# Patient Record
Sex: MALE | Race: WHITE | NOT HISPANIC OR LATINO | Employment: OTHER | URBAN - METROPOLITAN AREA
[De-identification: names, ages, dates, MRNs, and addresses within clinical notes are randomized per-mention and may not be internally consistent; named-entity substitution may affect disease eponyms.]

---

## 2024-01-31 ENCOUNTER — APPOINTMENT (OUTPATIENT)
Dept: LAB | Facility: HOSPITAL | Age: 89
End: 2024-01-31
Payer: COMMERCIAL

## 2024-01-31 DIAGNOSIS — Z13.21 SCREENING FOR MALNUTRITION: ICD-10-CM

## 2024-01-31 DIAGNOSIS — E08.65 DIABETES MELLITUS DUE TO UNDERLYING CONDITION WITH HYPERGLYCEMIA, UNSPECIFIED WHETHER LONG TERM INSULIN USE (HCC): ICD-10-CM

## 2024-01-31 DIAGNOSIS — K57.11 DIVERTICULOSIS OF SMALL INTESTINE WITH HEMORRHAGE: ICD-10-CM

## 2024-01-31 DIAGNOSIS — Z13.1 SCREENING FOR DIABETES MELLITUS: ICD-10-CM

## 2024-01-31 DIAGNOSIS — E04.1 NONTOXIC UNINODULAR GOITER: ICD-10-CM

## 2024-01-31 DIAGNOSIS — I10 ESSENTIAL HYPERTENSION, MALIGNANT: ICD-10-CM

## 2024-01-31 DIAGNOSIS — Z13.0 SCREENING FOR IRON DEFICIENCY ANEMIA: ICD-10-CM

## 2024-01-31 DIAGNOSIS — J43.9 EMPHYSEMATOUS BLEB (HCC): ICD-10-CM

## 2024-01-31 DIAGNOSIS — Z13.29 SCREENING FOR THYROID DISORDER: ICD-10-CM

## 2024-01-31 LAB
25(OH)D3 SERPL-MCNC: 38.7 NG/ML (ref 30–100)
ALBUMIN SERPL BCP-MCNC: 4.1 G/DL (ref 3.5–5)
ALP SERPL-CCNC: 71 U/L (ref 34–104)
ALT SERPL W P-5'-P-CCNC: 27 U/L (ref 7–52)
ANION GAP SERPL CALCULATED.3IONS-SCNC: 12 MMOL/L
AST SERPL W P-5'-P-CCNC: 26 U/L (ref 13–39)
BACTERIA UR QL AUTO: ABNORMAL /HPF
BASOPHILS # BLD AUTO: 0.03 THOUSANDS/ÂΜL (ref 0–0.1)
BASOPHILS NFR BLD AUTO: 0 % (ref 0–1)
BILIRUB SERPL-MCNC: 0.97 MG/DL (ref 0.2–1)
BILIRUB UR QL STRIP: NEGATIVE
BUN SERPL-MCNC: 21 MG/DL (ref 5–25)
CALCIUM SERPL-MCNC: 10 MG/DL (ref 8.4–10.2)
CHLORIDE SERPL-SCNC: 101 MMOL/L (ref 96–108)
CHOLEST SERPL-MCNC: 161 MG/DL
CLARITY UR: CLEAR
CO2 SERPL-SCNC: 30 MMOL/L (ref 21–32)
COLOR UR: YELLOW
CREAT SERPL-MCNC: 1.08 MG/DL (ref 0.6–1.3)
EOSINOPHIL # BLD AUTO: 0.27 THOUSAND/ÂΜL (ref 0–0.61)
EOSINOPHIL NFR BLD AUTO: 3 % (ref 0–6)
ERYTHROCYTE [DISTWIDTH] IN BLOOD BY AUTOMATED COUNT: 14.9 % (ref 11.6–15.1)
GFR SERPL CREATININE-BSD FRML MDRD: 60 ML/MIN/1.73SQ M
GLUCOSE P FAST SERPL-MCNC: 103 MG/DL (ref 65–99)
GLUCOSE UR STRIP-MCNC: ABNORMAL MG/DL
HCT VFR BLD AUTO: 47.9 % (ref 36.5–49.3)
HDLC SERPL-MCNC: 45 MG/DL
HGB BLD-MCNC: 15 G/DL (ref 12–17)
HGB UR QL STRIP.AUTO: NEGATIVE
HYALINE CASTS #/AREA URNS LPF: ABNORMAL /LPF
IMM GRANULOCYTES # BLD AUTO: 0.07 THOUSAND/UL (ref 0–0.2)
IMM GRANULOCYTES NFR BLD AUTO: 1 % (ref 0–2)
KETONES UR STRIP-MCNC: NEGATIVE MG/DL
LDLC SERPL CALC-MCNC: 86 MG/DL (ref 0–100)
LEUKOCYTE ESTERASE UR QL STRIP: ABNORMAL
LYMPHOCYTES # BLD AUTO: 0.95 THOUSANDS/ÂΜL (ref 0.6–4.47)
LYMPHOCYTES NFR BLD AUTO: 9 % (ref 14–44)
MCH RBC QN AUTO: 28.7 PG (ref 26.8–34.3)
MCHC RBC AUTO-ENTMCNC: 31.3 G/DL (ref 31.4–37.4)
MCV RBC AUTO: 92 FL (ref 82–98)
MONOCYTES # BLD AUTO: 1.12 THOUSAND/ÂΜL (ref 0.17–1.22)
MONOCYTES NFR BLD AUTO: 11 % (ref 4–12)
MUCOUS THREADS UR QL AUTO: ABNORMAL
NEUTROPHILS # BLD AUTO: 7.63 THOUSANDS/ÂΜL (ref 1.85–7.62)
NEUTS SEG NFR BLD AUTO: 76 % (ref 43–75)
NITRITE UR QL STRIP: NEGATIVE
NON-SQ EPI CELLS URNS QL MICRO: ABNORMAL /HPF
NRBC BLD AUTO-RTO: 0 /100 WBCS
PH UR STRIP.AUTO: 6 [PH]
PLATELET # BLD AUTO: 261 THOUSANDS/UL (ref 149–390)
PMV BLD AUTO: 10.7 FL (ref 8.9–12.7)
POTASSIUM SERPL-SCNC: 4.2 MMOL/L (ref 3.5–5.3)
PROT SERPL-MCNC: 7.6 G/DL (ref 6.4–8.4)
PROT UR STRIP-MCNC: ABNORMAL MG/DL
RBC # BLD AUTO: 5.22 MILLION/UL (ref 3.88–5.62)
RBC #/AREA URNS AUTO: ABNORMAL /HPF
SODIUM SERPL-SCNC: 143 MMOL/L (ref 135–147)
SP GR UR STRIP.AUTO: 1.02 (ref 1–1.03)
TRIGL SERPL-MCNC: 149 MG/DL
TSH SERPL DL<=0.05 MIU/L-ACNC: 1.8 UIU/ML (ref 0.45–4.5)
UROBILINOGEN UR STRIP-ACNC: <2 MG/DL
WBC # BLD AUTO: 10.07 THOUSAND/UL (ref 4.31–10.16)
WBC #/AREA URNS AUTO: ABNORMAL /HPF

## 2024-01-31 PROCEDURE — 36415 COLL VENOUS BLD VENIPUNCTURE: CPT

## 2024-01-31 PROCEDURE — 84443 ASSAY THYROID STIM HORMONE: CPT

## 2024-01-31 PROCEDURE — 80053 COMPREHEN METABOLIC PANEL: CPT

## 2024-01-31 PROCEDURE — 81001 URINALYSIS AUTO W/SCOPE: CPT

## 2024-01-31 PROCEDURE — 80061 LIPID PANEL: CPT

## 2024-01-31 PROCEDURE — 82306 VITAMIN D 25 HYDROXY: CPT

## 2024-01-31 PROCEDURE — 85025 COMPLETE CBC W/AUTO DIFF WBC: CPT

## 2024-03-06 ENCOUNTER — APPOINTMENT (OUTPATIENT)
Dept: LAB | Facility: HOSPITAL | Age: 89
End: 2024-03-06
Payer: COMMERCIAL

## 2024-03-06 DIAGNOSIS — Z79.4 DIABETES MELLITUS DUE TO UNDERLYING CONDITION, UNCONTROLLED, WITH HYPERGLYCEMIA, WITH LONG-TERM CURRENT USE OF INSULIN (HCC): ICD-10-CM

## 2024-03-06 DIAGNOSIS — I10 ESSENTIAL HYPERTENSION, MALIGNANT: ICD-10-CM

## 2024-03-06 DIAGNOSIS — E08.65 DIABETES MELLITUS DUE TO UNDERLYING CONDITION, UNCONTROLLED, WITH HYPERGLYCEMIA, WITH LONG-TERM CURRENT USE OF INSULIN (HCC): ICD-10-CM

## 2024-03-06 LAB
EST. AVERAGE GLUCOSE BLD GHB EST-MCNC: 223 MG/DL
HBA1C MFR BLD: 9.4 %

## 2024-03-06 PROCEDURE — 36415 COLL VENOUS BLD VENIPUNCTURE: CPT

## 2024-03-06 PROCEDURE — 83036 HEMOGLOBIN GLYCOSYLATED A1C: CPT

## 2024-03-07 ENCOUNTER — LAB REQUISITION (OUTPATIENT)
Dept: LAB | Facility: HOSPITAL | Age: 89
End: 2024-03-07
Payer: COMMERCIAL

## 2024-03-07 ENCOUNTER — TELEPHONE (OUTPATIENT)
Dept: LAB | Facility: HOSPITAL | Age: 89
End: 2024-03-07

## 2024-03-07 DIAGNOSIS — E08.65 DIABETES MELLITUS DUE TO UNDERLYING CONDITION WITH HYPERGLYCEMIA (HCC): ICD-10-CM

## 2024-03-07 DIAGNOSIS — I10 ESSENTIAL (PRIMARY) HYPERTENSION: ICD-10-CM

## 2024-03-07 LAB
CREAT UR-MCNC: 54.9 MG/DL
MICROALBUMIN UR-MCNC: 7.2 MG/L
MICROALBUMIN/CREAT 24H UR: 13 MG/G CREATININE (ref 0–30)

## 2024-03-07 PROCEDURE — 82043 UR ALBUMIN QUANTITATIVE: CPT | Performed by: PHYSICIAN ASSISTANT

## 2024-03-07 PROCEDURE — 82570 ASSAY OF URINE CREATININE: CPT | Performed by: PHYSICIAN ASSISTANT

## 2024-04-18 ENCOUNTER — TELEPHONE (OUTPATIENT)
Dept: CARDIOLOGY CLINIC | Facility: CLINIC | Age: 89
End: 2024-04-18

## 2024-04-18 NOTE — TELEPHONE ENCOUNTER
Spoke with Cecil ([address]) who took the message that patient's 5- appointment with Dr Reeves is cancelled.  Dr Reeves is not in the office this morning.  She will have patient contact the office back to reschedule as avelino likes to schedule his own appointments.  Provided number for her to have patient call back

## 2024-05-10 ENCOUNTER — OFFICE VISIT (OUTPATIENT)
Dept: FAMILY MEDICINE CLINIC | Facility: HOSPITAL | Age: 89
End: 2024-05-10
Payer: COMMERCIAL

## 2024-05-10 VITALS
HEART RATE: 64 BPM | HEIGHT: 67 IN | BODY MASS INDEX: 33.43 KG/M2 | TEMPERATURE: 97.9 F | DIASTOLIC BLOOD PRESSURE: 62 MMHG | RESPIRATION RATE: 16 BRPM | WEIGHT: 213 LBS | OXYGEN SATURATION: 90 % | SYSTOLIC BLOOD PRESSURE: 120 MMHG

## 2024-05-10 DIAGNOSIS — I10 PRIMARY HYPERTENSION: ICD-10-CM

## 2024-05-10 DIAGNOSIS — R35.1 BENIGN PROSTATIC HYPERPLASIA WITH NOCTURIA: ICD-10-CM

## 2024-05-10 DIAGNOSIS — E78.2 MIXED HYPERLIPIDEMIA: ICD-10-CM

## 2024-05-10 DIAGNOSIS — G47.33 OSA (OBSTRUCTIVE SLEEP APNEA): ICD-10-CM

## 2024-05-10 DIAGNOSIS — Z79.4 TYPE 2 DIABETES MELLITUS WITH HYPERGLYCEMIA, WITH LONG-TERM CURRENT USE OF INSULIN (HCC): Primary | ICD-10-CM

## 2024-05-10 DIAGNOSIS — J44.9 CHRONIC OBSTRUCTIVE PULMONARY DISEASE, UNSPECIFIED COPD TYPE (HCC): ICD-10-CM

## 2024-05-10 DIAGNOSIS — N40.1 BENIGN PROSTATIC HYPERPLASIA WITH NOCTURIA: ICD-10-CM

## 2024-05-10 DIAGNOSIS — E11.65 TYPE 2 DIABETES MELLITUS WITH HYPERGLYCEMIA, WITH LONG-TERM CURRENT USE OF INSULIN (HCC): Primary | ICD-10-CM

## 2024-05-10 DIAGNOSIS — I25.10 CORONARY ARTERY DISEASE INVOLVING NATIVE CORONARY ARTERY OF NATIVE HEART WITHOUT ANGINA PECTORIS: ICD-10-CM

## 2024-05-10 DIAGNOSIS — E53.8 B12 DEFICIENCY: ICD-10-CM

## 2024-05-10 DIAGNOSIS — E55.9 VITAMIN D DEFICIENCY: ICD-10-CM

## 2024-05-10 PROCEDURE — 99204 OFFICE O/P NEW MOD 45 MIN: CPT | Performed by: INTERNAL MEDICINE

## 2024-05-10 RX ORDER — FINASTERIDE 5 MG/1
TABLET, FILM COATED ORAL EVERY 24 HOURS
COMMUNITY

## 2024-05-10 RX ORDER — ATORVASTATIN CALCIUM 80 MG/1
TABLET, FILM COATED ORAL EVERY 24 HOURS
COMMUNITY

## 2024-05-10 RX ORDER — FLUTICASONE PROPIONATE 50 MCG
SPRAY, SUSPENSION (ML) NASAL EVERY 24 HOURS
COMMUNITY

## 2024-05-10 RX ORDER — AMLODIPINE BESYLATE 10 MG/1
TABLET ORAL EVERY 24 HOURS
COMMUNITY

## 2024-05-10 RX ORDER — INSULIN GLARGINE 100 [IU]/ML
INJECTION, SOLUTION SUBCUTANEOUS
COMMUNITY
Start: 2024-02-27 | End: 2024-05-10

## 2024-05-10 RX ORDER — PANTOPRAZOLE SODIUM 40 MG/1
TABLET, DELAYED RELEASE ORAL EVERY 24 HOURS
COMMUNITY

## 2024-05-10 RX ORDER — IPRATROPIUM BROMIDE AND ALBUTEROL SULFATE 2.5; .5 MG/3ML; MG/3ML
SOLUTION RESPIRATORY (INHALATION)
COMMUNITY

## 2024-05-10 RX ORDER — ATENOLOL 50 MG/1
TABLET ORAL EVERY 24 HOURS
COMMUNITY

## 2024-05-10 RX ORDER — VITAMIN B COMPLEX
TABLET ORAL
COMMUNITY

## 2024-05-10 RX ORDER — FUROSEMIDE 20 MG/1
TABLET ORAL EVERY 24 HOURS
COMMUNITY

## 2024-05-10 RX ORDER — FLUTICASONE FUROATE, UMECLIDINIUM BROMIDE AND VILANTEROL TRIFENATATE 100; 62.5; 25 UG/1; UG/1; UG/1
1 POWDER RESPIRATORY (INHALATION) EVERY 24 HOURS
COMMUNITY

## 2024-05-10 RX ORDER — INSULIN GLARGINE 100 [IU]/ML
INJECTION, SOLUTION SUBCUTANEOUS
Qty: 15 ML | Refills: 3 | Status: SHIPPED | OUTPATIENT
Start: 2024-05-10

## 2024-05-10 RX ORDER — INSULIN ADMIN. SUPPLIES
INSULIN PEN (EA) SUBCUTANEOUS
COMMUNITY
Start: 2024-04-08

## 2024-05-10 RX ORDER — LACTOSE-REDUCED FOOD
LIQUID (ML) ORAL
COMMUNITY

## 2024-05-10 RX ORDER — HYDROCHLOROTHIAZIDE 25 MG/1
TABLET ORAL EVERY 24 HOURS
COMMUNITY

## 2024-05-10 RX ORDER — CYCLOSPORINE 0.5 MG/ML
EMULSION OPHTHALMIC EVERY 12 HOURS
COMMUNITY

## 2024-05-10 RX ORDER — LOSARTAN POTASSIUM 100 MG/1
TABLET ORAL EVERY 24 HOURS
COMMUNITY

## 2024-05-10 RX ORDER — CLOTRIMAZOLE AND BETAMETHASONE DIPROPIONATE 10; .64 MG/G; MG/G
1 CREAM TOPICAL EVERY 12 HOURS
COMMUNITY

## 2024-05-10 RX ORDER — ISOSORBIDE MONONITRATE 60 MG/1
TABLET, EXTENDED RELEASE ORAL
COMMUNITY

## 2024-05-10 RX ORDER — ISOPROPYL ALCOHOL 0.7 ML/1
SWAB TOPICAL
COMMUNITY

## 2024-05-10 RX ORDER — ASPIRIN 81 MG/1
TABLET, CHEWABLE ORAL EVERY 24 HOURS
COMMUNITY

## 2024-05-10 RX ORDER — ACETAMINOPHEN 325 MG/1
TABLET ORAL
COMMUNITY

## 2024-05-10 RX ORDER — ALBUTEROL SULFATE 2.5 MG/3ML
SOLUTION RESPIRATORY (INHALATION)
COMMUNITY

## 2024-05-10 RX ORDER — ALFUZOSIN HYDROCHLORIDE 10 MG/1
TABLET, EXTENDED RELEASE ORAL EVERY 24 HOURS
COMMUNITY

## 2024-05-10 NOTE — ASSESSMENT & PLAN NOTE
Patient has chronic hypertension.  His blood pressure is controlled today    I will check his electrolytes and renal function since he is on furosemide and hydrochlorothiazide in addition to losartan, atenolol and amlodipine.    Will continue this combination for now and will review labs.

## 2024-05-10 NOTE — ASSESSMENT & PLAN NOTE
He has chronic COPD.  He quit smoking in 1988  He feels that his breathing is at baseline.  He uses oxygen as needed when he feels out of breath.  He has an oxygen concentrator at his facility.    His lungs are completely clear to auscultation today.  Continue Trelegy and as needed albuterol

## 2024-05-10 NOTE — ASSESSMENT & PLAN NOTE
He has BPH with nocturia.  Denies urinary retention, dysuria, hematuria.    He requested that I check his PSA.  I told him that after age 70 screening for prostate cancer is not indicated by any professional societies    He will continue alfuzosin and finasteride for BPH

## 2024-05-10 NOTE — ASSESSMENT & PLAN NOTE
Lab Results   Component Value Date    HGBA1C 9.4 (H) 03/06/2024       Patient presents as a new patient to establish care at this practice.    He has had diabetes at least since 2008.  He denies retinopathy nephropathy or neuropathy from diabetes.    He is not able to follow a diabetic diet when he resides at an assisted living facility.  His blood sugars are frequently more than 300.  The lowest blood sugar he remembers is 146.  He checks his blood sugars 4 times a day.    A1c was uncontrolled in March    I increased his Lantus to 30 units in the morning from 20 units and will continue giving him 40 units in the evening.  I will recheck his A1c  Continue Jardiance    He saw an ophthalmologist last year somewhere in the [facility]  Monofilament testing of the feet today was normal.  Will check his renal function and his urine microalbumin    I will follow-up with him at [facility] living [facility]

## 2024-05-10 NOTE — PROGRESS NOTES
Assessment/Plan:    Type 2 diabetes mellitus with hyperglycemia, with long-term current use of insulin ([facility])    Lab Results   Component Value Date    HGBA1C 9.4 (H) 03/06/2024       Patient presents as a new patient to establish care at this practice.    He has had diabetes at least since 2008.  He denies retinopathy nephropathy or neuropathy from diabetes.    He is not able to follow a diabetic diet when he resides at an assisted living facility.  His blood sugars are frequently more than 300.  The lowest blood sugar he remembers is 146.  He checks his blood sugars 4 times a day.    A1c was uncontrolled in March    I increased his Lantus to 30 units in the morning from 20 units and will continue giving him 40 units in the evening.  I will recheck his A1c  Continue Jardiance    He saw an ophthalmologist last year somewhere in the [facility]  Monofilament testing of the feet today was normal.  Will check his renal function and his urine microalbumin    I will follow-up with him at [facility]    Coronary artery disease involving native coronary artery of native heart without angina pectoris  Patient has coronary disease and he is status post 3 stent placements in his arteries.  He denies chest pain at rest or with exertion.  He only gastro breath with heavy physical exertion.    His lungs and heart examinations are normal today    Continue aspirin, atorvastatin, Imdur, atenolol and losartan    COPD (chronic obstructive pulmonary disease) ([facility])  He has chronic COPD.  He quit smoking in 1988  He feels that his breathing is at baseline.  He uses oxygen as needed when he feels out of breath.  He has an oxygen concentrator at his facility.    His lungs are completely clear to auscultation today.  Continue Trelegy and as needed albuterol    Primary hypertension  Patient has chronic hypertension.  His blood pressure is controlled today    I will check his electrolytes and renal function  since he is on furosemide and hydrochlorothiazide in addition to losartan, atenolol and amlodipine.    Will continue this combination for now and will review labs.    Benign prostatic hyperplasia with nocturia  He has BPH with nocturia.  Denies urinary retention, dysuria, hematuria.    He requested that I check his PSA.  I told him that after age 70 screening for prostate cancer is not indicated by any professional societies    He will continue alfuzosin and finasteride for BPH    Mixed hyperlipidemia  He has hyperlipidemia.  Denies myalgias.  I will check his cholesterol.  Continue atorvastatin    Vitamin D deficiency  He has vitamin D deficiency.  I will check his vitamin D level    B12 deficiency  He has a vitamin B12 deficiency.  I will check his B12 level       Diagnoses and all orders for this visit:    Type 2 diabetes mellitus with hyperglycemia, with long-term current use of insulin ([facility])  -     Comprehensive metabolic panel; Future  -     CBC and Platelet; Future  -     Albumin / creatinine urine ratio; Future  -     Hemoglobin A1C; Future  -     Lipid Panel with Direct LDL reflex; Future  -     TSH, 3rd generation with Free T4 reflex; Future  -     Insulin Glargine Solostar (Lantus SoloStar) 100 UNIT/ML SOPN; 30 units sc in am and 40 sc at hs    Chronic obstructive pulmonary disease, unspecified COPD type ([facility])    Coronary artery disease involving native coronary artery of native heart without angina pectoris    Primary hypertension  -     Comprehensive metabolic panel; Future  -     CBC and Platelet; Future  -     TSH, 3rd generation with Free T4 reflex; Future    B12 deficiency  -     Vitamin B12; Future    Mixed hyperlipidemia  -     Lipid Panel with Direct LDL reflex; Future    Vitamin D deficiency  -     Vitamin D 25 hydroxy; Future    LINCOLN (obstructive sleep apnea)    Benign prostatic hyperplasia with nocturia    Other orders  -     Alcohol Swabs (Alcohol Prep Pad) 70 % PADS; As directed  -      Multiple Vitamin (MULTIVITAMIN ADULT PO); every 24 hours  -     ipratropium-albuterol (DUO-NEB) 0.5-2.5 mg/3 mL nebulizer solution; 4 times daily prn wheezing  -     isosorbide mononitrate (IMDUR) 60 mg 24 hr tablet; 1 tablet in the morning Orally Once a day  -     losartan (COZAAR) 100 MG tablet; every 24 hours  -     Cyanocobalamin 2500 MCG SUBL; 1 tab --5 times a week  -     Empagliflozin (JARDIANCE) 10 MG TABS tablet; every 24 hours  -     clotrimazole-betamethasone (LOTRISONE) 1-0.05 % cream; 1 Application Every 12 hours  -     cycloSPORINE (RESTASIS) 0.05 % ophthalmic emulsion; Administer to both eyes Every 12 hours  -     albuterol (2.5 mg/3 mL) 0.083 % nebulizer solution; 1 vial q 4 hrs prn  -     alfuzosin (UROXATRAL) 10 mg 24 hr tablet; every 24 hours  -     amLODIPine (NORVASC) 10 mg tablet; every 24 hours  -     aspirin 81 mg chewable tablet; every 24 hours  -     atorvastatin (LIPITOR) 80 mg tablet; every 24 hours  -     atenolol (TENORMIN) 50 mg tablet; every 24 hours  -     finasteride (PROSCAR) 5 mg tablet; every 24 hours  -     fluticasone (FLONASE) 50 mcg/act nasal spray; every 24 hours  -     Trelegy Ellipta 100-62.5-25 MCG/ACT inhaler; 1 puff every 24 hours  -     furosemide (LASIX) 20 mg tablet; every 24 hours  -     hydroCHLOROthiazide 25 mg tablet; every 24 hours  -     Discontinue: Lantus SoloStar 100 units/mL SOPN; 22 units in am and 40 units in evening Subcutaneous 2 x daily for 90 days  -     NovoFine Autocover Pen Needle 30G X 8 MM MISC; 2 pen needles daily  -     pantoprazole (PROTONIX) 40 mg tablet; every 24 hours  -     Nutritional Supplements (Ensure); Take by mouth 1 can daily  -     acetaminophen (TYLENOL) 325 mg tablet; 2 tabs q 6 hrs prn pain or fever greater than 100  -     Cholecalciferol (Vitamin D3) 25 MCG (1000 UT) CAPS; Take by mouth 1 daily          Subjective:      Patient ID: Kyler Hernandez is a 88 y.o. male.      HPI    Patient presents for follow-up of chronic conditions  "as detailed in the assessment and plan.      The following portions of the patient's history were reviewed and updated as appropriate: current medications, past family history, past medical history, past social history, past surgical history and problem list.    Review of Systems   Constitutional:  Negative for fever.   HENT:  Positive for hearing loss.    Eyes:  Negative for visual disturbance.   Respiratory:  Negative for cough. Shortness of breath: with heavy exetion only.   Cardiovascular:  Negative for chest pain and palpitations.   Gastrointestinal:  Negative for abdominal pain, blood in stool, constipation and diarrhea.   Endocrine: Negative for polydipsia and polyphagia.   Genitourinary:  Negative for dysuria and hematuria.   Musculoskeletal:  Negative for myalgias.   Skin:  Negative for rash.   Neurological:  Negative for weakness, numbness and headaches.   Psychiatric/Behavioral:  Negative for dysphoric mood.    All other systems reviewed and are negative.        Objective:    /62   Pulse 64   Temp 97.9 °F (36.6 °C) (Tympanic)   Resp 16   Ht 5' 7\" (1.702 m)   Wt 96.6 kg (213 lb)   SpO2 90%   BMI 33.36 kg/m²      Physical Exam  HENT:      Head: Normocephalic.   Eyes:      Conjunctiva/sclera: Conjunctivae normal.   Cardiovascular:      Rate and Rhythm: Normal rate and regular rhythm.      Pulses: no weak pulses.           Dorsalis pedis pulses are 2+ on the right side and 2+ on the left side.        Posterior tibial pulses are 2+ on the right side and 1+ on the left side.      Heart sounds: No murmur heard.  Pulmonary:      Effort: No respiratory distress.      Breath sounds: No wheezing or rales.   Abdominal:      General: Bowel sounds are normal.      Palpations: Abdomen is soft.      Tenderness: There is no abdominal tenderness.   Musculoskeletal:         General: No tenderness.      Cervical back: Neck supple.   Feet:      Right foot:      Skin integrity: No ulcer.      Left foot:      Skin " integrity: No ulcer.   Skin:     General: Skin is warm and dry.   Neurological:      Mental Status: He is alert.      Cranial Nerves: No cranial nerve deficit.      Motor: No weakness.      Gait: Gait normal.   Psychiatric:         Mood and Affect: Mood normal.             Csaba Berces, MDDiabetic Foot Exam    Patient's shoes and socks removed.    Right Foot/Ankle   Right Foot Inspection  Skin Exam: No ulcer.     Sensory   Monofilament testing: intact    Vascular  The right DP pulse is 2+. The right PT pulse is 2+.     Left Foot/Ankle  Left Foot Inspection  Skin Exam: No ulcer.     Sensory   Monofilament testing: intact    Vascular  The left DP pulse is 2+. The left PT pulse is 1+.     Assign Risk Category  No deformity present  No loss of protective sensation  No weak pulses  Risk: 0      Depression Screening and Follow-up Plan: Patient was screened for depression during today's encounter. They screened negative with a PHQ-2 score of 0.

## 2024-05-10 NOTE — ASSESSMENT & PLAN NOTE
Patient has coronary disease and he is status post 3 stent placements in his arteries.  He denies chest pain at rest or with exertion.  He only gastro breath with heavy physical exertion.    His lungs and heart examinations are normal today    Continue aspirin, atorvastatin, Imdur, atenolol and losartan

## 2024-05-15 ENCOUNTER — APPOINTMENT (OUTPATIENT)
Dept: LAB | Facility: HOSPITAL | Age: 89
End: 2024-05-15
Attending: INTERNAL MEDICINE
Payer: COMMERCIAL

## 2024-05-15 DIAGNOSIS — E55.9 VITAMIN D DEFICIENCY: ICD-10-CM

## 2024-05-15 DIAGNOSIS — E53.8 B12 DEFICIENCY: ICD-10-CM

## 2024-05-15 DIAGNOSIS — Z79.4 TYPE 2 DIABETES MELLITUS WITH HYPERGLYCEMIA, WITH LONG-TERM CURRENT USE OF INSULIN (HCC): ICD-10-CM

## 2024-05-15 DIAGNOSIS — I10 PRIMARY HYPERTENSION: ICD-10-CM

## 2024-05-15 DIAGNOSIS — E78.2 MIXED HYPERLIPIDEMIA: ICD-10-CM

## 2024-05-15 DIAGNOSIS — E11.65 TYPE 2 DIABETES MELLITUS WITH HYPERGLYCEMIA, WITH LONG-TERM CURRENT USE OF INSULIN (HCC): ICD-10-CM

## 2024-05-15 LAB
25(OH)D3 SERPL-MCNC: 41.4 NG/ML (ref 30–100)
ALBUMIN SERPL BCP-MCNC: 4.4 G/DL (ref 3.5–5)
ALP SERPL-CCNC: 71 U/L (ref 34–104)
ALT SERPL W P-5'-P-CCNC: 22 U/L (ref 7–52)
ANION GAP SERPL CALCULATED.3IONS-SCNC: 10 MMOL/L (ref 4–13)
AST SERPL W P-5'-P-CCNC: 16 U/L (ref 13–39)
BILIRUB SERPL-MCNC: 0.63 MG/DL (ref 0.2–1)
BUN SERPL-MCNC: 25 MG/DL (ref 5–25)
CALCIUM SERPL-MCNC: 9.9 MG/DL (ref 8.4–10.2)
CHLORIDE SERPL-SCNC: 101 MMOL/L (ref 96–108)
CHOLEST SERPL-MCNC: 161 MG/DL
CO2 SERPL-SCNC: 32 MMOL/L (ref 21–32)
CREAT SERPL-MCNC: 1.3 MG/DL (ref 0.6–1.3)
ERYTHROCYTE [DISTWIDTH] IN BLOOD BY AUTOMATED COUNT: 14.8 % (ref 11.6–15.1)
EST. AVERAGE GLUCOSE BLD GHB EST-MCNC: 212 MG/DL
GFR SERPL CREATININE-BSD FRML MDRD: 48 ML/MIN/1.73SQ M
GLUCOSE P FAST SERPL-MCNC: 172 MG/DL (ref 65–99)
HBA1C MFR BLD: 9 %
HCT VFR BLD AUTO: 46.3 % (ref 36.5–49.3)
HDLC SERPL-MCNC: 46 MG/DL
HGB BLD-MCNC: 15 G/DL (ref 12–17)
LDLC SERPL CALC-MCNC: 87 MG/DL (ref 0–100)
MCH RBC QN AUTO: 29.9 PG (ref 26.8–34.3)
MCHC RBC AUTO-ENTMCNC: 32.4 G/DL (ref 31.4–37.4)
MCV RBC AUTO: 92 FL (ref 82–98)
PLATELET # BLD AUTO: 221 THOUSANDS/UL (ref 149–390)
PMV BLD AUTO: 10.2 FL (ref 8.9–12.7)
POTASSIUM SERPL-SCNC: 4.1 MMOL/L (ref 3.5–5.3)
PROT SERPL-MCNC: 7.9 G/DL (ref 6.4–8.4)
RBC # BLD AUTO: 5.01 MILLION/UL (ref 3.88–5.62)
SODIUM SERPL-SCNC: 143 MMOL/L (ref 135–147)
TRIGL SERPL-MCNC: 142 MG/DL
TSH SERPL DL<=0.05 MIU/L-ACNC: 1.6 UIU/ML (ref 0.45–4.5)
VIT B12 SERPL-MCNC: 1967 PG/ML (ref 180–914)
WBC # BLD AUTO: 7.54 THOUSAND/UL (ref 4.31–10.16)

## 2024-05-15 PROCEDURE — 82607 VITAMIN B-12: CPT

## 2024-05-15 PROCEDURE — 36415 COLL VENOUS BLD VENIPUNCTURE: CPT

## 2024-05-15 PROCEDURE — 82306 VITAMIN D 25 HYDROXY: CPT

## 2024-05-15 PROCEDURE — 83036 HEMOGLOBIN GLYCOSYLATED A1C: CPT

## 2024-05-15 PROCEDURE — 84443 ASSAY THYROID STIM HORMONE: CPT

## 2024-05-15 PROCEDURE — 80053 COMPREHEN METABOLIC PANEL: CPT

## 2024-05-15 PROCEDURE — 85027 COMPLETE CBC AUTOMATED: CPT

## 2024-05-15 PROCEDURE — 80061 LIPID PANEL: CPT

## 2024-05-23 ENCOUNTER — IN HOME VISIT (OUTPATIENT)
Dept: FAMILY MEDICINE CLINIC | Facility: HOSPITAL | Age: 89
End: 2024-05-23
Payer: COMMERCIAL

## 2024-05-23 VITALS
HEART RATE: 68 BPM | TEMPERATURE: 96.9 F | WEIGHT: 216 LBS | RESPIRATION RATE: 16 BRPM | BODY MASS INDEX: 33.83 KG/M2 | DIASTOLIC BLOOD PRESSURE: 62 MMHG | SYSTOLIC BLOOD PRESSURE: 124 MMHG

## 2024-05-23 DIAGNOSIS — R35.1 BENIGN PROSTATIC HYPERPLASIA WITH NOCTURIA: ICD-10-CM

## 2024-05-23 DIAGNOSIS — N40.1 BENIGN PROSTATIC HYPERPLASIA WITH NOCTURIA: ICD-10-CM

## 2024-05-23 DIAGNOSIS — Z79.4 TYPE 2 DIABETES MELLITUS WITH HYPERGLYCEMIA, WITH LONG-TERM CURRENT USE OF INSULIN (HCC): Primary | ICD-10-CM

## 2024-05-23 DIAGNOSIS — I10 PRIMARY HYPERTENSION: ICD-10-CM

## 2024-05-23 DIAGNOSIS — E11.65 TYPE 2 DIABETES MELLITUS WITH HYPERGLYCEMIA, WITH LONG-TERM CURRENT USE OF INSULIN (HCC): Primary | ICD-10-CM

## 2024-05-23 PROCEDURE — 99349 HOME/RES VST EST MOD MDM 40: CPT | Performed by: INTERNAL MEDICINE

## 2024-05-23 RX ORDER — INSULIN GLARGINE 100 [IU]/ML
INJECTION, SOLUTION SUBCUTANEOUS
Qty: 15 ML | Refills: 3 | Status: SHIPPED | OUTPATIENT
Start: 2024-05-23

## 2024-05-23 RX ORDER — INSULIN LISPRO 100 [IU]/ML
5 INJECTION, SOLUTION INTRAVENOUS; SUBCUTANEOUS
Qty: 15 ML | Refills: 5 | Status: SHIPPED | OUTPATIENT
Start: 2024-05-23

## 2024-05-23 RX ORDER — FUROSEMIDE 40 MG/1
40 TABLET ORAL EVERY MORNING
Qty: 30 TABLET | Refills: 5 | Status: SHIPPED | OUTPATIENT
Start: 2024-05-23

## 2024-05-23 NOTE — ASSESSMENT & PLAN NOTE
Lab Results   Component Value Date    HGBA1C 9.0 (H) 05/15/2024       Patient has type 2 diabetes with hyperglycemia.  His fasting blood sugars in the morning since May 17 have been running between 138-188, before lunch blood sugars can be anywhere between 184-279, before dinner blood sugars run between 176 and 262, bedtime blood sugars run between 215-346.    I placed patient on diabetic diet and the assisted in facility    I will increase the dose of his morning Lantus to 35 units in the morning and will add Humalog before dinner to improve bedtime hyperglycemia    Recheck A1c after August 15

## 2024-05-23 NOTE — ASSESSMENT & PLAN NOTE
Patient has BPH with nocturia.  He feels that he is able to empty his bladder.  Denies urinary retention.  Continue alfuzosin and finasteride

## 2024-05-23 NOTE — ASSESSMENT & PLAN NOTE
Patient's blood pressure is acceptable.    His electrolytes were normal on May 15, renal function was worse than in January.  Patient does not take NSAIDs.    His lungs are clear to auscultation, he has trace lower leg edema.    I increased the dose of furosemide to 40 mg in the morning and discontinue the HCTZ.  I will recheck his electrolytes renal function next week    Continue losartan 100 mg daily, continue amlodipine 10 mg and atenolol 50 mg daily

## 2024-05-23 NOTE — PROGRESS NOTES
Assessment/Plan:    Type 2 diabetes mellitus with hyperglycemia, with long-term current use of insulin ([facility])    Lab Results   Component Value Date    HGBA1C 9.0 (H) 05/15/2024       Patient has type 2 diabetes with hyperglycemia.  His fasting blood sugars in the morning since May 17 have been running between 138-188, before lunch blood sugars can be anywhere between 184-279, before dinner blood sugars run between 176 and 262, bedtime blood sugars run between 215-346.    I placed patient on diabetic diet and the assisted in facility    I will increase the dose of his morning Lantus to 35 units in the morning and will add Humalog before dinner to improve bedtime hyperglycemia    Recheck A1c after August 15    Primary hypertension  Patient's blood pressure is acceptable.    His electrolytes were normal on May 15, renal function was worse than in January.  Patient does not take NSAIDs.    His lungs are clear to auscultation, he has trace lower leg edema.    I increased the dose of furosemide to 40 mg in the morning and discontinue the HCTZ.  I will recheck his electrolytes renal function next week    Continue losartan 100 mg daily, continue amlodipine 10 mg and atenolol 50 mg daily      Benign prostatic hyperplasia with nocturia  Patient has BPH with nocturia.  He feels that he is able to empty his bladder.  Denies urinary retention.  Continue alfuzosin and finasteride     Diagnoses and all orders for this visit:    Type 2 diabetes mellitus with hyperglycemia, with long-term current use of insulin ([facility])  -     Basic metabolic panel; Future  -     Hemoglobin A1C; Future  -     Insulin Glargine Solostar (Lantus SoloStar) 100 UNIT/ML SOPN; 35 units sc in am and 40 sc at hs  -     insulin lispro (HumaLOG KwikPen) 100 units/mL injection pen; Inject 5 Units under the skin daily before dinner    Primary hypertension  -     furosemide (LASIX) 40 mg tablet; Take 1 tablet (40 mg total) by mouth every morning  -     Basic  metabolic panel; Future    Benign prostatic hyperplasia with nocturia          Subjective:      Patient ID: Kyler Hernandez is a 88 y.o. male.      I saw patient for follow-up at [facility] in [facility] for diabetes mellitus with uncontrolled blood sugars.    We reviewed labs from March 15.  He has not been on diabetic diet at the facility but I started it today.          Patient presents for follow-up of chronic conditions as detailed in the assessment and plan.      The following portions of the patient's history were reviewed and updated as appropriate: current medications, past family history, past medical history, past social history, past surgical history and problem list.    Review of Systems   Respiratory:  Negative for shortness of breath.    Cardiovascular:  Negative for chest pain.   Gastrointestinal:  Negative for abdominal pain and blood in stool.   Genitourinary:  Positive for difficulty urinating. Negative for dysuria and hematuria.         Objective:    /62   Pulse 68   Temp (!) 96.9 °F (36.1 °C)   Resp 16   Wt 98 kg (216 lb)   BMI 33.83 kg/m²      Physical Exam  Constitutional:       General: He is not in acute distress.     Appearance: He is not toxic-appearing.   Cardiovascular:      Rate and Rhythm: Normal rate and regular rhythm.   Pulmonary:      Effort: No respiratory distress.      Breath sounds: No wheezing or rales.   Abdominal:      General: Bowel sounds are normal.      Palpations: Abdomen is soft. There is no mass.      Tenderness: There is no abdominal tenderness.   Skin:     General: Skin is warm and dry.   Neurological:      Mental Status: He is alert and oriented to person, place, and time.      Motor: No weakness.   Psychiatric:         Mood and Affect: Mood normal.             Gordon Álvarez MD

## 2024-06-10 ENCOUNTER — OFFICE VISIT (OUTPATIENT)
Dept: ENDOCRINOLOGY | Facility: HOSPITAL | Age: 89
End: 2024-06-10
Payer: COMMERCIAL

## 2024-06-10 VITALS
HEART RATE: 50 BPM | BODY MASS INDEX: 34.31 KG/M2 | HEIGHT: 67 IN | OXYGEN SATURATION: 90 % | DIASTOLIC BLOOD PRESSURE: 60 MMHG | SYSTOLIC BLOOD PRESSURE: 118 MMHG | WEIGHT: 218.6 LBS

## 2024-06-10 DIAGNOSIS — I25.10 CORONARY ARTERY DISEASE INVOLVING NATIVE CORONARY ARTERY OF NATIVE HEART WITHOUT ANGINA PECTORIS: ICD-10-CM

## 2024-06-10 DIAGNOSIS — E78.2 MIXED HYPERLIPIDEMIA: Primary | ICD-10-CM

## 2024-06-10 DIAGNOSIS — Z79.4 TYPE 2 DIABETES MELLITUS WITH HYPERGLYCEMIA, WITH LONG-TERM CURRENT USE OF INSULIN (HCC): ICD-10-CM

## 2024-06-10 DIAGNOSIS — E11.65 TYPE 2 DIABETES MELLITUS WITH HYPERGLYCEMIA, WITH LONG-TERM CURRENT USE OF INSULIN (HCC): ICD-10-CM

## 2024-06-10 DIAGNOSIS — I10 PRIMARY HYPERTENSION: ICD-10-CM

## 2024-06-10 PROCEDURE — 99204 OFFICE O/P NEW MOD 45 MIN: CPT | Performed by: STUDENT IN AN ORGANIZED HEALTH CARE EDUCATION/TRAINING PROGRAM

## 2024-06-10 RX ORDER — INSULIN LISPRO 100 [IU]/ML
1-8 INJECTION, SOLUTION INTRAVENOUS; SUBCUTANEOUS
Qty: 15 ML | Refills: 5 | Status: SHIPPED | OUTPATIENT
Start: 2024-06-10

## 2024-06-10 RX ORDER — DULAGLUTIDE 0.75 MG/.5ML
0.75 INJECTION, SOLUTION SUBCUTANEOUS
Qty: 2 ML | Refills: 2 | Status: SHIPPED | OUTPATIENT
Start: 2024-06-10

## 2024-06-10 NOTE — PROGRESS NOTES
"    New Patient Consult Note      Chief Complaint   Patient presents with    Diabetes Type 2      Referring Provider  Referral Self  No address on file     History of Present Illness:   Kyler Hernandez is a 88 y.o. male with a history of type 2 diabetes since 1987 With complications of CABG s.p stenting x3 who presents today for diabetes management. Other Pmhx of htn, hlp, class 1 obesity, COPD    Patient was first diagnosed with T2DM- 1987  Control since diagnosis:- only 2 A1C on file 03/24 9.4% now 9%   Medications tried thus far: metformin for years but reports wont be on it anymore since it will kill his kidney  Current regime:- lantus 35u AM and 40u PM, Lispro 5u with dinner, jardiance 10mg   BG control:- checks BG 3-4x daily.   Random BG checks but avg in 200-300 range  Hypoglycemic episodes:  None  Hyperglycemia symptoms: no polyuria or polydipsia\",\"no chest pain, dyspnea or TIAs\",\"no numbness, tingling or pain in extremities\"  Diet: reports they give him poor diet at the facility and they dont have very many diabetic friendly options  Activity: stays active    Opthamology: oct 2023; no retinopathy, no macular edema  Hx of hyperlipidemia: yes  Hx of hypertension: yes  Last Lipid:- 05/24 LDL <100  Last urine microalbumin: 03/24 normal   Last hbA1C: 05/24 9%    Social Hx:- lives [facility]  Family HX:- nephew had T1DM, sister had T2DM    Patient Active Problem List   Diagnosis    Coronary artery disease involving native coronary artery of native heart without angina pectoris    Primary hypertension    Type 2 diabetes mellitus with hyperglycemia, with long-term current use of insulin (HCC)    Benign prostatic hyperplasia with nocturia    COPD (chronic obstructive pulmonary disease) (HCC)    LINCOLN (obstructive sleep apnea)    B12 deficiency    Vitamin D deficiency    Mixed hyperlipidemia      Past Medical History:   Diagnosis Date    Atherosclerotic heart disease of native coronary artery without angina " pectoris     Chronic obstructive pulmonary disease, unspecified COPD type (HCC)     Diabetes mellitus (HCC)     Dry eye syndrome     Hyperlipidemia     Hypertension     Nonrheumatic mitral (valve) insufficiency     Obstructive sleep apnea (adult) (pediatric)     Vitamin B12 deficiency     Vitamin D deficiency       Past Surgical History:   Procedure Laterality Date    HERNIA REPAIR        Family History   Problem Relation Age of Onset    Dementia Mother     Diabetes Mother     Peripheral vascular disease Father     Mental illness Neg Hx     Substance Abuse Neg Hx      Social History     Tobacco Use    Smoking status: Former     Types: Cigarettes     Start date:      Quit date:      Years since quittin.4    Smokeless tobacco: Never   Substance Use Topics    Alcohol use: Yes     Comment: occasional whiskey sour     No Known Allergies      Current Outpatient Medications:     acetaminophen (TYLENOL) 325 mg tablet, 2 tabs q 6 hrs prn pain or fever greater than 100, Disp: , Rfl:     albuterol (2.5 mg/3 mL) 0.083 % nebulizer solution, 1 vial q 4 hrs prn, Disp: , Rfl:     Alcohol Swabs (Alcohol Prep Pad) 70 % PADS, As directed, Disp: , Rfl:     alfuzosin (UROXATRAL) 10 mg 24 hr tablet, every 24 hours, Disp: , Rfl:     amLODIPine (NORVASC) 10 mg tablet, every 24 hours, Disp: , Rfl:     aspirin 81 mg chewable tablet, every 24 hours, Disp: , Rfl:     atenolol (TENORMIN) 50 mg tablet, every 24 hours, Disp: , Rfl:     atorvastatin (LIPITOR) 80 mg tablet, every 24 hours, Disp: , Rfl:     Cholecalciferol (Vitamin D3) 25 MCG (1000 UT) CAPS, Take by mouth 1 daily, Disp: , Rfl:     clotrimazole-betamethasone (LOTRISONE) 1-0.05 % cream, 1 Application Every 12 hours, Disp: , Rfl:     Cyanocobalamin 2500 MCG SUBL, 1 tab --5 times a week, Disp: , Rfl:     cycloSPORINE (RESTASIS) 0.05 % ophthalmic emulsion, Administer to both eyes Every 12 hours, Disp: , Rfl:     Empagliflozin (JARDIANCE) 10 MG TABS tablet, every 24 hours,  "Disp: , Rfl:     finasteride (PROSCAR) 5 mg tablet, every 24 hours, Disp: , Rfl:     fluticasone (FLONASE) 50 mcg/act nasal spray, every 24 hours, Disp: , Rfl:     furosemide (LASIX) 40 mg tablet, Take 1 tablet (40 mg total) by mouth every morning, Disp: 30 tablet, Rfl: 5    Insulin Glargine Solostar (Lantus SoloStar) 100 UNIT/ML SOPN, 35 units sc in am and 40 sc at hs, Disp: 15 mL, Rfl: 3    insulin lispro (HumaLOG KwikPen) 100 units/mL injection pen, Inject 5 Units under the skin daily before dinner, Disp: 15 mL, Rfl: 5    ipratropium-albuterol (DUO-NEB) 0.5-2.5 mg/3 mL nebulizer solution, 4 times daily prn wheezing, Disp: , Rfl:     isosorbide mononitrate (IMDUR) 60 mg 24 hr tablet, 1 tablet in the morning Orally Once a day, Disp: , Rfl:     losartan (COZAAR) 100 MG tablet, every 24 hours, Disp: , Rfl:     Multiple Vitamin (MULTIVITAMIN ADULT PO), every 24 hours, Disp: , Rfl:     NovoFine Autocover Pen Needle 30G X 8 MM MISC, 2 pen needles daily, Disp: , Rfl:     Nutritional Supplements (Ensure), Take by mouth 1 can daily, Disp: , Rfl:     pantoprazole (PROTONIX) 40 mg tablet, every 24 hours, Disp: , Rfl:     Trelegy Ellipta 100-62.5-25 MCG/ACT inhaler, 1 puff every 24 hours, Disp: , Rfl:   Review of Systems   Constitutional:  Negative for diaphoresis, fatigue and unexpected weight change.   Respiratory:  Negative for shortness of breath.    Cardiovascular:  Negative for chest pain and palpitations.   Gastrointestinal:  Negative for constipation and diarrhea.   Endocrine: Negative for polydipsia and polyuria.       Physical Exam:  Body mass index is 34.24 kg/m².  /60   Pulse (!) 50   Ht 5' 7\" (1.702 m)   Wt 99.2 kg (218 lb 9.6 oz)   SpO2 90%   BMI 34.24 kg/m²    Wt Readings from Last 3 Encounters:   06/10/24 99.2 kg (218 lb 9.6 oz)   05/23/24 98 kg (216 lb)   05/10/24 96.6 kg (213 lb)       Physical Exam  Constitutional:       Appearance: Normal appearance. He is obese.   Cardiovascular:      Rate and " Rhythm: Normal rate and regular rhythm.      Pulses: Normal pulses. no weak pulses.           Dorsalis pedis pulses are 2+ on the right side and 2+ on the left side.   Pulmonary:      Effort: Pulmonary effort is normal.   Abdominal:      General: Abdomen is flat.      Palpations: Abdomen is soft.   Feet:      Right foot:      Skin integrity: Callus and dry skin present. No ulcer, skin breakdown, erythema or warmth.      Left foot:      Skin integrity: Callus and dry skin present. No ulcer, skin breakdown, erythema or warmth.   Skin:     General: Skin is warm.      Capillary Refill: Capillary refill takes less than 2 seconds.   Neurological:      General: No focal deficit present.      Mental Status: He is alert.       Patient's shoes and socks removed.    Right Foot/Ankle   Right Foot Inspection  Skin Exam: skin normal, skin intact, dry skin, callus and callus. No warmth, no erythema, no maceration, no abnormal color, no pre-ulcer and no ulcer.     Toe Exam: ROM and strength within normal limits.     Sensory   Vibration: intact  Monofilament testing: intact    Vascular  Capillary refills: < 3 seconds  The right DP pulse is 2+.     Left Foot/Ankle  Left Foot Inspection  Skin Exam: skin normal, skin intact, dry skin and callus. No warmth, no erythema, no maceration, normal color, no pre-ulcer and no ulcer.     Toe Exam: ROM and strength within normal limits.     Sensory   Vibration: intact  Monofilament testing: intact    Vascular  Capillary refills: < 3 seconds  The left DP pulse is 2+.     Assign Risk Category  Deformity present  No loss of protective sensation  No weak pulses  Risk: 0      Labs:    Latest Reference Range & Units 03/06/24 08:10 05/15/24 07:55   Hemoglobin A1C Normal 4.0-5.6%; PreDiabetic 5.7-6.4%; Diabetic >=6.5%; Glycemic control for adults with diabetes <7.0% % 9.4 (H) 9.0 (H)   eAG, EST AVG Glucose mg/dl 223 212   (H): Data is abnormally high     Latest Reference Range & Units 03/07/24 20:55    EXT Creatinine Urine Reference range not established. mg/dL 54.9   Albumin Creat Ratio 0 - 30 mg/g creatinine 13   Albumin,U,Random <20.0 mg/L 7.2      Latest Reference Range & Units 01/31/24 08:15 05/15/24 07:55   Cholesterol See Comment mg/dL 161 161   Triglycerides See Comment mg/dL 149 142   HDL >=40 mg/dL 45 46   LDL Calculated 0 - 100 mg/dL 86 87     Impression:  1. Mixed hyperlipidemia    2. Type 2 diabetes mellitus with hyperglycemia, with long-term current use of insulin (HCC)    3. Primary hypertension    4. Coronary artery disease involving native coronary artery of native heart without angina pectoris           Plan:    Kyler was seen today for diabetes type 2.    Diagnoses and all orders for this visit:    Mixed hyperlipidemia  -     Comprehensive metabolic panel; Future  -     Albumin / creatinine urine ratio; Future  -     Lipid panel; Future  -     Hemoglobin A1C; Future  -     Ambulatory referral to Diabetic Education; Future    Type 2 diabetes mellitus with hyperglycemia, with long-term current use of insulin (HCC)  -     Comprehensive metabolic panel; Future  -     Albumin / creatinine urine ratio; Future  -     Lipid panel; Future  -     Hemoglobin A1C; Future  -     Ambulatory referral to Diabetic Education; Future    Primary hypertension  -     Comprehensive metabolic panel; Future  -     Albumin / creatinine urine ratio; Future  -     Lipid panel; Future  -     Hemoglobin A1C; Future  -     Ambulatory referral to Diabetic Education; Future    Coronary artery disease involving native coronary artery of native heart without angina pectoris  -     Comprehensive metabolic panel; Future  -     Albumin / creatinine urine ratio; Future  -     Lipid panel; Future  -     Hemoglobin A1C; Future  -     Ambulatory referral to Diabetic Education; Future      There are no diagnoses linked to this encounter.      Patient is a 88yM With PMHx of T2DM since 1983 With any complications CAD with Other PMHx of  htn, hlp, COPD, class 1 obesity Who presents today for diabetic care.     1) T2DM:- Patients A1C is poorly controlled but reports A1C was 11% last year and thus his 9% is better. BG data does show elevated fasting and premeal BG. He does not want his BG <140 which is acceptable considering his age and hence A1C 8.5 is ok without hypoglycemia. Did discuss however given his CAD hx, considering starting GLP-1 agonist to help with CVD protection and also glycemic benefits and also will recommend scale for lispro with meals rather than standing doses. Will also start him on CGM for more BG data. Refer to CDE for this    Plan   - c/w lantus 35/40  - Stop lispro with dinner  - Start Lispro scale 1:50 for goal 150 for each meal   - Start trulicity 0.75mg weekly   - c.w jardiance 10mg   - Start CGM  - CDE visit   - labs in 3 months     Screening:-   Retinopathy- UTD  Nephropathy- UTD  Lipide levels- UTD    2) Hypertension:- BP in clinic 118/60, currently on     3) Hyperlipidemia:- LDL <100, c/w statin     RTC in 3 months     Discussed with the patient and all questioned fully answered. He will call me if any problems arise.    Counseled patient on diagnostic results, prognosis, risk and benefit of treatment options, instruction for management, importance of treatment compliance, Risk  factor reduction and impressions      Nighat Lemos MD

## 2024-06-11 ENCOUNTER — TELEPHONE (OUTPATIENT)
Age: 89
End: 2024-06-11

## 2024-06-11 NOTE — TELEPHONE ENCOUNTER
Patient returning call to schedule appt with diabetes educator. Warm transferred to diabetes education.

## 2024-06-12 ENCOUNTER — APPOINTMENT (OUTPATIENT)
Dept: LAB | Facility: HOSPITAL | Age: 89
End: 2024-06-12
Attending: INTERNAL MEDICINE
Payer: COMMERCIAL

## 2024-06-12 ENCOUNTER — OFFICE VISIT (OUTPATIENT)
Dept: CARDIOLOGY CLINIC | Facility: CLINIC | Age: 89
End: 2024-06-12
Payer: COMMERCIAL

## 2024-06-12 VITALS
SYSTOLIC BLOOD PRESSURE: 110 MMHG | DIASTOLIC BLOOD PRESSURE: 60 MMHG | HEIGHT: 67 IN | HEART RATE: 61 BPM | BODY MASS INDEX: 34.37 KG/M2 | WEIGHT: 219 LBS

## 2024-06-12 DIAGNOSIS — I10 PRIMARY HYPERTENSION: ICD-10-CM

## 2024-06-12 DIAGNOSIS — E78.2 MIXED HYPERLIPIDEMIA: ICD-10-CM

## 2024-06-12 DIAGNOSIS — I25.10 CORONARY ARTERY DISEASE INVOLVING NATIVE CORONARY ARTERY OF NATIVE HEART WITHOUT ANGINA PECTORIS: Primary | ICD-10-CM

## 2024-06-12 LAB
ANION GAP SERPL CALCULATED.3IONS-SCNC: 11 MMOL/L (ref 4–13)
BUN SERPL-MCNC: 26 MG/DL (ref 5–25)
CALCIUM SERPL-MCNC: 9.7 MG/DL (ref 8.4–10.2)
CHLORIDE SERPL-SCNC: 105 MMOL/L (ref 96–108)
CO2 SERPL-SCNC: 28 MMOL/L (ref 21–32)
CREAT SERPL-MCNC: 1.19 MG/DL (ref 0.6–1.3)
GFR SERPL CREATININE-BSD FRML MDRD: 54 ML/MIN/1.73SQ M
GLUCOSE SERPL-MCNC: 126 MG/DL (ref 65–140)
POTASSIUM SERPL-SCNC: 3.8 MMOL/L (ref 3.5–5.3)
SODIUM SERPL-SCNC: 144 MMOL/L (ref 135–147)

## 2024-06-12 PROCEDURE — 93000 ELECTROCARDIOGRAM COMPLETE: CPT | Performed by: INTERNAL MEDICINE

## 2024-06-12 PROCEDURE — 36415 COLL VENOUS BLD VENIPUNCTURE: CPT

## 2024-06-12 PROCEDURE — 99204 OFFICE O/P NEW MOD 45 MIN: CPT | Performed by: INTERNAL MEDICINE

## 2024-06-12 PROCEDURE — 80048 BASIC METABOLIC PNL TOTAL CA: CPT

## 2024-06-12 NOTE — PROGRESS NOTES
Consultation - Cardiology   Kyler Hernandez 88 y.o. male MRN: [id number]    Encounter: [id number]    Assessment & Plan     Assessment:     CAD  Hypertension  Hyperlipidemia    Plan:    CAD: No angina. Continue with current medical therapy. EF normal on echocardiogram last year. LDL 87. No changes needed today.    Hypertension: BP is currently controlled on current medical therapy.     Hyperlipidemia: Continue with statin therapy. LDL 87.       History of Present Illness   Physician Requesting Consult: No att. providers found  Reason for Consult / Principal Problem: CAD  HPI: Kyler Hernandez is a 88 y.o. year old male who presents with a history of CAD and prior stenting X 3 over the past twenty years, including stenting to left circumflex. He has type 2 DM, COPD, and Hypertension. He just moved to [facility]. Historically he has been an active fred. He has no angina, dyspnea or palpitations.     Review of Systems   Constitutional: Negative.   HENT: Negative.     Eyes: Negative.    Cardiovascular: Negative.    Respiratory:  Positive for shortness of breath.    Endocrine: Negative.    Hematologic/Lymphatic: Negative.    Skin: Negative.    Musculoskeletal: Negative.    Gastrointestinal: Negative.    Genitourinary: Negative.    Neurological: Negative.    Psychiatric/Behavioral: Negative.     Allergic/Immunologic: Negative.        Historical Information   Past Medical History:   Diagnosis Date    Atherosclerotic heart disease of native coronary artery without angina pectoris     Chronic obstructive pulmonary disease, unspecified COPD type (HCC)     Diabetes mellitus (HCC)     Dry eye syndrome     Hyperlipidemia     Hypertension     Nonrheumatic mitral (valve) insufficiency     Obstructive sleep apnea (adult) (pediatric)     Vitamin B12 deficiency     Vitamin D deficiency      Past Surgical History:   Procedure Laterality Date    HERNIA REPAIR         Social History:  Social History     Substance and Sexual Activity    Alcohol Use Yes    Comment: occasional whiskey sour     Social History     Substance and Sexual Activity   Drug Use Never     Social History     Tobacco Use   Smoking Status Former    Types: Cigarettes    Start date:     Quit date: 1945    Years since quittin.4   Smokeless Tobacco Never       Family History:   Family History   Problem Relation Age of Onset    Dementia Mother     Diabetes Mother     Peripheral vascular disease Father     Mental illness Neg Hx     Substance Abuse Neg Hx        Meds/Allergies   No Known Allergies    Current Outpatient Medications:     acetaminophen (TYLENOL) 325 mg tablet, 2 tabs q 6 hrs prn pain or fever greater than 100, Disp: , Rfl:     albuterol (2.5 mg/3 mL) 0.083 % nebulizer solution, 1 vial q 4 hrs prn, Disp: , Rfl:     Alcohol Swabs (Alcohol Prep Pad) 70 % PADS, As directed, Disp: , Rfl:     alfuzosin (UROXATRAL) 10 mg 24 hr tablet, every 24 hours, Disp: , Rfl:     amLODIPine (NORVASC) 10 mg tablet, every 24 hours, Disp: , Rfl:     aspirin 81 mg chewable tablet, every 24 hours, Disp: , Rfl:     atenolol (TENORMIN) 50 mg tablet, Take 50 mg by mouth 2 (two) times a day, Disp: , Rfl:     atorvastatin (LIPITOR) 80 mg tablet, every 24 hours, Disp: , Rfl:     Cholecalciferol (Vitamin D3) 25 MCG (1000 UT) CAPS, Take by mouth 1 daily, Disp: , Rfl:     Cyanocobalamin 2500 MCG SUBL, 1 tab --5 times a week, Disp: , Rfl:     dulaglutide (Trulicity) 0.75 MG/0.5ML injection, Inject 0.5 mL (0.75 mg total) under the skin every 7 days, Disp: 2 mL, Rfl: 2    Empagliflozin (JARDIANCE) 10 MG TABS tablet, every 24 hours, Disp: , Rfl:     finasteride (PROSCAR) 5 mg tablet, every 24 hours, Disp: , Rfl:     fluticasone (FLONASE) 50 mcg/act nasal spray, every 24 hours, Disp: , Rfl:     furosemide (LASIX) 40 mg tablet, Take 1 tablet (40 mg total) by mouth every morning, Disp: 30 tablet, Rfl: 5    Insulin Glargine Solostar (Lantus SoloStar) 100 UNIT/ML SOPN, 35 units sc in am and 40 sc at hs,  Disp: 15 mL, Rfl: 3    insulin lispro (HumaLOG KwikPen) 100 units/mL injection pen, Inject 1-8 Units under the skin 3 (three) times a day with meals Per scale 1:50 for goal 150, Disp: 15 mL, Rfl: 5    ipratropium-albuterol (DUO-NEB) 0.5-2.5 mg/3 mL nebulizer solution, 4 times daily prn wheezing, Disp: , Rfl:     isosorbide mononitrate (IMDUR) 60 mg 24 hr tablet, 1 tablet in the morning Orally Once a day, Disp: , Rfl:     losartan (COZAAR) 100 MG tablet, every 24 hours, Disp: , Rfl:     Multiple Vitamin (MULTIVITAMIN ADULT PO), every 24 hours, Disp: , Rfl:     NovoFine Autocover Pen Needle 30G X 8 MM MISC, 2 pen needles daily, Disp: , Rfl:     Nutritional Supplements (Ensure), Take by mouth 1 can daily, Disp: , Rfl:     pantoprazole (PROTONIX) 40 mg tablet, every 24 hours, Disp: , Rfl:     Trelegy Ellipta 100-62.5-25 MCG/ACT inhaler, 1 puff every 24 hours, Disp: , Rfl:     clotrimazole-betamethasone (LOTRISONE) 1-0.05 % cream, 1 Application Every 12 hours, Disp: , Rfl:     cycloSPORINE (RESTASIS) 0.05 % ophthalmic emulsion, Administer to both eyes Every 12 hours, Disp: , Rfl:     Vitals:   Pulse: 61  Blood Pressue: 110/60  Weight: 99.3 kg (219 lb)      Physical Exam  Constitutional:       General: He is not in acute distress.     Appearance: He is well-developed. He is not diaphoretic.   HENT:      Head: Normocephalic.   Eyes:      General: No scleral icterus.        Right eye: No discharge.      Conjunctiva/sclera: Conjunctivae normal.   Neck:      Vascular: No JVD.   Cardiovascular:      Rate and Rhythm: Normal rate and regular rhythm.      Heart sounds: No murmur heard.     No friction rub. No gallop.   Pulmonary:      Effort: Pulmonary effort is normal. No respiratory distress.      Breath sounds: Normal breath sounds. No wheezing or rales.   Abdominal:      General: Bowel sounds are normal. There is no distension.      Palpations: Abdomen is soft.      Tenderness: There is no abdominal tenderness. There is no  "rebound.   Musculoskeletal:         General: No tenderness, deformity or edema.      Cervical back: Normal range of motion.   Skin:     General: Skin is warm and dry.   Neurological:      Mental Status: He is alert and oriented to person, place, and time.   Psychiatric:         Mood and Affect: Mood and affect normal.         [unfilled]    Invasive Devices       None                   Lab Results   Component Value Date     05/15/2024    CO2 32 05/15/2024    BUN 25 05/15/2024    CREATININE 1.30 05/15/2024    EGFR 48 05/15/2024    CALCIUM 9.9 05/15/2024    AST 16 05/15/2024    ALT 22 05/15/2024    ALKPHOS 71 05/15/2024     Lab Results   Component Value Date    WBC 7.54 05/15/2024    HGB 15.0 05/15/2024     05/15/2024     No components found for: \"TROP\"    Imaging:     EKG: Normal Sinus Rhythm     Counseling / Coordination of Care  Total floor / unit time spent today 45 minutes.  Greater than 50% of total time was spent with the patient and / or family counseling and / or coordination of care.  A description of the counseling / coordination of care.      "

## 2024-06-17 ENCOUNTER — TELEPHONE (OUTPATIENT)
Dept: ENDOCRINOLOGY | Facility: HOSPITAL | Age: 89
End: 2024-06-17

## 2024-06-17 NOTE — TELEPHONE ENCOUNTER
----- Message from Nighat Lemos MD sent at 6/10/2024  1:15 PM EDT -----  Can we order dexcom G7 for patient

## 2024-06-17 NOTE — TELEPHONE ENCOUNTER
Per provider request a Dexcom was ordered for the patient the [facility] on 6/17/2024

## 2024-06-18 DIAGNOSIS — J44.9 CHRONIC OBSTRUCTIVE PULMONARY DISEASE, UNSPECIFIED COPD TYPE (HCC): Primary | ICD-10-CM

## 2024-06-18 RX ORDER — FLUTICASONE FUROATE, UMECLIDINIUM BROMIDE AND VILANTEROL TRIFENATATE 100; 62.5; 25 UG/1; UG/1; UG/1
1 POWDER RESPIRATORY (INHALATION) EVERY 24 HOURS
Qty: 60 BLISTER | Refills: 5 | Status: SHIPPED | OUTPATIENT
Start: 2024-06-18

## 2024-06-18 NOTE — TELEPHONE ENCOUNTER
Reason for call:   [x] Refill   [] Prior Auth  [] Other:     Office:   [x] PCP/Provider -   [] Specialty/Provider -     Medication: Trelegy Ellipta 100-62.5-25 MCG/ACT inhaler 1 puff every 24 hours       Pharmacy: [id number] - [place], [place] - [address] [place] [address]      Does the patient have enough for 3 days?   [] Yes   [x] No - Send as HP to POD

## 2024-06-19 ENCOUNTER — TELEPHONE (OUTPATIENT)
Age: 89
End: 2024-06-19

## 2024-06-19 LAB
DME PARACHUTE DELIVERY DATE REQUESTED: NORMAL
DME PARACHUTE ITEM DESCRIPTION: NORMAL
DME PARACHUTE ITEM DESCRIPTION: NORMAL
DME PARACHUTE ORDER STATUS: NORMAL
DME PARACHUTE SUPPLIER NAME: NORMAL
DME PARACHUTE SUPPLIER PHONE: NORMAL

## 2024-06-19 NOTE — TELEPHONE ENCOUNTER
Kassie was calling to see how patients Dexcom will be getting delivered. I gave her [facility] phone number. No further questions at this time.

## 2024-07-08 DIAGNOSIS — R21 RASH AND NONSPECIFIC SKIN ERUPTION: Primary | ICD-10-CM

## 2024-07-08 RX ORDER — NYSTATIN 100000 [USP'U]/G
POWDER TOPICAL 2 TIMES DAILY
Qty: 15 G | Refills: 0 | Status: SHIPPED | OUTPATIENT
Start: 2024-07-08

## 2024-07-20 ENCOUNTER — TELEPHONE (OUTPATIENT)
Dept: OTHER | Facility: OTHER | Age: 89
End: 2024-07-20

## 2024-07-20 PROCEDURE — 87635 SARS-COV-2 COVID-19 AMP PRB: CPT | Performed by: INTERNAL MEDICINE

## 2024-07-21 ENCOUNTER — LAB REQUISITION (OUTPATIENT)
Dept: LAB | Facility: HOSPITAL | Age: 89
End: 2024-07-21
Payer: COMMERCIAL

## 2024-07-21 DIAGNOSIS — Z11.52 ENCOUNTER FOR SCREENING FOR COVID-19: ICD-10-CM

## 2024-07-21 LAB — SARS-COV-2 RNA RESP QL NAA+PROBE: POSITIVE

## 2024-07-21 NOTE — TELEPHONE ENCOUNTER
[facility] Patient Engagement Partner // Jocelynn ARRIETA from [facility]. Reporting that pt has tested positive for Covid . Please f/u and advise . Thank you in advance. Phone # [phone]

## 2024-07-21 NOTE — PROGRESS NOTES
Assessment/Plan:    No problem-specific Assessment & Plan notes found for this encounter.       There are no diagnoses linked to this encounter.      Subjective:      Patient ID: Kyler Hernandez is a 88 y.o. male.    Pt  with  low grad  fever in no distress vss not hypoxic per marely  at [facility] living  Paxlovid contraindicated with 3 medications  Advised follow facility covid protocols and close observation of symptoms        The following portions of the patient's history were reviewed and updated as appropriate: allergies, current medications, past family history, past medical history, past social history, past surgical history, and problem list.    Review of Systems      Objective:      There were no vitals taken for this visit.         Physical Exam

## 2024-08-19 DIAGNOSIS — E11.65 TYPE 2 DIABETES MELLITUS WITH HYPERGLYCEMIA, WITH LONG-TERM CURRENT USE OF INSULIN (HCC): ICD-10-CM

## 2024-08-19 DIAGNOSIS — Z79.4 TYPE 2 DIABETES MELLITUS WITH HYPERGLYCEMIA, WITH LONG-TERM CURRENT USE OF INSULIN (HCC): ICD-10-CM

## 2024-08-19 RX ORDER — INSULIN GLARGINE 100 [IU]/ML
INJECTION, SOLUTION SUBCUTANEOUS
Qty: 15 ML | Refills: 5 | Status: SHIPPED | OUTPATIENT
Start: 2024-08-19

## 2024-08-21 ENCOUNTER — APPOINTMENT (OUTPATIENT)
Dept: LAB | Facility: HOSPITAL | Age: 89
End: 2024-08-21
Attending: INTERNAL MEDICINE
Payer: COMMERCIAL

## 2024-08-21 DIAGNOSIS — Z79.4 TYPE 2 DIABETES MELLITUS WITH HYPERGLYCEMIA, WITH LONG-TERM CURRENT USE OF INSULIN (HCC): ICD-10-CM

## 2024-08-21 DIAGNOSIS — E11.65 TYPE 2 DIABETES MELLITUS WITH HYPERGLYCEMIA, WITH LONG-TERM CURRENT USE OF INSULIN (HCC): ICD-10-CM

## 2024-08-21 LAB
ANION GAP SERPL CALCULATED.3IONS-SCNC: 12 MMOL/L (ref 4–13)
BUN SERPL-MCNC: 21 MG/DL (ref 5–25)
CALCIUM SERPL-MCNC: 9.5 MG/DL (ref 8.4–10.2)
CHLORIDE SERPL-SCNC: 104 MMOL/L (ref 96–108)
CO2 SERPL-SCNC: 29 MMOL/L (ref 21–32)
CREAT SERPL-MCNC: 1.12 MG/DL (ref 0.6–1.3)
EST. AVERAGE GLUCOSE BLD GHB EST-MCNC: 177 MG/DL
GFR SERPL CREATININE-BSD FRML MDRD: 57 ML/MIN/1.73SQ M
GLUCOSE P FAST SERPL-MCNC: 108 MG/DL (ref 65–99)
HBA1C MFR BLD: 7.8 %
POTASSIUM SERPL-SCNC: 4.1 MMOL/L (ref 3.5–5.3)
SODIUM SERPL-SCNC: 145 MMOL/L (ref 135–147)

## 2024-08-21 PROCEDURE — 80048 BASIC METABOLIC PNL TOTAL CA: CPT

## 2024-08-21 PROCEDURE — 36415 COLL VENOUS BLD VENIPUNCTURE: CPT

## 2024-08-21 PROCEDURE — 83036 HEMOGLOBIN GLYCOSYLATED A1C: CPT

## 2024-08-27 DIAGNOSIS — I10 PRIMARY HYPERTENSION: ICD-10-CM

## 2024-08-27 DIAGNOSIS — I25.10 CORONARY ARTERY DISEASE INVOLVING NATIVE CORONARY ARTERY OF NATIVE HEART WITHOUT ANGINA PECTORIS: ICD-10-CM

## 2024-08-27 DIAGNOSIS — E78.2 MIXED HYPERLIPIDEMIA: ICD-10-CM

## 2024-08-27 DIAGNOSIS — Z79.4 TYPE 2 DIABETES MELLITUS WITH HYPERGLYCEMIA, WITH LONG-TERM CURRENT USE OF INSULIN (HCC): ICD-10-CM

## 2024-08-27 DIAGNOSIS — E11.65 TYPE 2 DIABETES MELLITUS WITH HYPERGLYCEMIA, WITH LONG-TERM CURRENT USE OF INSULIN (HCC): Primary | ICD-10-CM

## 2024-08-27 DIAGNOSIS — Z79.4 TYPE 2 DIABETES MELLITUS WITH HYPERGLYCEMIA, WITH LONG-TERM CURRENT USE OF INSULIN (HCC): Primary | ICD-10-CM

## 2024-08-27 DIAGNOSIS — E11.65 TYPE 2 DIABETES MELLITUS WITH HYPERGLYCEMIA, WITH LONG-TERM CURRENT USE OF INSULIN (HCC): ICD-10-CM

## 2024-08-27 RX ORDER — DULAGLUTIDE 0.75 MG/.5ML
0.75 INJECTION, SOLUTION SUBCUTANEOUS
Qty: 2 ML | Refills: 5 | Status: SHIPPED | OUTPATIENT
Start: 2024-08-27

## 2024-08-27 RX ORDER — DULAGLUTIDE 0.75 MG/.5ML
INJECTION, SOLUTION SUBCUTANEOUS
Qty: 2 ML | Refills: 5 | Status: SHIPPED | OUTPATIENT
Start: 2024-08-27 | End: 2024-08-27 | Stop reason: SDUPTHER

## 2024-09-18 ENCOUNTER — APPOINTMENT (OUTPATIENT)
Dept: LAB | Facility: HOSPITAL | Age: 89
End: 2024-09-18
Attending: INTERNAL MEDICINE
Payer: COMMERCIAL

## 2024-09-18 DIAGNOSIS — E11.65 TYPE 2 DIABETES MELLITUS WITH HYPERGLYCEMIA, WITH LONG-TERM CURRENT USE OF INSULIN (HCC): ICD-10-CM

## 2024-09-18 DIAGNOSIS — I25.10 CORONARY ARTERY DISEASE INVOLVING NATIVE CORONARY ARTERY OF NATIVE HEART WITHOUT ANGINA PECTORIS: ICD-10-CM

## 2024-09-18 DIAGNOSIS — Z79.4 TYPE 2 DIABETES MELLITUS WITH HYPERGLYCEMIA, WITH LONG-TERM CURRENT USE OF INSULIN (HCC): ICD-10-CM

## 2024-09-18 DIAGNOSIS — E78.2 MIXED HYPERLIPIDEMIA: ICD-10-CM

## 2024-09-18 DIAGNOSIS — I10 PRIMARY HYPERTENSION: ICD-10-CM

## 2024-09-18 LAB
ALBUMIN SERPL BCG-MCNC: 3.9 G/DL (ref 3.5–5)
ALP SERPL-CCNC: 67 U/L (ref 34–104)
ALT SERPL W P-5'-P-CCNC: 21 U/L (ref 7–52)
ANION GAP SERPL CALCULATED.3IONS-SCNC: 12 MMOL/L (ref 4–13)
AST SERPL W P-5'-P-CCNC: 19 U/L (ref 13–39)
BILIRUB SERPL-MCNC: 0.58 MG/DL (ref 0.2–1)
BUN SERPL-MCNC: 21 MG/DL (ref 5–25)
CALCIUM SERPL-MCNC: 9.3 MG/DL (ref 8.4–10.2)
CHLORIDE SERPL-SCNC: 105 MMOL/L (ref 96–108)
CHOLEST SERPL-MCNC: 139 MG/DL
CO2 SERPL-SCNC: 28 MMOL/L (ref 21–32)
CREAT SERPL-MCNC: 1.06 MG/DL (ref 0.6–1.3)
CREAT UR-MCNC: 83.1 MG/DL
EST. AVERAGE GLUCOSE BLD GHB EST-MCNC: 169 MG/DL
GFR SERPL CREATININE-BSD FRML MDRD: 61 ML/MIN/1.73SQ M
GLUCOSE P FAST SERPL-MCNC: 98 MG/DL (ref 65–99)
HBA1C MFR BLD: 7.5 %
HDLC SERPL-MCNC: 38 MG/DL
LDLC SERPL CALC-MCNC: 62 MG/DL (ref 0–100)
MICROALBUMIN UR-MCNC: <7 MG/L
NONHDLC SERPL-MCNC: 101 MG/DL
POTASSIUM SERPL-SCNC: 3.9 MMOL/L (ref 3.5–5.3)
PROT SERPL-MCNC: 7 G/DL (ref 6.4–8.4)
SODIUM SERPL-SCNC: 145 MMOL/L (ref 135–147)
TRIGL SERPL-MCNC: 197 MG/DL

## 2024-09-18 PROCEDURE — 82570 ASSAY OF URINE CREATININE: CPT

## 2024-09-18 PROCEDURE — 80061 LIPID PANEL: CPT

## 2024-09-18 PROCEDURE — 83036 HEMOGLOBIN GLYCOSYLATED A1C: CPT

## 2024-09-18 PROCEDURE — 80053 COMPREHEN METABOLIC PANEL: CPT

## 2024-09-18 PROCEDURE — 82043 UR ALBUMIN QUANTITATIVE: CPT

## 2024-09-18 PROCEDURE — 36415 COLL VENOUS BLD VENIPUNCTURE: CPT

## 2024-09-19 ENCOUNTER — OFFICE VISIT (OUTPATIENT)
Dept: ENDOCRINOLOGY | Facility: HOSPITAL | Age: 89
End: 2024-09-19
Payer: COMMERCIAL

## 2024-09-19 VITALS
BODY MASS INDEX: 35.16 KG/M2 | DIASTOLIC BLOOD PRESSURE: 62 MMHG | HEIGHT: 67 IN | SYSTOLIC BLOOD PRESSURE: 120 MMHG | OXYGEN SATURATION: 90 % | WEIGHT: 224 LBS | HEART RATE: 71 BPM

## 2024-09-19 DIAGNOSIS — E11.65 TYPE 2 DIABETES MELLITUS WITH HYPERGLYCEMIA, WITH LONG-TERM CURRENT USE OF INSULIN (HCC): ICD-10-CM

## 2024-09-19 DIAGNOSIS — I10 PRIMARY HYPERTENSION: ICD-10-CM

## 2024-09-19 DIAGNOSIS — Z79.4 TYPE 2 DIABETES MELLITUS WITH OTHER SPECIFIED COMPLICATION, WITH LONG-TERM CURRENT USE OF INSULIN (HCC): ICD-10-CM

## 2024-09-19 DIAGNOSIS — I27.20 MILD PULMONARY HYPERTENSION (HCC): ICD-10-CM

## 2024-09-19 DIAGNOSIS — E78.2 MIXED HYPERLIPIDEMIA: ICD-10-CM

## 2024-09-19 DIAGNOSIS — E11.69 TYPE 2 DIABETES MELLITUS WITH OTHER SPECIFIED COMPLICATION, WITH LONG-TERM CURRENT USE OF INSULIN (HCC): ICD-10-CM

## 2024-09-19 DIAGNOSIS — Z79.4 TYPE 2 DIABETES MELLITUS WITH HYPERGLYCEMIA, WITH LONG-TERM CURRENT USE OF INSULIN (HCC): ICD-10-CM

## 2024-09-19 DIAGNOSIS — I25.10 CORONARY ARTERY DISEASE INVOLVING NATIVE CORONARY ARTERY OF NATIVE HEART WITHOUT ANGINA PECTORIS: Primary | ICD-10-CM

## 2024-09-19 PROCEDURE — 99214 OFFICE O/P EST MOD 30 MIN: CPT | Performed by: STUDENT IN AN ORGANIZED HEALTH CARE EDUCATION/TRAINING PROGRAM

## 2024-09-19 PROCEDURE — 95251 CONT GLUC MNTR ANALYSIS I&R: CPT | Performed by: STUDENT IN AN ORGANIZED HEALTH CARE EDUCATION/TRAINING PROGRAM

## 2024-09-19 RX ORDER — ACYCLOVIR 400 MG/1
TABLET ORAL
COMMUNITY
Start: 2024-06-19

## 2024-09-19 RX ORDER — ACYCLOVIR 400 MG/1
TABLET ORAL
COMMUNITY
Start: 2024-09-13

## 2024-09-19 NOTE — PATIENT INSTRUCTIONS
- c/w lantus 35/40  - Stop Lispro scale since BG at goal  - c/w trulicity 0.75mg weekly   - c.w jardiance 10mg   - c/w CGM  - labs in 3 months

## 2024-09-19 NOTE — PROGRESS NOTES
"    Follow up Progress note      Chief Complaint   Patient presents with    Diabetes Type 2      History of Present Illness:   Kyler Hernandez is a 89 y.o. male with a history of type 2 diabetes since 1987 With complications of CABG s.p stenting x3 who presents today for diabetes management. Other Pmhx of htn, hlp, class 1 obesity, COPD    Patient was first diagnosed with T2DM- 1987  Control since diagnosis:- only 2 A1C on file 03/24 9.4% now 9%   Medications tried thus far: metformin for years but reports wont be on it anymore since it will kill his kidney  Current regime:- lantus 35u AM and 40u PM, Trulicity 0.75mg weekly, jardiance 10mg   BG control:- Kyler Hernandez   Device used Dexcom G7    Indication   Type 2 Diabetes    More than 72 hours of data was reviewed. Report to be scanned to chart.     Date Range: Sept 6- Sep 19 2024  11/14 days 79%    Analysis of data:   Average Glucose: 159  GMI N/A  Time in Target Range: 77%   Time Above Range: 23%/0%   Time Below Range: 0%     Interpretation of data:   BG in range     Hypoglycemic episodes:  None  Hyperglycemia symptoms: no polyuria or polydipsia\",\"no chest pain, dyspnea or TIAs\",\"no numbness, tingling or pain in extremities\"  Diet: reports eats whatever they give him   Activity: stays active    Opthamology: oct 2023; no retinopathy, no macular edema  Hx of hyperlipidemia: yes  Hx of hypertension: yes  Last Lipid:- 09/24 LDL <100  Last urine microalbumin: 09/24 normal   Last hbA1C: 9/24 7.5%, 05/24 9%    Social Hx:- lives at [place]  Family HX:- nephew had T1DM, sister had T2DM    Patient Active Problem List   Diagnosis    Coronary artery disease involving native coronary artery of native heart without angina pectoris    Primary hypertension    Type 2 diabetes mellitus with hyperglycemia, with long-term current use of insulin (HCC)    Benign prostatic hyperplasia with nocturia    COPD (chronic obstructive pulmonary disease) (HCC)    LINCOLN (obstructive sleep " apnea)    B12 deficiency    Vitamin D deficiency    Mixed hyperlipidemia      Past Medical History:   Diagnosis Date    Atherosclerotic heart disease of native coronary artery without angina pectoris     Chronic obstructive pulmonary disease, unspecified COPD type (HCC)     Diabetes mellitus (HCC)     Dry eye syndrome     Hyperlipidemia     Hypertension     Nonrheumatic mitral (valve) insufficiency     Obstructive sleep apnea (adult) (pediatric)     Vitamin B12 deficiency     Vitamin D deficiency       Past Surgical History:   Procedure Laterality Date    HERNIA REPAIR        Family History   Problem Relation Age of Onset    Dementia Mother     Diabetes Mother     Peripheral vascular disease Father     Mental illness Neg Hx     Substance Abuse Neg Hx      Social History     Tobacco Use    Smoking status: Former     Types: Cigarettes     Start date:      Quit date:      Years since quittin.7    Smokeless tobacco: Never   Substance Use Topics    Alcohol use: Yes     Comment: occasional whiskey sour     No Known Allergies      Current Outpatient Medications:     acetaminophen (TYLENOL) 325 mg tablet, 2 tabs q 6 hrs prn pain or fever greater than 100, Disp: , Rfl:     albuterol (2.5 mg/3 mL) 0.083 % nebulizer solution, 1 vial q 4 hrs prn, Disp: , Rfl:     Alcohol Swabs (Alcohol Prep Pad) 70 % PADS, As directed, Disp: , Rfl:     alfuzosin (UROXATRAL) 10 mg 24 hr tablet, every 24 hours, Disp: , Rfl:     amLODIPine (NORVASC) 10 mg tablet, every 24 hours, Disp: , Rfl:     aspirin 81 mg chewable tablet, every 24 hours, Disp: , Rfl:     atenolol (TENORMIN) 50 mg tablet, Take 50 mg by mouth 2 (two) times a day, Disp: , Rfl:     atorvastatin (LIPITOR) 80 mg tablet, every 24 hours, Disp: , Rfl:     Cholecalciferol (Vitamin D3) 25 MCG (1000 UT) CAPS, Take by mouth 1 daily, Disp: , Rfl:     clotrimazole-betamethasone (LOTRISONE) 1-0.05 % cream, 1 Application Every 12 hours, Disp: , Rfl:     Cyanocobalamin 2500 MCG  SUBL, 1 tab --5 times a week, Disp: , Rfl:     cycloSPORINE (RESTASIS) 0.05 % ophthalmic emulsion, Administer to both eyes Every 12 hours, Disp: , Rfl:     dulaglutide (Trulicity) 0.75 MG/0.5ML injection, Inject 0.5 mL (0.75 mg total) under the skin every 7 days, Disp: 2 mL, Rfl: 5    Empagliflozin (JARDIANCE) 10 MG TABS tablet, every 24 hours, Disp: , Rfl:     finasteride (PROSCAR) 5 mg tablet, every 24 hours, Disp: , Rfl:     fluticasone (FLONASE) 50 mcg/act nasal spray, every 24 hours, Disp: , Rfl:     furosemide (LASIX) 40 mg tablet, Take 1 tablet (40 mg total) by mouth every morning, Disp: 30 tablet, Rfl: 5    Insulin Glargine Solostar (Lantus SoloStar) 100 UNIT/ML SOPN, INJECT 35U SUB-Q EVERY MORNING DX: DIABETES DX: **DISCARD UNUSED PEN 28 DAYS AFTER 1ST USE** INJECT 40 UNITS SUB-Q AT BEDTIME DX; DIABETES, Disp: 15 mL, Rfl: 5    insulin lispro (HumaLOG KwikPen) 100 units/mL injection pen, Inject 1-8 Units under the skin 3 (three) times a day with meals Per scale 1:50 for goal 150, Disp: 15 mL, Rfl: 5    ipratropium-albuterol (DUO-NEB) 0.5-2.5 mg/3 mL nebulizer solution, 4 times daily prn wheezing, Disp: , Rfl:     isosorbide mononitrate (IMDUR) 60 mg 24 hr tablet, 1 tablet in the morning Orally Once a day, Disp: , Rfl:     losartan (COZAAR) 100 MG tablet, every 24 hours, Disp: , Rfl:     Multiple Vitamin (MULTIVITAMIN ADULT PO), every 24 hours, Disp: , Rfl:     NovoFine Autocover Pen Needle 30G X 8 MM MISC, 2 pen needles daily, Disp: , Rfl:     Nutritional Supplements (Ensure), Take by mouth 1 can daily, Disp: , Rfl:     nystatin (MYCOSTATIN) powder, Apply topically 2 (two) times a day, Disp: 15 g, Rfl: 0    pantoprazole (PROTONIX) 40 mg tablet, every 24 hours, Disp: , Rfl:     Trelegy Ellipta 100-62.5-25 MCG/ACT inhaler, Inhale 1 puff every 24 hours, Disp: 60 blister, Rfl: 5  Review of Systems   Constitutional:  Negative for diaphoresis, fatigue and unexpected weight change.   Respiratory:  Negative for  shortness of breath.    Cardiovascular:  Negative for chest pain and palpitations.   Gastrointestinal:  Negative for constipation and diarrhea.   Endocrine: Negative for polydipsia and polyuria.       Physical Exam:  There is no height or weight on file to calculate BMI.  There were no vitals taken for this visit.   Wt Readings from Last 3 Encounters:   06/12/24 99.3 kg (219 lb)   06/10/24 99.2 kg (218 lb 9.6 oz)   05/23/24 98 kg (216 lb)       Physical Exam  Constitutional:       Appearance: Normal appearance. He is obese.   Cardiovascular:      Rate and Rhythm: Normal rate and regular rhythm.      Pulses: Normal pulses.           Dorsalis pedis pulses are 2+ on the right side and 2+ on the left side.   Pulmonary:      Effort: Pulmonary effort is normal.   Abdominal:      General: Abdomen is flat.      Palpations: Abdomen is soft.   Feet:      Right foot:      Skin integrity: Callus and dry skin present. No ulcer, skin breakdown, erythema or warmth.      Left foot:      Skin integrity: Callus and dry skin present. No ulcer, skin breakdown, erythema or warmth.   Skin:     General: Skin is warm.      Capillary Refill: Capillary refill takes less than 2 seconds.   Neurological:      General: No focal deficit present.      Mental Status: He is alert.         Labs:    Latest Reference Range & Units 03/06/24 08:10 05/15/24 07:55 08/21/24 07:49 09/18/24 07:57   Hemoglobin A1C Normal 4.0-5.6%; PreDiabetic 5.7-6.4%; Diabetic >=6.5%; Glycemic control for adults with diabetes <7.0% % 9.4 (H) 9.0 (H) 7.8 (H) 7.5 (H)   eAG, EST AVG Glucose mg/dl 223 212 177 169   (H): Data is abnormally high     Latest Reference Range & Units 03/07/24 20:55 09/18/24 07:57   EXT Creatinine Urine Reference range not established. mg/dL 54.9 83.1   Albumin Creat Ratio 0 - 30 mg/g creatinine 13 See Comment   Albumin,U,Random <20.0 mg/L 7.2 <7.0      Latest Reference Range & Units 05/15/24 07:55 09/18/24 07:57   Cholesterol See Comment mg/dL 161 139    Triglycerides See Comment mg/dL 142 197 (H)   HDL >=40 mg/dL 46 38 (L)   Non-HDL Cholesterol mg/dl  101   LDL Calculated 0 - 100 mg/dL 87 62   (H): Data is abnormally high  (L): Data is abnormally low    Impression:  1. Coronary artery disease involving native coronary artery of native heart without angina pectoris    2. Primary hypertension    3. Type 2 diabetes mellitus with hyperglycemia, with long-term current use of insulin (HCC)    4. Mixed hyperlipidemia             Plan:    Kyler was seen today for diabetes type 2.    Diagnoses and all orders for this visit:    Coronary artery disease involving native coronary artery of native heart without angina pectoris    Primary hypertension    Type 2 diabetes mellitus with hyperglycemia, with long-term current use of insulin (HCC)    Mixed hyperlipidemia        There are no diagnoses linked to this encounter.      Patient is a 88yM With PMHx of T2DM since 1983 With any complications CAD with Other PMHx of htn, hlp, COPD, class 1 obesity Who presents today for diabetic care.     1) T2DM:- patients A1C continous to improve and now at 7.5% which is excellent for his age. CGM shows TIR 77% with only 23% high without any lows or major highs and hence c/w current regime.     Plan   - c/w lantus 35/40  - Stop Lispro scale since BG at goal  - c/w trulicity 0.75mg weekly   - c.w jardiance 10mg   - c/w CGM  - labs in 3 months     Screening:-   Retinopathy- UTD  Nephropathy- UTD  Lipide levels- UTD    2) Hypertension:- BP in clinic 120/62, currently on losartan    3) Hyperlipidemia:- LDL <100, c/w statin     RTC in 3 months     Discussed with the patient and all questioned fully answered. He will call me if any problems arise.    Counseled patient on diagnostic results, prognosis, risk and benefit of treatment options, instruction for management, importance of treatment compliance, Risk  factor reduction and impressions      Nighat Lemos MD

## 2024-09-24 ENCOUNTER — TELEPHONE (OUTPATIENT)
Age: 89
End: 2024-09-24

## 2024-09-24 DIAGNOSIS — R19.7 DIARRHEA, UNSPECIFIED TYPE: Primary | ICD-10-CM

## 2024-09-24 RX ORDER — LOPERAMIDE HCL 2 MG
2 CAPSULE ORAL DAILY PRN
Qty: 30 CAPSULE | Refills: 0 | Status: SHIPPED | OUTPATIENT
Start: 2024-09-24

## 2024-09-24 NOTE — TELEPHONE ENCOUNTER
Received call from irma at [facility]. They are asking for script for immodium. Patient occasionally has bouts of diarrhea that he believes Is from his trulicity. He's asking for immodium. Please advise.

## 2024-09-26 DIAGNOSIS — J44.9 CHRONIC OBSTRUCTIVE PULMONARY DISEASE, UNSPECIFIED COPD TYPE (HCC): Primary | ICD-10-CM

## 2024-09-28 DIAGNOSIS — I10 PRIMARY HYPERTENSION: ICD-10-CM

## 2024-09-28 RX ORDER — FUROSEMIDE 40 MG
40 TABLET ORAL EVERY MORNING
Qty: 30 TABLET | Refills: 4 | Status: SHIPPED | OUTPATIENT
Start: 2024-09-28

## 2024-10-09 ENCOUNTER — TELEPHONE (OUTPATIENT)
Age: 89
End: 2024-10-09

## 2024-10-09 NOTE — TELEPHONE ENCOUNTER
Independent living is calling as patient is requesting the Trulicity to be discontinued as it is causing him diarrhea and he refuses to continue with it. He is asking that his lispro be ordered again with meals. Please advise.

## 2024-10-10 ENCOUNTER — TELEPHONE (OUTPATIENT)
Age: 89
End: 2024-10-10

## 2024-10-10 DIAGNOSIS — Z79.4 TYPE 2 DIABETES MELLITUS WITH HYPERGLYCEMIA, WITH LONG-TERM CURRENT USE OF INSULIN (HCC): Primary | ICD-10-CM

## 2024-10-10 DIAGNOSIS — Z79.4 TYPE 2 DIABETES MELLITUS WITH HYPERGLYCEMIA, WITH LONG-TERM CURRENT USE OF INSULIN (HCC): ICD-10-CM

## 2024-10-10 DIAGNOSIS — E11.65 TYPE 2 DIABETES MELLITUS WITH HYPERGLYCEMIA, WITH LONG-TERM CURRENT USE OF INSULIN (HCC): ICD-10-CM

## 2024-10-10 DIAGNOSIS — E11.65 TYPE 2 DIABETES MELLITUS WITH HYPERGLYCEMIA, WITH LONG-TERM CURRENT USE OF INSULIN (HCC): Primary | ICD-10-CM

## 2024-10-10 RX ORDER — INSULIN LISPRO 100 [IU]/ML
7 INJECTION, SOLUTION INTRAVENOUS; SUBCUTANEOUS
Qty: 15 ML | Refills: 4 | Status: SHIPPED | OUTPATIENT
Start: 2024-10-10

## 2024-10-10 RX ORDER — INSULIN LISPRO 100 [IU]/ML
7 INJECTION, SOLUTION INTRAVENOUS; SUBCUTANEOUS
Qty: 15 ML | Refills: 1 | Status: SHIPPED | OUTPATIENT
Start: 2024-10-10 | End: 2024-10-10 | Stop reason: SDUPTHER

## 2024-10-10 NOTE — TELEPHONE ENCOUNTER
06/18/2018OS-0.25+[id number] -2&nbsp;SN &nbsp; &nbsp; Pharmacist [facility] called stating they received a rx for Lispro but then a cancelled rx came through. Made aware rx was reordered and printed to fax to facility. Pharmacist states she will call facility for rx. Thank you.

## 2024-10-10 NOTE — TELEPHONE ENCOUNTER
Can you get me a blank order sheet. I did order the lispro on file and also d/c his trulicity from chart

## 2024-10-10 NOTE — TELEPHONE ENCOUNTER
Physician order and new script was faxed to [facility] for Tanvi [phone]

## 2024-10-10 NOTE — TELEPHONE ENCOUNTER
I spoke with Tanvi from [facility] and she would like a new printed prescription for the Lispro sent over to them via a fax as well as a printed order stating that the patient can discontinue his Trulicity.  The order will be faxed over with the script when printed.  The prescription will be sent over to the provider to be signed off on and printed.

## 2024-10-24 DIAGNOSIS — E78.2 MIXED HYPERLIPIDEMIA: Primary | ICD-10-CM

## 2024-10-24 DIAGNOSIS — I10 PRIMARY HYPERTENSION: ICD-10-CM

## 2024-10-24 DIAGNOSIS — J44.9 CHRONIC OBSTRUCTIVE PULMONARY DISEASE, UNSPECIFIED COPD TYPE (HCC): Primary | ICD-10-CM

## 2024-10-24 NOTE — TELEPHONE ENCOUNTER
Patient had nurse call to ask if there is an alternative to Trulicity that he can try? He wanted to stop the Trulicity as it was causing him to have diarrhea.

## 2024-10-24 NOTE — TELEPHONE ENCOUNTER
Patient would like to know if there is a weekly medication option like Trulicity instead of daily insulin?

## 2024-10-25 DIAGNOSIS — Z79.4 TYPE 2 DIABETES MELLITUS WITH HYPERGLYCEMIA, WITH LONG-TERM CURRENT USE OF INSULIN (HCC): Primary | ICD-10-CM

## 2024-10-25 DIAGNOSIS — E11.65 TYPE 2 DIABETES MELLITUS WITH HYPERGLYCEMIA, WITH LONG-TERM CURRENT USE OF INSULIN (HCC): Primary | ICD-10-CM

## 2024-10-25 RX ORDER — LOSARTAN POTASSIUM 100 MG/1
100 TABLET ORAL EVERY MORNING
Qty: 30 TABLET | Refills: 10 | Status: SHIPPED | OUTPATIENT
Start: 2024-10-25

## 2024-10-25 RX ORDER — ATORVASTATIN CALCIUM 80 MG/1
80 TABLET, FILM COATED ORAL
Qty: 30 TABLET | Refills: 10 | Status: SHIPPED | OUTPATIENT
Start: 2024-10-25

## 2024-10-25 RX ORDER — TIRZEPATIDE 2.5 MG/.5ML
2.5 INJECTION, SOLUTION SUBCUTANEOUS WEEKLY
Qty: 2 ML | Refills: 2 | Status: SHIPPED | OUTPATIENT
Start: 2024-10-25

## 2024-10-25 NOTE — TELEPHONE ENCOUNTER
Spoke with Tanvi at the facility. She will speak with the patient and call the office back with a decision.

## 2024-10-31 ENCOUNTER — TELEPHONE (OUTPATIENT)
Dept: FAMILY MEDICINE CLINIC | Facility: HOSPITAL | Age: 89
End: 2024-10-31

## 2024-10-31 NOTE — TELEPHONE ENCOUNTER
Patient calling with complaint of continued diarrhea. States he has been up since 3am because of it. States new medication is not working and he wants to go back to insulin before meals as he did not have this problem before. Requesting orders  be faxed to nurses station at [facility].

## 2024-10-31 NOTE — TELEPHONE ENCOUNTER
Attempted to call and speak with the nurse at [facility] and they were unable to reach her concerning the patient.  I will attempt to call them again tomorrow concerning the patient

## 2024-10-31 NOTE — TELEPHONE ENCOUNTER
Patient can stop Trulicity, but I would like to see a Dexcom download in about 2 weeks to see if we need to restart the mealtime insulin.

## 2024-11-01 NOTE — TELEPHONE ENCOUNTER
I spoke with the patient's caretaker after she returned my call and I made her aware of the provider's recommendations and she asked if a written order could be sent over to her for her to review with the patient.

## 2024-11-29 DIAGNOSIS — E11.65 TYPE 2 DIABETES MELLITUS WITH HYPERGLYCEMIA, WITH LONG-TERM CURRENT USE OF INSULIN (HCC): ICD-10-CM

## 2024-11-29 DIAGNOSIS — E55.9 VITAMIN D DEFICIENCY: ICD-10-CM

## 2024-11-29 DIAGNOSIS — I10 PRIMARY HYPERTENSION: ICD-10-CM

## 2024-11-29 DIAGNOSIS — R35.1 BENIGN PROSTATIC HYPERPLASIA WITH NOCTURIA: ICD-10-CM

## 2024-11-29 DIAGNOSIS — N40.1 BENIGN PROSTATIC HYPERPLASIA WITH NOCTURIA: ICD-10-CM

## 2024-11-29 DIAGNOSIS — R19.7 DIARRHEA, UNSPECIFIED TYPE: ICD-10-CM

## 2024-11-29 DIAGNOSIS — Z79.4 TYPE 2 DIABETES MELLITUS WITH HYPERGLYCEMIA, WITH LONG-TERM CURRENT USE OF INSULIN (HCC): ICD-10-CM

## 2024-11-29 DIAGNOSIS — E78.2 MIXED HYPERLIPIDEMIA: Primary | ICD-10-CM

## 2024-11-29 DIAGNOSIS — E53.8 B12 DEFICIENCY: ICD-10-CM

## 2024-11-29 RX ORDER — EMPAGLIFLOZIN 10 MG/1
TABLET, FILM COATED ORAL
Qty: 30 TABLET | Refills: 5 | Status: SHIPPED | OUTPATIENT
Start: 2024-11-29

## 2024-11-29 RX ORDER — ISOSORBIDE MONONITRATE 60 MG/1
60 TABLET, EXTENDED RELEASE ORAL EVERY MORNING
Qty: 30 TABLET | Refills: 5 | Status: SHIPPED | OUTPATIENT
Start: 2024-11-29

## 2024-11-29 RX ORDER — FINASTERIDE 5 MG/1
TABLET, FILM COATED ORAL
Qty: 30 TABLET | Refills: 5 | Status: SHIPPED | OUTPATIENT
Start: 2024-11-29

## 2024-11-29 RX ORDER — CYANOCOBALAMIN (VITAMIN B-12) 2500 MCG
TABLET, SUBLINGUAL SUBLINGUAL
Qty: 20 TABLET | Refills: 4 | Status: SHIPPED | OUTPATIENT
Start: 2024-11-29

## 2024-11-29 RX ORDER — ALFUZOSIN HYDROCHLORIDE 10 MG/1
TABLET, EXTENDED RELEASE ORAL
Qty: 30 TABLET | Refills: 5 | Status: SHIPPED | OUTPATIENT
Start: 2024-11-29

## 2024-11-29 RX ORDER — AMLODIPINE BESYLATE 10 MG/1
10 TABLET ORAL EVERY MORNING
Qty: 30 TABLET | Refills: 5 | Status: SHIPPED | OUTPATIENT
Start: 2024-11-29

## 2024-11-29 RX ORDER — ASPIRIN 81 MG/1
81 TABLET, COATED ORAL EVERY MORNING
Qty: 30 TABLET | Refills: 5 | Status: SHIPPED | OUTPATIENT
Start: 2024-11-29

## 2024-11-29 RX ORDER — PANTOPRAZOLE SODIUM 40 MG/1
40 TABLET, DELAYED RELEASE ORAL EVERY MORNING
Qty: 30 TABLET | Refills: 5 | Status: SHIPPED | OUTPATIENT
Start: 2024-11-29

## 2024-11-29 RX ORDER — MULTIVITAMIN WITH FOLIC ACID 400 MCG
1 TABLET ORAL EVERY MORNING
Qty: 30 TABLET | Refills: 5 | Status: SHIPPED | OUTPATIENT
Start: 2024-11-29

## 2024-11-29 RX ORDER — ATENOLOL 50 MG/1
50 TABLET ORAL
Qty: 60 TABLET | Refills: 4 | Status: SHIPPED | OUTPATIENT
Start: 2024-11-29

## 2024-12-02 ENCOUNTER — IN HOME VISIT (OUTPATIENT)
Dept: FAMILY MEDICINE CLINIC | Facility: HOSPITAL | Age: 89
End: 2024-12-02
Payer: COMMERCIAL

## 2024-12-02 VITALS
HEART RATE: 72 BPM | BODY MASS INDEX: 34.46 KG/M2 | OXYGEN SATURATION: 94 % | SYSTOLIC BLOOD PRESSURE: 132 MMHG | RESPIRATION RATE: 16 BRPM | TEMPERATURE: 98.4 F | WEIGHT: 220 LBS | DIASTOLIC BLOOD PRESSURE: 68 MMHG

## 2024-12-02 DIAGNOSIS — E55.9 VITAMIN D DEFICIENCY: Primary | ICD-10-CM

## 2024-12-02 DIAGNOSIS — Z00.00 MEDICARE ANNUAL WELLNESS VISIT, SUBSEQUENT: Primary | ICD-10-CM

## 2024-12-02 PROCEDURE — G0439 PPPS, SUBSEQ VISIT: HCPCS | Performed by: INTERNAL MEDICINE

## 2024-12-03 NOTE — PATIENT INSTRUCTIONS
Medicare Preventive Visit Patient Instructions  Thank you for completing your Welcome to Medicare Visit or Medicare Annual Wellness Visit today. Your next wellness visit will be due in one year (12/3/2025).  The screening/preventive services that you may require over the next 5-10 years are detailed below. Some tests may not apply to you based off risk factors and/or age. Screening tests ordered at today's visit but not completed yet may show as past due. Also, please note that scanned in results may not display below.  Preventive Screenings:  Service Recommendations Previous Testing/Comments   Colorectal Cancer Screening  Colonoscopy    Fecal Occult Blood Test (FOBT)/Fecal Immunochemical Test (FIT)  Fecal DNA/Cologuard Test  Flexible Sigmoidoscopy Age: 45-75 years old   Colonoscopy: every 10 years (May be performed more frequently if at higher risk)  OR  FOBT/FIT: every 1 year  OR  Cologuard: every 3 years  OR  Sigmoidoscopy: every 5 years  Screening may be recommended earlier than age 45 if at higher risk for colorectal cancer. Also, an individualized decision between you and your healthcare provider will decide whether screening between the ages of 76-85 would be appropriate. Colonoscopy: Not on file  FOBT/FIT: Not on file  Cologuard: Not on file  Sigmoidoscopy: Not on file          Prostate Cancer Screening Individualized decision between patient and health care provider in men between ages of 55-69   Medicare will cover every 12 months beginning on the day after your 50th birthday PSA: No results in last 5 years           Hepatitis C Screening Once for adults born between 1945 and 1965  More frequently in patients at high risk for Hepatitis C Hep C Antibody: Not on file        Diabetes Screening 1-2 times per year if you're at risk for diabetes or have pre-diabetes Fasting glucose: 98 mg/dL (9/18/2024)  A1C: 7.5 % (9/18/2024)      Cholesterol Screening Once every 5 years if you don't have a lipid disorder. May  order more often based on risk factors. Lipid panel: 09/18/2024         Other Preventive Screenings Covered by Medicare:  Abdominal Aortic Aneurysm (AAA) Screening: covered once if your at risk. You're considered to be at risk if you have a family history of AAA or a male between the age of 65-75 who smoking at least 100 cigarettes in your lifetime.  Lung Cancer Screening: covers low dose CT scan once per year if you meet all of the following conditions: (1) Age 55-77; (2) No signs or symptoms of lung cancer; (3) Current smoker or have quit smoking within the last 15 years; (4) You have a tobacco smoking history of at least 20 pack years (packs per day x number of years you smoked); (5) You get a written order from a healthcare provider.  Glaucoma Screening: covered annually if you're considered high risk: (1) You have diabetes OR (2) Family history of glaucoma OR (3)  aged 50 and older OR (4)  American aged 65 and older  Osteoporosis Screening: covered every 2 years if you meet one of the following conditions: (1) Have a vertebral abnormality; (2) On glucocorticoid therapy for more than 3 months; (3) Have primary hyperparathyroidism; (4) On osteoporosis medications and need to assess response to drug therapy.  HIV Screening: covered annually if you're between the age of 15-65. Also covered annually if you are younger than 15 and older than 65 with risk factors for HIV infection. For pregnant patients, it is covered up to 3 times per pregnancy.    Immunizations:  Immunization Recommendations   Influenza Vaccine Annual influenza vaccination during flu season is recommended for all persons aged >= 6 months who do not have contraindications   Pneumococcal Vaccine   * Pneumococcal conjugate vaccine = PCV13 (Prevnar 13), PCV15 (Vaxneuvance), PCV20 (Prevnar 20)  * Pneumococcal polysaccharide vaccine = PPSV23 (Pneumovax) Adults 19-65 yo with certain risk factors or if 65+ yo  If never received any  pneumonia vaccine: recommend Prevnar 20 (PCV20)  Give PCV20 if previously received 1 dose of PCV13 or PPSV23   Hepatitis B Vaccine 3 dose series if at intermediate or high risk (ex: diabetes, end stage renal disease, liver disease)   Respiratory syncytial virus (RSV) Vaccine - COVERED BY MEDICARE PART D  * RSVPreF3 (Arexvy) CDC recommends that adults 60 years of age and older may receive a single dose of RSV vaccine using shared clinical decision-making (SCDM)   Tetanus (Td) Vaccine - COST NOT COVERED BY MEDICARE PART B Following completion of primary series, a booster dose should be given every 10 years to maintain immunity against tetanus. Td may also be given as tetanus wound prophylaxis.   Tdap Vaccine - COST NOT COVERED BY MEDICARE PART B Recommended at least once for all adults. For pregnant patients, recommended with each pregnancy.   Shingles Vaccine (Shingrix) - COST NOT COVERED BY MEDICARE PART B  2 shot series recommended in those 19 years and older who have or will have weakened immune systems or those 50 years and older     Health Maintenance Due:  There are no preventive care reminders to display for this patient.  Immunizations Due:      Topic Date Due   • Influenza Vaccine (1) 09/01/2024   • COVID-19 Vaccine (6 - 2024-25 season) 09/01/2024     Advance Directives   What are advance directives?  Advance directives are legal documents that state your wishes and plans for medical care. These plans are made ahead of time in case you lose your ability to make decisions for yourself. Advance directives can apply to any medical decision, such as the treatments you want, and if you want to donate organs.   What are the types of advance directives?  There are many types of advance directives, and each state has rules about how to use them. You may choose a combination of any of the following:  Living will:  This is a written record of the treatment you want. You can also choose which treatments you do not  want, which to limit, and which to stop at a certain time. This includes surgery, medicine, IV fluid, and tube feedings.   Durable power of  for healthcare (DPAHC):  This is a written record that states who you want to make healthcare choices for you when you are unable to make them for yourself. This person, called a proxy, is usually a family member or a friend. You may choose more than 1 proxy.  Do not resuscitate (DNR) order:  A DNR order is used in case your heart stops beating or you stop breathing. It is a request not to have certain forms of treatment, such as CPR. A DNR order may be included in other types of advance directives.  Medical directive:  This covers the care that you want if you are in a coma, near death, or unable to make decisions for yourself. You can list the treatments you want for each condition. Treatment may include pain medicine, surgery, blood transfusions, dialysis, IV or tube feedings, and a ventilator (breathing machine).  Values history:  This document has questions about your views, beliefs, and how you feel and think about life. This information can help others choose the care that you would choose.  Why are advance directives important?  An advance directive helps you control your care. Although spoken wishes may be used, it is better to have your wishes written down. Spoken wishes can be misunderstood, or not followed. Treatments may be given even if you do not want them. An advance directive may make it easier for your family to make difficult choices about your care.   Weight Management   Why it is important to manage your weight:  Being overweight increases your risk of health conditions such as heart disease, high blood pressure, type 2 diabetes, and certain types of cancer. It can also increase your risk for osteoarthritis, sleep apnea, and other respiratory problems. Aim for a slow, steady weight loss. Even a small amount of weight loss can lower your risk of  health problems.  How to lose weight safely:  A safe and healthy way to lose weight is to eat fewer calories and get regular exercise. You can lose up about 1 pound a week by decreasing the number of calories you eat by 500 calories each day.   Healthy meal plan for weight management:  A healthy meal plan includes a variety of foods, contains fewer calories, and helps you stay healthy. A healthy meal plan includes the following:  Eat whole-grain foods more often.  A healthy meal plan should contain fiber. Fiber is the part of grains, fruits, and vegetables that is not broken down by your body. Whole-grain foods are healthy and provide extra fiber in your diet. Some examples of whole-grain foods are whole-wheat breads and pastas, oatmeal, brown rice, and bulgur.  Eat a variety of vegetables every day.  Include dark, leafy greens such as spinach, kale, bowen greens, and mustard greens. Eat yellow and orange vegetables such as carrots, sweet potatoes, and winter squash.   Eat a variety of fruits every day.  Choose fresh or canned fruit (canned in its own juice or light syrup) instead of juice. Fruit juice has very little or no fiber.  Eat low-fat dairy foods.  Drink fat-free (skim) milk or 1% milk. Eat fat-free yogurt and low-fat cottage cheese. Try low-fat cheeses such as mozzarella and other reduced-fat cheeses.  Choose meat and other protein foods that are low in fat.  Choose beans or other legumes such as split peas or lentils. Choose fish, skinless poultry (chicken or turkey), or lean cuts of red meat (beef or pork). Before you cook meat or poultry, cut off any visible fat.   Use less fat and oil.  Try baking foods instead of frying them. Add less fat, such as margarine, sour cream, regular salad dressing and mayonnaise to foods. Eat fewer high-fat foods. Some examples of high-fat foods include french fries, doughnuts, ice cream, and cakes.  Eat fewer sweets.  Limit foods and drinks that are high in sugar. This  includes candy, cookies, regular soda, and sweetened drinks.  Exercise:  Exercise at least 30 minutes per day on most days of the week. Some examples of exercise include walking, biking, dancing, and swimming. You can also fit in more physical activity by taking the stairs instead of the elevator or parking farther away from stores. Ask your healthcare provider about the best exercise plan for you.      © Copyright [employer] 2018 Information is for End User's use only and may not be sold, redistributed or otherwise used for commercial purposes. All illustrations and images included in CareNotes® are the copyrighted property of [employer] Inc[employer] or [employer]

## 2024-12-03 NOTE — PROGRESS NOTES
Name: Kyler Hernandez      : 1935      MRN: [id number]  Encounter Provider: Gordon Álvarez MD  Encounter Date: 2024   Encounter department: [facility] PRIMARY CARE [address]    Assessment & Plan  Medicare annual wellness visit, subsequent              Preventive health issues were discussed with patient, and age appropriate screening tests were ordered as noted in patient's After Visit Summary. Personalized health advice and appropriate referrals for health education or preventive services given if needed, as noted in patient's After Visit Summary.    History of Present Illness     HPI   Patient Care Team:  Gordon Álvarez MD as PCP - General (Internal Medicine)  Parish Barker as PCP - PCP-[facility] ([facility])  Nighat Lemos MD as PCP - Endocrinology (Endocrinology)    Review of Systems   Respiratory:  Negative for shortness of breath.    Cardiovascular:  Negative for chest pain.   Gastrointestinal:  Negative for abdominal pain and blood in stool.   Genitourinary:  Negative for dysuria and hematuria.     Medical History Reviewed by provider this encounter:  Tobacco  Allergies  Meds  Problems  Med Hx  Surg Hx  Fam Hx       Annual Wellness Visit Questionnaire   Kyler is here for his Subsequent Wellness visit.     Health Risk Assessment:   Patient rates overall health as good. Patient feels that their physical health rating is same. Patient is satisfied with their life. Eyesight was rated as same. Hearing was rated as same. Patient feels that their emotional and mental health rating is same. Patients states they are never, rarely angry. Patient states they are sometimes unusually tired/fatigued. Pain experienced in the last 7 days has been none. Patient states that he has experienced no weight loss or gain in last 6 months.     Fall Risk Screening:   In the past year, patient has experienced: no history of falling in past year      Home Safety:  Patient does not have trouble with stairs  inside or outside of their home. Patient has working smoke alarms and has working carbon monoxide detector. Home safety hazards include: none.     Nutrition:   Current diet is Diabetic.     Medications:   Patient is not currently taking any over-the-counter supplements. Patient is not able to manage medications.     Activities of Daily Living (ADLs)/Instrumental Activities of Daily Living (IADLs):   Walk and transfer into and out of bed and chair?: Yes  Dress and groom yourself?: Yes    Bathe or shower yourself?: Yes    Feed yourself? Yes  Do your laundry/housekeeping?: Yes  Manage your money, pay your bills and track your expenses?: Yes  Make your own meals?: No    Do your own shopping?: Yes    Advance Care Planning:   Living will: Yes    Advanced directive: Yes    Advanced directive counseling given: Yes    End of Life Decisions reviewed with patient: Yes      PREVENTIVE SCREENINGS      Cardiovascular Screening:    General: Screening Not Indicated, History Lipid Disorder, Risks and Benefits Discussed and Screening Current      Diabetes Screening:     General: Screening Not Indicated, History Diabetes, Risks and Benefits Discussed and Screening Current      Colorectal Cancer Screening:     General: Screening Not Indicated      Prostate Cancer Screening:    General: Screening Not Indicated      Abdominal Aortic Aneurysm (AAA) Screening:    Risk factors include: tobacco use        Lung Cancer Screening:     General: Screening Not Indicated    Screening, Brief Intervention, and Referral to Treatment (SBIRT)    Screening  Typical number of drinks in a day: 0    Brief Intervention  Alcohol & drug use screenings were reviewed. No concerns regarding substance use disorder identified.     Other Counseling Topics:   Regular weightbearing exercise.        No results found.    Objective   /68   Pulse 72   Temp 98.4 °F (36.9 °C)   Resp 16   Wt 99.8 kg (220 lb)   SpO2 94%   BMI 34.46 kg/m²     Physical  Exam  Constitutional:       General: He is not in acute distress.     Appearance: He is well-developed. He is not toxic-appearing.   HENT:      Head: Normocephalic.   Eyes:      Conjunctiva/sclera: Conjunctivae normal.   Cardiovascular:      Rate and Rhythm: Normal rate and regular rhythm.   Pulmonary:      Effort: No respiratory distress.      Breath sounds: No wheezing or rales.   Abdominal:      General: Bowel sounds are normal.      Palpations: Abdomen is soft.      Tenderness: There is no abdominal tenderness.   Musculoskeletal:      Cervical back: Neck supple.   Skin:     General: Skin is warm and dry.   Neurological:      Mental Status: He is alert and oriented to person, place, and time.      Cranial Nerves: No cranial nerve deficit.      Motor: No weakness.   Psychiatric:         Mood and Affect: Mood normal.

## 2024-12-04 ENCOUNTER — TELEPHONE (OUTPATIENT)
Dept: ENDOCRINOLOGY | Facility: HOSPITAL | Age: 89
End: 2024-12-04

## 2024-12-04 DIAGNOSIS — E11.65 TYPE 2 DIABETES MELLITUS WITH HYPERGLYCEMIA, WITH LONG-TERM CURRENT USE OF INSULIN (HCC): Primary | ICD-10-CM

## 2024-12-04 DIAGNOSIS — Z79.4 TYPE 2 DIABETES MELLITUS WITH HYPERGLYCEMIA, WITH LONG-TERM CURRENT USE OF INSULIN (HCC): Primary | ICD-10-CM

## 2024-12-04 NOTE — TELEPHONE ENCOUNTER
Spoke with Josiah at [facility]. They will look into if the Dexcom can be downloaded and will call the office back.    Glucose log in media.

## 2024-12-04 NOTE — TELEPHONE ENCOUNTER
Josiah called back and stated that the patient will be coming by the office for his Dexcom to be downloaded

## 2024-12-04 NOTE — TELEPHONE ENCOUNTER
SUREKHA Styles MA  I know they are asking to restart Humalog.  Is there a way I could see a Dexcom download to see how his blood sugars are doing throughout the day before I write a prescription for this.

## 2024-12-05 ENCOUNTER — CLINICAL SUPPORT (OUTPATIENT)
Dept: ENDOCRINOLOGY | Facility: HOSPITAL | Age: 89
End: 2024-12-05

## 2024-12-05 DIAGNOSIS — E11.69 TYPE 2 DIABETES MELLITUS WITH OTHER SPECIFIED COMPLICATION, WITH LONG-TERM CURRENT USE OF INSULIN (HCC): Primary | ICD-10-CM

## 2024-12-05 DIAGNOSIS — Z79.4 TYPE 2 DIABETES MELLITUS WITH OTHER SPECIFIED COMPLICATION, WITH LONG-TERM CURRENT USE OF INSULIN (HCC): Primary | ICD-10-CM

## 2024-12-06 RX ORDER — REPAGLINIDE 0.5 MG/1
TABLET ORAL
Qty: 180 TABLET | Refills: 1 | Status: SHIPPED | OUTPATIENT
Start: 2024-12-06

## 2024-12-06 NOTE — TELEPHONE ENCOUNTER
Please inform patient to will start Prandin 0.5 mg with breakfast and with dinner, hold it if not eating or blood sugar less than 100  I have sent the prescription to pharmacy

## 2024-12-06 NOTE — TELEPHONE ENCOUNTER
Confirmed with facility patient is taking Lantus at 35 units in the morning and 40 units at night.

## 2024-12-06 NOTE — TELEPHONE ENCOUNTER
Reviewed continuous glucose monitor sensor by Dexcom, average blood sugars are to 20 mg per DL blood sugars are elevated in 182 to 240 mg per DL range  Please inform patient to cut down on carbohydrate with each meal    Please verify what dose of Lantus he is currently taking?  I do not see Lantus on his med list.

## 2024-12-09 NOTE — TELEPHONE ENCOUNTER
Spoke to the nurse at the facility. Nothing further is needed from us since the script was sent to the pharmacy.

## 2024-12-18 ENCOUNTER — APPOINTMENT (OUTPATIENT)
Dept: LAB | Facility: HOSPITAL | Age: 89
End: 2024-12-18
Attending: STUDENT IN AN ORGANIZED HEALTH CARE EDUCATION/TRAINING PROGRAM
Payer: COMMERCIAL

## 2024-12-18 DIAGNOSIS — E78.2 MIXED HYPERLIPIDEMIA: ICD-10-CM

## 2024-12-18 DIAGNOSIS — E11.65 TYPE 2 DIABETES MELLITUS WITH HYPERGLYCEMIA, WITH LONG-TERM CURRENT USE OF INSULIN (HCC): ICD-10-CM

## 2024-12-18 DIAGNOSIS — Z79.4 TYPE 2 DIABETES MELLITUS WITH HYPERGLYCEMIA, WITH LONG-TERM CURRENT USE OF INSULIN (HCC): ICD-10-CM

## 2024-12-18 DIAGNOSIS — I10 PRIMARY HYPERTENSION: ICD-10-CM

## 2024-12-18 DIAGNOSIS — I25.10 CORONARY ARTERY DISEASE INVOLVING NATIVE CORONARY ARTERY OF NATIVE HEART WITHOUT ANGINA PECTORIS: ICD-10-CM

## 2024-12-18 LAB
ALBUMIN SERPL BCG-MCNC: 4.1 G/DL (ref 3.5–5)
ALP SERPL-CCNC: 77 U/L (ref 34–104)
ALT SERPL W P-5'-P-CCNC: 23 U/L (ref 7–52)
ANION GAP SERPL CALCULATED.3IONS-SCNC: 10 MMOL/L (ref 4–13)
AST SERPL W P-5'-P-CCNC: 18 U/L (ref 13–39)
BILIRUB SERPL-MCNC: 0.8 MG/DL (ref 0.2–1)
BUN SERPL-MCNC: 18 MG/DL (ref 5–25)
CALCIUM SERPL-MCNC: 9.4 MG/DL (ref 8.4–10.2)
CHLORIDE SERPL-SCNC: 103 MMOL/L (ref 96–108)
CHOLEST SERPL-MCNC: 139 MG/DL (ref ?–200)
CO2 SERPL-SCNC: 31 MMOL/L (ref 21–32)
CREAT SERPL-MCNC: 1.06 MG/DL (ref 0.6–1.3)
EST. AVERAGE GLUCOSE BLD GHB EST-MCNC: 186 MG/DL
GFR SERPL CREATININE-BSD FRML MDRD: 61 ML/MIN/1.73SQ M
GLUCOSE P FAST SERPL-MCNC: 93 MG/DL (ref 65–99)
HBA1C MFR BLD: 8.1 %
HDLC SERPL-MCNC: 37 MG/DL
LDLC SERPL CALC-MCNC: 74 MG/DL (ref 0–100)
NONHDLC SERPL-MCNC: 102 MG/DL
POTASSIUM SERPL-SCNC: 4.4 MMOL/L (ref 3.5–5.3)
PROT SERPL-MCNC: 7 G/DL (ref 6.4–8.4)
SODIUM SERPL-SCNC: 144 MMOL/L (ref 135–147)
TRIGL SERPL-MCNC: 141 MG/DL (ref ?–150)

## 2024-12-18 PROCEDURE — 80061 LIPID PANEL: CPT

## 2024-12-18 PROCEDURE — 83036 HEMOGLOBIN GLYCOSYLATED A1C: CPT

## 2024-12-18 PROCEDURE — 36415 COLL VENOUS BLD VENIPUNCTURE: CPT

## 2024-12-18 PROCEDURE — 80053 COMPREHEN METABOLIC PANEL: CPT

## 2024-12-19 ENCOUNTER — TELEPHONE (OUTPATIENT)
Age: 89
End: 2024-12-19

## 2024-12-19 ENCOUNTER — RESULTS FOLLOW-UP (OUTPATIENT)
Dept: ENDOCRINOLOGY | Facility: HOSPITAL | Age: 89
End: 2024-12-19

## 2024-12-19 NOTE — TELEPHONE ENCOUNTER
[facility] called. Pt requesting order for new CPAP, tubing, and mask. Pt's existing CPAP is 20 years old and not working efficiently. Facility requests that the order be faxed to them at [phone]. They will order from [facility].

## 2024-12-22 ENCOUNTER — LAB REQUISITION (OUTPATIENT)
Dept: LAB | Facility: HOSPITAL | Age: 89
End: 2024-12-22
Payer: COMMERCIAL

## 2024-12-22 DIAGNOSIS — E78.2 MIXED HYPERLIPIDEMIA: ICD-10-CM

## 2024-12-22 PROCEDURE — 87086 URINE CULTURE/COLONY COUNT: CPT | Performed by: STUDENT IN AN ORGANIZED HEALTH CARE EDUCATION/TRAINING PROGRAM

## 2024-12-23 LAB — BACTERIA UR CULT: NORMAL

## 2024-12-26 ENCOUNTER — IN HOME VISIT (OUTPATIENT)
Dept: FAMILY MEDICINE CLINIC | Facility: HOSPITAL | Age: 89
End: 2024-12-26
Payer: COMMERCIAL

## 2024-12-26 VITALS — HEART RATE: 76 BPM | SYSTOLIC BLOOD PRESSURE: 136 MMHG | DIASTOLIC BLOOD PRESSURE: 78 MMHG | RESPIRATION RATE: 16 BRPM

## 2024-12-26 DIAGNOSIS — G47.33 OSA (OBSTRUCTIVE SLEEP APNEA): Primary | ICD-10-CM

## 2024-12-26 DIAGNOSIS — R35.1 BENIGN PROSTATIC HYPERPLASIA WITH NOCTURIA: ICD-10-CM

## 2024-12-26 DIAGNOSIS — N40.1 BENIGN PROSTATIC HYPERPLASIA WITH NOCTURIA: ICD-10-CM

## 2024-12-26 LAB
DME PARACHUTE DELIVERY DATE REQUESTED: NORMAL
DME PARACHUTE ITEM DESCRIPTION: NORMAL
DME PARACHUTE ORDER STATUS: NORMAL
DME PARACHUTE SUPPLIER NAME: NORMAL
DME PARACHUTE SUPPLIER PHONE: NORMAL

## 2024-12-26 PROCEDURE — 99348 HOME/RES VST EST LOW MDM 30: CPT | Performed by: INTERNAL MEDICINE

## 2024-12-26 NOTE — ASSESSMENT & PLAN NOTE
He has BPH with nocturia which is made worse by obstructive sleep apnea.  He has no difficulty emptying his bladder during the day.  Continue finasteride and alfuzosin

## 2024-12-26 NOTE — PROGRESS NOTES
Name: Kyler Hernandez      : 1935      MRN: [id number]  Encounter Provider: Gordon Álvarez MD  Encounter Date: 2024   Encounter department: [facility] PRIMARY CARE [address]  :  Assessment & Plan  LINCOLN (obstructive sleep apnea)  I saw patient at [facility] assisted in facility for sleep apnea.  He has had obstructive sleep apnea for at least 20 years he claims.  His last sleep study was about 2 years ago in [place] he told me.  His CPAP machine broke and he requires a new facemask.  He told me that he ordered to being.    He does not recall the settings on his CPAP machine but the CPAP enables him to get a restful sleep and decrease his nocturia.    He had nasal congestion, has a history of broken nose, he has crowded oropharynx.    His lungs and heart examinations were normal today    I tried to order a new facemask for him as he told me that he had ordered a new CPAP machine and tubing already       Benign prostatic hyperplasia with nocturia  He has BPH with nocturia which is made worse by obstructive sleep apnea.  He has no difficulty emptying his bladder during the day.  Continue finasteride and alfuzosin              History of Present Illness     HPI  Review of Systems    Objective   /78   Pulse 76   Resp 16      Physical Exam  Constitutional:       General: He is not in acute distress.     Appearance: He is not toxic-appearing.   Cardiovascular:      Rate and Rhythm: Normal rate and regular rhythm.   Pulmonary:      Effort: No respiratory distress.      Breath sounds: No wheezing or rales.   Neurological:      Mental Status: He is alert.

## 2024-12-26 NOTE — ASSESSMENT & PLAN NOTE
I saw patient at [facility] assisted in facility for sleep apnea.  He has had obstructive sleep apnea for at least 20 years he claims.  His last sleep study was about 2 years ago in [place] he told me.  His CPAP machine broke and he requires a new facemask.  He told me that he ordered to being.    He does not recall the settings on his CPAP machine but the CPAP enables him to get a restful sleep and decrease his nocturia.    He had nasal congestion, has a history of broken nose, he has crowded oropharynx.    His lungs and heart examinations were normal today    I tried to order a new facemask for him as he told me that he had ordered a new CPAP machine and tubing already

## 2024-12-30 LAB
DME PARACHUTE DELIVERY DATE ACTUAL: NORMAL
DME PARACHUTE DELIVERY DATE REQUESTED: NORMAL
DME PARACHUTE ITEM DESCRIPTION: NORMAL
DME PARACHUTE ORDER STATUS: NORMAL
DME PARACHUTE SUPPLIER NAME: NORMAL
DME PARACHUTE SUPPLIER PHONE: NORMAL

## 2025-01-03 ENCOUNTER — OFFICE VISIT (OUTPATIENT)
Dept: ENDOCRINOLOGY | Facility: HOSPITAL | Age: OVER 89
End: 2025-01-03
Payer: COMMERCIAL

## 2025-01-03 VITALS
HEART RATE: 64 BPM | DIASTOLIC BLOOD PRESSURE: 60 MMHG | WEIGHT: 223.8 LBS | BODY MASS INDEX: 35.12 KG/M2 | HEIGHT: 67 IN | SYSTOLIC BLOOD PRESSURE: 118 MMHG | OXYGEN SATURATION: 89 %

## 2025-01-03 DIAGNOSIS — E78.2 MIXED HYPERLIPIDEMIA: ICD-10-CM

## 2025-01-03 DIAGNOSIS — I27.20 MILD PULMONARY HYPERTENSION (HCC): ICD-10-CM

## 2025-01-03 DIAGNOSIS — I10 PRIMARY HYPERTENSION: ICD-10-CM

## 2025-01-03 DIAGNOSIS — Z79.4 TYPE 2 DIABETES MELLITUS WITH OTHER SPECIFIED COMPLICATION, WITH LONG-TERM CURRENT USE OF INSULIN (HCC): Primary | ICD-10-CM

## 2025-01-03 DIAGNOSIS — E66.01 OBESITY, MORBID (HCC): ICD-10-CM

## 2025-01-03 DIAGNOSIS — E11.69 TYPE 2 DIABETES MELLITUS WITH OTHER SPECIFIED COMPLICATION, WITH LONG-TERM CURRENT USE OF INSULIN (HCC): Primary | ICD-10-CM

## 2025-01-03 PROCEDURE — 95251 CONT GLUC MNTR ANALYSIS I&R: CPT | Performed by: PHYSICIAN ASSISTANT

## 2025-01-03 PROCEDURE — 99214 OFFICE O/P EST MOD 30 MIN: CPT | Performed by: PHYSICIAN ASSISTANT

## 2025-01-03 NOTE — PROGRESS NOTES
Kyler Hernandez 89 y.o. male MRN: [id number]    Encounter: [id number]      Assessment & Plan     Assessment:  This is a 89 y.o.-year-old male with type 2 diabetes with hypertension and hyperlipidemia.    Plan:  1.  Type 2 diabetes: Most recent hemoglobin A1c increased to 8.1.  Review of his Dexcom I would expect his A1c to improve if everything is stable.  He did send in a download of his Dexcom as a month ago and at which time glucose levels were high and medication was adjusted.  At this time he will continue with Lantus 40 units daily, Jardiance 10 mg daily, and Prandin 0.5 mg with meals.  Unfortunately he is unable to tolerate GLP-1's and I do not think these will be beneficial for him in the future.  Continue utilizing Dexcom to monitor glucose levels.  Continue with lifestyle changes to help improve glucose levels.  Contact the office with any concerns or questions.  Follow-up in 3 months with labwork completed prior to visit.    2.  Hypertension: Normotensive in the office today.  Kidney function remains stable with normal electrolytes.  He will continue with amlodipine 10 mg daily, atenolol 50 mg twice a day, losartan 100 mg daily, furosemide milligrams daily.  Repeat CMP prior to next office visit.    3.  Hyperlipidemia: Lipid panel doing excellent at this time.  Continue with atorvastatin 80 mg daily.  Will continue to monitor over time.    4.  Obesity: Unable to tolerate GLP-1's to assist in weight loss.  At this time his health is overall doing well.  Could benefit from some slight weight loss, would focus on lifestyle.    5.  Pulmonary hypertension: Managed by cardiology.    CC: Type 2 diabetes follow-up    History of Present Illness     HPI:  Kyler Hernandez is a 89 year old male with type 2 diabetes diagnosed in 1987 with hypertension and hyperlipidemia.  Who presents today for routine follow-up.  He is on oral agents and insulin at home and takes Lantus 40 units daily, Prandin 0.25 mg with meals,  Jardiance 10 mg daily. He denies any polyuria, polydipsia, nocturia and blurry vision.  He denies neuropathy, retinopathy, heart attack, stroke, and claudication but does admit to nephropathy.  Unfortunately he had side effects with both Ozempic and Trulicity causing significant diarrhea and stopped the medications.  States that he is feeling better off these medications.  Was started on Prandin afterwards.  Knows that he needs to focus on lifestyle changes to keep his blood sugars under control.  Has not had any recent issues with blood sugars.  Overall feeling well today.    Hypoglycemic episodes: No recently, but does keep fast acting carbohydrates on hand..     Due for diabetic eye exam at this time.  The patient's last foot exam was in June 2024.    Blood Sugar/Glucometer/Pump/CGM review: Download of Dexcom from December 21 through January 3, 2025 reveals an average glucose level of 164 with standard deviation of 49.  He is in target range 72% time, above target range 27% time, and below target range 1% of the time.  He believes that these episodes of hypoglycemia are due to sensor issues then rather than true lows.  Glucose levels decreased slightly overnight, will increase with breakfast, trend down after lunch, and then increase later in the day before leveling out.    For hypertension he is currently on amlodipine 10 mg daily, atenolol 50 mg daily, furosemide 40 mg daily, and losartan 100 mg daily.  For hyperlipidemia he is currently on atorvastatin 80 mg daily.  Denies any headaches, vision change, chest pain, MI or strokelike symptoms.     Review of Systems   Constitutional:  Negative for activity change, appetite change, fatigue and unexpected weight change.   HENT:  Negative for trouble swallowing.    Eyes:  Negative for visual disturbance.   Respiratory:  Negative for chest tightness and shortness of breath.    Cardiovascular:  Negative for chest pain, palpitations and leg swelling.   Gastrointestinal:   Negative for abdominal pain, diarrhea, nausea and vomiting.   Endocrine: Negative for cold intolerance, heat intolerance, polydipsia, polyphagia and polyuria.   Genitourinary:  Negative for frequency.   Skin:  Negative for rash and wound.   Neurological:  Negative for dizziness, weakness, light-headedness, numbness and headaches.   Psychiatric/Behavioral:  Negative for dysphoric mood and sleep disturbance. The patient is not nervous/anxious.        Historical Information   Past Medical History:   Diagnosis Date   • Atherosclerotic heart disease of native coronary artery without angina pectoris    • Chronic obstructive pulmonary disease, unspecified COPD type (HCC)    • Diabetes mellitus (HCC)    • Dry eye syndrome    • Hyperlipidemia    • Hypertension    • Nonrheumatic mitral (valve) insufficiency    • Obstructive sleep apnea (adult) (pediatric)    • Vitamin B12 deficiency    • Vitamin D deficiency      Past Surgical History:   Procedure Laterality Date   • HERNIA REPAIR       Social History   Social History     Substance and Sexual Activity   Alcohol Use Yes    Comment: occasional whiskey sour     Social History     Substance and Sexual Activity   Drug Use Never     Social History     Tobacco Use   Smoking Status Former   • Types: Cigarettes   • Start date:    • Quit date:    • Years since quittin.0   Smokeless Tobacco Never     Family History:   Family History   Problem Relation Age of Onset   • Dementia Mother    • Diabetes Mother    • Peripheral vascular disease Father    • Mental illness Neg Hx    • Substance Abuse Neg Hx        Meds/Allergies   Current Outpatient Medications   Medication Sig Dispense Refill   • acetaminophen (TYLENOL) 325 mg tablet 2 tabs q 6 hrs prn pain or fever greater than 100     • albuterol (2.5 mg/3 mL) 0.083 % nebulizer solution 1 vial q 4 hrs prn     • Alcohol Swabs (Alcohol Prep Pad) 70 % PADS As directed     • alfuzosin (UROXATRAL) 10 mg 24 hr tablet 1 TAB ORALLY EVERY  MORNING DX: BPH 30 tablet 5   • amLODIPine (NORVASC) 10 mg tablet 1 TAB ORALLY EVERY MORNING DX: HYPERTENSION 30 tablet 5   • aspirin (Aspirin Low Dose) 81 mg EC tablet 1 TAB ORALLY EVERY MORNING DX: STROKE PREVENTION 30 tablet 5   • atenolol (TENORMIN) 50 mg tablet 1 TAB ORALLY TWICE DAILY DX: HYPERTENSION 60 tablet 4   • atorvastatin (LIPITOR) 80 mg tablet 1 TAB ORALLY AT BEDTIME DX:HYPERLIPIDEMIA 30 tablet 10   • Cholecalciferol (Vitamin D3) 25 MCG (1000 UT) CAPS Take 1 capsule (1,000 Units total) by mouth in the morning 1 daily 30 capsule 11   • Continuous Glucose  (Dexcom G7 ) ZAYNAB USE AS DIRECTED PER  DIRECTIONS     • Continuous Glucose Sensor (Dexcom G7 Sensor) USE PER  DIRECTIONS. CHANGE SENSOR EVERY 10 DAYS.     • Cyanocobalamin (Vitamin B-12) 2500 MCG SUBL 1 TAB UNDER THE TONGUE 5 TIMES/WK (OMIT SA,WESTON) AT 8:30AM DX: SUPPLEMENT *COLD SEAL* 20 tablet 4   • cycloSPORINE (RESTASIS) 0.05 % ophthalmic emulsion Administer to both eyes Every 12 hours     • Empagliflozin (Jardiance) 10 MG TABS tablet 1 TAB ORALLY EVERY MORNING DX: DIABETES (IDDM) 30 tablet 5   • finasteride (PROSCAR) 5 mg tablet 1 TAB ORALLY EVERY MORNING DX: BPH **NIOSH 3 HAZARDOUS DRUG** 30 tablet 5   • fluticasone (FLONASE) 50 mcg/act nasal spray every 24 hours     • fluticasone-umeclidinium-vilanterol 200-62.5-25 mcg/actuation AEPB inhaler Inhale 1 puff daily Rinse mouth after use. 1 each 5   • furosemide (LASIX) 40 mg tablet 1 TAB ORALLY EVERY MORNING DX: EDEMA 30 tablet 4   • Insulin Glargine Solostar (Lantus SoloStar) 100 UNIT/ML SOPN INJECT 35U SUB-Q EVERY MORNING DX: DIABETES DX: **DISCARD UNUSED PEN 28 DAYS AFTER 1ST USE** INJECT 40 UNITS SUB-Q AT BEDTIME DX; DIABETES (Patient taking differently: INJECT 40 UNITS SUB-Q AT BEDTIME DX; DIABETES) 15 mL 5   • ipratropium-albuterol (DUO-NEB) 0.5-2.5 mg/3 mL nebulizer solution 4 times daily prn wheezing     • isosorbide mononitrate (IMDUR) 60 mg 24 hr  "tablet 1 TAB ORALLY EVERY MORNING DX:ANGINA 30 tablet 5   • loperamide (IMODIUM) 2 mg capsule Take 1 capsule (2 mg total) by mouth daily as needed for diarrhea 30 capsule 0   • losartan (COZAAR) 100 MG tablet 1 TAB ORALLY EVERY MORNING DX: HYPERTENSION 30 tablet 10   • Multiple Vitamin (Daily-Jhonatan) TABS 1 TAB ORALLY EVERY MORNING DX: SUPPLEMENT 30 tablet 5   • Multiple Vitamin (MULTIVITAMIN ADULT PO) every 24 hours     • NovoFine Autocover Pen Needle 30G X 8 MM MISC 2 pen needles daily     • Nutritional Supplements (Ensure) Take by mouth 1 can daily     • nystatin (MYCOSTATIN) powder Apply topically 2 (two) times a day 15 g 0   • pantoprazole (PROTONIX) 40 mg tablet 1 TAB ORALLY EVERY MORNING DX: GERD 30 tablet 5   • repaglinide (PRANDIN) 0.5 mg tablet Take 1 tablet with breakfast and 1 tablet with dinner, hold if not eating or blood sugar less than 100 180 tablet 1   • aspirin 81 mg chewable tablet every 24 hours (Patient not taking: Reported on 1/3/2025)     • clotrimazole-betamethasone (LOTRISONE) 1-0.05 % cream 1 Application Every 12 hours (Patient not taking: Reported on 1/3/2025)       No current facility-administered medications for this visit.     No Known Allergies    Objective   Vitals: Blood pressure 118/60, pulse 64, height 5' 7\" (1.702 m), weight 102 kg (223 lb 12.8 oz), SpO2 (!) 89%.    Physical Exam  Vitals and nursing note reviewed.   Constitutional:       General: He is not in acute distress.     Appearance: Normal appearance. He is not diaphoretic.   HENT:      Head: Normocephalic and atraumatic.   Eyes:      General: No scleral icterus.     Extraocular Movements: Extraocular movements intact.      Conjunctiva/sclera: Conjunctivae normal.      Pupils: Pupils are equal, round, and reactive to light.   Cardiovascular:      Rate and Rhythm: Normal rate and regular rhythm.      Heart sounds: No murmur heard.  Pulmonary:      Effort: Pulmonary effort is normal. No respiratory distress.      Breath " sounds: Normal breath sounds. No wheezing.   Musculoskeletal:      Cervical back: Normal range of motion.      Right lower leg: No edema.      Left lower leg: No edema.   Lymphadenopathy:      Cervical: No cervical adenopathy.   Skin:     General: Skin is warm and dry.   Neurological:      Mental Status: He is alert and oriented to person, place, and time. Mental status is at baseline.      Sensory: No sensory deficit.      Gait: Gait normal.   Psychiatric:         Mood and Affect: Mood normal.         Behavior: Behavior normal.         Thought Content: Thought content normal.         The history was obtained from the review of the chart, patient.    Lab Results:   Lab Results   Component Value Date/Time    Hemoglobin A1C 8.1 (H) 12/18/2024 07:45 AM    Hemoglobin A1C 7.5 (H) 09/18/2024 07:57 AM    Hemoglobin A1C 7.8 (H) 08/21/2024 07:49 AM    WBC 7.54 05/15/2024 07:55 AM    WBC 10.07 01/31/2024 08:15 AM    Hemoglobin 15.0 05/15/2024 07:55 AM    Hemoglobin 15.0 01/31/2024 08:15 AM    Hematocrit 46.3 05/15/2024 07:55 AM    Hematocrit 47.9 01/31/2024 08:15 AM    MCV 92 05/15/2024 07:55 AM    MCV 92 01/31/2024 08:15 AM    Platelets 221 05/15/2024 07:55 AM    Platelets 261 01/31/2024 08:15 AM    BUN 18 12/18/2024 07:45 AM    BUN 21 09/18/2024 07:57 AM    BUN 21 08/21/2024 07:49 AM    Potassium 4.4 12/18/2024 07:45 AM    Potassium 3.9 09/18/2024 07:57 AM    Potassium 4.1 08/21/2024 07:49 AM    Chloride 103 12/18/2024 07:45 AM    Chloride 105 09/18/2024 07:57 AM    Chloride 104 08/21/2024 07:49 AM    CO2 31 12/18/2024 07:45 AM    CO2 28 09/18/2024 07:57 AM    CO2 29 08/21/2024 07:49 AM    Creatinine 1.06 12/18/2024 07:45 AM    Creatinine 1.06 09/18/2024 07:57 AM    Creatinine 1.12 08/21/2024 07:49 AM    AST 18 12/18/2024 07:45 AM    AST 19 09/18/2024 07:57 AM    AST 16 05/15/2024 07:55 AM    ALT 23 12/18/2024 07:45 AM    ALT 21 09/18/2024 07:57 AM    ALT 22 05/15/2024 07:55 AM    Total Protein 7.0 12/18/2024 07:45 AM     "Total Protein 7.0 09/18/2024 07:57 AM    Total Protein 7.9 05/15/2024 07:55 AM    Albumin 4.1 12/18/2024 07:45 AM    Albumin 3.9 09/18/2024 07:57 AM    Albumin 4.4 05/15/2024 07:55 AM    HDL, Direct 37 (L) 12/18/2024 07:45 AM    HDL, Direct 38 (L) 09/18/2024 07:57 AM    HDL, Direct 46 05/15/2024 07:55 AM    Triglycerides 141 12/18/2024 07:45 AM    Triglycerides 197 (H) 09/18/2024 07:57 AM    Triglycerides 142 05/15/2024 07:55 AM           Imaging Studies: Results Review Statement: No pertinent imaging studies reviewed.    Portions of the record may have been created with voice recognition software. Occasional wrong word or \"sound a like\" substitutions may have occurred due to the inherent limitations of voice recognition software. Read the chart carefully and recognize, using context, where substitutions have occurred.    "

## 2025-01-03 NOTE — PATIENT INSTRUCTIONS
A1C increased to 8.1 which is fine. Would expect it to improve if nothing changes over the next few months.    Continue with Jardiance 10 mg daily, Lantus 40 units daily, and Prandin 0.5 mg with meals.    Continue to use Dexcom to monitor blood sugar.    Call with any concerns or questions.    Follow up in 3 month with labs.

## 2025-01-06 DIAGNOSIS — G47.33 OSA (OBSTRUCTIVE SLEEP APNEA): Primary | ICD-10-CM

## 2025-01-14 DIAGNOSIS — G47.33 OSA (OBSTRUCTIVE SLEEP APNEA): Primary | ICD-10-CM

## 2025-01-15 ENCOUNTER — TRANSCRIBE ORDERS (OUTPATIENT)
Dept: SLEEP CENTER | Facility: CLINIC | Age: OVER 89
End: 2025-01-15

## 2025-01-15 DIAGNOSIS — G47.33 OSA (OBSTRUCTIVE SLEEP APNEA): Primary | ICD-10-CM

## 2025-01-20 DIAGNOSIS — E11.65 TYPE 2 DIABETES MELLITUS WITH HYPERGLYCEMIA, WITH LONG-TERM CURRENT USE OF INSULIN (HCC): ICD-10-CM

## 2025-01-20 DIAGNOSIS — Z79.4 TYPE 2 DIABETES MELLITUS WITH HYPERGLYCEMIA, WITH LONG-TERM CURRENT USE OF INSULIN (HCC): ICD-10-CM

## 2025-01-21 RX ORDER — INSULIN GLARGINE 100 [IU]/ML
INJECTION, SOLUTION SUBCUTANEOUS
Qty: 15 ML | Refills: 0 | Status: SHIPPED | OUTPATIENT
Start: 2025-01-21

## 2025-01-27 DIAGNOSIS — E11.65 TYPE 2 DIABETES MELLITUS WITH HYPERGLYCEMIA, WITH LONG-TERM CURRENT USE OF INSULIN (HCC): Primary | ICD-10-CM

## 2025-01-27 DIAGNOSIS — Z79.4 TYPE 2 DIABETES MELLITUS WITH HYPERGLYCEMIA, WITH LONG-TERM CURRENT USE OF INSULIN (HCC): Primary | ICD-10-CM

## 2025-01-28 RX ORDER — PEN NEEDLE, DIABETIC, SAFETY 30 GX3/16"
NEEDLE, DISPOSABLE MISCELLANEOUS
Qty: 100 EACH | Refills: 1 | Status: SHIPPED | OUTPATIENT
Start: 2025-01-28

## 2025-01-31 ENCOUNTER — TELEPHONE (OUTPATIENT)
Age: OVER 89
End: 2025-01-31

## 2025-01-31 PROBLEM — E11.65 TYPE 2 DIABETES MELLITUS WITH HYPERGLYCEMIA, WITH LONG-TERM CURRENT USE OF INSULIN (HCC): Status: ACTIVE | Noted: 2025-01-31

## 2025-01-31 PROBLEM — Z79.4 TYPE 2 DIABETES MELLITUS WITH HYPERGLYCEMIA, WITH LONG-TERM CURRENT USE OF INSULIN (HCC): Status: ACTIVE | Noted: 2025-01-31

## 2025-02-05 ENCOUNTER — TELEPHONE (OUTPATIENT)
Age: OVER 89
End: 2025-02-05

## 2025-02-05 NOTE — TELEPHONE ENCOUNTER
Nurse calling from [facility] in [place]. She states the patient lost his Dexcom . She called [facility] who told her they will need a new script for the Dexcom G7 . Nurse asked if this can be sent to [facility] in [facility].  Please advise

## 2025-02-12 ENCOUNTER — LAB REQUISITION (OUTPATIENT)
Dept: LAB | Facility: HOSPITAL | Age: OVER 89
End: 2025-02-12
Payer: COMMERCIAL

## 2025-02-12 DIAGNOSIS — E78.2 MIXED HYPERLIPIDEMIA: ICD-10-CM

## 2025-02-12 PROCEDURE — 82570 ASSAY OF URINE CREATININE: CPT | Performed by: INTERNAL MEDICINE

## 2025-02-12 PROCEDURE — 82043 UR ALBUMIN QUANTITATIVE: CPT | Performed by: INTERNAL MEDICINE

## 2025-02-13 LAB
CREAT UR-MCNC: 48.1 MG/DL
MICROALBUMIN UR-MCNC: <7 MG/L

## 2025-02-27 ENCOUNTER — IN HOME VISIT (OUTPATIENT)
Dept: FAMILY MEDICINE CLINIC | Facility: HOSPITAL | Age: OVER 89
End: 2025-02-27
Payer: COMMERCIAL

## 2025-02-27 VITALS
BODY MASS INDEX: 34.46 KG/M2 | HEART RATE: 76 BPM | SYSTOLIC BLOOD PRESSURE: 132 MMHG | WEIGHT: 220 LBS | RESPIRATION RATE: 16 BRPM | DIASTOLIC BLOOD PRESSURE: 72 MMHG

## 2025-02-27 DIAGNOSIS — I10 PRIMARY HYPERTENSION: ICD-10-CM

## 2025-02-27 DIAGNOSIS — Z79.4 TYPE 2 DIABETES MELLITUS WITH HYPERGLYCEMIA, WITH LONG-TERM CURRENT USE OF INSULIN (HCC): ICD-10-CM

## 2025-02-27 DIAGNOSIS — R35.1 BENIGN PROSTATIC HYPERPLASIA WITH NOCTURIA: ICD-10-CM

## 2025-02-27 DIAGNOSIS — N40.1 BENIGN PROSTATIC HYPERPLASIA WITH NOCTURIA: ICD-10-CM

## 2025-02-27 DIAGNOSIS — J44.9 CHRONIC OBSTRUCTIVE PULMONARY DISEASE, UNSPECIFIED COPD TYPE (HCC): Primary | ICD-10-CM

## 2025-02-27 DIAGNOSIS — I27.20 PULMONARY HYPERTENSION (HCC): ICD-10-CM

## 2025-02-27 DIAGNOSIS — E11.65 TYPE 2 DIABETES MELLITUS WITH HYPERGLYCEMIA, WITH LONG-TERM CURRENT USE OF INSULIN (HCC): ICD-10-CM

## 2025-02-27 DIAGNOSIS — I25.10 CORONARY ARTERY DISEASE INVOLVING NATIVE CORONARY ARTERY OF NATIVE HEART WITHOUT ANGINA PECTORIS: ICD-10-CM

## 2025-02-27 PROBLEM — E11.69 TYPE 2 DIABETES MELLITUS WITH OTHER SPECIFIED COMPLICATION, WITH LONG-TERM CURRENT USE OF INSULIN (HCC): Status: RESOLVED | Noted: 2024-05-10 | Resolved: 2025-02-27

## 2025-02-27 PROCEDURE — 99349 HOME/RES VST EST MOD MDM 40: CPT | Performed by: INTERNAL MEDICINE

## 2025-02-27 RX ORDER — TORSEMIDE 20 MG/1
20 TABLET ORAL EVERY MORNING
Qty: 30 TABLET | Refills: 5 | Status: SHIPPED | OUTPATIENT
Start: 2025-02-27

## 2025-02-27 RX ORDER — ALBUTEROL SULFATE 90 UG/1
2 INHALANT RESPIRATORY (INHALATION) EVERY 6 HOURS PRN
Qty: 8 G | Refills: 11 | Status: SHIPPED | OUTPATIENT
Start: 2025-02-27 | End: 2025-03-29

## 2025-02-27 NOTE — ASSESSMENT & PLAN NOTE
His dyspnea exertion could also be caused by pulmonary hypertension.  I reviewed echo report done in 2023 at [facility] that showed an estimated PA systolic pressure of 51 mmHg.  He had mild pitting edema of both lower extremities today.  I replaced his furosemide with torsemide.  He has a CMP ordered for March 20 already.  Will review electrolytes on torsemide

## 2025-02-27 NOTE — ASSESSMENT & PLAN NOTE
He has BPH with nocturia.  He feels that he is able to empty his bladder during the day well.  Denies hematuria or dysuria.  He will continue finasteride

## 2025-02-27 NOTE — ASSESSMENT & PLAN NOTE
Patient has hypertension.  Blood pressure was controlled today.  His electrolytes were normal in December and renal function was stable.  He will continue losartan, atenolol  Orders:  •  torsemide (DEMADEX) 20 mg tablet; Take 1 tablet (20 mg total) by mouth every morning

## 2025-02-27 NOTE — ASSESSMENT & PLAN NOTE
I saw patient at [facility] as she is living facility for [facility] today.  He complains of shortness of breath with exertion.  He has scant cough in the morning without hemoptysis.  His dyspnea exertion has been going on for years.  He quit smoking in 1988.  He used to be 2-1/2 pack/day smoker.  He told that he had a lung function test when he was living in [place] couple of years ago.  I have no records of that.  He uses Trelegy but he requested an albuterol rescue inhaler  His lungs were clear to auscultation today without wheezing or crackles  I added albuterol rescue inhaler to his Trelegy  Orders:  •  albuterol (PROVENTIL HFA,VENTOLIN HFA) 90 mcg/act inhaler; Inhale 2 puffs every 6 (six) hours as needed for wheezing

## 2025-02-27 NOTE — ASSESSMENT & PLAN NOTE
Patient has coronary artery disease and is status post 3 stents placement.  He denies chest pain at rest or with exertion.  Continue aspirin, atorvastatin, atenolol and isosorbide

## 2025-02-27 NOTE — PROGRESS NOTES
Name: Kyler Hernandez      : 1935      MRN: [id number]  Encounter Provider: Gordon Álvarez MD  Encounter Date: 2025   Encounter department: [facility] PRIMARY CARE [address]  :  Assessment & Plan  Chronic obstructive pulmonary disease, unspecified COPD type (HCC)  I saw patient at [facility] as she is living facility for [facility] today.  He complains of shortness of breath with exertion.  He has scant cough in the morning without hemoptysis.  His dyspnea exertion has been going on for years.  He quit smoking in .  He used to be 2-1/2 pack/day smoker.  He told that he had a lung function test when he was living in [place] couple of years ago.  I have no records of that.  He uses Trelegy but he requested an albuterol rescue inhaler  His lungs were clear to auscultation today without wheezing or crackles  I added albuterol rescue inhaler to his Trelegy  Orders:  •  albuterol (PROVENTIL HFA,VENTOLIN HFA) 90 mcg/act inhaler; Inhale 2 puffs every 6 (six) hours as needed for wheezing    Primary hypertension  Patient has hypertension.  Blood pressure was controlled today.  His electrolytes were normal in December and renal function was stable.  He will continue losartan, atenolol  Orders:  •  torsemide (DEMADEX) 20 mg tablet; Take 1 tablet (20 mg total) by mouth every morning    Coronary artery disease involving native coronary artery of native heart without angina pectoris  Patient has coronary artery disease and is status post 3 stents placement.  He denies chest pain at rest or with exertion.  Continue aspirin, atorvastatin, atenolol and isosorbide       Pulmonary hypertension (HCC)  His dyspnea exertion could also be caused by pulmonary hypertension.  I reviewed echo report done in  at [facility] that showed an estimated PA systolic pressure of 51 mmHg.  He had mild pitting edema of both lower extremities today.  I replaced his furosemide with torsemide.  He has a CMP ordered  for March 20 already.  Will review electrolytes on torsemide       Type 2 diabetes mellitus with hyperglycemia, with long-term current use of insulin ([facility])    Lab Results   Component Value Date    HGBA1C 8.1 (H) 12/18/2024     He has type 2 diabetes with hyperglycemia.  A1c was 8.1 last December.  A1c is ordered for March 20.  Will review results.  He will continue Jardiance Lantus and Prandin    He did not tolerate Mounjaro: He developed severe diarrhea that with the medication had to be discontinued.       Benign prostatic hyperplasia with nocturia  He has BPH with nocturia.  He feels that he is able to empty his bladder during the day well.  Denies hematuria or dysuria.  He will continue finasteride             Depression Screening and Follow-up Plan: Patient was screened for depression during today's encounter. They screened negative with a PHQ-2 score of 0.        History of Present Illness   HPI  Review of Systems    Objective   /72   Pulse 76   Resp 16   Wt 99.8 kg (220 lb)   BMI 34.46 kg/m²      Physical Exam  Constitutional:       General: He is not in acute distress.     Appearance: He is well-developed. He is not toxic-appearing.   HENT:      Head: Normocephalic.   Eyes:      Conjunctiva/sclera: Conjunctivae normal.   Cardiovascular:      Rate and Rhythm: Normal rate and regular rhythm.      Heart sounds: No murmur heard.  Pulmonary:      Effort: No respiratory distress.      Breath sounds: No wheezing or rales.   Abdominal:      General: Bowel sounds are normal.      Palpations: Abdomen is soft.      Tenderness: There is no abdominal tenderness.   Musculoskeletal:      Cervical back: Neck supple.   Skin:     General: Skin is warm and dry.   Neurological:      Mental Status: He is alert.      Cranial Nerves: No cranial nerve deficit.      Motor: No weakness.   Psychiatric:         Mood and Affect: Mood normal.

## 2025-02-27 NOTE — ASSESSMENT & PLAN NOTE
Lab Results   Component Value Date    HGBA1C 8.1 (H) 12/18/2024     He has type 2 diabetes with hyperglycemia.  A1c was 8.1 last December.  A1c is ordered for March 20.  Will review results.  He will continue Jardiance Lantus and Prandin    He did not tolerate Mounjaro: He developed severe diarrhea that with the medication had to be discontinued.

## 2025-03-06 ENCOUNTER — TELEPHONE (OUTPATIENT)
Age: OVER 89
End: 2025-03-06

## 2025-03-06 NOTE — TELEPHONE ENCOUNTER
[address] call about the patient wanting an order for self administer order for his inhaler. Patient get upset about not having it for him to give himself. Please assist with an order for self administer. Thank you

## 2025-03-21 DIAGNOSIS — Z79.4 TYPE 2 DIABETES MELLITUS WITH HYPERGLYCEMIA, WITH LONG-TERM CURRENT USE OF INSULIN (HCC): ICD-10-CM

## 2025-03-21 DIAGNOSIS — E11.65 TYPE 2 DIABETES MELLITUS WITH HYPERGLYCEMIA, WITH LONG-TERM CURRENT USE OF INSULIN (HCC): ICD-10-CM

## 2025-03-21 RX ORDER — INSULIN GLARGINE 100 [IU]/ML
INJECTION, SOLUTION SUBCUTANEOUS
Qty: 15 ML | Refills: 5 | Status: SHIPPED | OUTPATIENT
Start: 2025-03-21

## 2025-03-26 ENCOUNTER — TELEPHONE (OUTPATIENT)
Age: OVER 89
End: 2025-03-26

## 2025-03-28 ENCOUNTER — DOCUMENTATION (OUTPATIENT)
Dept: ENDOCRINOLOGY | Facility: HOSPITAL | Age: OVER 89
End: 2025-03-28

## 2025-03-28 DIAGNOSIS — Z79.4 TYPE 2 DIABETES MELLITUS WITH HYPERGLYCEMIA, WITH LONG-TERM CURRENT USE OF INSULIN (HCC): Primary | ICD-10-CM

## 2025-03-28 DIAGNOSIS — E11.65 TYPE 2 DIABETES MELLITUS WITH HYPERGLYCEMIA, WITH LONG-TERM CURRENT USE OF INSULIN (HCC): Primary | ICD-10-CM

## 2025-03-28 PROCEDURE — 95251 CONT GLUC MNTR ANALYSIS I&R: CPT | Performed by: STUDENT IN AN ORGANIZED HEALTH CARE EDUCATION/TRAINING PROGRAM

## 2025-03-28 NOTE — PROGRESS NOTES
[facility]   Device used Dexcom G7    Indication   Type 2 Diabetes    More than 72 hours of data was reviewed. Report to be scanned to chart.     Date Range: march 15- March 28 2025    Analysis of data:   Average Glucose: 228  GMI 8.8%  Time in Target Range: 18%   Time Above Range: 52%/30%   Time Below Range: 0%     Interpretation of data:  high BG with meals, highest with breakfast    Plan  C/w Lantus 35 units in AM and 40 units in PM,   Inc Prandin 1mg with bk, 1mg with lunch and start 0.5mg with dinner.   C/w Jardiance 10 mg one daily   Can do lispro per scale with meals-  150-200=1u, 200-250 =2u, 250-300= 3u, 300-350=4u, >350 = 6u

## 2025-03-28 NOTE — TELEPHONE ENCOUNTER
[place]  [address] [facility] called patient  blood  sugars are  running  high . He his schedule with  doctor Canelo April 14,25.   Phone -[phone].  
Called and spoke with nurse Josiah at [facility]. She states the patients BG has been higher for the past few days. He is wearing his Dexcom, she is unsure if this is connected with the office.   She states the patient has been asking them if he can get his meal time insulin back, he used to take insulin lispro.   Right now his current regimen is Lantus 35 units in AM and 40 units in PM, Prandin 0.5 mg twice daily and Jardiance 10 mg one daily. Same states if his Dexcom is not connected please call her so they can figure out how to communicate his BG over the past few days.  [facility] CB# [phone] as for Josiah.  Fax # [phone]  Please advise  
Spoke with facility, they will ask the patient to stop in the office to have his Dexcom downloaded.  
The patient was able to stop by the office today and bring in his dexcom for download and a copy is on your desk for review.  
1% lidocaine

## 2025-04-07 ENCOUNTER — HOSPITAL ENCOUNTER (INPATIENT)
Facility: HOSPITAL | Age: OVER 89
LOS: 7 days | Discharge: NURSING, REHAB OR LONG-TERM CARE | DRG: 190 | End: 2025-04-14
Attending: EMERGENCY MEDICINE | Admitting: INTERNAL MEDICINE
Payer: COMMERCIAL

## 2025-04-07 ENCOUNTER — APPOINTMENT (EMERGENCY)
Dept: RADIOLOGY | Facility: HOSPITAL | Age: OVER 89
DRG: 190 | End: 2025-04-07
Payer: COMMERCIAL

## 2025-04-07 DIAGNOSIS — R09.02 HYPOXIA: ICD-10-CM

## 2025-04-07 DIAGNOSIS — J44.1 COPD EXACERBATION (HCC): Primary | ICD-10-CM

## 2025-04-07 PROBLEM — J96.01 ACUTE RESPIRATORY FAILURE WITH HYPOXIA (HCC): Status: ACTIVE | Noted: 2025-04-07

## 2025-04-07 LAB
ALBUMIN SERPL BCG-MCNC: 4.1 G/DL (ref 3.5–5)
ALP SERPL-CCNC: 78 U/L (ref 34–104)
ALT SERPL W P-5'-P-CCNC: 23 U/L (ref 7–52)
ANION GAP SERPL CALCULATED.3IONS-SCNC: 9 MMOL/L (ref 4–13)
APTT PPP: 25 SECONDS (ref 23–34)
AST SERPL W P-5'-P-CCNC: 18 U/L (ref 13–39)
BASOPHILS # BLD AUTO: 0.02 THOUSANDS/ÂΜL (ref 0–0.1)
BASOPHILS NFR BLD AUTO: 0 % (ref 0–1)
BILIRUB SERPL-MCNC: 1.11 MG/DL (ref 0.2–1)
BUN SERPL-MCNC: 24 MG/DL (ref 5–25)
CALCIUM SERPL-MCNC: 9.1 MG/DL (ref 8.4–10.2)
CHLORIDE SERPL-SCNC: 99 MMOL/L (ref 96–108)
CO2 SERPL-SCNC: 30 MMOL/L (ref 21–32)
CREAT SERPL-MCNC: 1.34 MG/DL (ref 0.6–1.3)
EOSINOPHIL # BLD AUTO: 0.14 THOUSAND/ÂΜL (ref 0–0.61)
EOSINOPHIL NFR BLD AUTO: 2 % (ref 0–6)
ERYTHROCYTE [DISTWIDTH] IN BLOOD BY AUTOMATED COUNT: 15.4 % (ref 11.6–15.1)
FLUAV RNA RESP QL NAA+PROBE: NEGATIVE
FLUBV RNA RESP QL NAA+PROBE: NEGATIVE
GFR SERPL CREATININE-BSD FRML MDRD: 46 ML/MIN/1.73SQ M
GLUCOSE SERPL-MCNC: 149 MG/DL (ref 65–140)
GLUCOSE SERPL-MCNC: 157 MG/DL (ref 65–140)
HCT VFR BLD AUTO: 44.3 % (ref 36.5–49.3)
HGB BLD-MCNC: 14.2 G/DL (ref 12–17)
IMM GRANULOCYTES # BLD AUTO: 0.09 THOUSAND/UL (ref 0–0.2)
IMM GRANULOCYTES NFR BLD AUTO: 1 % (ref 0–2)
INR PPP: 1.11 (ref 0.85–1.19)
LACTATE SERPL-SCNC: 1 MMOL/L (ref 0.5–2)
LYMPHOCYTES # BLD AUTO: 0.68 THOUSANDS/ÂΜL (ref 0.6–4.47)
LYMPHOCYTES NFR BLD AUTO: 7 % (ref 14–44)
MCH RBC QN AUTO: 29.3 PG (ref 26.8–34.3)
MCHC RBC AUTO-ENTMCNC: 32.1 G/DL (ref 31.4–37.4)
MCV RBC AUTO: 92 FL (ref 82–98)
MONOCYTES # BLD AUTO: 1.46 THOUSAND/ÂΜL (ref 0.17–1.22)
MONOCYTES NFR BLD AUTO: 15 % (ref 4–12)
NEUTROPHILS # BLD AUTO: 7.11 THOUSANDS/ÂΜL (ref 1.85–7.62)
NEUTS SEG NFR BLD AUTO: 75 % (ref 43–75)
NRBC BLD AUTO-RTO: 0 /100 WBCS
PLATELET # BLD AUTO: 183 THOUSANDS/UL (ref 149–390)
PMV BLD AUTO: 9.8 FL (ref 8.9–12.7)
POTASSIUM SERPL-SCNC: 3.7 MMOL/L (ref 3.5–5.3)
PROCALCITONIN SERPL-MCNC: 0.11 NG/ML
PROT SERPL-MCNC: 7.4 G/DL (ref 6.4–8.4)
PROTHROMBIN TIME: 14.8 SECONDS (ref 12.3–15)
RBC # BLD AUTO: 4.84 MILLION/UL (ref 3.88–5.62)
RSV RNA RESP QL NAA+PROBE: NEGATIVE
SARS-COV-2 RNA RESP QL NAA+PROBE: NEGATIVE
SODIUM SERPL-SCNC: 138 MMOL/L (ref 135–147)
WBC # BLD AUTO: 9.5 THOUSAND/UL (ref 4.31–10.16)

## 2025-04-07 PROCEDURE — 87040 BLOOD CULTURE FOR BACTERIA: CPT | Performed by: EMERGENCY MEDICINE

## 2025-04-07 PROCEDURE — 93005 ELECTROCARDIOGRAM TRACING: CPT

## 2025-04-07 PROCEDURE — 83605 ASSAY OF LACTIC ACID: CPT | Performed by: EMERGENCY MEDICINE

## 2025-04-07 PROCEDURE — 85025 COMPLETE CBC W/AUTO DIFF WBC: CPT | Performed by: EMERGENCY MEDICINE

## 2025-04-07 PROCEDURE — 94760 N-INVAS EAR/PLS OXIMETRY 1: CPT

## 2025-04-07 PROCEDURE — 82948 REAGENT STRIP/BLOOD GLUCOSE: CPT

## 2025-04-07 PROCEDURE — 71045 X-RAY EXAM CHEST 1 VIEW: CPT

## 2025-04-07 PROCEDURE — 80053 COMPREHEN METABOLIC PANEL: CPT | Performed by: EMERGENCY MEDICINE

## 2025-04-07 PROCEDURE — 96365 THER/PROPH/DIAG IV INF INIT: CPT

## 2025-04-07 PROCEDURE — 85610 PROTHROMBIN TIME: CPT | Performed by: EMERGENCY MEDICINE

## 2025-04-07 PROCEDURE — 84145 PROCALCITONIN (PCT): CPT | Performed by: EMERGENCY MEDICINE

## 2025-04-07 PROCEDURE — 85730 THROMBOPLASTIN TIME PARTIAL: CPT | Performed by: EMERGENCY MEDICINE

## 2025-04-07 PROCEDURE — 0241U HB NFCT DS VIR RESP RNA 4 TRGT: CPT | Performed by: INTERNAL MEDICINE

## 2025-04-07 PROCEDURE — 94644 CONT INHLJ TX 1ST HOUR: CPT

## 2025-04-07 PROCEDURE — 99285 EMERGENCY DEPT VISIT HI MDM: CPT

## 2025-04-07 PROCEDURE — 99222 1ST HOSP IP/OBS MODERATE 55: CPT | Performed by: INTERNAL MEDICINE

## 2025-04-07 PROCEDURE — 96375 TX/PRO/DX INJ NEW DRUG ADDON: CPT

## 2025-04-07 PROCEDURE — 94660 CPAP INITIATION&MGMT: CPT

## 2025-04-07 PROCEDURE — 36415 COLL VENOUS BLD VENIPUNCTURE: CPT | Performed by: EMERGENCY MEDICINE

## 2025-04-07 RX ORDER — GUAIFENESIN 600 MG/1
600 TABLET, EXTENDED RELEASE ORAL EVERY 12 HOURS SCHEDULED
Status: DISCONTINUED | OUTPATIENT
Start: 2025-04-07 | End: 2025-04-14 | Stop reason: HOSPADM

## 2025-04-07 RX ORDER — ACETAMINOPHEN 325 MG/1
650 TABLET ORAL EVERY 6 HOURS PRN
Status: DISCONTINUED | OUTPATIENT
Start: 2025-04-07 | End: 2025-04-14 | Stop reason: HOSPADM

## 2025-04-07 RX ORDER — PANTOPRAZOLE SODIUM 40 MG/1
40 TABLET, DELAYED RELEASE ORAL EVERY MORNING
Status: DISCONTINUED | OUTPATIENT
Start: 2025-04-08 | End: 2025-04-14 | Stop reason: HOSPADM

## 2025-04-07 RX ORDER — MAGNESIUM SULFATE HEPTAHYDRATE 40 MG/ML
2 INJECTION, SOLUTION INTRAVENOUS ONCE
Status: COMPLETED | OUTPATIENT
Start: 2025-04-07 | End: 2025-04-07

## 2025-04-07 RX ORDER — ISOSORBIDE MONONITRATE 60 MG/1
60 TABLET, EXTENDED RELEASE ORAL EVERY MORNING
Status: DISCONTINUED | OUTPATIENT
Start: 2025-04-08 | End: 2025-04-14 | Stop reason: HOSPADM

## 2025-04-07 RX ORDER — NYSTATIN 100000 [USP'U]/G
POWDER TOPICAL 2 TIMES DAILY
Status: DISCONTINUED | OUTPATIENT
Start: 2025-04-08 | End: 2025-04-14 | Stop reason: HOSPADM

## 2025-04-07 RX ORDER — FLUTICASONE PROPIONATE 50 MCG
1 SPRAY, SUSPENSION (ML) NASAL DAILY
Status: DISCONTINUED | OUTPATIENT
Start: 2025-04-08 | End: 2025-04-14 | Stop reason: HOSPADM

## 2025-04-07 RX ORDER — BENZONATATE 100 MG/1
100 CAPSULE ORAL 3 TIMES DAILY PRN
Status: DISCONTINUED | OUTPATIENT
Start: 2025-04-07 | End: 2025-04-14 | Stop reason: HOSPADM

## 2025-04-07 RX ORDER — LOSARTAN POTASSIUM 50 MG/1
100 TABLET ORAL EVERY MORNING
Status: DISCONTINUED | OUTPATIENT
Start: 2025-04-08 | End: 2025-04-14 | Stop reason: HOSPADM

## 2025-04-07 RX ORDER — METHYLPREDNISOLONE SODIUM SUCCINATE 125 MG/2ML
125 INJECTION, POWDER, LYOPHILIZED, FOR SOLUTION INTRAMUSCULAR; INTRAVENOUS ONCE
Status: COMPLETED | OUTPATIENT
Start: 2025-04-07 | End: 2025-04-07

## 2025-04-07 RX ORDER — TORSEMIDE 20 MG/1
20 TABLET ORAL EVERY MORNING
Status: DISCONTINUED | OUTPATIENT
Start: 2025-04-08 | End: 2025-04-14 | Stop reason: HOSPADM

## 2025-04-07 RX ORDER — METHYLPREDNISOLONE SODIUM SUCCINATE 40 MG/ML
40 INJECTION, POWDER, LYOPHILIZED, FOR SOLUTION INTRAMUSCULAR; INTRAVENOUS EVERY 8 HOURS
Status: DISCONTINUED | OUTPATIENT
Start: 2025-04-08 | End: 2025-04-08

## 2025-04-07 RX ORDER — ASPIRIN 81 MG/1
81 TABLET ORAL EVERY MORNING
Status: DISCONTINUED | OUTPATIENT
Start: 2025-04-08 | End: 2025-04-14 | Stop reason: HOSPADM

## 2025-04-07 RX ORDER — POLYVINYL ALCOHOL 14 MG/ML
1 SOLUTION/ DROPS OPHTHALMIC EVERY 12 HOURS SCHEDULED
Status: DISCONTINUED | OUTPATIENT
Start: 2025-04-08 | End: 2025-04-07

## 2025-04-07 RX ORDER — INSULIN LISPRO 100 [IU]/ML
1-6 INJECTION, SOLUTION INTRAVENOUS; SUBCUTANEOUS
Status: DISCONTINUED | OUTPATIENT
Start: 2025-04-07 | End: 2025-04-14 | Stop reason: HOSPADM

## 2025-04-07 RX ORDER — SODIUM CHLORIDE FOR INHALATION 0.9 %
12 VIAL, NEBULIZER (ML) INHALATION ONCE
Status: COMPLETED | OUTPATIENT
Start: 2025-04-07 | End: 2025-04-07

## 2025-04-07 RX ORDER — ALBUTEROL SULFATE 5 MG/ML
10 SOLUTION RESPIRATORY (INHALATION) ONCE
Status: COMPLETED | OUTPATIENT
Start: 2025-04-07 | End: 2025-04-07

## 2025-04-07 RX ORDER — FINASTERIDE 5 MG/1
5 TABLET, FILM COATED ORAL DAILY
Status: DISCONTINUED | OUTPATIENT
Start: 2025-04-08 | End: 2025-04-14 | Stop reason: HOSPADM

## 2025-04-07 RX ORDER — LEVALBUTEROL INHALATION SOLUTION 1.25 MG/3ML
1.25 SOLUTION RESPIRATORY (INHALATION)
Status: DISCONTINUED | OUTPATIENT
Start: 2025-04-08 | End: 2025-04-14

## 2025-04-07 RX ORDER — IPRATROPIUM BROMIDE AND ALBUTEROL SULFATE 2.5; .5 MG/3ML; MG/3ML
3 SOLUTION RESPIRATORY (INHALATION) EVERY 6 HOURS PRN
Status: DISCONTINUED | OUTPATIENT
Start: 2025-04-07 | End: 2025-04-14 | Stop reason: HOSPADM

## 2025-04-07 RX ORDER — INSULIN GLARGINE 100 [IU]/ML
35 INJECTION, SOLUTION SUBCUTANEOUS EVERY MORNING
Status: DISCONTINUED | OUTPATIENT
Start: 2025-04-08 | End: 2025-04-14 | Stop reason: HOSPADM

## 2025-04-07 RX ORDER — HEPARIN SODIUM 5000 [USP'U]/ML
5000 INJECTION, SOLUTION INTRAVENOUS; SUBCUTANEOUS EVERY 8 HOURS SCHEDULED
Status: DISCONTINUED | OUTPATIENT
Start: 2025-04-07 | End: 2025-04-14 | Stop reason: HOSPADM

## 2025-04-07 RX ORDER — INSULIN LISPRO 100 [IU]/ML
1-6 INJECTION, SOLUTION INTRAVENOUS; SUBCUTANEOUS
Status: DISCONTINUED | OUTPATIENT
Start: 2025-04-08 | End: 2025-04-14 | Stop reason: HOSPADM

## 2025-04-07 RX ORDER — CEFTRIAXONE 2 G/50ML
2000 INJECTION, SOLUTION INTRAVENOUS ONCE
Status: COMPLETED | OUTPATIENT
Start: 2025-04-07 | End: 2025-04-07

## 2025-04-07 RX ORDER — AMLODIPINE BESYLATE 5 MG/1
10 TABLET ORAL EVERY MORNING
Status: DISCONTINUED | OUTPATIENT
Start: 2025-04-08 | End: 2025-04-14 | Stop reason: HOSPADM

## 2025-04-07 RX ORDER — FLUTICASONE FUROATE AND VILANTEROL 200; 25 UG/1; UG/1
1 POWDER RESPIRATORY (INHALATION) DAILY
Status: DISCONTINUED | OUTPATIENT
Start: 2025-04-08 | End: 2025-04-14 | Stop reason: HOSPADM

## 2025-04-07 RX ORDER — ATENOLOL 50 MG/1
50 TABLET ORAL 2 TIMES DAILY
Status: DISCONTINUED | OUTPATIENT
Start: 2025-04-08 | End: 2025-04-14 | Stop reason: HOSPADM

## 2025-04-07 RX ORDER — SODIUM CHLORIDE FOR INHALATION 0.9 %
3 VIAL, NEBULIZER (ML) INHALATION
Status: DISCONTINUED | OUTPATIENT
Start: 2025-04-08 | End: 2025-04-08

## 2025-04-07 RX ORDER — ATORVASTATIN CALCIUM 40 MG/1
80 TABLET, FILM COATED ORAL
Status: DISCONTINUED | OUTPATIENT
Start: 2025-04-08 | End: 2025-04-14 | Stop reason: HOSPADM

## 2025-04-07 RX ADMIN — IPRATROPIUM BROMIDE 1 MG: 0.5 SOLUTION RESPIRATORY (INHALATION) at 19:46

## 2025-04-07 RX ADMIN — INSULIN LISPRO 1 UNITS: 100 INJECTION, SOLUTION INTRAVENOUS; SUBCUTANEOUS at 22:43

## 2025-04-07 RX ADMIN — HEPARIN SODIUM 5000 UNITS: 5000 INJECTION INTRAVENOUS; SUBCUTANEOUS at 22:45

## 2025-04-07 RX ADMIN — ISODIUM CHLORIDE 12 ML: 0.03 SOLUTION RESPIRATORY (INHALATION) at 19:46

## 2025-04-07 RX ADMIN — MAGNESIUM SULFATE HEPTAHYDRATE 2 G: 40 INJECTION, SOLUTION INTRAVENOUS at 19:36

## 2025-04-07 RX ADMIN — CEFTRIAXONE 2000 MG: 2 INJECTION, SOLUTION INTRAVENOUS at 20:08

## 2025-04-07 RX ADMIN — AZITHROMYCIN MONOHYDRATE 500 MG: 500 INJECTION, POWDER, LYOPHILIZED, FOR SOLUTION INTRAVENOUS at 22:47

## 2025-04-07 RX ADMIN — ALBUTEROL SULFATE 10 MG: 2.5 SOLUTION RESPIRATORY (INHALATION) at 19:46

## 2025-04-07 RX ADMIN — GUAIFENESIN 600 MG: 600 TABLET ORAL at 22:42

## 2025-04-07 RX ADMIN — METHYLPREDNISOLONE SODIUM SUCCINATE 125 MG: 125 INJECTION, POWDER, FOR SOLUTION INTRAMUSCULAR; INTRAVENOUS at 19:36

## 2025-04-07 NOTE — ED PROVIDER NOTES
Time reflects when diagnosis was documented in both MDM as applicable and the Disposition within this note       Time User Action Codes Description Comment    4/7/2025  8:13 PM Dustin Segura [J44.1] COPD exacerbation (HCC)     4/7/2025  8:13 PM Dustin Segura [R09.02] Hypoxia           ED Disposition       ED Disposition   Admit    Condition   Stable    Date/Time   Mon Apr 7, 2025  8:12 PM    Comment   Case was discussed with TANIA and the patient's admission status was agreed to be Admission Status: inpatient status to the service of Dr. Gottlieb.               Assessment & Plan       Medical Decision Making  89-year-old male who does not wear oxygen at baseline with history of COPD presenting for 1 to 2 days of worsening shortness of breath and nonproductive cough.  Patient noted to be saturating 85% on room air and tachypneic with accessory muscle usage.  Will give heart neb, steroids, magnesium.    Critical Care Time Statement: Upon my evaluation, this patient had a high probability of imminent or life-threatening deterioration due to Respiratory distress, which required my direct attention, intervention, and personal management.  I spent a total of 45 minutes directly providing critical care services, including interpretation of complex medical databases, evaluating for the presence of life-threatening injuries or illnesses, management of organ system failure(s) , and complex medical decision making (to support/prevent further life-threatening deterioration).. This time is exclusive of procedures, teaching, treating other patients, family meetings, and any prior time recorded by providers other than myself.        Amount and/or Complexity of Data Reviewed  Labs: ordered.  Radiology: ordered.    Risk  Prescription drug management.  Decision regarding hospitalization.        ED Course as of 04/07/25 2015 Mon Apr 07, 2025 2008 Rocephin given for COPD exacerbation       Medications   cefTRIAXone  (ROCEPHIN) IVPB (premix in dextrose) 2,000 mg 50 mL (2,000 mg Intravenous New Bag 4/7/25 2008)   albuterol inhalation solution 10 mg (10 mg Nebulization Given 4/7/25 1946)   ipratropium (ATROVENT) 0.02 % inhalation solution 1 mg (1 mg Nebulization Given 4/7/25 1946)   sodium chloride 0.9 % inhalation solution 12 mL (12 mL Nebulization Given 4/7/25 1946)   methylPREDNISolone sodium succinate (Solu-MEDROL) injection 125 mg (125 mg Intravenous Given 4/7/25 1936)   magnesium sulfate 2 g/50 mL IVPB (premix) 2 g (0 g Intravenous Stopped 4/7/25 2008)       ED Risk Strat Scores                            SBIRT 20yo+      Flowsheet Row Most Recent Value   Initial Alcohol Screen: US AUDIT-C     1. How often do you have a drink containing alcohol? 0 Filed at: 04/07/2025 1946   2. How many drinks containing alcohol do you have on a typical day you are drinking?  0 Filed at: 04/07/2025 1946   3a. Male UNDER 65: How often do you have five or more drinks on one occasion? 0 Filed at: 04/07/2025 1946   3b. FEMALE Any Age, or MALE 65+: How often do you have 4 or more drinks on one occassion? 0 Filed at: 04/07/2025 1946   Audit-C Score 0 Filed at: 04/07/2025 1946   EDWIN: How many times in the past year have you...    Used an illegal drug or used a prescription medication for non-medical reasons? Never Filed at: 04/07/2025 1946                            History of Present Illness       Chief Complaint   Patient presents with    Shortness of Breath     Pt to ED via EMS from [facility] with reports of sinus pressure and SOB that started over the last few days. EMS found pt's O2 to be 85% on RA, put him on 4L NC and his O2 went up to 94.       Past Medical History:   Diagnosis Date    Atherosclerotic heart disease of native coronary artery without angina pectoris     Chronic obstructive pulmonary disease, unspecified COPD type (HCC)     Diabetes mellitus (HCC)     Dry eye syndrome     Hyperlipidemia     Hypertension     Nonrheumatic  mitral (valve) insufficiency     Obstructive sleep apnea (adult) (pediatric)     Vitamin B12 deficiency     Vitamin D deficiency       Past Surgical History:   Procedure Laterality Date    HERNIA REPAIR        Family History   Problem Relation Age of Onset    Dementia Mother     Diabetes Mother     Peripheral vascular disease Father     Mental illness Neg Hx     Substance Abuse Neg Hx       Social History     Tobacco Use    Smoking status: Former     Types: Cigarettes     Start date:      Quit date:      Years since quittin.3    Smokeless tobacco: Never   Vaping Use    Vaping status: Never Used   Substance Use Topics    Alcohol use: Yes     Comment: occasional whiskey sour    Drug use: Never      E-Cigarette/Vaping    E-Cigarette Use Never User       E-Cigarette/Vaping Substances    Nicotine No     THC No     CBD No     Flavoring No     Other No     Unknown No       I have reviewed and agree with the history as documented.     89-year-old male presenting for reports of worsening shortness of breath and nonproductive cough for the last 1 to 2 days.  Does have a history of COPD however does not wear oxygen at baseline.  Denies any pain anywhere at this time.  Denies fevers, chills, chest pain, abdominal pain no nausea or vomiting or diarrhea constipation or dysuria, hematuria.      Shortness of Breath  Associated symptoms: cough        Review of Systems   Respiratory:  Positive for cough and shortness of breath.    All other systems reviewed and are negative.          Objective       ED Triage Vitals   Temperature Pulse Blood Pressure Respirations SpO2 Patient Position - Orthostatic VS   25   99.2 °F (37.3 °C) 72 168/81 (!) 24 (!) 85 % Sitting      Temp Source Heart Rate Source BP Location FiO2 (%) Pain Score    25 -- 25    Oral Monitor Left arm  No Pain      Vitals      Date  and Time Temp Pulse SpO2 Resp BP Pain Score FACES Pain Rating User   04/07/25 2000 -- 66 97 % 26 141/64 -- -- AM   04/07/25 1934 -- -- 94 % -- -- -- -- AD   04/07/25 1930 99.2 °F (37.3 °C) 72 85 % 24 168/81 No Pain -- AD            Physical Exam  Vitals and nursing note reviewed.   Constitutional:       General: He is in acute distress.      Appearance: He is well-developed. He is ill-appearing. He is not toxic-appearing or diaphoretic.      Interventions: He is not intubated.  HENT:      Head: Normocephalic and atraumatic.      Right Ear: External ear normal.      Left Ear: External ear normal.      Nose: Nose normal.   Eyes:      General: No scleral icterus.        Right eye: No discharge.         Left eye: No discharge.      Conjunctiva/sclera: Conjunctivae normal.   Cardiovascular:      Rate and Rhythm: Normal rate and regular rhythm.      Heart sounds: Normal heart sounds. No murmur heard.     No friction rub. No gallop.   Pulmonary:      Effort: Tachypnea and accessory muscle usage present. No bradypnea or respiratory distress. He is not intubated.      Breath sounds: No stridor. Examination of the right-upper field reveals wheezing. Examination of the left-upper field reveals wheezing. Examination of the right-middle field reveals wheezing. Examination of the left-middle field reveals wheezing. Examination of the right-lower field reveals wheezing. Examination of the left-lower field reveals wheezing. Wheezing present. No decreased breath sounds, rhonchi or rales.   Abdominal:      General: Bowel sounds are normal. There is no distension.      Palpations: Abdomen is soft. There is no mass.      Tenderness: There is no abdominal tenderness. There is no guarding.   Musculoskeletal:         General: No tenderness or deformity. Normal range of motion.      Cervical back: Normal range of motion and neck supple.      Right lower leg: Edema (1+) present.      Left lower leg: Edema (1+) present.   Skin:     General:  Skin is warm and dry.      Coloration: Skin is not pale.      Findings: No erythema or rash.   Neurological:      Mental Status: He is alert and oriented to person, place, and time.   Psychiatric:         Behavior: Behavior normal.         Thought Content: Thought content normal.         Judgment: Judgment normal.         Results Reviewed       Procedure Component Value Units Date/Time    Procalcitonin [[id number]]  (Normal) Collected: 04/1935    Lab Status: Final result Specimen: Blood from Arm, Right Updated: 04/07/25 2007     Procalcitonin 0.11 ng/ml     Comprehensive metabolic panel [[id number]]  (Abnormal) Collected: 04/1935    Lab Status: Final result Specimen: Blood from Arm, Right Updated: 04/07/25 1958     Sodium 138 mmol/L      Potassium 3.7 mmol/L      Chloride 99 mmol/L      CO2 30 mmol/L      ANION GAP 9 mmol/L      BUN 24 mg/dL      Creatinine 1.34 mg/dL      Glucose 149 mg/dL      Calcium 9.1 mg/dL      AST 18 U/L      ALT 23 U/L      Alkaline Phosphatase 78 U/L      Total Protein 7.4 g/dL      Albumin 4.1 g/dL      Total Bilirubin 1.11 mg/dL      eGFR 46 ml/min/1.73sq m     Narrative:      National Kid[employer] Disease Foundation guidelines for Chronic Kidney Disease (CKD):     Stage 1 with normal or high GFR (GFR > 90 mL/min/1.73 square meters)    Stage 2 Mild CKD (GFR = 60-89 mL/min/1.73 square meters)    Stage 3A Moderate CKD (GFR = 45-59 mL/min/1.73 square meters)    Stage 3B Moderate CKD (GFR = 30-44 mL/min/1.73 square meters)    Stage 4 Severe CKD (GFR = 15-29 mL/min/1.73 square meters)    Stage 5 End Stage CKD (GFR <15 mL/min/1.73 square meters)  Note: GFR calculation is accurate only with a steady state creatinine    Lactic acid [[id number]]  (Normal) Collected: 04/1935    Lab Status: Final result Specimen: Blood from Arm, Right Updated: 04/07/25 1957     LACTIC ACID 1.0 mmol/L     Narrative:      Result may be elevated if tourniquet was used during collection.    Protime-INR  [[id number]]  (Normal) Collected: 04/1935    Lab Status: Final result Specimen: Blood from Arm, Right Updated: 04/07/25 1956     Protime 14.8 seconds      INR 1.11    Narrative:      INR Therapeutic Range    Indication                                             INR Range      Atrial Fibrillation                                               2.0-3.0  Hypercoagulable State                                    2.0.2.3  Left Ventricular Asist Device                            2.0-3.0  Mechanical Heart Valve                                  -    Aortic(with afib, MI, embolism, HF, LA enlargement,    and/or coagulopathy)                                     2.0-3.0 (2.5-3.5)     Mitral                                                             2.5-3.5  Prosthetic/Bioprosthetic Heart Valve               2.0-3.0  Venous thromboembolism (VTE: VT, PE        2.0-3.0    APTT [[id number]]  (Normal) Collected: 04/1935    Lab Status: Final result Specimen: Blood from Arm, Right Updated: 04/07/25 1956     PTT 25 seconds     CBC and differential [[id number]]  (Abnormal) Collected: 04/1935    Lab Status: Final result Specimen: Blood from Arm, Right Updated: 04/07/25 1943     WBC 9.50 Thousand/uL      RBC 4.84 Million/uL      Hemoglobin 14.2 g/dL      Hematocrit 44.3 %      MCV 92 fL      MCH 29.3 pg      MCHC 32.1 g/dL      RDW 15.4 %      MPV 9.8 fL      Platelets 183 Thousands/uL      nRBC 0 /100 WBCs      Segmented % 75 %      Immature Grans % 1 %      Lymphocytes % 7 %      Monocytes % 15 %      Eosinophils Relative 2 %      Basophils Relative 0 %      Absolute Neutrophils 7.11 Thousands/µL      Absolute Immature Grans 0.09 Thousand/uL      Absolute Lymphocytes 0.68 Thousands/µL      Absolute Monocytes 1.46 Thousand/µL      Eosinophils Absolute 0.14 Thousand/µL      Basophils Absolute 0.02 Thousands/µL     Blood culture #1 [[id number]] Collected: 04/1935    Lab Status: In process Specimen: Blood from Arm, Right  Updated: 25    Blood culture #2 [[id number]] Collected: 25    Lab Status: In process Specimen: Blood from Arm, Right Updated: 25            XR chest portable    (Results Pending)       ECG 12 Lead Documentation Only    Date/Time: 2025 7:48 PM    Performed by: Dustin Segura DO  Authorized by: Dustin Segura DO    ECG reviewed by me, the ED Provider: yes    Patient location:  ED  Previous ECG:     Comparison to cardiac monitor: Yes    Interpretation:     Interpretation: abnormal    Rate:     ECG rate:  73    ECG rate assessment: normal    Rhythm:     Rhythm: sinus rhythm and A-V block    Ectopy:     Ectopy: none    QRS:     QRS axis:  Normal    QRS intervals:  Normal  Conduction:     Conduction: normal    ST segments:     ST segments:  Normal  T waves:     T waves: normal        ED Medication and Procedure Management   Prior to Admission Medications   Prescriptions Last Dose Informant Patient Reported? Taking?   Alcohol Swabs (Alcohol Prep Pad) 70 % PADS  Outside Facility (Specify) Yes No   Sig: As directed   Cholecalciferol (Vitamin D3) 25 MCG (1000 UT) CAPS  Outside Facility (Specify) No No   Sig: Take 1 capsule (1,000 Units total) by mouth in the morning 1 daily   Continuous Glucose  (Dexcom G7 ) ZAYNAB  Outside Facility (Specify) Yes No   Sig: USE AS DIRECTED PER  DIRECTIONS   Continuous Glucose Sensor (Dexcom G7 Sensor)  Outside Facility (Specify) Yes No   Sig: USE PER  DIRECTIONS. CHANGE SENSOR EVERY 10 DAYS.   Cyanocobalamin (Vitamin B-12) 2500 MCG SUBL  Outside Facility (Specify) No No   Si TAB UNDER THE TONGUE 5 TIMES/WK (OMIT SA,WESTON) AT 8:30AM DX: SUPPLEMENT *COLD SEAL*   Empagliflozin (Jardiance) 10 MG TABS tablet  Outside Facility (Specify) No No   Si TAB ORALLY EVERY MORNING DX: DIABETES (IDDM)   Insulin Glargine Solostar (Lantus SoloStar) 100 UNIT/ML SOPN   No No   Sig: INJECT 35U SUB-Q EVERY MORNING DX: DIABETES  DX: **DISCARD UNUSED PEN 28 DAYS AFTER 1ST USE** INJECT 40 UNITS SUB-Q AT BEDTIME DX; DIABETES   Insulin Pen Needle (BD AutoShield Duo) 30G X 5 MM MISC   No No   Sig: USE FOR INSULIN INJECTION 3 TIMES DAILY   Multiple Vitamin (Daily-Jhonatan) TABS  Outside Facility (Specify) No No   Si TAB ORALLY EVERY MORNING DX: SUPPLEMENT   Multiple Vitamin (MULTIVITAMIN ADULT PO)  Outside Facility (Specify) Yes No   Sig: every 24 hours   NovoFine Autocover Pen Needle 30G X 8 MM MISC  Outside Facility (Specify) Yes No   Si pen needles daily   Nutritional Supplements (Ensure)  Outside Facility (Specify) Yes No   Sig: Take by mouth 1 can daily   acetaminophen (TYLENOL) 325 mg tablet  Outside Facility (Specify) Yes No   Si tabs q 6 hrs prn pain or fever greater than 100   albuterol (2.5 mg/3 mL) 0.083 % nebulizer solution  Outside Facility (Specify) Yes No   Si vial q 4 hrs prn   alfuzosin (UROXATRAL) 10 mg 24 hr tablet  Outside Facility (Specify) No No   Si TAB ORALLY EVERY MORNING DX: BPH   amLODIPine (NORVASC) 10 mg tablet  Outside Facility (Specify) No No   Si TAB ORALLY EVERY MORNING DX: HYPERTENSION   aspirin (Aspirin Low Dose) 81 mg EC tablet  Outside Facility (Specify) No No   Si TAB ORALLY EVERY MORNING DX: STROKE PREVENTION   atenolol (TENORMIN) 50 mg tablet  Outside Facility (Specify) No No   Si TAB ORALLY TWICE DAILY DX: HYPERTENSION   atorvastatin (LIPITOR) 80 mg tablet  Outside Facility (Specify) No No   Si TAB ORALLY AT BEDTIME DX:HYPERLIPIDEMIA   clotrimazole-betamethasone (LOTRISONE) 1-0.05 % cream  Outside Facility (Specify) Yes No   Si Application Every 12 hours   Patient not taking: Reported on 1/3/2025   cycloSPORINE (RESTASIS) 0.05 % ophthalmic emulsion  Outside Facility (Specify) Yes No   Sig: Administer to both eyes Every 12 hours   finasteride (PROSCAR) 5 mg tablet  Outside Facility (Specify) No No   Si TAB ORALLY EVERY MORNING DX: BPH **NIOSH 3 HAZARDOUS DRUG**    fluticasone (FLONASE) 50 mcg/act nasal spray  Outside Facility (Specify) Yes No   Sig: every 24 hours   fluticasone-umeclidinium-vilanterol 200-62.5-25 mcg/actuation AEPB inhaler  Outside Facility (Specify) No No   Sig: Inhale 1 puff daily Rinse mouth after use.   ipratropium-albuterol (DUO-NEB) 0.5-2.5 mg/3 mL nebulizer solution  Outside Facility (Specify) Yes No   Si times daily prn wheezing   isosorbide mononitrate (IMDUR) 60 mg 24 hr tablet  Outside Facility (Specify) No No   Si TAB ORALLY EVERY MORNING DX:ANGINA   loperamide (IMODIUM) 2 mg capsule  Outside Facility (Specify) No No   Sig: Take 1 capsule (2 mg total) by mouth daily as needed for diarrhea   losartan (COZAAR) 100 MG tablet  Outside Facility (Specify) No No   Si TAB ORALLY EVERY MORNING DX: HYPERTENSION   nystatin (MYCOSTATIN) powder  Outside Facility (Specify) No No   Sig: Apply topically 2 (two) times a day   pantoprazole (PROTONIX) 40 mg tablet  Outside Facility (Specify) No No   Si TAB ORALLY EVERY MORNING DX: GERD   repaglinide (PRANDIN) 0.5 mg tablet  Outside Facility (Specify) No No   Sig: Take 1 tablet with breakfast and 1 tablet with dinner, hold if not eating or blood sugar less than 100   torsemide (DEMADEX) 20 mg tablet   No No   Sig: Take 1 tablet (20 mg total) by mouth every morning      Facility-Administered Medications: None     Patient's Medications   Discharge Prescriptions    No medications on file     No discharge procedures on file.  ED SEPSIS DOCUMENTATION   Time reflects when diagnosis was documented in both MDM as applicable and the Disposition within this note       Time User Action Codes Description Comment    2025  8:13 PM Dustin Segura [J44.1] COPD exacerbation (HCC)     2025  8:13 PM Dustin Segura [R09.02] Hypoxia                  Dustin Segura,   25

## 2025-04-08 ENCOUNTER — APPOINTMENT (INPATIENT)
Dept: NON INVASIVE DIAGNOSTICS | Facility: HOSPITAL | Age: OVER 89
DRG: 190 | End: 2025-04-08
Payer: COMMERCIAL

## 2025-04-08 LAB
ANION GAP SERPL CALCULATED.3IONS-SCNC: 10 MMOL/L (ref 4–13)
AORTIC ROOT: 3.9 CM
AORTIC VALVE MEAN VELOCITY: 10 M/S
ATRIAL RATE: 73 BPM
AV AREA BY CONTINUOUS VTI: 2.6 CM2
AV AREA PEAK VELOCITY: 2.3 CM2
AV LVOT MEAN GRADIENT: 3 MMHG
AV LVOT PEAK GRADIENT: 5 MMHG
AV MEAN PRESS GRAD SYS DOP V1V2: 5 MMHG
AV ORIFICE AREA US: 2.6 CM2
AV PEAK GRADIENT: 11 MMHG
AV VELOCITY RATIO: 0.75
AV VMAX SYS DOP: 1.62 M/S
B PARAP IS1001 DNA NPH QL NAA+NON-PROBE: NOT DETECTED
B PERT.PT PRMT NPH QL NAA+NON-PROBE: NOT DETECTED
BACTERIA SPT RESP CULT: ABNORMAL
BASOPHILS # BLD MANUAL: 0 THOUSAND/UL (ref 0–0.1)
BASOPHILS NFR MAR MANUAL: 0 % (ref 0–1)
BSA FOR ECHO PROCEDURE: 2.13 M2
BUN SERPL-MCNC: 22 MG/DL (ref 5–25)
C PNEUM DNA NPH QL NAA+NON-PROBE: NOT DETECTED
CALCIUM SERPL-MCNC: 9.2 MG/DL (ref 8.4–10.2)
CHLORIDE SERPL-SCNC: 103 MMOL/L (ref 96–108)
CO2 SERPL-SCNC: 26 MMOL/L (ref 21–32)
CREAT SERPL-MCNC: 1.12 MG/DL (ref 0.6–1.3)
DOP CALC AO VTI: 30.21 CM
DOP CALC LVOT AREA: 3.46 CM2
DOP CALC LVOT CARDIAC INDEX: 2.51 L/MIN/M2
DOP CALC LVOT CARDIAC OUTPUT: 5.37 L/MIN
DOP CALC LVOT DIAMETER: 2.1 CM
DOP CALC LVOT PEAK VEL VTI: 22.72 CM
DOP CALC LVOT PEAK VEL: 1.08 M/S
DOP CALC LVOT STROKE INDEX: 37.4 ML/M2
DOP CALC LVOT STROKE VOLUME: 78.65
DOP CALC MV VTI: 23.5 CM
E WAVE DECELERATION TIME: 137 MS
E/A RATIO: 1.67
EOSINOPHIL # BLD MANUAL: 0 THOUSAND/UL (ref 0–0.4)
EOSINOPHIL NFR BLD MANUAL: 0 % (ref 0–6)
ERYTHROCYTE [DISTWIDTH] IN BLOOD BY AUTOMATED COUNT: 15.2 % (ref 11.6–15.1)
EST. AVERAGE GLUCOSE BLD GHB EST-MCNC: 209 MG/DL
FLUAV RNA NPH QL NAA+NON-PROBE: NOT DETECTED
FLUBV RNA NPH QL NAA+NON-PROBE: NOT DETECTED
FRACTIONAL SHORTENING: 36 (ref 28–44)
GFR SERPL CREATININE-BSD FRML MDRD: 57 ML/MIN/1.73SQ M
GLUCOSE SERPL-MCNC: 180 MG/DL (ref 65–140)
GLUCOSE SERPL-MCNC: 202 MG/DL (ref 65–140)
GLUCOSE SERPL-MCNC: 238 MG/DL (ref 65–140)
GLUCOSE SERPL-MCNC: 274 MG/DL (ref 65–140)
GLUCOSE SERPL-MCNC: 282 MG/DL (ref 65–140)
GRAM STN SPEC: ABNORMAL
HADV DNA NPH QL NAA+NON-PROBE: NOT DETECTED
HBA1C MFR BLD: 8.9 %
HCOV 229E RNA NPH QL NAA+NON-PROBE: NOT DETECTED
HCOV HKU1 RNA NPH QL NAA+NON-PROBE: NOT DETECTED
HCOV NL63 RNA NPH QL NAA+NON-PROBE: NOT DETECTED
HCOV OC43 RNA NPH QL NAA+NON-PROBE: NOT DETECTED
HCT VFR BLD AUTO: 44.7 % (ref 36.5–49.3)
HGB BLD-MCNC: 15.2 G/DL (ref 12–17)
HMPV RNA NPH QL NAA+NON-PROBE: NOT DETECTED
HPIV1 RNA NPH QL NAA+NON-PROBE: NOT DETECTED
HPIV2 RNA NPH QL NAA+NON-PROBE: NOT DETECTED
HPIV3 RNA NPH QL NAA+NON-PROBE: DETECTED
HPIV4 RNA NPH QL NAA+NON-PROBE: NOT DETECTED
INTERVENTRICULAR SEPTUM IN DIASTOLE (PARASTERNAL SHORT AXIS VIEW): 1.4 CM
INTERVENTRICULAR SEPTUM: 1.4 CM (ref 0.6–1.1)
LAAS-AP2: 22.2 CM2
LAAS-AP4: 20.6 CM2
LEFT ATRIUM SIZE: 4.2 CM
LEFT ATRIUM VOLUME (MOD BIPLANE): 70 ML
LEFT ATRIUM VOLUME INDEX (MOD BIPLANE): 32.7 ML/M2
LEFT INTERNAL DIMENSION IN SYSTOLE: 2.7 CM (ref 2.1–4)
LEFT VENTRICLE DIASTOLIC VOLUME (MOD BIPLANE): 151 ML
LEFT VENTRICLE DIASTOLIC VOLUME INDEX (MOD BIPLANE): 70.9 ML/M2
LEFT VENTRICLE SYSTOLIC VOLUME (MOD BIPLANE): 53 ML
LEFT VENTRICLE SYSTOLIC VOLUME INDEX (MOD BIPLANE): 24.9 ML/M2
LEFT VENTRICULAR INTERNAL DIMENSION IN DIASTOLE: 4.2 CM (ref 3.5–6)
LEFT VENTRICULAR POSTERIOR WALL IN END DIASTOLE: 1.1 CM
LEFT VENTRICULAR STROKE VOLUME: 52 ML
LV EF BIPLANE MOD: 65 %
LV EF US.2D.A4C+ESTIMATED: 64 %
LVSV (TEICH): 52 ML
LYMPHOCYTES # BLD AUTO: 0.33 THOUSAND/UL (ref 0.6–4.47)
LYMPHOCYTES # BLD AUTO: 4 % (ref 14–44)
M PNEUMO DNA NPH QL NAA+NON-PROBE: NOT DETECTED
MAGNESIUM SERPL-MCNC: 2.5 MG/DL (ref 1.9–2.7)
MCH RBC QN AUTO: 30.3 PG (ref 26.8–34.3)
MCHC RBC AUTO-ENTMCNC: 34 G/DL (ref 31.4–37.4)
MCV RBC AUTO: 89 FL (ref 82–98)
MONOCYTES # BLD AUTO: 0.08 THOUSAND/UL (ref 0–1.22)
MONOCYTES NFR BLD: 1 % (ref 4–12)
MV MEAN GRADIENT: 2 MMHG
MV PEAK A VEL: 0.6 M/S
MV PEAK E VEL: 100 CM/S
MV PEAK GRADIENT: 4 MMHG
MV STENOSIS PRESSURE HALF TIME: 40 MS
MV VALVE AREA BY CONTINUITY EQUATION: 3.35 CM2
MV VALVE AREA P 1/2 METHOD: 5.5
NEUTROPHILS # BLD MANUAL: 7.82 THOUSAND/UL (ref 1.85–7.62)
NEUTS SEG NFR BLD AUTO: 95 % (ref 43–75)
P AXIS: 79 DEGREES
PLATELET # BLD AUTO: 185 THOUSANDS/UL (ref 149–390)
PLATELET BLD QL SMEAR: ADEQUATE
PMV BLD AUTO: 10.2 FL (ref 8.9–12.7)
POTASSIUM SERPL-SCNC: 3.7 MMOL/L (ref 3.5–5.3)
PR INTERVAL: 342 MS
QRS AXIS: 22 DEGREES
QRSD INTERVAL: 88 MS
QT INTERVAL: 384 MS
QTC INTERVAL: 423 MS
RBC # BLD AUTO: 5.01 MILLION/UL (ref 3.88–5.62)
RBC MORPH BLD: NORMAL
RIGHT ATRIAL 2D VOLUME: 53 ML
RIGHT ATRIUM AREA SYSTOLE A4C: 18.4 CM2
RIGHT VENTRICLE ID DIMENSION: 4.4 CM
RSV RNA NPH QL NAA+NON-PROBE: NOT DETECTED
RV+EV RNA NPH QL NAA+NON-PROBE: NOT DETECTED
SARS-COV-2 RNA NPH QL NAA+NON-PROBE: NOT DETECTED
SL CV LEFT ATRIUM LENGTH A2C: 5.8 CM
SL CV LV EF: 65
SL CV PED ECHO LEFT VENTRICLE DIASTOLIC VOLUME (MOD BIPLANE) 2D: 78 ML
SL CV PED ECHO LEFT VENTRICLE SYSTOLIC VOLUME (MOD BIPLANE) 2D: 26 ML
SODIUM SERPL-SCNC: 139 MMOL/L (ref 135–147)
T WAVE AXIS: 77 DEGREES
TR MAX PG: 40 MMHG
TR PEAK VELOCITY: 3.2 M/S
TRICUSPID ANNULAR PLANE SYSTOLIC EXCURSION: 1.9 CM
TRICUSPID VALVE PEAK REGURGITATION VELOCITY: 3.18 M/S
VENTRICULAR RATE: 73 BPM
WBC # BLD AUTO: 8.23 THOUSAND/UL (ref 4.31–10.16)

## 2025-04-08 PROCEDURE — 99223 1ST HOSP IP/OBS HIGH 75: CPT | Performed by: INTERNAL MEDICINE

## 2025-04-08 PROCEDURE — 93306 TTE W/DOPPLER COMPLETE: CPT | Performed by: INTERNAL MEDICINE

## 2025-04-08 PROCEDURE — 99232 SBSQ HOSP IP/OBS MODERATE 35: CPT | Performed by: INTERNAL MEDICINE

## 2025-04-08 PROCEDURE — 83036 HEMOGLOBIN GLYCOSYLATED A1C: CPT | Performed by: INTERNAL MEDICINE

## 2025-04-08 PROCEDURE — 0202U NFCT DS 22 TRGT SARS-COV-2: CPT | Performed by: PHYSICIAN ASSISTANT

## 2025-04-08 PROCEDURE — 94760 N-INVAS EAR/PLS OXIMETRY 1: CPT

## 2025-04-08 PROCEDURE — 85007 BL SMEAR W/DIFF WBC COUNT: CPT | Performed by: INTERNAL MEDICINE

## 2025-04-08 PROCEDURE — 87205 SMEAR GRAM STAIN: CPT | Performed by: PHYSICIAN ASSISTANT

## 2025-04-08 PROCEDURE — 82948 REAGENT STRIP/BLOOD GLUCOSE: CPT

## 2025-04-08 PROCEDURE — 85027 COMPLETE CBC AUTOMATED: CPT | Performed by: INTERNAL MEDICINE

## 2025-04-08 PROCEDURE — 94640 AIRWAY INHALATION TREATMENT: CPT

## 2025-04-08 PROCEDURE — 80048 BASIC METABOLIC PNL TOTAL CA: CPT | Performed by: INTERNAL MEDICINE

## 2025-04-08 PROCEDURE — 93010 ELECTROCARDIOGRAM REPORT: CPT | Performed by: INTERNAL MEDICINE

## 2025-04-08 PROCEDURE — 87081 CULTURE SCREEN ONLY: CPT | Performed by: INTERNAL MEDICINE

## 2025-04-08 PROCEDURE — 83735 ASSAY OF MAGNESIUM: CPT | Performed by: INTERNAL MEDICINE

## 2025-04-08 PROCEDURE — 93306 TTE W/DOPPLER COMPLETE: CPT

## 2025-04-08 RX ORDER — METHYLPREDNISOLONE SODIUM SUCCINATE 40 MG/ML
40 INJECTION, POWDER, LYOPHILIZED, FOR SOLUTION INTRAMUSCULAR; INTRAVENOUS EVERY 12 HOURS SCHEDULED
Status: DISCONTINUED | OUTPATIENT
Start: 2025-04-08 | End: 2025-04-09

## 2025-04-08 RX ORDER — INSULIN GLARGINE 100 [IU]/ML
40 INJECTION, SOLUTION SUBCUTANEOUS
Status: DISCONTINUED | OUTPATIENT
Start: 2025-04-08 | End: 2025-04-14 | Stop reason: HOSPADM

## 2025-04-08 RX ADMIN — ASPIRIN 81 MG: 81 TABLET, COATED ORAL at 09:23

## 2025-04-08 RX ADMIN — LEVALBUTEROL HYDROCHLORIDE 1.25 MG: 1.25 SOLUTION RESPIRATORY (INHALATION) at 19:46

## 2025-04-08 RX ADMIN — TORSEMIDE 20 MG: 20 TABLET ORAL at 09:23

## 2025-04-08 RX ADMIN — METHYLPREDNISOLONE SODIUM SUCCINATE 40 MG: 40 INJECTION, POWDER, FOR SOLUTION INTRAMUSCULAR; INTRAVENOUS at 22:10

## 2025-04-08 RX ADMIN — NYSTATIN: 100000 POWDER TOPICAL at 09:27

## 2025-04-08 RX ADMIN — METHYLPREDNISOLONE SODIUM SUCCINATE 40 MG: 40 INJECTION, POWDER, FOR SOLUTION INTRAMUSCULAR; INTRAVENOUS at 06:22

## 2025-04-08 RX ADMIN — INSULIN GLARGINE 40 UNITS: 100 INJECTION, SOLUTION SUBCUTANEOUS at 22:52

## 2025-04-08 RX ADMIN — AZITHROMYCIN MONOHYDRATE 500 MG: 500 INJECTION, POWDER, LYOPHILIZED, FOR SOLUTION INTRAVENOUS at 22:11

## 2025-04-08 RX ADMIN — ISOSORBIDE MONONITRATE 60 MG: 60 TABLET, EXTENDED RELEASE ORAL at 09:22

## 2025-04-08 RX ADMIN — IPRATROPIUM BROMIDE 0.5 MG: 0.5 SOLUTION RESPIRATORY (INHALATION) at 14:09

## 2025-04-08 RX ADMIN — AMLODIPINE BESYLATE 10 MG: 5 TABLET ORAL at 09:23

## 2025-04-08 RX ADMIN — HEPARIN SODIUM 5000 UNITS: 5000 INJECTION INTRAVENOUS; SUBCUTANEOUS at 14:27

## 2025-04-08 RX ADMIN — IPRATROPIUM BROMIDE 0.5 MG: 0.5 SOLUTION RESPIRATORY (INHALATION) at 19:46

## 2025-04-08 RX ADMIN — CYANOCOBALAMIN TAB 500 MCG 1000 MCG: 500 TAB at 09:23

## 2025-04-08 RX ADMIN — NYSTATIN: 100000 POWDER TOPICAL at 17:12

## 2025-04-08 RX ADMIN — LEVALBUTEROL HYDROCHLORIDE 1.25 MG: 1.25 SOLUTION RESPIRATORY (INHALATION) at 14:09

## 2025-04-08 RX ADMIN — B-COMPLEX W/ C & FOLIC ACID TAB 1 TABLET: TAB at 09:23

## 2025-04-08 RX ADMIN — FINASTERIDE 5 MG: 5 TABLET, FILM COATED ORAL at 09:23

## 2025-04-08 RX ADMIN — INSULIN LISPRO 3 UNITS: 100 INJECTION, SOLUTION INTRAVENOUS; SUBCUTANEOUS at 17:09

## 2025-04-08 RX ADMIN — HEPARIN SODIUM 5000 UNITS: 5000 INJECTION INTRAVENOUS; SUBCUTANEOUS at 06:22

## 2025-04-08 RX ADMIN — DEXTRAN 70, GLYCERIN, HYPROMELLOSE 1 DROP: 1; 2; 3 SOLUTION/ DROPS OPHTHALMIC at 09:27

## 2025-04-08 RX ADMIN — LOSARTAN POTASSIUM 100 MG: 50 TABLET, FILM COATED ORAL at 09:23

## 2025-04-08 RX ADMIN — ATENOLOL 50 MG: 50 TABLET ORAL at 22:02

## 2025-04-08 RX ADMIN — Medication 1000 UNITS: at 09:23

## 2025-04-08 RX ADMIN — FLUTICASONE PROPIONATE 1 SPRAY: 50 SPRAY, METERED NASAL at 09:26

## 2025-04-08 RX ADMIN — ATENOLOL 50 MG: 50 TABLET ORAL at 09:23

## 2025-04-08 RX ADMIN — DEXTRAN 70, GLYCERIN, HYPROMELLOSE 1 DROP: 1; 2; 3 SOLUTION/ DROPS OPHTHALMIC at 22:01

## 2025-04-08 RX ADMIN — INSULIN LISPRO 2 UNITS: 100 INJECTION, SOLUTION INTRAVENOUS; SUBCUTANEOUS at 09:21

## 2025-04-08 RX ADMIN — INSULIN LISPRO 4 UNITS: 100 INJECTION, SOLUTION INTRAVENOUS; SUBCUTANEOUS at 22:06

## 2025-04-08 RX ADMIN — GUAIFENESIN 600 MG: 600 TABLET ORAL at 09:23

## 2025-04-08 RX ADMIN — UMECLIDINIUM 1 PUFF: 62.5 AEROSOL, POWDER ORAL at 09:27

## 2025-04-08 RX ADMIN — INSULIN GLARGINE 35 UNITS: 100 INJECTION, SOLUTION SUBCUTANEOUS at 09:26

## 2025-04-08 RX ADMIN — ATORVASTATIN CALCIUM 80 MG: 40 TABLET, FILM COATED ORAL at 22:02

## 2025-04-08 RX ADMIN — PANTOPRAZOLE SODIUM 40 MG: 40 TABLET, DELAYED RELEASE ORAL at 09:23

## 2025-04-08 RX ADMIN — INSULIN LISPRO 4 UNITS: 100 INJECTION, SOLUTION INTRAVENOUS; SUBCUTANEOUS at 12:04

## 2025-04-08 RX ADMIN — FLUTICASONE FUROATE AND VILANTEROL TRIFENATATE 1 PUFF: 200; 25 POWDER RESPIRATORY (INHALATION) at 09:23

## 2025-04-08 RX ADMIN — HEPARIN SODIUM 5000 UNITS: 5000 INJECTION INTRAVENOUS; SUBCUTANEOUS at 22:04

## 2025-04-08 RX ADMIN — GUAIFENESIN 600 MG: 600 TABLET ORAL at 22:02

## 2025-04-08 NOTE — UTILIZATION REVIEW
Initial Clinical Review    Admission: Date/Time/Statement:   Admission Orders (From admission, onward)       Ordered        04/07/25 2012  INPATIENT ADMISSION  Once                          Orders Placed This Encounter   Procedures    INPATIENT ADMISSION     Standing Status:   Standing     Number of Occurrences:   1     Level of Care:   Med Surg [16]     Estimated length of stay:   More than 2 Midnights     Certification:   I certify that inpatient services are medically necessary for this patient for a duration of greater than two midnights. See H&P and MD Progress Notes for additional information about the patient's course of treatment.     ED Arrival Information       Expected   -    Arrival   4/7/2025 19:26    Acuity   Emergent              Means of arrival   Ambulance    Escorted by   [facility])    Service   Hospitalist    Admission type   Emergency              Arrival complaint   Shortness OF Breath             Chief Complaint   Patient presents with    Shortness of Breath     Pt to ED via EMS from [address] with reports of sinus pressure and SOB that started over the last few days. EMS found pt's O2 to be 85% on RA, put him on 4L NC and his O2 went up to 94.       Initial Presentation: 89 y.o. male  to ED via EMS from assisted living.    Admitted to inpatient with Dx: COPD with Acute Exacerbation/acute respiratory failure with hypoxia/Type 2 DM with hyperglycemia.  Presented to ED with worsening shortness of breath and cough starting 1 to 2 days prior to arrival.  Per ems sat 85% room air and placed on oxygen 4 liters.    PMHx: COPD, BPH, CAD, LINCOLN, hypertension, type 2 diabetes.   On exam:  acute distress.  Appears ill.  Tachypnea and accessory muscle use. Lungs wheezing.  BLE +1 edema. Bun 24. Creatinine 1.34.    Imaging shows no acute findings on CxR.   ED treatment:  oxygen continued at 4 liters.   Given Goldman Neb, Solu medrol IV, mag and started on ceftriaxone.    Plan includes to continue  oxygen, wean as able.   Continue IV Solu medrol, scheduled nebs,  start IV azithromycin.  Check for COVID/Flu and RSV.      Anticipated Length of Stay/Certification Statement:  Patient will be admitted on an inpatient basis with an anticipated length of stay of greater than 2 midnights secondary to COPD exacerbation.     Date: 4/8/25   Day 2:  still short of breath.  On exam: lungs decreased air entry.  Continue IV steroids, nebs, azithromycin.  Consult Pulmonary.  Wean oxygen as able.     4/8/25 per Pulmonary:   Suspected chronic hypoxic respiratory failure/Viral URI/COPD with possible mild exacerbation.  Recommend to continue to titrate oxygen for goal sat of 88 - 92%.  Continue IV Solu medrol.  Possible switch to prednisone tomorrow.     Patient has crossed 3 midnights and requires ongoing care    4/9/2025 .  Patient presents with expiratory wheezes with exertion.  Continues to require oxygen.   On exam diminished breath sounds.  On oxygen 3 liters. Non productive cough.    Abnormal labs or imaging:  bun 38. Creatinine 1.22.  glucose 207.  Wbc 13.55.   Diagnosis/Plan    acute exacerbation of COPD/acute respiratory failure with hypoxia.    Home oxygen test done and qualifies:  room air sat 84%, needed 5 liters to reach > 88%, with exertion 93% on 5 liters.     Continue oxygen.  On azithromycin, IV steroids, scheduled nebs. Glycemic control,     ED Treatment-Medication Administration from 04/07/2025 1926 to 04/07/2025 2111         Date/Time Order Dose Route Action     04/07/2025 1946 albuterol inhalation solution 10 mg 10 mg Nebulization Given     04/07/2025 1946 ipratropium (ATROVENT) 0.02 % inhalation solution 1 mg 1 mg Nebulization Given     04/07/2025 1946 sodium chloride 0.9 % inhalation solution 12 mL 12 mL Nebulization Given     04/07/2025 1936 methylPREDNISolone sodium succinate (Solu-MEDROL) injection 125 mg 125 mg Intravenous Given     04/07/2025 1936 magnesium sulfate 2 g/50 mL IVPB (premix) 2 g 2 g  Intravenous New Bag     04/07/2025 2008 cefTRIAXone (ROCEPHIN) IVPB (premix in dextrose) 2,000 mg 50 mL 2,000 mg Intravenous New Bag            Scheduled Medications:  amLODIPine, 10 mg, Oral, QAM  Artificial Tears, 1 drop, Both Eyes, Q12H MOLINA  aspirin, 81 mg, Oral, QAM  atenolol, 50 mg, Oral, BID  atorvastatin, 80 mg, Oral, HS  azithromycin, 500 mg, Intravenous, Q24H  cholecalciferol, 1,000 Units, Oral, Daily  cyanocobalamin, 1,000 mcg, Oral, Daily  finasteride, 5 mg, Oral, Daily  fluticasone, 1 spray, Each Nare, Daily  fluticasone-vilanterol, 1 puff, Inhalation, Daily   And  umeclidinium, 1 puff, Inhalation, Daily  guaiFENesin, 600 mg, Oral, Q12H MOLINA  heparin (porcine), 5,000 Units, Subcutaneous, Q8H MOLINA  insulin glargine, 35 Units, Subcutaneous, QAM  insulin lispro, 1-6 Units, Subcutaneous, TID AC  insulin lispro, 1-6 Units, Subcutaneous, HS  ipratropium, 0.5 mg, Nebulization, TID  isosorbide mononitrate, 60 mg, Oral, QAM  levalbuterol, 1.25 mg, Nebulization, TID  losartan, 100 mg, Oral, QAM  methylPREDNISolone sodium succinate, 40 mg, Intravenous, Q8H  multivitamin stress formula, 1 tablet, Oral, QAM  nystatin, , Topical, BID  pantoprazole, 40 mg, Oral, QAM  torsemide, 20 mg, Oral, QAM    methylPREDNISolone sodium succinate (Solu-MEDROL) injection 40 mg  Dose: 40 mg  Freq: Every 12 hours scheduled Route: IV  Start: 04/08/25 2100 End: 04/09/25 1120   predniSONE tablet 40 mg  Dose: 40 mg  Freq: Daily Route: PO  Start: 04/10/25 0900 End: 04/15/25 0859    Continuous IV Infusions:     PRN Meds: not used.   acetaminophen, 650 mg, Oral, Q6H PRN  benzonatate, 100 mg, Oral, TID PRN  ipratropium-albuterol, 3 mL, Nebulization, Q6H PRN      ED Triage Vitals [04/07/25 1930]   Temperature Pulse Respirations Blood Pressure SpO2 Pain Score   99.2 °F (37.3 °C) 72 (!) 24 168/81 (!) 85 % No Pain     Weight (last 2 days)       Date/Time Weight    04/08/25 1104 100 (221)    04/07/25 2114 101 (221.8)    04/07/25 1930 103 (226.63)           Vital Signs (last 3 days)       Date/Time Temp Pulse Resp BP MAP (mmHg) SpO2 Calculated FIO2 (%) - Nasal Cannula Nasal Cannula O2 Flow Rate (L/min) O2 Device O2 Interface Device Patient Position - Orthostatic VS Henderson Coma Scale Score Pain    04/09/25 0810 -- 70 -- -- -- 90 % -- -- None (Room air) -- -- -- --    04/09/25 0800 -- 71 -- -- -- 91 % -- -- -- -- -- -- --    04/09/25 07:36:54 -- 61 20 140/67 91 96 % 32 3 L/min Nasal cannula -- Sitting -- --    04/09/25 0700 -- -- -- -- -- 92 % -- -- -- -- -- -- --    04/08/25 2347 97.8 °F (36.6 °C) -- -- -- -- -- -- -- -- -- -- -- --    04/08/25 2205 -- -- -- -- -- -- 28 2 L/min Nasal cannula -- -- 15 No Pain    04/08/25 22:02:18 -- 76 -- 138/71 93 90 % -- -- -- -- -- -- --    04/08/25 2202 -- 76 -- 138/71 -- -- -- -- -- -- -- -- --    04/08/25 21:35:02 -- 66 15 118/52 74 93 % -- -- -- -- -- -- --    04/08/25 2029 -- -- -- -- -- -- 28 2 L/min Nasal cannula -- -- -- --    04/08/25 1946 -- -- -- -- -- -- 28 2 L/min Nasal cannula -- -- -- --    04/08/25 15:46:44 -- 66 18 138/55 83 92 % -- -- -- -- -- -- --    04/08/25 1410 -- -- -- -- -- -- 28 2 L/min Nasal cannula -- -- -- --    04/08/25 1104 -- 79 -- 136/68 -- -- -- -- -- -- -- -- --    04/08/25 0923 -- 76 -- 136/68 -- -- -- -- -- -- -- 15 No Pain    04/08/25 07:24:22 -- 79 -- 139/74 96 90 % -- -- -- -- -- -- --    04/08/25 0001 -- -- -- -- -- -- 28 2 L/min Nasal cannula -- -- -- --    04/07/25 2255 -- -- -- -- -- 92 % -- -- -- Face mask -- -- --    04/07/25 2135 -- -- -- -- -- -- 28 2 L/min Nasal cannula -- -- 15 No Pain    04/07/25 21:18:34 -- 74 -- 121/66 84 93 % -- -- -- -- -- -- --    04/07/25 2118 98.5 °F (36.9 °C) -- -- -- -- -- -- -- Nasal cannula -- -- -- --    04/07/25 2030 -- 70 18 139/67 96 97 % 36 4 L/min Nasal cannula -- Sitting -- --    04/07/25 2000 -- 66 26 141/64 89 97 % 36 4 L/min Nasal cannula -- Sitting -- --    04/07/25 1953 -- -- -- -- -- -- -- -- Nasal cannula -- -- 15 --    04/07/25 1946  -- -- -- -- -- -- 36 4 L/min Nasal cannula -- -- -- --    04/07/25 1934 -- -- -- -- -- 94 % 36 4 L/min Nasal cannula -- -- -- --    04/07/25 1930 99.2 °F (37.3 °C) 72 24 168/81 -- 85 % -- -- None (Room air) -- -- -- No Pain          Pertinent Labs/Diagnostic Test Results:   Radiology:  XR chest portable   Final Interpretation by Miguel Rust MD (04/08 1843)      No acute pulmonary disease on this examination, which is somewhat limited secondary to low lung volumes.            Workstation performed: [id number]           Cardiology:  Echo complete w/ contrast if indicated   Final Result by Omi Herman MD (04/08 1250)        Left Ventricle: Left ventricular cavity size is normal. Wall thickness    is mildly increased. The left ventricular ejection fraction is 65%.    Systolic function is normal. Wall motion is normal. Diastolic function is    normal.     IVS: There is sigmoid appearance of the septum.     Left Atrium: The atrium is mildly dilated.     Aortic Valve: The aortic valve is trileaflet. The leaflets are mildly    thickened. The leaflets are moderately calcified. There is mildly reduced    mobility. There is aortic valve sclerosis.     Mitral Valve: There is mild annular calcification. There is mild    regurgitation.     Tricuspid Valve: There is mild regurgitation.     Pulmonary Artery: The pulmonary artery systolic pressure is moderately    increased.         ECG 12 lead   Final Result by Omi Herman MD (04/08 8840)   Sinus rhythm with 1st degree A-V block   Nonspecific ST and T wave abnormality   Abnormal ECG   No previous ECGs available   Confirmed by Omi Herman ([id number]) on 4/8/2025 6:40:32 AM        GI:  No orders to display     Results from last 7 days   Lab Units 04/08/25  1055 04/07/25 2209   SARS-COV-2  Not Detected Negative     Results from last 7 days   Lab Units 04/09/25  0529 04/08/25  0631 04/1935   WBC Thousand/uL 13.55* 8.23 9.50   HEMOGLOBIN g/dL 14.5 15.2 14.2    HEMATOCRIT % 44.9 44.7 44.3   PLATELETS Thousands/uL 151 185 183   TOTAL NEUT ABS Thousands/µL  --   --  7.11     Results from last 7 days   Lab Units 04/09/25  0529 04/08/25  0631 04/1935   SODIUM mmol/L 137 139 138   POTASSIUM mmol/L 4.1 3.7 3.7   CHLORIDE mmol/L 102 103 99   CO2 mmol/L 25 26 30   ANION GAP mmol/L 10 10 9   BUN mg/dL 38* 22 24   CREATININE mg/dL 1.22 1.12 1.34*   EGFR ml/min/1.73sq m 52 57 46   CALCIUM mg/dL 8.8 9.2 9.1   MAGNESIUM mg/dL  --  2.5  --      Results from last 7 days   Lab Units 04/1935   AST U/L 18   ALT U/L 23   ALK PHOS U/L 78   TOTAL PROTEIN g/dL 7.4   ALBUMIN g/dL 4.1   TOTAL BILIRUBIN mg/dL 1.11*     Results from last 7 days   Lab Units 04/09/25  0733 04/08/25  2132 04/08/25  1650 04/08/25  1110 04/08/25  0722 04/07/25  2202   POC GLUCOSE mg/dl 185* 282* 238* 274* 202* 157*     Results from last 7 days   Lab Units 04/09/25  0529 04/08/25  0631 04/1935   GLUCOSE RANDOM mg/dL 207* 180* 149*     Results from last 7 days   Lab Units 04/08/25 0631   HEMOGLOBIN A1C % 8.9*   EAG mg/dl 209     Results from last 7 days   Lab Units 04/1935   PROTIME seconds 14.8   INR  1.11   PTT seconds 25     Results from last 7 days   Lab Units 04/07/25  1935   PROCALCITONIN ng/ml 0.11     Results from last 7 days   Lab Units 04/07/25  1935   LACTIC ACID mmol/L 1.0     Results from last 7 days   Lab Units 04/08/25  1055 04/07/25  2209   INFLUENZA A PCR   --  Negative   INFLUENZA B PCR   --  Negative   INFLUENZA B  Not Detected  --    RSV PCR   --  Negative   RESPIRATORY SYNCYTIAL VIRUS  Not Detected  --      Results from last 7 days   Lab Units 04/08/25  1055   ADENOVIRUS  Not Detected   BORDETELLA PARAPERTUSSIS  Not Detected   BORDETELLA PERTUSSIS  Not Detected   CHLAMYDIA PNEUMONIAE  Not Detected   CORONAVIRUS 229E  Not Detected   CORONAVIRUS HKU1  Not Detected   CORONAVIRUS NL63  Not Detected   CORONAVIRUS OC43  Not Detected   METAPNEUMOVIRUS  Not Detected   RHINOVIRUS   Not Detected   MYCOPLASMA PNEUMONIAE  Not Detected   PARAINFLUENZA 1  Not Detected   PARAINFLUENZA 2  Not Detected   PARAINFLUENZA 3  Detected*   PARAINFLUENZA 4  Not Detected     Results from last 7 days   Lab Units 04/08/25  1210 04/07/25  1935   BLOOD CULTURE   --  No Growth at 24 hrs.  No Growth at 24 hrs.   SPUTUM CULTURE  Test not performed. Suggest repeat specimen.  --    GRAM STAIN RESULT  >10 squamous epithelial cells/lpf, indicating orophayngeal contamination.*  2+ Polys*  1+ Budding yeast*  Rare Gram positive cocci in pairs*  --      Past Medical History:   Diagnosis Date    Atherosclerotic heart disease of native coronary artery without angina pectoris     Chronic obstructive pulmonary disease, unspecified COPD type (HCC)     Diabetes mellitus (HCC)     Dry eye syndrome     Hyperlipidemia     Hypertension     Nonrheumatic mitral (valve) insufficiency     Obstructive sleep apnea (adult) (pediatric)     Vitamin B12 deficiency     Vitamin D deficiency      Present on Admission:   LINCOLN (obstructive sleep apnea)   Primary hypertension   COPD with acute exacerbation (HCC)   Coronary artery disease involving native coronary artery of native heart without angina pectoris   Benign prostatic hyperplasia with nocturia    Admitting Diagnosis: Shortness of breath [R06.02]  Hypoxia [R09.02]  COPD exacerbation (HCC) [J44.1]  Age/Sex: 89 y.o. male    Network Utilization Review Department  ATTENTION: Please call with any questions or concerns to [phone] and carefully listen to the prompts so that you are directed to the right person. All voicemails are confidential.   For Discharge needs, contact Care Management DC Support Team at [phone] opt. [phone]  Send all requests for admission clinical reviews, approved or denied determinations and any other requests to dedicated fax number below belonging to the campus where the patient is receiving treatment. List of dedicated fax numbers for the Facilities:  FACILITY  NAME UR FAX NUMBER   ADMISSION DENIALS (Administrative/Medical Necessity) [phone]   DISCHARGE SUPPORT TEAM (NETWORK) [phone]   PARENT CHILD HEALTH (Maternity/NICU/Pediatrics) [phone]   [facility] [phone]   [facility] [phone]   [facility] [phone]   [facility] [phone]   [facility] [phone]   [facility] [phone]   [facility] [phone]   [facility] [phone]   [facility] [phone]   [facility] [phone]   [facility] [phone]   [facility] [phone]

## 2025-04-08 NOTE — PLAN OF CARE
Problem: Potential for Falls  Goal: Patient will remain free of falls  Description: INTERVENTIONS:- Educate patient/family on patient safety including physical limitations- Instruct patient to call for assistance with activity - Consult OT/PT to assist with strengthening/mobility - Keep Call bell within reach- Keep bed low and locked with side rails adjusted as appropriate- Keep care items and personal belongings within reach- Initiate and maintain comfort rounds- Make Fall Risk Sign visible to staff- Offer Toileting every 2 Hours, in advance of need- Initiate/Maintain bed/ chair alarm- Obtain necessary fall risk management equipment:- Apply yellow socks and bracelet for high fall risk patients- Consider moving patient to room near nurses station  Outcome: Progressing     Problem: RESPIRATORY - ADULT  Goal: Achieves optimal ventilation and oxygenation  Description: INTERVENTIONS:- Assess for changes in respiratory status- Assess for changes in mentation and behavior- Position to facilitate oxygenation and minimize respiratory effort- Oxygen administered by appropriate delivery if ordered- Initiate smoking cessation education as indicated- Encourage broncho-pulmonary hygiene including cough, deep breathe, Incentive Spirometry- Assess the need for suctioning and aspirate as needed- Assess and instruct to report SOB or any respiratory difficulty- Respiratory Therapy support as indicated  Outcome: Progressing

## 2025-04-08 NOTE — ASSESSMENT & PLAN NOTE
Presented from facility due to shortness of breath, congestion and hypoxia  85% on room air.  Placed on 4 L nasal cannula.    Home regimen:  Fluticasone-umeclidinium-vilanterol daily   Albuterol prn   Duoneb prn   Plan:  IV Solu-Medrol 40 mg every 8 hours  Xopenex/Atrovent 3 times daily  IV azithromycin   Respiratory protocol   Pulmonary consult  Covid/flu/rsv is negative

## 2025-04-08 NOTE — ASSESSMENT & PLAN NOTE
Presented from facility due to shortness of breath, congestion and hypoxia  85% on room air.  Placed on 4 L nasal cannula.    Home regimen:  Fluticasone-umeclidinium-vilanterol daily   Albuterol prn   Duoneb prn   Plan:  IV Solu-Medrol 40 mg every 8 hours  Xopenex/Atrovent 3 times daily  IV azithromycin   Respiratory protocol   Rule out covid/flu/rsv

## 2025-04-08 NOTE — CONSULTS
Consultation - Pulmonology   Name: Kyler Hernandez 89 y.o. male I MRN: [id number]  Unit/Bed#: -01 I Date of Admission: 4/7/2025   Date of Service: 4/8/2025 I Hospital Day: 1   Inpatient consult to Pulmonology  Consult performed by: Philomena Khan PA-C  Consult ordered by: Camilla Berry PA-C        Physician Requesting Evaluation: Lit Gottlieb MD   Reason for Evaluation / Principal Problem: COPD    Assessment & Plan  COPD with acute exacerbation (HCC)  Likely 2/2 viral trigger  Needs outpatient PFTs to confirm suspected diagnosis of COPD  F/u RP2 and sputum culture  Decrease Solu-Medrol to q 12  Azithromycin 500 mg x 3 days  Continue nebs  Home inhaler is Trelegy  Acute respiratory failure with hypoxia (HCC)  Likely chronic underlying hypoxia  Maintain pulse ox at least 88%  LINCOLN (obstructive sleep apnea)  Reports compliance with home PAP therapy, difficulty tolerating hospital device  Type 2 diabetes mellitus with hyperglycemia, with long-term current use of insulin (HCC)  Lab Results   Component Value Date    HGBA1C 8.1 (H) 12/18/2024       Recent Labs     04/07/25  2202 04/08/25  0722   POCGLU 157* 202*       Blood Sugar Average: Last 72 hrs:  (P) 179.5    Limit steroid usage as reasonably able  Pulmonology service will follow.    History of Present Illness   Kyler Hernandez is a 89 y.o. male who presents with with worsening shortness of breath. Feels like he had the flu or a cold. Already feeling much better since being admitted. Still coughing up discolored sputum.     Does not currently follow with a pulmonologist.     Review of Systems   Constitutional:  Positive for fatigue.   Respiratory:  Positive for cough and shortness of breath.    All other systems reviewed and are negative.      Historical Information   Medical History Review: I have reviewed the patient's PMH, PSH, Social History, Family History, Meds, and Allergies   Tobacco History: quit almost 40 years ago but was 2.5 PPD smoker before  that  Occupational History: hx ,   Family History:  Family History   Problem Relation Age of Onset    Dementia Mother     Diabetes Mother     Peripheral vascular disease Father     Mental illness Neg Hx     Substance Abuse Neg Hx        Objective :  Temp:  [98.5 °F (36.9 °C)-99.2 °F (37.3 °C)] 98.5 °F (36.9 °C)  HR:  [66-79] 76  BP: (121-168)/(64-81) 136/68  Resp:  [18-26] 18  SpO2:  [85 %-97 %] 90 %  O2 Device: Nasal cannula  Nasal Cannula O2 Flow Rate (L/min):  [2 L/min-4 L/min] 2 L/min    Physical Exam  Vitals reviewed.   Constitutional:       General: He is not in acute distress.     Appearance: He is not toxic-appearing.   HENT:      Head: Normocephalic and atraumatic.   Eyes:      General: No scleral icterus.  Cardiovascular:      Rate and Rhythm: Normal rate and regular rhythm.   Pulmonary:      Effort: Pulmonary effort is normal.      Breath sounds: Wheezing present.   Musculoskeletal:         General: No signs of injury.   Skin:     General: Skin is warm and dry.   Neurological:      General: No focal deficit present.      Mental Status: He is alert. Mental status is at baseline.   Psychiatric:         Mood and Affect: Mood normal.         Behavior: Behavior normal.           Lab Results: I have reviewed the following results:  .     04/07/25  1935/25  0631   WBC 9.50 8.23   HGB 14.2 15.2   HCT 44.3 44.7    185   SODIUM 138 139   K 3.7 3.7   CL 99 103   CO2 30 26   BUN 24 22   CREATININE 1.34* 1.12   GLUC 149* 180*   MG  --  2.5   AST 18  --    ALT 23  --    ALB 4.1  --    TBILI 1.11*  --    ALKPHOS 78  --    PTT 25  --    INR 1.11  --    LACTICACID 1.0  --      ABG: No new results in last 24 hours.    Imaging Results Review: I reviewed radiology reports from this admission including: chest xray.  Other Study Results Review: No additional pertinent studies reviewed.  PFT Results Reviewed:     VTE Prophylaxis: VTE covered by:  heparin (porcine), Subcutaneous, 5,000 Units at  04/08/25 0622

## 2025-04-08 NOTE — ASSESSMENT & PLAN NOTE
Lab Results   Component Value Date    HGBA1C 8.1 (H) 12/18/2024       Recent Labs     04/07/25 2202 04/08/25  0722   POCGLU 157* 202*       Blood Sugar Average: Last 72 hrs:  (P) 179.5    Limit steroid usage as reasonably able

## 2025-04-08 NOTE — CASE MANAGEMENT
Case Management Assessment & Discharge Planning Note    Patient name Kyler Hernandez  Location /-01 MRN [id number]  : 1935 Date 2025       Current Admission Date: 2025  Current Admission Diagnosis:COPD with acute exacerbation (HCC)   Patient Active Problem List    Diagnosis Date Noted Date Diagnosed    Acute respiratory failure with hypoxia (HCC) 2025     Type 2 diabetes mellitus with hyperglycemia, with long-term current use of insulin (HCC) 2025     Obesity, morbid (HCC) 2025     Pulmonary hypertension (HCC) 2024     Coronary artery disease involving native coronary artery of native heart without angina pectoris 05/10/2024     Primary hypertension 05/10/2024     Benign prostatic hyperplasia with nocturia 05/10/2024     COPD with acute exacerbation (HCC) 05/10/2024     LINCOLN (obstructive sleep apnea) 05/10/2024     B12 deficiency 05/10/2024     Vitamin D deficiency 05/10/2024     Mixed hyperlipidemia 05/10/2024       LOS (days): 1  Geometric Mean LOS (GMLOS) (days): 3.4  Days to GMLOS:2.6     OBJECTIVE:    Risk of Unplanned Readmission Score: 14.64         Current admission status: Inpatient       Preferred Pharmacy:   [id number] - [place] [address]  Phone: [phone] Fax: [phone]    [facility], [place] - [address] [place] [address]  Phone: [phone] Fax: [phone]    Primary Care Provider: Gordon Álvarez MD    Primary Insurance: LING  REP  Secondary Insurance:     ASSESSMENT:  Active Health Care Proxies    There are no active Health Care Proxies on file.       Advance Directives  Does patient have a Health Care POA?: No  Was patient offered paperwork?: Yes  Does patient currently have a Health Care decision maker?: Yes, please see Health Care Proxy section  Does patient have Advance Directives?: No  Was patient offered paperwork?:  Yes  Primary Contact: yuly Germain         Readmission Root Cause  30 Day Readmission: No    Patient Information  Admitted from:: Facility ([facility])  Mental Status: Alert  During Assessment patient was accompanied by: Not accompanied during assessment  Assessment information provided by:: Other - please comment ([address]ell[address])  Primary Caregiver: Self  Support Systems: Family members, Self, Other (Comment) ([facility])  County of Residence: [place]  What city do you live in?: [place]  Home entry access options. Select all that apply.: No steps to enter home  Type of Current Residence: Facility ([facility])  Upon entering residence, is there a bedroom on the main floor (no further steps)?: Yes  Upon entering residence, is there a bathroom on the main floor (no further steps)?: Yes  Living Arrangements: Lives in Facility ([facility])    Activities of Daily Living Prior to Admission  Functional Status: Independent  Completes ADLs independently?: Yes  Ambulates independently?: Yes  Does patient use assisted devices?: No  Does patient currently own DME?: No  Does patient have a history of Outpatient Therapy (PT/OT)?: No  Does the patient have a history of Short-Term Rehab?: No  Does patient have a history of HHC?: No  Does patient currently have HHC?: No         Patient Information Continued  Income Source: Pension/long-term  Does patient have prescription coverage?: Yes  Can the patient afford their medications and any related supplies (such as glucometers or test strips)?: Yes  Does patient receive dialysis treatments?: No  Does patient have a history of substance abuse?: No  Does patient have a history of Mental Health Diagnosis?: No         Means of Transportation  [facility]          DISCHARGE DETAILS:    Discharge planning discussed with:: Nellis [address]  [place] of Choice: Yes     CM contacted family/caregiver?: Yes  Were  Treatment Team discharge recommendations reviewed with patient/caregiver?: Yes  Did patient/caregiver verbalize understanding of patient care needs?: Yes  Were patient/caregiver advised of the risks associated with not following Treatment Team discharge recommendations?: Yes    Contacts  Patient Contacts: [facility]  Relationship to Patient:: Treatment Provider  Contact Method: Phone  Phone Number: [phone]  Reason/Outcome: Discharge Planning    Requested Home Health Care         Is the patient interested in HHC at discharge?: No    DME Referral Provided  Referral made for DME?: No    Other Referral/Resources/Interventions Provided:  Interventions: Assisted Living         Treatment Team Recommendation: Assisted Living  Discharge Destination Plan:: Assisted Living                                         Additional Comments: CM spoke with pt's nurse, Josiah, at [facility]. CM discussed role of CM and any needs prior to discharge. Pt resides at [facility] JANET. Indp PTA. [facility] assist with medication administration. No DME. Pt is on 2L O2 here and none at baseline. No hx STR/OP PT/HH/DA/MH. [facility] uses [facility] in [place]. [facility] [place] [facility]. Pt will need transport at discharge.

## 2025-04-08 NOTE — PROGRESS NOTES
Progress Note - Hospitalist   Name: Kyler Hernandez 89 y.o. male I MRN: [id number]  Unit/Bed#: -01 I Date of Admission: 4/7/2025   Date of Service: 4/8/2025 I Hospital Day: 1    Assessment & Plan  COPD with acute exacerbation (HCC)  Presented from facility due to shortness of breath, congestion and hypoxia  85% on room air.  Placed on 4 L nasal cannula.    Home regimen:  Fluticasone-umeclidinium-vilanterol daily   Albuterol prn   Duoneb prn   Plan:  IV Solu-Medrol 40 mg every 8 hours  Xopenex/Atrovent 3 times daily  IV azithromycin   Respiratory protocol   Pulmonary consult  Covid/flu/rsv is negative  Acute respiratory failure with hypoxia (HCC)  85% on room air  Placed on 4 L nasal cannula. Weaned to 2 L at time on admission   Patient reports he has concentrator available to him at facility but he seldomly uses.   Diffuse wheezing on exam.  Suspected in the setting of COPD exacerbation  IV steroids/nebulizer treatments  Respiratory protocol  Wean oxygen as able  Coronary artery disease involving native coronary artery of native heart without angina pectoris  S/p 3 stents   Aspirin, statin, atenolol, isosorbide  Primary hypertension  Home regimen:   Losartan   Amlodipine   Atenolol   Imdur     Benign prostatic hyperplasia with nocturia  Continue home finasteride  LINCOLN (obstructive sleep apnea)  CPAP HS   Type 2 diabetes mellitus with hyperglycemia, with long-term current use of insulin (HCC)  Lab Results   Component Value Date    HGBA1C 8.1 (H) 12/18/2024       Recent Labs     04/07/25  2202   POCGLU 157*       Blood Sugar Average: Last 72 hrs:  (P) 157Home regimen:   Jardiance  Prandin  Lantus 35 units every morning  Plan:   Insulin Prandin  Continue Lantus  SSI      Labs & Imaging: Results Review Statement: No pertinent imaging studies reviewed.    VTE Prophylaxis: in place.    Code Status:   Level 1 - Full Code    Patient Centered Rounds: I have performed bedside rounds with nursing staff today.    Mobility:  "  Basic Mobility Inpatient Raw Score: 23  JH-HLM Goal: 7: Walk 25 feet or more  JH-HLM Achieved: 6: Walk 10 steps or more  JH-HLM Goal achieved. Continue to encourage appropriate mobility.    Discussions with Specialists or Other Care Team Provider: RN    Education and Discussions with Family / Patient: Declined update    Total Time Spent on Date of Encounter in care of patient: 35 mins. This time was spent on one or more of the following: performing physical exam; counseling and coordination of care; obtaining or reviewing history; documenting in the medical record; reviewing/ordering tests, medications or procedures; communicating with other healthcare professionals and discussing with patient's family/caregivers.    Current Length of Stay: 1 day(s)    Current Patient Status: Inpatient   Certification Statement: The patient will continue to require additional inpatient hospital stay due to see my assessment and plan.     Subjective:   Patient is seen and examined at bedside.  No new complaints.  Still has shortness of breath.  Afebrile  All other ROS are negative.    Objective:    Vitals: Blood pressure 121/66, pulse 74, temperature 98.5 °F (36.9 °C), temperature source Oral, resp. rate 18, height 5' 8\" (1.727 m), weight 101 kg (221 lb 12.8 oz), SpO2 92%.,Body mass index is 33.72 kg/m².  SPO2 RA Rest      Flowsheet Row ED to Hosp-Admission (Current) from 4/7/2025 in [facility] Med [facility]   SpO2 92 %   SpO2 Activity At Rest   O2 Device Nasal cannula  [Pt  refused CPAP at this time]   O2 Flow Rate --          I&O:   Intake/Output Summary (Last 24 hours) at 4/8/2025 0722  Last data filed at 4/8/2025 0634  Gross per 24 hour   Intake 303.33 ml   Output 1025 ml   Net -721.67 ml       Physical Exam:    General- Alert, lying comfortably in bed. Not in any acute distress.  Neck- Supple, No JVD  CVS- regular, S1 and S2 normal  Chest- Bilateral Air entry, decreased air entry bilaterally  Abdomen- soft, " nontender, not distended, no guarding or rigidity, BS+  Extremities-  No pedal edema, No calf tenderness                         Normal ROM in all extremities.  CNS-   Alert, awake and orientedx3. No focal deficits present.    Invasive Devices       Peripheral Intravenous Line  Duration             Peripheral IV 04/07/25 Right Antecubital <1 day                          Social History  reviewed  Family History   Problem Relation Age of Onset    Dementia Mother     Diabetes Mother     Peripheral vascular disease Father     Mental illness Neg Hx     Substance Abuse Neg Hx     reviewed    Meds:  Current Facility-Administered Medications   Medication Dose Route Frequency Provider Last Rate Last Admin    acetaminophen (TYLENOL) tablet 650 mg  650 mg Oral Q6H PRN Camilla Berry PA-C        amLODIPine (NORVASC) tablet 10 mg  10 mg Oral QAM Camilla Berry PA-C        Artificial Tears Op Soln 1 drop  1 drop Both Eyes Q12H [facility] Camilla Berry PA-C        aspirin (ECOTRIN LOW STRENGTH) EC tablet 81 mg  81 mg Oral QAM Camilla Berry PA-C        atenolol (TENORMIN) tablet 50 mg  50 mg Oral BID Camilla Berry PA-C        atorvastatin (LIPITOR) tablet 80 mg  80 mg Oral HS Camilla Berry PA-C        azithromycin (ZITHROMAX) 500 mg in sodium chloride 0.9% 250mL IVPB 500 mg  500 mg Intravenous Q24H Camilla Berry PA-C   500 mg at 04/07/25 2247    benzonatate (TESSALON PERLES) capsule 100 mg  100 mg Oral TID PRN Camilla Berry PA-C        Cholecalciferol (VITAMIN D3) tablet 1,000 Units  1,000 Units Oral Daily Camilla Berry PA-C        cyanocobalamin (VITAMIN B-12) tablet 1,000 mcg  1,000 mcg Oral Daily Camilla Berry PA-C        finasteride (PROSCAR) tablet 5 mg  5 mg Oral Daily Camilla Berry PA-C        fluticasone (FLONASE) 50 mcg/act nasal spray 1 spray  1 spray Each Nare Daily Camilla Berry PA-C        fluticasone-vilanterol 200-25 mcg/actuation 1 puff  1 puff Inhalation Daily Camilla Berry PA-C        And    umeclidinium 62.5 mcg/actuation inhaler AEPB 1 puff  1  puff Inhalation Daily Camilla Berry PA-C        guaiFENesin (MUCINEX) 12 hr tablet 600 mg  600 mg Oral Q12H [facility] Camilla Berry PA-C   600 mg at 04/07/25 2242    heparin (porcine) subcutaneous injection 5,000 Units  5,000 Units Subcutaneous Q8H [facility] Camilla Berry PA-C   5,000 Units at 04/08/25 0622    insulin glargine (LANTUS) subcutaneous injection 35 Units 0.35 mL  35 Units Subcutaneous QAM Camilla Berry PA-C        insulin lispro (HumALOG/ADMELOG) 100 units/mL subcutaneous injection 1-6 Units  1-6 Units Subcutaneous TID AC Camilla Berry PA-C        insulin lispro (HumALOG/ADMELOG) 100 units/mL subcutaneous injection 1-6 Units  1-6 Units Subcutaneous HS Camilla Berry PA-C   1 Units at 04/07/25 2243    ipratropium (ATROVENT) 0.02 % inhalation solution 0.5 mg  0.5 mg Nebulization TID Camilla Berry PA-C        ipratropium-albuterol (DUO-NEB) 0.5-2.5 mg/3 mL inhalation solution 3 mL  3 mL Nebulization Q6H PRN Camilla Berry PA-C        isosorbide mononitrate (IMDUR) 24 hr tablet 60 mg  60 mg Oral INGRIS Berry PA-C        levalbuterol (XOPENEX) inhalation solution 1.25 mg  1.25 mg Nebulization TID Camilla Berry PA-C        losartan (COZAAR) tablet 100 mg  100 mg Oral QAM Camilla Berry PA-C        methylPREDNISolone sodium succinate (Solu-MEDROL) injection 40 mg  40 mg Intravenous Q8H Camilla Berry PA-C   40 mg at 04/08/25 0622    multivitamin stress formula tablet 1 tablet  1 tablet Oral JOSHM Camilla Berry PA-C        nystatin (MYCOSTATIN) powder   Topical BID Camilla Berry PA-C        pantoprazole (PROTONIX) EC tablet 40 mg  40 mg Oral QAM Camilla Berry PA-C        torsemide (DEMADEX) tablet 20 mg  20 mg Oral QAM Camilla Berry PA-C            Medications Prior to Admission:     acetaminophen (TYLENOL) 325 mg tablet    albuterol (2.5 mg/3 mL) 0.083 % nebulizer solution    alfuzosin (UROXATRAL) 10 mg 24 hr tablet    amLODIPine (NORVASC) 10 mg tablet    aspirin (Aspirin Low Dose) 81 mg EC tablet    atorvastatin (LIPITOR) 80 mg tablet    Continuous  "Glucose  (Dexcom G7 ) ZAYNAB    Continuous Glucose Sensor (Dexcom G7 Sensor)    Empagliflozin (Jardiance) 10 MG TABS tablet    finasteride (PROSCAR) 5 mg tablet    Insulin Glargine Solostar (Lantus SoloStar) 100 UNIT/ML SOPN    losartan (COZAAR) 100 MG tablet    nystatin (MYCOSTATIN) powder    pantoprazole (PROTONIX) 40 mg tablet    repaglinide (PRANDIN) 0.5 mg tablet    torsemide (DEMADEX) 20 mg tablet    Alcohol Swabs (Alcohol Prep Pad) 70 % PADS    atenolol (TENORMIN) 50 mg tablet    Cholecalciferol (Vitamin D3) 25 MCG (1000 UT) CAPS    clotrimazole-betamethasone (LOTRISONE) 1-0.05 % cream    Cyanocobalamin (Vitamin B-12) 2500 MCG SUBL    cycloSPORINE (RESTASIS) 0.05 % ophthalmic emulsion    fluticasone (FLONASE) 50 mcg/act nasal spray    fluticasone-umeclidinium-vilanterol 200-62.5-25 mcg/actuation AEPB inhaler    Insulin Pen Needle (BD AutoShield Duo) 30G X 5 MM MISC    ipratropium-albuterol (DUO-NEB) 0.5-2.5 mg/3 mL nebulizer solution    isosorbide mononitrate (IMDUR) 60 mg 24 hr tablet    loperamide (IMODIUM) 2 mg capsule    Multiple Vitamin (Daily-Jhonatan) TABS    Multiple Vitamin (MULTIVITAMIN ADULT PO)    NovoFine Autocover Pen Needle 30G X 8 MM MISC    Nutritional Supplements (Ensure)    Labs:  Results from last 7 days   Lab Units 04/08/25  0631 04/07/25  1935   WBC Thousand/uL 8.23 9.50   HEMOGLOBIN g/dL 15.2 14.2   HEMATOCRIT % 44.7 44.3   PLATELETS Thousands/uL 185 183   SEGS PCT %  --  75   LYMPHO PCT % 4* 7*   MONO PCT % 1* 15*   EOS PCT % 0 2     Results from last 7 days   Lab Units 04/08/25  0631 04/07/25  1935   POTASSIUM mmol/L 3.7 3.7   CHLORIDE mmol/L 103 99   CO2 mmol/L 26 30   BUN mg/dL 22 24   CREATININE mg/dL 1.12 1.34*   CALCIUM mg/dL 9.2 9.1   ALK PHOS U/L  --  78   ALT U/L  --  23   AST U/L  --  18     No results found for: \"TROPONINI\", \"CKMB\", \"CKTOTAL\"  Results from last 7 days   Lab Units 04/07/25  1935   INR  1.11     Lab Results   Component Value Date    URINECX No Growth " <1000 cfu/mL 12/22/2024         Imaging:  No results found for this or any previous visit.    No results found for this or any previous visit.      Last 24 Hours Medication List:   Current Facility-Administered Medications   Medication Dose Route Frequency Provider Last Rate    acetaminophen  650 mg Oral Q6H PRN Camilla Berry PA-C      amLODIPine  10 mg Oral QAM Camilla Berry PA-C      Artificial Tears  1 drop Both Eyes Q12H [facility] Camilla Berry PA-C      aspirin  81 mg Oral QAM Camilla Berry PA-C      atenolol  50 mg Oral BID Camilla Berry PA-C      atorvastatin  80 mg Oral HS Camilla Berry PA-C      azithromycin  500 mg Intravenous Q24H Camilla Berry PA-C      benzonatate  100 mg Oral TID PRN Camilla Berry PA-C      cholecalciferol  1,000 Units Oral Daily Camilla Berry PA-C      cyanocobalamin  1,000 mcg Oral Daily Camilla Berry PA-C      finasteride  5 mg Oral Daily Camilla Berry PA-C      fluticasone  1 spray Each Nare Daily Camilla Berry PA-C      fluticasone-vilanterol  1 puff Inhalation Daily Camilla Berry PA-C      And    umeclidinium  1 puff Inhalation Daily Camilla Berry PA-C      guaiFENesin  600 mg Oral Q12H [facility] Camilla Berry PA-C      heparin (porcine)  5,000 Units Subcutaneous Q8H [facility] Camilla Berry PA-C      insulin glargine  35 Units Subcutaneous [facility] Camilla Berry PA-C      insulin lispro  1-6 Units Subcutaneous TID  Camilla Berry PA-C      insulin lispro  1-6 Units Subcutaneous  Camilla Berry PA-C      ipratropium  0.5 mg Nebulization TID Camilla Berry PA-C      ipratropium-albuterol  3 mL Nebulization Q6H PRN Camilla Berry PA-C      isosorbide mononitrate  60 mg Oral [facility] Camilla Berry PA-C      levalbuterol  1.25 mg Nebulization TID Camilla Berry PA-C      losartan  100 mg Oral [facility] Camilla Berry PA-C      methylPREDNISolone sodium succinate  40 mg Intravenous Q8H Camilla Berry PA-C      multivitamin stress formula  1 tablet Oral INGRIS Berry PA-C      nystatin   Topical BID Camilla Berry PA-C      pantoprazole  40 mg Oral INGRIS Berry PA-C       torsemide  20 mg Oral QAM Camilla Berry PA-C          Today, Patient Was Seen By: Lit Gottlieb MD    ** Please Note: Dictation voice to text software may have been used in the creation of this document. **

## 2025-04-08 NOTE — PLAN OF CARE
Problem: Potential for Falls  Goal: Patient will remain free of falls  Description: INTERVENTIONS:- Educate patient/family on patient safety including physical limitations- Instruct patient to call for assistance with activity - Consult OT/PT to assist with strengthening/mobility - Keep Call bell within reach- Keep bed low and locked with side rails adjusted as appropriate- Keep care items and personal belongings within reach- Initiate and maintain comfort rounds- Make Fall Risk Sign visible to staff- Offer Toileting every 2 Hours, in advance of need- Initiate/Maintain bed alarm- Obtain necessary fall risk management equipment: - Apply yellow socks and bracelet for high fall risk patients- Consider moving patient to room near nurses station  Outcome: Progressing

## 2025-04-08 NOTE — ASSESSMENT & PLAN NOTE
85% on room air  Placed on 4 L nasal cannula. Weaned to 2 L at time on admission   Patient reports he has concentrator available to him at facility but he seldomly uses.   Diffuse wheezing on exam.  Suspected in the setting of COPD exacerbation  IV steroids/nebulizer treatments  Respiratory protocol  Wean oxygen as able

## 2025-04-08 NOTE — ASSESSMENT & PLAN NOTE
Lab Results   Component Value Date    HGBA1C 8.1 (H) 12/18/2024       Recent Labs     04/07/25  2202   POCGLU 157*       Blood Sugar Average: Last 72 hrs:  (P) 157Home regimen:   Jardiance  Prandin  Lantus 35 units every morning  Plan:   Insulin Prandin  Continue Lantus  SSI

## 2025-04-08 NOTE — ASSESSMENT & PLAN NOTE
Likely 2/2 viral trigger  Needs outpatient PFTs to confirm suspected diagnosis of COPD  F/u RP2 and sputum culture  Decrease Solu-Medrol to q 12  Azithromycin 500 mg x 3 days  Continue nebs  Home inhaler is Trelegy

## 2025-04-08 NOTE — H&P
H&P - Hospitalist   Name: Kyler Hernandez 89 y.o. male I MRN: [id number]  Unit/Bed#: -01 I Date of Admission: 4/7/2025   Date of Service: 4/7/2025 I Hospital Day: 0     Assessment & Plan  COPD with acute exacerbation (HCC)  Presented from facility due to shortness of breath, congestion and hypoxia  85% on room air.  Placed on 4 L nasal cannula.    Home regimen:  Fluticasone-umeclidinium-vilanterol daily   Albuterol prn   Duoneb prn   Plan:  IV Solu-Medrol 40 mg every 8 hours  Xopenex/Atrovent 3 times daily  IV azithromycin   Respiratory protocol   Rule out covid/flu/rsv  Acute respiratory failure with hypoxia (HCC)  85% on room air  Placed on 4 L nasal cannula. Weaned to 2 L at time on admission   Patient reports he has concentrator available to him at facility but he seldomly uses.   Diffuse wheezing on exam.  Suspected in the setting of COPD exacerbation  IV steroids/nebulizer treatments  Respiratory protocol  Wean oxygen as able  Coronary artery disease involving native coronary artery of native heart without angina pectoris  S/p 3 stents   Aspirin, statin, atenolol, isosorbide  Primary hypertension  Home regimen:   Losartan   Amlodipine   Atenolol   Imdur     Benign prostatic hyperplasia with nocturia  Continue home finasteride  LINCOLN (obstructive sleep apnea)  CPAP HS   Type 2 diabetes mellitus with hyperglycemia, with long-term current use of insulin (HCC)  Lab Results   Component Value Date    HGBA1C 8.1 (H) 12/18/2024       Recent Labs     04/07/25  2202   POCGLU 157*       Blood Sugar Average: Last 72 hrs:  (P) 157Home regimen:   Jardiance  Prandin  Lantus 35 units every morning  Plan:   Insulin Prandin  Continue Lantus  SSI        VTE Pharmacologic Prophylaxis: VTE Score: 3 Moderate Risk (Score 3-4) - Pharmacological DVT Prophylaxis Ordered: heparin.  Code Status: Level 1 - Full Code   Discussion with family: Patient declined call to .     Anticipated Length of Stay: Patient will be  admitted on an inpatient basis with an anticipated length of stay of greater than 2 midnights secondary to COPD exacerbation.    History of Present Illness   Chief Complaint: short of breath    Kyler Hernandez is a 89 y.o. male with a PMH of COPD, BPH, CAD, LINCOLN, hypertension, type 2 diabetes who presents from facility due to shortness of breath, congestion over the past 2 to 3 days.  Noted to be hypoxic at the facility thus sent to the ER.  Initially requiring 4 L nasal cannula.  Received nebulizer treatments, IV steroids and antibiotics.  At time of admission patient able to be weaned down to 2 L.  Reports improvement in his symptoms.  Denies having fevers or chills over the past couple of days.  History of COPD.  No longer smokes.     Review of Systems   Constitutional:  Negative for fatigue and fever.   HENT:  Positive for congestion. Negative for sore throat.    Respiratory:  Positive for cough, chest tightness and shortness of breath.    Cardiovascular:  Negative for chest pain.   Gastrointestinal:  Negative for abdominal distention, abdominal pain, diarrhea, nausea and vomiting.   Genitourinary:  Negative for difficulty urinating.   Musculoskeletal:  Negative for arthralgias.   Neurological:  Negative for weakness and headaches.   Psychiatric/Behavioral:  Negative for agitation and behavioral problems.    All other systems reviewed and are negative.      Historical Information   Past Medical History:   Diagnosis Date    Atherosclerotic heart disease of native coronary artery without angina pectoris     Chronic obstructive pulmonary disease, unspecified COPD type (HCC)     Diabetes mellitus (HCC)     Dry eye syndrome     Hyperlipidemia     Hypertension     Nonrheumatic mitral (valve) insufficiency     Obstructive sleep apnea (adult) (pediatric)     Vitamin B12 deficiency     Vitamin D deficiency      Past Surgical History:   Procedure Laterality Date    HERNIA REPAIR       Social History     Tobacco Use    Smoking  status: Former     Types: Cigarettes     Start date:      Quit date:      Years since quittin.3    Smokeless tobacco: Never   Vaping Use    Vaping status: Never Used   Substance and Sexual Activity    Alcohol use: Yes     Comment: occasional whiskey sour    Drug use: Never    Sexual activity: Not Currently     E-Cigarette/Vaping    E-Cigarette Use Never User      E-Cigarette/Vaping Substances    Nicotine No     THC No     CBD No     Flavoring No     Other No     Unknown No      Family History   Problem Relation Age of Onset    Dementia Mother     Diabetes Mother     Peripheral vascular disease Father     Mental illness Neg Hx     Substance Abuse Neg Hx      Social History:  Marital Status:    Occupation: Unknown  Patient Pre-hospital Living Situation: Long Term Care Facility  Patient Pre-hospital Level of Mobility: walks  Patient Pre-hospital Diet Restrictions: Diabetic    Meds/Allergies   I have reveiwed home medications using records provided by SNF.  Prior to Admission medications    Medication Sig Start Date End Date Taking? Authorizing Provider   acetaminophen (TYLENOL) 325 mg tablet 2 tabs q 6 hrs prn pain or fever greater than 100    Historical Provider, MD   albuterol (2.5 mg/3 mL) 0.083 % nebulizer solution 1 vial q 4 hrs prn    Historical Provider, MD   Alcohol Swabs (Alcohol Prep Pad) 70 % PADS As directed    Historical Provider, MD   alfuzosin (UROXATRAL) 10 mg 24 hr tablet 1 TAB ORALLY EVERY MORNING DX: BPH 24   Gordon Álvarez MD   amLODIPine (NORVASC) 10 mg tablet 1 TAB ORALLY EVERY MORNING DX: HYPERTENSION 24   Gordon Álvarez MD   aspirin (Aspirin Low Dose) 81 mg EC tablet 1 TAB ORALLY EVERY MORNING DX: STROKE PREVENTION 24   Gordon Álvarez MD   atenolol (TENORMIN) 50 mg tablet 1 TAB ORALLY TWICE DAILY DX: HYPERTENSION 24   Gordon Álvarez MD   atorvastatin (LIPITOR) 80 mg tablet 1 TAB ORALLY AT BEDTIME DX:HYPERLIPIDEMIA 10/25/24   Gordon Álvarez MD    Cholecalciferol (Vitamin D3) 25 MCG (1000 UT) CAPS Take 1 capsule (1,000 Units total) by mouth in the morning 1 daily 12/2/24   Gordon Álvarez MD   clotrimazole-betamethasone (LOTRISONE) 1-0.05 % cream 1 Application Every 12 hours  Patient not taking: Reported on 1/3/2025    Historical Provider, MD   Continuous Glucose  (Dexcom G7 ) ZAYNAB USE AS DIRECTED PER  DIRECTIONS 6/19/24   Historical Provider, MD   Continuous Glucose Sensor (Dexcom G7 Sensor) USE PER  DIRECTIONS. CHANGE SENSOR EVERY 10 DAYS. 9/13/24   Historical Provider, MD   Cyanocobalamin (Vitamin B-12) 2500 MCG SUBL 1 TAB UNDER THE TONGUE 5 TIMES/WK (OMIT SA,WESTON) AT 8:30AM DX: SUPPLEMENT *COLD SEAL* 11/29/24   Gordon Álvarez MD   cycloSPORINE (RESTASIS) 0.05 % ophthalmic emulsion Administer to both eyes Every 12 hours    Historical Provider, MD   Empagliflozin (Jardiance) 10 MG TABS tablet 1 TAB ORALLY EVERY MORNING DX: DIABETES (IDDM) 11/29/24   Gordon Álvarez MD   finasteride (PROSCAR) 5 mg tablet 1 TAB ORALLY EVERY MORNING DX: BPH **NIOSH 3 HAZARDOUS DRUG** 11/29/24   Gordon Álvarez MD   fluticasone (FLONASE) 50 mcg/act nasal spray every 24 hours    Historical Provider, MD   fluticasone-umeclidinium-vilanterol 200-62.5-25 mcg/actuation AEPB inhaler Inhale 1 puff daily Rinse mouth after use. 10/24/24   Gordon Álvarez MD   Insulin Glargine Solostar (Lantus SoloStar) 100 UNIT/ML SOPN INJECT 35U SUB-Q EVERY MORNING DX: DIABETES DX: **DISCARD UNUSED PEN 28 DAYS AFTER 1ST USE** INJECT 40 UNITS SUB-Q AT BEDTIME DX; DIABETES 3/21/25   Gordon Álvarez MD   Insulin Pen Needle (BD AutoShield Duo) 30G X 5 MM MISC USE FOR INSULIN INJECTION 3 TIMES DAILY 1/28/25   Gordon Álvarez MD   ipratropium-albuterol (DUO-NEB) 0.5-2.5 mg/3 mL nebulizer solution 4 times daily prn wheezing    Historical Provider, MD   isosorbide mononitrate (IMDUR) 60 mg 24 hr tablet 1 TAB ORALLY EVERY MORNING DX:ANGINA 11/29/24   Gordon Álvarez MD   loperamide  (IMODIUM) 2 mg capsule Take 1 capsule (2 mg total) by mouth daily as needed for diarrhea 9/24/24   Gordon Álvarez MD   losartan (COZAAR) 100 MG tablet 1 TAB ORALLY EVERY MORNING DX: HYPERTENSION 10/25/24   Gordon Álvarez MD   Multiple Vitamin (Daily-Jhonatan) TABS 1 TAB ORALLY EVERY MORNING DX: SUPPLEMENT 11/29/24   Gordon Álvarez MD   Multiple Vitamin (MULTIVITAMIN ADULT PO) every 24 hours    Historical Provider, MD   NovoFine Autocover Pen Needle 30G X 8 MM MISC 2 pen needles daily 4/8/24   Historical Provider, MD   Nutritional Supplements (Ensure) Take by mouth 1 can daily    Historical Provider, MD   nystatin (MYCOSTATIN) powder Apply topically 2 (two) times a day 7/8/24   Gordon Álvarez MD   pantoprazole (PROTONIX) 40 mg tablet 1 TAB ORALLY EVERY MORNING DX: GERD 11/29/24   Gordon Álvarez MD   repaglinide (PRANDIN) 0.5 mg tablet Take 1 tablet with breakfast and 1 tablet with dinner, hold if not eating or blood sugar less than 100 12/6/24   Miki Woody MD   torsemide (DEMADEX) 20 mg tablet Take 1 tablet (20 mg total) by mouth every morning 2/27/25   Gordon Álvarez MD     Allergies   Allergen Reactions    Mounjaro [Tirzepatide] Diarrhea       Objective :  Temp:  [98.5 °F (36.9 °C)-99.2 °F (37.3 °C)] 98.5 °F (36.9 °C)  HR:  [66-74] 74  BP: (121-168)/(64-81) 121/66  Resp:  [18-26] 18  SpO2:  [85 %-97 %] 93 %  O2 Device: Nasal cannula  Nasal Cannula O2 Flow Rate (L/min):  [4 L/min] 4 L/min    Physical Exam  Vitals and nursing note reviewed.   Constitutional:       Appearance: Normal appearance. He is obese.   HENT:      Head: Normocephalic.      Nose: Congestion present.   Eyes:      Extraocular Movements: Extraocular movements intact.      Pupils: Pupils are equal, round, and reactive to light.   Cardiovascular:      Rate and Rhythm: Normal rate and regular rhythm.      Heart sounds: No murmur heard.     No gallop.   Pulmonary:      Effort: No respiratory distress.      Breath sounds: Wheezing (mild) present.    Abdominal:      General: Bowel sounds are normal. There is no distension.      Tenderness: There is no abdominal tenderness.   Musculoskeletal:         General: Normal range of motion.      Cervical back: Normal range of motion.      Right lower leg: No edema.      Left lower leg: No edema.   Skin:     General: Skin is warm.   Neurological:      General: No focal deficit present.      Mental Status: He is alert and oriented to person, place, and time. Mental status is at baseline.   Psychiatric:         Mood and Affect: Mood normal.         Behavior: Behavior normal.         Thought Content: Thought content normal.          Lines/Drains:            Lab Results: I have reviewed the following results:  Results from last 7 days   Lab Units 04/07/25  1935   WBC Thousand/uL 9.50   HEMOGLOBIN g/dL 14.2   HEMATOCRIT % 44.3   PLATELETS Thousands/uL 183   SEGS PCT % 75   LYMPHO PCT % 7*   MONO PCT % 15*   EOS PCT % 2     Results from last 7 days   Lab Units 04/07/25  1935   SODIUM mmol/L 138   POTASSIUM mmol/L 3.7   CHLORIDE mmol/L 99   CO2 mmol/L 30   BUN mg/dL 24   CREATININE mg/dL 1.34*   ANION GAP mmol/L 9   CALCIUM mg/dL 9.1   ALBUMIN g/dL 4.1   TOTAL BILIRUBIN mg/dL 1.11*   ALK PHOS U/L 78   ALT U/L 23   AST U/L 18   GLUCOSE RANDOM mg/dL 149*     Results from last 7 days   Lab Units 04/07/25  1935   INR  1.11     Results from last 7 days   Lab Units 04/07/25  2202   POC GLUCOSE mg/dl 157*     Lab Results   Component Value Date    HGBA1C 8.1 (H) 12/18/2024    HGBA1C 7.5 (H) 09/18/2024    HGBA1C 7.8 (H) 08/21/2024     Results from last 7 days   Lab Units 04/07/25  1935   LACTIC ACID mmol/L 1.0   PROCALCITONIN ng/ml 0.11       Imaging Results Review: No pertinent imaging studies reviewed.  Other Study Results Review: EKG was reviewed.     Administrative Statements   I have spent a total time of 50 minutes in caring for this patient on the day of the visit/encounter including Risks and benefits of tx options,  Instructions for management, Counseling / Coordination of care, and Documenting in the medical record.    ** Please Note: This note has been constructed using a voice recognition system. **

## 2025-04-09 DIAGNOSIS — R19.7 DIARRHEA, UNSPECIFIED TYPE: ICD-10-CM

## 2025-04-09 PROBLEM — E66.9 OBESITY: Status: ACTIVE | Noted: 2025-01-03

## 2025-04-09 LAB
ANION GAP SERPL CALCULATED.3IONS-SCNC: 10 MMOL/L (ref 4–13)
BUN SERPL-MCNC: 38 MG/DL (ref 5–25)
CALCIUM SERPL-MCNC: 8.8 MG/DL (ref 8.4–10.2)
CHLORIDE SERPL-SCNC: 102 MMOL/L (ref 96–108)
CO2 SERPL-SCNC: 25 MMOL/L (ref 21–32)
CREAT SERPL-MCNC: 1.22 MG/DL (ref 0.6–1.3)
ERYTHROCYTE [DISTWIDTH] IN BLOOD BY AUTOMATED COUNT: 15.2 % (ref 11.6–15.1)
GFR SERPL CREATININE-BSD FRML MDRD: 52 ML/MIN/1.73SQ M
GLUCOSE SERPL-MCNC: 185 MG/DL (ref 65–140)
GLUCOSE SERPL-MCNC: 207 MG/DL (ref 65–140)
GLUCOSE SERPL-MCNC: 243 MG/DL (ref 65–140)
GLUCOSE SERPL-MCNC: 309 MG/DL (ref 65–140)
GLUCOSE SERPL-MCNC: 319 MG/DL (ref 65–140)
HCT VFR BLD AUTO: 44.9 % (ref 36.5–49.3)
HGB BLD-MCNC: 14.5 G/DL (ref 12–17)
MCH RBC QN AUTO: 29.8 PG (ref 26.8–34.3)
MCHC RBC AUTO-ENTMCNC: 32.3 G/DL (ref 31.4–37.4)
MCV RBC AUTO: 92 FL (ref 82–98)
MRSA NOSE QL CULT: NORMAL
NRBC BLD AUTO-RTO: 0 /100 WBCS
PLATELET # BLD AUTO: 151 THOUSANDS/UL (ref 149–390)
PMV BLD AUTO: 10.4 FL (ref 8.9–12.7)
POTASSIUM SERPL-SCNC: 4.1 MMOL/L (ref 3.5–5.3)
RBC # BLD AUTO: 4.86 MILLION/UL (ref 3.88–5.62)
SODIUM SERPL-SCNC: 137 MMOL/L (ref 135–147)
WBC # BLD AUTO: 13.55 THOUSAND/UL (ref 4.31–10.16)

## 2025-04-09 PROCEDURE — 82948 REAGENT STRIP/BLOOD GLUCOSE: CPT

## 2025-04-09 PROCEDURE — 99232 SBSQ HOSP IP/OBS MODERATE 35: CPT | Performed by: INTERNAL MEDICINE

## 2025-04-09 PROCEDURE — 80048 BASIC METABOLIC PNL TOTAL CA: CPT | Performed by: INTERNAL MEDICINE

## 2025-04-09 PROCEDURE — 94640 AIRWAY INHALATION TREATMENT: CPT

## 2025-04-09 PROCEDURE — 94760 N-INVAS EAR/PLS OXIMETRY 1: CPT

## 2025-04-09 PROCEDURE — 85027 COMPLETE CBC AUTOMATED: CPT | Performed by: INTERNAL MEDICINE

## 2025-04-09 RX ORDER — METHYLPREDNISOLONE SODIUM SUCCINATE 40 MG/ML
40 INJECTION, POWDER, LYOPHILIZED, FOR SOLUTION INTRAMUSCULAR; INTRAVENOUS ONCE
Status: COMPLETED | OUTPATIENT
Start: 2025-04-09 | End: 2025-04-09

## 2025-04-09 RX ORDER — PREDNISONE 20 MG/1
40 TABLET ORAL DAILY
Status: DISCONTINUED | OUTPATIENT
Start: 2025-04-10 | End: 2025-04-10

## 2025-04-09 RX ADMIN — INSULIN LISPRO 4 UNITS: 100 INJECTION, SOLUTION INTRAVENOUS; SUBCUTANEOUS at 16:26

## 2025-04-09 RX ADMIN — DEXTRAN 70, GLYCERIN, HYPROMELLOSE 1 DROP: 1; 2; 3 SOLUTION/ DROPS OPHTHALMIC at 21:26

## 2025-04-09 RX ADMIN — LEVALBUTEROL HYDROCHLORIDE 1.25 MG: 1.25 SOLUTION RESPIRATORY (INHALATION) at 07:18

## 2025-04-09 RX ADMIN — UMECLIDINIUM 1 PUFF: 62.5 AEROSOL, POWDER ORAL at 09:13

## 2025-04-09 RX ADMIN — FLUTICASONE FUROATE AND VILANTEROL TRIFENATATE 1 PUFF: 200; 25 POWDER RESPIRATORY (INHALATION) at 09:12

## 2025-04-09 RX ADMIN — INSULIN GLARGINE 35 UNITS: 100 INJECTION, SOLUTION SUBCUTANEOUS at 07:47

## 2025-04-09 RX ADMIN — ASPIRIN 81 MG: 81 TABLET, COATED ORAL at 07:45

## 2025-04-09 RX ADMIN — Medication 1000 UNITS: at 07:46

## 2025-04-09 RX ADMIN — ISOSORBIDE MONONITRATE 60 MG: 60 TABLET, EXTENDED RELEASE ORAL at 07:46

## 2025-04-09 RX ADMIN — GUAIFENESIN 600 MG: 600 TABLET ORAL at 07:45

## 2025-04-09 RX ADMIN — ATENOLOL 50 MG: 50 TABLET ORAL at 07:46

## 2025-04-09 RX ADMIN — CYANOCOBALAMIN TAB 500 MCG 1000 MCG: 500 TAB at 07:46

## 2025-04-09 RX ADMIN — LOSARTAN POTASSIUM 100 MG: 50 TABLET, FILM COATED ORAL at 07:46

## 2025-04-09 RX ADMIN — AZITHROMYCIN MONOHYDRATE 500 MG: 500 INJECTION, POWDER, LYOPHILIZED, FOR SOLUTION INTRAVENOUS at 21:12

## 2025-04-09 RX ADMIN — HEPARIN SODIUM 5000 UNITS: 5000 INJECTION INTRAVENOUS; SUBCUTANEOUS at 21:24

## 2025-04-09 RX ADMIN — NYSTATIN: 100000 POWDER TOPICAL at 09:13

## 2025-04-09 RX ADMIN — HEPARIN SODIUM 5000 UNITS: 5000 INJECTION INTRAVENOUS; SUBCUTANEOUS at 05:46

## 2025-04-09 RX ADMIN — NYSTATIN: 100000 POWDER TOPICAL at 19:42

## 2025-04-09 RX ADMIN — LEVALBUTEROL HYDROCHLORIDE 1.25 MG: 1.25 SOLUTION RESPIRATORY (INHALATION) at 19:55

## 2025-04-09 RX ADMIN — METHYLPREDNISOLONE SODIUM SUCCINATE 40 MG: 40 INJECTION, POWDER, FOR SOLUTION INTRAMUSCULAR; INTRAVENOUS at 07:46

## 2025-04-09 RX ADMIN — INSULIN LISPRO 1 UNITS: 100 INJECTION, SOLUTION INTRAVENOUS; SUBCUTANEOUS at 07:48

## 2025-04-09 RX ADMIN — DEXTRAN 70, GLYCERIN, HYPROMELLOSE 1 DROP: 1; 2; 3 SOLUTION/ DROPS OPHTHALMIC at 09:13

## 2025-04-09 RX ADMIN — HEPARIN SODIUM 5000 UNITS: 5000 INJECTION INTRAVENOUS; SUBCUTANEOUS at 14:55

## 2025-04-09 RX ADMIN — INSULIN LISPRO 3 UNITS: 100 INJECTION, SOLUTION INTRAVENOUS; SUBCUTANEOUS at 11:23

## 2025-04-09 RX ADMIN — IPRATROPIUM BROMIDE 0.5 MG: 0.5 SOLUTION RESPIRATORY (INHALATION) at 19:55

## 2025-04-09 RX ADMIN — IPRATROPIUM BROMIDE 0.5 MG: 0.5 SOLUTION RESPIRATORY (INHALATION) at 07:18

## 2025-04-09 RX ADMIN — METHYLPREDNISOLONE SODIUM SUCCINATE 40 MG: 40 INJECTION, POWDER, FOR SOLUTION INTRAMUSCULAR; INTRAVENOUS at 14:55

## 2025-04-09 RX ADMIN — B-COMPLEX W/ C & FOLIC ACID TAB 1 TABLET: TAB at 07:45

## 2025-04-09 RX ADMIN — ATENOLOL 50 MG: 50 TABLET ORAL at 21:20

## 2025-04-09 RX ADMIN — GUAIFENESIN 600 MG: 600 TABLET ORAL at 21:20

## 2025-04-09 RX ADMIN — FINASTERIDE 5 MG: 5 TABLET, FILM COATED ORAL at 07:46

## 2025-04-09 RX ADMIN — IPRATROPIUM BROMIDE 0.5 MG: 0.5 SOLUTION RESPIRATORY (INHALATION) at 13:52

## 2025-04-09 RX ADMIN — PANTOPRAZOLE SODIUM 40 MG: 40 TABLET, DELAYED RELEASE ORAL at 07:45

## 2025-04-09 RX ADMIN — INSULIN LISPRO 5 UNITS: 100 INJECTION, SOLUTION INTRAVENOUS; SUBCUTANEOUS at 21:22

## 2025-04-09 RX ADMIN — ATORVASTATIN CALCIUM 80 MG: 40 TABLET, FILM COATED ORAL at 21:20

## 2025-04-09 RX ADMIN — INSULIN GLARGINE 40 UNITS: 100 INJECTION, SOLUTION SUBCUTANEOUS at 21:22

## 2025-04-09 RX ADMIN — AMLODIPINE BESYLATE 10 MG: 5 TABLET ORAL at 07:46

## 2025-04-09 RX ADMIN — FLUTICASONE PROPIONATE 1 SPRAY: 50 SPRAY, METERED NASAL at 09:13

## 2025-04-09 RX ADMIN — LEVALBUTEROL HYDROCHLORIDE 1.25 MG: 1.25 SOLUTION RESPIRATORY (INHALATION) at 13:52

## 2025-04-09 RX ADMIN — TORSEMIDE 20 MG: 20 TABLET ORAL at 07:46

## 2025-04-09 NOTE — ASSESSMENT & PLAN NOTE
Likely chronic underlying hypoxia  Currently on room air 91%  Maintain pulse ox at least 88%  Home o2 eval prior to discharge

## 2025-04-09 NOTE — ASSESSMENT & PLAN NOTE
COPD of unknown severity, no formal PFTs on chart, with acute exacerbation likely secondary to acute parainfluenza infection   RP2 positive for parainfluenza   F/u sputum culture  Start prednisone tomorrow for 5 day course  Azithromycin 500 mg x 3 days  Continue nebs  Home inhaler is Darlene  Recommend OP pulmonary follow up with PFTs

## 2025-04-09 NOTE — PROGRESS NOTES
Progress Note - Pulmonology   Name: Kyler Hernandez 89 y.o. male I MRN: [id number]  Unit/Bed#: -01 I Date of Admission: 4/7/2025   Date of Service: 4/9/2025 I Hospital Day: 2    Assessment & Plan  COPD with acute exacerbation (HCC)  COPD of unknown severity, no formal PFTs on chart, with acute exacerbation likely secondary to acute parainfluenza infection   RP2 positive for parainfluenza   F/u sputum culture  Start prednisone tomorrow for 5 day course  Azithromycin 500 mg x 3 days  Continue nebs  Home inhaler is Trelegy  Recommend OP pulmonary follow up with PFTs  Acute respiratory failure with hypoxia (HCC)  Likely chronic underlying hypoxia  Currently on room air 91%  Maintain pulse ox at least 88%  Home o2 eval prior to discharge   LINCOLN (obstructive sleep apnea)  Reports compliance with home PAP therapy, difficulty tolerating hospital device  Type 2 diabetes mellitus with hyperglycemia, with long-term current use of insulin (HCC)  Lab Results   Component Value Date    HGBA1C 8.9 (H) 04/08/2025       Recent Labs     04/08/25  1110 04/08/25  1650 04/08/25  2132 04/09/25  0733   POCGLU 274* 238* 282* 185*       Blood Sugar Average: Last 72 hrs:  (P) 223    Limit steroid usage as reasonably able    No further pulmonary recommendations at this time. Pulmonary will sign off. Please reach out for any other questions if needed.      24 Hour Events : None  Subjective : Kyler is awake sitting in up his chair. He has continued productive cough, but overall feels good.     Objective :  Temp:  [97.8 °F (36.6 °C)] 97.8 °F (36.6 °C)  HR:  [61-79] 70  BP: (118-140)/(52-71) 140/67  Resp:  [15-18] 15  SpO2:  [90 %-96 %] 90 %  O2 Device: None (Room air)  Nasal Cannula O2 Flow Rate (L/min):  [2 L/min-3 L/min] 3 L/min    Physical Exam  General appearance:   Alert and awake, in no acute distress  Head:   Normocephalic, without obvious abnormality, atraumatic  Eyes:   No scleral icterus   Lungs:  Pulmonary effort non labored at  rest  Breath sounds: Decreased breath sounds. Minimal expiratory wheezes.  Cardiovascular:   Regular rate and rhythm. No murmurs. Capillary refill < 3 seconds.  Abdomen:    No appreciable distension or tenderness  Extremities:   No deformity. No clubbing present. No edema to extremities.   Skin:   Warm and dry. Intact. Color appropriate for ethnicity.  Neurologic:   No acute focal deficits are noted.  Psychiatric:   Normal mood. Answers questions appropriately.       Lab Results: I have reviewed the following results:   .     04/09/25  0529   WBC 13.55*   HGB 14.5   HCT 44.9      SODIUM 137   K 4.1      CO2 25   BUN 38*   CREATININE 1.22   GLUC 207*     ABG: No new results in last 24 hours.    Imaging Results Review: I reviewed radiology reports from this admission including: chest xray.  Other Study Results Review: No additional pertinent studies reviewed.  PFT Results Reviewed: N/A      Jesika Marin, MSN RN FNP-BC  Nurse Practitioner  [facility]

## 2025-04-09 NOTE — CASE MANAGEMENT
Case Management Discharge Planning Note    Patient name Kyler Hernandez  Location /-01 MRN [id number]  : 1935 Date 2025       Current Admission Date: 2025  Current Admission Diagnosis:COPD with acute exacerbation (HCC)   Patient Active Problem List    Diagnosis Date Noted Date Diagnosed    Acute respiratory failure with hypoxia (HCC) 2025     Type 2 diabetes mellitus with hyperglycemia, with long-term current use of insulin (HCC) 2025     Obesity, morbid (HCC) 2025     Pulmonary hypertension (HCC) 2024     Coronary artery disease involving native coronary artery of native heart without angina pectoris 05/10/2024     Primary hypertension 05/10/2024     Benign prostatic hyperplasia with nocturia 05/10/2024     COPD with acute exacerbation (HCC) 05/10/2024     LINCOLN (obstructive sleep apnea) 05/10/2024     B12 deficiency 05/10/2024     Vitamin D deficiency 05/10/2024     Mixed hyperlipidemia 05/10/2024       LOS (days): 2  Geometric Mean LOS (GMLOS) (days): 3.4  Days to GMLOS:1.7     OBJECTIVE:  Risk of Unplanned Readmission Score: 15.88         Current admission status: Inpatient   Preferred Pharmacy:   [facility] [id number] - [place] [address]  Phone: [phone] Fax: [phone]    [facility] [employer], [facility]. [place] [address]  Phone: [phone] Fax: [phone]    Primary Care Provider: Gordon Álvarez MD    Primary Insurance: [employer] [facility] REP  Secondary Insurance:     DISCHARGE DETAILS:     Ot is being discharged today back to [facility]. Pt will need 5L O2 24hr/day. CM placed order in [place] for home O2 to be delivered to [facility]. CM called [facility] and spoke w/ Josiah, nurse, and asked that she call CM when home O2 is delivered.     DME Referral Provided  Referral made for DME?: Yes  DME referral completed for the following items:: Home Oxygen concentrator,  Portable Oxygen concentrator  DME Supplier Name:: [facility]

## 2025-04-09 NOTE — ASSESSMENT & PLAN NOTE
Lab Results   Component Value Date    HGBA1C 8.9 (H) 04/08/2025       Recent Labs     04/08/25  1110 04/08/25  1650 04/08/25  2132 04/09/25  0733   POCGLU 274* 238* 282* 185*       Blood Sugar Average: Last 72 hrs:  (P) 223    Limit steroid usage as reasonably able

## 2025-04-09 NOTE — CASE MANAGEMENT
Case Management Discharge Planning Note    Patient name Kyler Hernandez  Location /-01 MRN [id number]  : 1935 Date 2025       Current Admission Date: 2025  Current Admission Diagnosis:COPD with acute exacerbation (HCC)   Patient Active Problem List    Diagnosis Date Noted Date Diagnosed    Acute respiratory failure with hypoxia (HCC) 2025     Type 2 diabetes mellitus with hyperglycemia, with long-term current use of insulin (HCC) 2025     Obesity 2025     Pulmonary hypertension (HCC) 2024     Coronary artery disease involving native coronary artery of native heart without angina pectoris 05/10/2024     Primary hypertension 05/10/2024     Benign prostatic hyperplasia with nocturia 05/10/2024     COPD with acute exacerbation (HCC) 05/10/2024     LINCOLN (obstructive sleep apnea) 05/10/2024     B12 deficiency 05/10/2024     Vitamin D deficiency 05/10/2024     Mixed hyperlipidemia 05/10/2024       LOS (days): 2  Geometric Mean LOS (GMLOS) (days): 3.4  Days to GMLOS:1.7     OBJECTIVE:  Risk of Unplanned Readmission Score: 15.91         Current admission status: Inpatient   Preferred Pharmacy:   [facility] [id number] - [place] [address]  Phone: [phone] Fax: [phone]    [facility] [address], [place] [address]  Phone: [phone] Fax: [phone]    Primary Care Provider: Gordon Álvarez MD    Primary Insurance: [facility] REP  Secondary Insurance:     DISCHARGE DETAILS:     Per MD, pt will be discharged tomorrow. Eduard from [facility] will call  tomorrow morning with delivery time to [facility] so transport can be arranged. [employer] is delivering home O2 concentrator and portable tanks to [facility]. Pt will need to be transport via BLS after delivery is confirmed at facility.

## 2025-04-09 NOTE — ASSESSMENT & PLAN NOTE
Presented from facility due to shortness of breath, congestion and hypoxia  85% on room air.  Placed on 4 L nasal cannula.    Home regimen:  Fluticasone-umeclidinium-vilanterol daily   Albuterol prn   Duoneb prn   Plan:  IV Solu-Medrol 40 mg every 8 hours on admission  Xopenex/Atrovent 3 times daily  IV azithromycin   Respiratory protocol   Pulmonary consult appreciated  Covid/flu/rsv is negative  Patient is weaned to prednisone.

## 2025-04-09 NOTE — ASSESSMENT & PLAN NOTE
Lab Results   Component Value Date    HGBA1C 8.9 (H) 04/08/2025       Recent Labs     04/08/25  1650 04/08/25  2132 04/09/25  0733 04/09/25  1119   POCGLU 238* 282* 185* 243*       Blood Sugar Average: Last 72 hrs:  (P) [id number] regimen:   Jardiance  Prandin  Lantus 35 units every morning  Plan:   Insulin Prandin  Continue Lantus  SSI

## 2025-04-09 NOTE — ASSESSMENT & PLAN NOTE
Obesity, class 1, due to excess calories, evidenced by BMI of 33.72 kg/mA², treated with CHO controlled diet and education on nutrition and lifestyle modifications.

## 2025-04-09 NOTE — PLAN OF CARE
Problem: Potential for Falls  Goal: Patient will remain free of falls  Description: INTERVENTIONS:- Educate patient/family on patient safety including physical limitations- Instruct patient to call for assistance with activity - Consult OT/PT to assist with strengthening/mobility - Keep Call bell within reach- Keep bed low and locked with side rails adjusted as appropriate- Keep care items and personal belongings within reach- Initiate and maintain comfort rounds- Make Fall Risk Sign visible to staff- Offer Toileting every 2 Hours, in advance of need- Initiate/Maintain bed alarm- Obtain necessary fall risk management equipment: - Apply yellow socks and bracelet for high fall risk patients- Consider moving patient to room near nurses station  Outcome: Progressing     Problem: RESPIRATORY - ADULT  Goal: Achieves optimal ventilation and oxygenation  Description: INTERVENTIONS:- Assess for changes in respiratory status- Assess for changes in mentation and behavior- Position to facilitate oxygenation and minimize respiratory effort- Oxygen administered by appropriate delivery if ordered- Initiate smoking cessation education as indicated- Encourage broncho-pulmonary hygiene including cough, deep breathe, Incentive Spirometry- Assess the need for suctioning and aspirate as needed- Assess and instruct to report SOB or any respiratory difficulty- Respiratory Therapy support as indicated  Outcome: Progressing

## 2025-04-09 NOTE — PROGRESS NOTES
Progress Note - Hospitalist   Name: Kyler Hernandez 89 y.o. male I MRN: [id number]  Unit/Bed#: -01 I Date of Admission: 4/7/2025   Date of Service: 4/9/2025 I Hospital Day: 2    Assessment & Plan  COPD with acute exacerbation (HCC)  Presented from facility due to shortness of breath, congestion and hypoxia  85% on room air.  Placed on 4 L nasal cannula.    Home regimen:  Fluticasone-umeclidinium-vilanterol daily   Albuterol prn   Duoneb prn   Plan:  IV Solu-Medrol 40 mg every 8 hours on admission  Xopenex/Atrovent 3 times daily  IV azithromycin   Respiratory protocol   Pulmonary consult appreciated  Covid/flu/rsv is negative  Patient is weaned to prednisone.  Acute respiratory failure with hypoxia (HCC)  85% on room air  Placed on 4 L nasal cannula. Weaned to 2 L at time on admission   Patient reports he has concentrator available to him at facility but he seldomly uses.   Diffuse wheezing on exam.  Suspected in the setting of COPD exacerbation  IV steroids/nebulizer treatments  Respiratory protocol  Wean oxygen as able  Home O2 evaluation  Coronary artery disease involving native coronary artery of native heart without angina pectoris  S/p 3 stents   Aspirin, statin, atenolol, isosorbide  Primary hypertension  Home regimen:   Losartan   Amlodipine   Atenolol   Imdur     Benign prostatic hyperplasia with nocturia  Continue home finasteride  LINCOLN (obstructive sleep apnea)  CPAP HS   Type 2 diabetes mellitus with hyperglycemia, with long-term current use of insulin (HCC)  Lab Results   Component Value Date    HGBA1C 8.9 (H) 04/08/2025       Recent Labs     04/08/25  1650 04/08/25  2132 04/09/25  0733 04/09/25  1119   POCGLU 238* 282* 185* 243*       Blood Sugar Average: Last 72 hrs:  (P) [id number] regimen:   Jardiance  Prandin  Lantus 35 units every morning  Plan:   Insulin Prandin  Continue Lantus  SSI    Obesity  Obesity, class 1, due to excess calories, evidenced by BMI of 33.72 kg/mA², treated  "with CHO controlled diet and education on nutrition and lifestyle modifications.     Labs & Imaging: Results Review Statement: No pertinent imaging studies reviewed.    VTE Prophylaxis: in place.    Code Status:   Level 1 - Full Code    Patient Centered Rounds: I have performed bedside rounds with nursing staff today.    Mobility:   Basic Mobility Inpatient Raw Score: 22  JH-HLM Goal: 7: Walk 25 feet or more  JH-HLM Achieved: 6: Walk 10 steps or more  JH-HLM Goal achieved. Continue to encourage appropriate mobility.    Discussions with Specialists or Other Care Team Provider: Pulmonary    Education and Discussions with Family / Patient: Declined update    Total Time Spent on Date of Encounter in care of patient: 35 mins. This time was spent on one or more of the following: performing physical exam; counseling and coordination of care; obtaining or reviewing history; documenting in the medical record; reviewing/ordering tests, medications or procedures; communicating with other healthcare professionals and discussing with patient's family/caregivers.    Current Length of Stay: 2 day(s)    Current Patient Status: Inpatient   Certification Statement: The patient will continue to require additional inpatient hospital stay due to see my assessment and plan.     Subjective:   Patient is seen and examined at bedside.  Has shortness of breath with exertion.  Has nonproductive cough.  Afebrile  All other ROS are negative.    Objective:    Vitals: Blood pressure 140/67, pulse 70, temperature (!) 97.4 °F (36.3 °C), temperature source Oral, resp. rate 20, height 5' 8\" (1.727 m), weight 100 kg (221 lb), SpO2 90%.,Body mass index is 33.6 kg/m².  SPO2 RA Rest      Flowsheet Row ED to Hosp-Admission (Current) from 4/7/2025 in [facility]   SpO2 90 %   SpO2 Activity At Rest   O2 Device None (Room air)   O2 Flow Rate --          I&O:   Intake/Output Summary (Last 24 hours) at 4/9/2025 1321  Last data " filed at 4/9/2025 1241  Gross per 24 hour   Intake 2170 ml   Output 2750 ml   Net -580 ml       Physical Exam:    General- Alert, sitting in chair. Not in any acute distress.  Neck- Supple, No JVD  CVS- regular, S1 and S2 normal  Chest- Bilateral Air entry, decreased at bases with minimal wheezing  Abdomen- soft, nontender, not distended, no guarding or rigidity, BS+  Extremities-  No pedal edema, No calf tenderness                         Normal ROM in all extremities.  CNS-   Alert, awake and orientedx3. No focal deficits present.    Invasive Devices       Peripheral Intravenous Line  Duration             Peripheral IV 04/07/25 Right Antecubital 1 day                          Social History  reviewed  Family History   Problem Relation Age of Onset    Dementia Mother     Diabetes Mother     Peripheral vascular disease Father     Mental illness Neg Hx     Substance Abuse Neg Hx     reviewed    Meds:  Current Facility-Administered Medications   Medication Dose Route Frequency Provider Last Rate Last Admin    acetaminophen (TYLENOL) tablet 650 mg  650 mg Oral Q6H PRN Camilla Berry PA-C        amLODIPine (NORVASC) tablet 10 mg  10 mg Oral QAM Camilla Berry PA-C   10 mg at 04/09/25 0746    Artificial Tears Op Soln 1 drop  1 drop Both Eyes Q12H MOLINA Camilla Berry PA-C   1 drop at 04/09/25 0913    aspirin (ECOTRIN LOW STRENGTH) EC tablet 81 mg  81 mg Oral INGRIS Berry PA-C   81 mg at 04/09/25 0745    atenolol (TENORMIN) tablet 50 mg  50 mg Oral BID Camilla Berry PA-C   50 mg at 04/09/25 0746    atorvastatin (LIPITOR) tablet 80 mg  80 mg Oral HS Camilla Berry PA-C   80 mg at 04/08/25 2202    azithromycin (ZITHROMAX) 500 mg in sodium chloride 0.9% 250mL IVPB 500 mg  500 mg Intravenous Q24H Camilla Berry PA-C   500 mg at 04/08/25 2211    benzonatate (TESSALON PERLES) capsule 100 mg  100 mg Oral TID PRN Camilla Berry PA-C        Cholecalciferol (VITAMIN D3) tablet 1,000 Units  1,000 Units Oral Daily Camilla Berry PA-C   1,000 Units at  04/09/25 0746    cyanocobalamin (VITAMIN B-12) tablet 1,000 mcg  1,000 mcg Oral Daily Camilla Berry PA-C   1,000 mcg at 04/09/25 0746    finasteride (PROSCAR) tablet 5 mg  5 mg Oral Daily Camilla Berry PA-C   5 mg at 04/09/25 0746    fluticasone (FLONASE) 50 mcg/act nasal spray 1 spray  1 spray Each Nare Daily Camilla Berry PA-C   1 spray at 04/09/25 0913    fluticasone-vilanterol 200-25 mcg/actuation 1 puff  1 puff Inhalation Daily Camilla Berry PA-C   1 puff at 04/09/25 0912    And    umeclidinium 62.5 mcg/actuation inhaler AEPB 1 puff  1 puff Inhalation Daily Camilla Berry PA-C   1 puff at 04/09/25 0913    guaiFENesin (MUCINEX) 12 hr tablet 600 mg  600 mg Oral Q12H [facility] Camilla Berry PA-C   600 mg at 04/09/25 0745    heparin (porcine) subcutaneous injection 5,000 Units  5,000 Units Subcutaneous Q8H [facility] Caimlla Berry PA-C   5,000 Units at 04/09/25 0546    insulin glargine (LANTUS) subcutaneous injection 35 Units 0.35 mL  35 Units Subcutaneous QAM Camilla Berry PA-C   35 Units at 04/09/25 0747    insulin glargine (LANTUS) subcutaneous injection 40 Units 0.4 mL  40 Units Subcutaneous HS Camilla Berry PA-C   40 Units at 04/08/25 2252    insulin lispro (HumALOG/ADMELOG) 100 units/mL subcutaneous injection 1-6 Units  1-6 Units Subcutaneous TID AC Camilla Berry PA-C   3 Units at 04/09/25 1123    insulin lispro (HumALOG/ADMELOG) 100 units/mL subcutaneous injection 1-6 Units  1-6 Units Subcutaneous HS Camilla Berry PA-C   4 Units at 04/08/25 2206    ipratropium (ATROVENT) 0.02 % inhalation solution 0.5 mg  0.5 mg Nebulization TID Camilla Berry PA-C   0.5 mg at 04/09/25 0718    ipratropium-albuterol (DUO-NEB) 0.5-2.5 mg/3 mL inhalation solution 3 mL  3 mL Nebulization Q6H PRN Camilla Berry PA-C        isosorbide mononitrate (IMDUR) 24 hr tablet 60 mg  60 mg Oral QAM Camilla Berry PA-C   60 mg at 04/09/25 0746    levalbuterol (XOPENEX) inhalation solution 1.25 mg  1.25 mg Nebulization TID Camilla Berry PA-C   1.25 mg at 04/09/25 0718    losartan (COZAAR)  tablet 100 mg  100 mg Oral INGRIS Berry PA-C   100 mg at 04/09/25 0746    multivitamin stress formula tablet 1 tablet  1 tablet Oral QAM Camilla Berry PA-C   1 tablet at 04/09/25 0745    nystatin (MYCOSTATIN) powder   Topical BID Camilla Berry PA-C   Given at 04/09/25 0913    pantoprazole (PROTONIX) EC tablet 40 mg  40 mg Oral INGRIS Berry PA-C   40 mg at 04/09/25 0745    [START ON 4/10/2025] predniSONE tablet 40 mg  40 mg Oral Daily CONCEPCION Reed        torsemide (DEMADEX) tablet 20 mg  20 mg Oral JOSHMURTAZA Berry PA-C   20 mg at 04/09/25 0746        Medications Prior to Admission:     acetaminophen (TYLENOL) 325 mg tablet    albuterol (2.5 mg/3 mL) 0.083 % nebulizer solution    alfuzosin (UROXATRAL) 10 mg 24 hr tablet    amLODIPine (NORVASC) 10 mg tablet    aspirin (Aspirin Low Dose) 81 mg EC tablet    atorvastatin (LIPITOR) 80 mg tablet    Continuous Glucose  (Dexcom G7 ) ZAYNAB    Continuous Glucose Sensor (Dexcom G7 Sensor)    Empagliflozin (Jardiance) 10 MG TABS tablet    finasteride (PROSCAR) 5 mg tablet    Insulin Glargine Solostar (Lantus SoloStar) 100 UNIT/ML SOPN    losartan (COZAAR) 100 MG tablet    nystatin (MYCOSTATIN) powder    pantoprazole (PROTONIX) 40 mg tablet    repaglinide (PRANDIN) 0.5 mg tablet    torsemide (DEMADEX) 20 mg tablet    Alcohol Swabs (Alcohol Prep Pad) 70 % PADS    atenolol (TENORMIN) 50 mg tablet    Cholecalciferol (Vitamin D3) 25 MCG (1000 UT) CAPS    clotrimazole-betamethasone (LOTRISONE) 1-0.05 % cream    Cyanocobalamin (Vitamin B-12) 2500 MCG SUBL    cycloSPORINE (RESTASIS) 0.05 % ophthalmic emulsion    fluticasone (FLONASE) 50 mcg/act nasal spray    fluticasone-umeclidinium-vilanterol 200-62.5-25 mcg/actuation AEPB inhaler    Insulin Pen Needle (BD AutoShield Duo) 30G X 5 MM MISC    ipratropium-albuterol (DUO-NEB) 0.5-2.5 mg/3 mL nebulizer solution    isosorbide mononitrate (IMDUR) 60 mg 24 hr tablet    loperamide (IMODIUM) 2 mg capsule    Multiple  "Vitamin (Daily-Jhonatan) TABS    Multiple Vitamin (MULTIVITAMIN ADULT PO)    NovoFine Autocover Pen Needle 30G X 8 MM MISC    Nutritional Supplements (Ensure)    Labs:  Results from last 7 days   Lab Units 04/09/25 0529 04/08/25 0631 04/1935   WBC Thousand/uL 13.55* 8.23 9.50   HEMOGLOBIN g/dL 14.5 15.2 14.2   HEMATOCRIT % 44.9 44.7 44.3   PLATELETS Thousands/uL 151 185 183   SEGS PCT %  --   --  75   LYMPHO PCT %  --  4* 7*   MONO PCT %  --  1* 15*   EOS PCT %  --  0 2     Results from last 7 days   Lab Units 04/09/25 0529 04/08/25 0631 04/1935   POTASSIUM mmol/L 4.1 3.7 3.7   CHLORIDE mmol/L 102 103 99   CO2 mmol/L 25 26 30   BUN mg/dL 38* 22 24   CREATININE mg/dL 1.22 1.12 1.34*   CALCIUM mg/dL 8.8 9.2 9.1   ALK PHOS U/L  --   --  78   ALT U/L  --   --  23   AST U/L  --   --  18     No results found for: \"TROPONINI\", \"CKMB\", \"CKTOTAL\"  Results from last 7 days   Lab Units 04/1935   INR  1.11     Lab Results   Component Value Date    BLOODCX No Growth at 24 hrs. 04/07/2025    BLOODCX No Growth at 24 hrs. 04/07/2025    URINECX No Growth <1000 cfu/mL 12/22/2024    SPUTUMCULTUR Test not performed. Suggest repeat specimen. 04/08/2025         Imaging:  Results for orders placed during the hospital encounter of 04/07/25    XR chest portable    Narrative  XR CHEST PORTABLE    INDICATION: hypoxia.    COMPARISON: None    FINDINGS:    Low lung volumes, which causes crowding of bronchovascular markings.  Within that limitation, there is no focal lung opacity. No pneumothorax or pleural effusion.    Unremarkable cardiomediastinal silhouette.  Atherosclerotic vascular calcifications noted.    Bones are unremarkable for age.    Impression  No acute pulmonary disease on this examination, which is somewhat limited secondary to low lung volumes.        Workstation performed: [id number]    No results found for this or any previous visit.      Last 24 Hours Medication List:   Current Facility-Administered " Medications   Medication Dose Route Frequency Provider Last Rate    acetaminophen  650 mg Oral Q6H PRN Camilla Berry PA-C      amLODIPine  10 mg Oral QAM Camilla Berry PA-C      Artificial Tears  1 drop Both Eyes Q12H [facility] Camilla Berry PA-C      aspirin  81 mg Oral QAM Camilla Berry PA-C      atenolol  50 mg Oral BID Camilla Berry PA-C      atorvastatin  80 mg Oral HS Camilla Berry PA-C      azithromycin  500 mg Intravenous Q24H Camilla Berry PA-C      benzonatate  100 mg Oral TID PRN Camilla Berry PA-C      cholecalciferol  1,000 Units Oral Daily Camilla Berry PA-C      cyanocobalamin  1,000 mcg Oral Daily Camilla Berry PA-C      finasteride  5 mg Oral Daily Camilla Berry PA-C      fluticasone  1 spray Each Nare Daily Camilla Berry PA-C      fluticasone-vilanterol  1 puff Inhalation Daily Camilla Berry PA-C      And    umeclidinium  1 puff Inhalation Daily Camilla Berry PA-C      guaiFENesin  600 mg Oral Q12H [facility] Camilla Berry PA-C      heparin (porcine)  5,000 Units Subcutaneous Q8H [facility] Camilla Berry PA-C      insulin glargine  35 Units Subcutaneous QA Camilla Berry PA-C      insulin glargine  40 Units Subcutaneous HS Camilla Berry PA-C      insulin lispro  1-6 Units Subcutaneous TID AC Camilla Berry PA-C      insulin lispro  1-6 Units Subcutaneous HS Camilla Berry PA-C      ipratropium  0.5 mg Nebulization TID Camilla Berry PA-C      ipratropium-albuterol  3 mL Nebulization Q6H PRN Camilla Berry PA-C      isosorbide mononitrate  60 mg Oral QAM Camilla Berry PA-C      levalbuterol  1.25 mg Nebulization TID Camilla Berry PA-C      losartan  100 mg Oral QAM Camilla Berry PA-C      multivitamin stress formula  1 tablet Oral QAM Camilla Berry PA-C      nystatin   Topical BID Camilla Berry PA-C      pantoprazole  40 mg Oral QAM Camilla Berry PA-C      [START ON 4/10/2025] predniSONE  40 mg Oral Daily CONCEPCION Reed      torsemide  20 mg Oral INGRIS Berry PA-C          Today, Patient Was Seen By: Lit Gottlieb MD    ** Please Note: Dictation voice to text  software may have been used in the creation of this document. **

## 2025-04-09 NOTE — ASSESSMENT & PLAN NOTE
85% on room air  Placed on 4 L nasal cannula. Weaned to 2 L at time on admission   Patient reports he has concentrator available to him at facility but he seldomly uses.   Diffuse wheezing on exam.  Suspected in the setting of COPD exacerbation  IV steroids/nebulizer treatments  Respiratory protocol  Wean oxygen as able  Home O2 evaluation

## 2025-04-09 NOTE — PLAN OF CARE
Problem: Potential for Falls  Goal: Patient will remain free of falls  Description: INTERVENTIONS:- Educate patient/family on patient safety including physical limitations- Instruct patient to call for assistance with activity - Consult OT/PT to assist with strengthening/mobility - Keep Call bell within reach- Keep bed low and locked with side rails adjusted as appropriate- Keep care items and personal belongings within reach- Initiate and maintain comfort rounds- Make Fall Risk Sign visible to staff- Offer Toileting every 2 Hours, in advance of need- Initiate/Maintain bed/ chair alarm- Obtain necessary fall risk management equipment: - Apply yellow socks and bracelet for high fall risk patients- Consider moving patient to room near nurses station  Outcome: Progressing     Problem: RESPIRATORY - ADULT  Goal: Achieves optimal ventilation and oxygenation  Description: INTERVENTIONS:- Assess for changes in respiratory status- Assess for changes in mentation and behavior- Position to facilitate oxygenation and minimize respiratory effort- Oxygen administered by appropriate delivery if ordered- Initiate smoking cessation education as indicated- Encourage broncho-pulmonary hygiene including cough, deep breathe, Incentive Spirometry- Assess the need for suctioning and aspirate as needed- Assess and instruct to report SOB or any respiratory difficulty- Respiratory Therapy support as indicated  Outcome: Progressing

## 2025-04-09 NOTE — RESPIRATORY THERAPY NOTE
Home Oxygen Qualifying Test     Patient name: Kyler Hernandez        : 1935   Date of Test:  2025  Diagnosis:    Home Oxygen Test:    **Medicare Guidelines require item(s) 1-5 on all ambulatory patients or 1 and 2 on non-ambulatory patients.    1. Baseline SPO2 on Room Air at rest 84 %   If <= 88% on Room Air add O2 via NC to obtain SpO2 >=88%. If LPM needed, document LPM 5 needed to reach =>88%    SPO2 during exertion on Room Air 84  %  During exertion monitor SPO2. If SPO2 increases >=89%, do not add supplemental oxygen    SPO2 on Oxygen at Rest 93 % at 5 LPM    SPO2 during exertion on Oxygen 93 % at 5 LPM    Test performed during exertion activity.      [x]  Supplemental Home Oxygen is indicated.    []  Client does not qualify for home oxygen.    Respiratory Additional Notes- Pt had expiratory wheezes during exertion activity    Radhika Haddad, RT

## 2025-04-10 ENCOUNTER — APPOINTMENT (INPATIENT)
Dept: RADIOLOGY | Facility: HOSPITAL | Age: OVER 89
DRG: 190 | End: 2025-04-10
Payer: COMMERCIAL

## 2025-04-10 PROBLEM — B34.8 PARAINFLUENZA: Status: ACTIVE | Noted: 2025-04-10

## 2025-04-10 LAB
ANION GAP SERPL CALCULATED.3IONS-SCNC: 10 MMOL/L (ref 4–13)
BASOPHILS # BLD AUTO: 0.01 THOUSANDS/ÂΜL (ref 0–0.1)
BASOPHILS NFR BLD AUTO: 0 % (ref 0–1)
BNP SERPL-MCNC: 126 PG/ML (ref 0–100)
BUN SERPL-MCNC: 45 MG/DL (ref 5–25)
CALCIUM SERPL-MCNC: 8.8 MG/DL (ref 8.4–10.2)
CHLORIDE SERPL-SCNC: 100 MMOL/L (ref 96–108)
CO2 SERPL-SCNC: 28 MMOL/L (ref 21–32)
CREAT SERPL-MCNC: 1.17 MG/DL (ref 0.6–1.3)
DME PARACHUTE DELIVERY DATE ACTUAL: NORMAL
DME PARACHUTE DELIVERY DATE EXPECTED: NORMAL
DME PARACHUTE DELIVERY DATE REQUESTED: NORMAL
DME PARACHUTE DELIVERY NOTE: NORMAL
DME PARACHUTE ITEM DESCRIPTION: NORMAL
DME PARACHUTE ORDER STATUS: NORMAL
DME PARACHUTE SUPPLIER NAME: NORMAL
DME PARACHUTE SUPPLIER PHONE: NORMAL
EOSINOPHIL # BLD AUTO: 0 THOUSAND/ÂΜL (ref 0–0.61)
EOSINOPHIL NFR BLD AUTO: 0 % (ref 0–6)
ERYTHROCYTE [DISTWIDTH] IN BLOOD BY AUTOMATED COUNT: 15.3 % (ref 11.6–15.1)
GFR SERPL CREATININE-BSD FRML MDRD: 54 ML/MIN/1.73SQ M
GLUCOSE SERPL-MCNC: 163 MG/DL (ref 65–140)
GLUCOSE SERPL-MCNC: 187 MG/DL (ref 65–140)
GLUCOSE SERPL-MCNC: 209 MG/DL (ref 65–140)
GLUCOSE SERPL-MCNC: 237 MG/DL (ref 65–140)
GLUCOSE SERPL-MCNC: 279 MG/DL (ref 65–140)
HCT VFR BLD AUTO: 44.4 % (ref 36.5–49.3)
HGB BLD-MCNC: 14.2 G/DL (ref 12–17)
IMM GRANULOCYTES # BLD AUTO: 0.11 THOUSAND/UL (ref 0–0.2)
IMM GRANULOCYTES NFR BLD AUTO: 1 % (ref 0–2)
LYMPHOCYTES # BLD AUTO: 0.43 THOUSANDS/ÂΜL (ref 0.6–4.47)
LYMPHOCYTES NFR BLD AUTO: 3 % (ref 14–44)
MCH RBC QN AUTO: 29.4 PG (ref 26.8–34.3)
MCHC RBC AUTO-ENTMCNC: 32 G/DL (ref 31.4–37.4)
MCV RBC AUTO: 92 FL (ref 82–98)
MONOCYTES # BLD AUTO: 0.96 THOUSAND/ÂΜL (ref 0.17–1.22)
MONOCYTES NFR BLD AUTO: 7 % (ref 4–12)
NEUTROPHILS # BLD AUTO: 11.64 THOUSANDS/ÂΜL (ref 1.85–7.62)
NEUTS SEG NFR BLD AUTO: 89 % (ref 43–75)
NRBC BLD AUTO-RTO: 0 /100 WBCS
PLATELET # BLD AUTO: 216 THOUSANDS/UL (ref 149–390)
PMV BLD AUTO: 10.6 FL (ref 8.9–12.7)
POTASSIUM SERPL-SCNC: 3.7 MMOL/L (ref 3.5–5.3)
RBC # BLD AUTO: 4.83 MILLION/UL (ref 3.88–5.62)
SODIUM SERPL-SCNC: 138 MMOL/L (ref 135–147)
WBC # BLD AUTO: 13.15 THOUSAND/UL (ref 4.31–10.16)

## 2025-04-10 PROCEDURE — 80048 BASIC METABOLIC PNL TOTAL CA: CPT | Performed by: INTERNAL MEDICINE

## 2025-04-10 PROCEDURE — 93005 ELECTROCARDIOGRAM TRACING: CPT

## 2025-04-10 PROCEDURE — 82948 REAGENT STRIP/BLOOD GLUCOSE: CPT

## 2025-04-10 PROCEDURE — 83880 ASSAY OF NATRIURETIC PEPTIDE: CPT | Performed by: NURSE PRACTITIONER

## 2025-04-10 PROCEDURE — 94640 AIRWAY INHALATION TREATMENT: CPT

## 2025-04-10 PROCEDURE — 85025 COMPLETE CBC W/AUTO DIFF WBC: CPT | Performed by: INTERNAL MEDICINE

## 2025-04-10 PROCEDURE — 71045 X-RAY EXAM CHEST 1 VIEW: CPT

## 2025-04-10 PROCEDURE — 99232 SBSQ HOSP IP/OBS MODERATE 35: CPT | Performed by: INTERNAL MEDICINE

## 2025-04-10 PROCEDURE — 94760 N-INVAS EAR/PLS OXIMETRY 1: CPT

## 2025-04-10 RX ORDER — FUROSEMIDE 10 MG/ML
40 INJECTION INTRAMUSCULAR; INTRAVENOUS ONCE
Status: COMPLETED | OUTPATIENT
Start: 2025-04-10 | End: 2025-04-10

## 2025-04-10 RX ORDER — LOPERAMIDE HYDROCHLORIDE 2 MG/1
CAPSULE ORAL
Qty: 30 CAPSULE | Refills: 4 | Status: SHIPPED | OUTPATIENT
Start: 2025-04-10

## 2025-04-10 RX ORDER — PREDNISONE 20 MG/1
40 TABLET ORAL DAILY
Status: DISCONTINUED | OUTPATIENT
Start: 2025-04-11 | End: 2025-04-14 | Stop reason: HOSPADM

## 2025-04-10 RX ADMIN — B-COMPLEX W/ C & FOLIC ACID TAB 1 TABLET: TAB at 07:55

## 2025-04-10 RX ADMIN — CYANOCOBALAMIN TAB 500 MCG 1000 MCG: 500 TAB at 07:55

## 2025-04-10 RX ADMIN — FINASTERIDE 5 MG: 5 TABLET, FILM COATED ORAL at 07:55

## 2025-04-10 RX ADMIN — AMLODIPINE BESYLATE 10 MG: 5 TABLET ORAL at 07:56

## 2025-04-10 RX ADMIN — ASPIRIN 81 MG: 81 TABLET, COATED ORAL at 07:55

## 2025-04-10 RX ADMIN — INSULIN LISPRO 1 UNITS: 100 INJECTION, SOLUTION INTRAVENOUS; SUBCUTANEOUS at 07:51

## 2025-04-10 RX ADMIN — PREDNISONE 40 MG: 20 TABLET ORAL at 07:55

## 2025-04-10 RX ADMIN — LEVALBUTEROL HYDROCHLORIDE 1.25 MG: 1.25 SOLUTION RESPIRATORY (INHALATION) at 21:01

## 2025-04-10 RX ADMIN — TORSEMIDE 20 MG: 20 TABLET ORAL at 07:55

## 2025-04-10 RX ADMIN — GUAIFENESIN 600 MG: 600 TABLET ORAL at 20:52

## 2025-04-10 RX ADMIN — INSULIN LISPRO 4 UNITS: 100 INJECTION, SOLUTION INTRAVENOUS; SUBCUTANEOUS at 11:15

## 2025-04-10 RX ADMIN — DEXTRAN 70, GLYCERIN, HYPROMELLOSE 1 DROP: 1; 2; 3 SOLUTION/ DROPS OPHTHALMIC at 08:07

## 2025-04-10 RX ADMIN — INSULIN LISPRO 3 UNITS: 100 INJECTION, SOLUTION INTRAVENOUS; SUBCUTANEOUS at 16:10

## 2025-04-10 RX ADMIN — FLUTICASONE PROPIONATE 1 SPRAY: 50 SPRAY, METERED NASAL at 08:07

## 2025-04-10 RX ADMIN — IPRATROPIUM BROMIDE 0.5 MG: 0.5 SOLUTION RESPIRATORY (INHALATION) at 14:08

## 2025-04-10 RX ADMIN — INSULIN GLARGINE 40 UNITS: 100 INJECTION, SOLUTION SUBCUTANEOUS at 20:54

## 2025-04-10 RX ADMIN — IPRATROPIUM BROMIDE 0.5 MG: 0.5 SOLUTION RESPIRATORY (INHALATION) at 21:01

## 2025-04-10 RX ADMIN — UMECLIDINIUM 1 PUFF: 62.5 AEROSOL, POWDER ORAL at 08:07

## 2025-04-10 RX ADMIN — INSULIN GLARGINE 35 UNITS: 100 INJECTION, SOLUTION SUBCUTANEOUS at 07:53

## 2025-04-10 RX ADMIN — FUROSEMIDE 40 MG: 10 INJECTION, SOLUTION INTRAVENOUS at 11:21

## 2025-04-10 RX ADMIN — LOSARTAN POTASSIUM 100 MG: 50 TABLET, FILM COATED ORAL at 07:56

## 2025-04-10 RX ADMIN — ATENOLOL 50 MG: 50 TABLET ORAL at 07:55

## 2025-04-10 RX ADMIN — HEPARIN SODIUM 5000 UNITS: 5000 INJECTION INTRAVENOUS; SUBCUTANEOUS at 20:55

## 2025-04-10 RX ADMIN — NYSTATIN: 100000 POWDER TOPICAL at 20:58

## 2025-04-10 RX ADMIN — LEVALBUTEROL HYDROCHLORIDE 1.25 MG: 1.25 SOLUTION RESPIRATORY (INHALATION) at 14:08

## 2025-04-10 RX ADMIN — DEXTRAN 70, GLYCERIN, HYPROMELLOSE 1 DROP: 1; 2; 3 SOLUTION/ DROPS OPHTHALMIC at 20:58

## 2025-04-10 RX ADMIN — IPRATROPIUM BROMIDE 0.5 MG: 0.5 SOLUTION RESPIRATORY (INHALATION) at 07:29

## 2025-04-10 RX ADMIN — ATORVASTATIN CALCIUM 80 MG: 40 TABLET, FILM COATED ORAL at 20:52

## 2025-04-10 RX ADMIN — HEPARIN SODIUM 5000 UNITS: 5000 INJECTION INTRAVENOUS; SUBCUTANEOUS at 04:54

## 2025-04-10 RX ADMIN — PANTOPRAZOLE SODIUM 40 MG: 40 TABLET, DELAYED RELEASE ORAL at 07:55

## 2025-04-10 RX ADMIN — FLUTICASONE FUROATE AND VILANTEROL TRIFENATATE 1 PUFF: 200; 25 POWDER RESPIRATORY (INHALATION) at 08:07

## 2025-04-10 RX ADMIN — HEPARIN SODIUM 5000 UNITS: 5000 INJECTION INTRAVENOUS; SUBCUTANEOUS at 14:59

## 2025-04-10 RX ADMIN — ISOSORBIDE MONONITRATE 60 MG: 60 TABLET, EXTENDED RELEASE ORAL at 07:55

## 2025-04-10 RX ADMIN — LEVALBUTEROL HYDROCHLORIDE 1.25 MG: 1.25 SOLUTION RESPIRATORY (INHALATION) at 07:29

## 2025-04-10 RX ADMIN — ATENOLOL 50 MG: 50 TABLET ORAL at 20:52

## 2025-04-10 RX ADMIN — Medication 1000 UNITS: at 07:55

## 2025-04-10 RX ADMIN — GUAIFENESIN 600 MG: 600 TABLET ORAL at 07:55

## 2025-04-10 RX ADMIN — NYSTATIN: 100000 POWDER TOPICAL at 08:07

## 2025-04-10 RX ADMIN — INSULIN LISPRO 2 UNITS: 100 INJECTION, SOLUTION INTRAVENOUS; SUBCUTANEOUS at 20:53

## 2025-04-10 NOTE — ASSESSMENT & PLAN NOTE
-Currently on 5 L, patient reports home O2 requirements 2 to 4 L  -Continue sats greater than 88%  -Pulmonary toileting: Deep breathing cough out of bed as tolerated incentive spirometry  -Home O2 eval: 5 L at rest and upon ambulation

## 2025-04-10 NOTE — ASSESSMENT & PLAN NOTE
85% on room air  Placed on 4 L nasal cannula. Weaned to 2 L at time on admission   Patient reports he has concentrator available to him at facility but he seldomly uses.   Diffuse wheezing on exam.  Suspected in the setting of COPD exacerbation  IV steroids/nebulizer treatments  Respiratory protocol  Wean oxygen as able  Echo showed ejection of 65% with normal diastolic function  Home O2 evaluation

## 2025-04-10 NOTE — PROGRESS NOTES
Progress Note - Hospitalist   Name: Kyler Hernandez 89 y.o. male I MRN: [id number]  Unit/Bed#: -01 I Date of Admission: 4/7/2025   Date of Service: 4/10/2025 I Hospital Day: 3    Assessment & Plan  COPD with acute exacerbation (HCC)  Presented from facility due to shortness of breath, congestion and hypoxia  85% on room air.  Placed on 4 L nasal cannula in ER  Home regimen:  Fluticasone-umeclidinium-vilanterol daily   Albuterol prn   Duoneb prn   Plan:  IV Solu-Medrol 40 mg every 8 hours on admission  Xopenex/Atrovent 3 times daily  IV azithromycin   Respiratory protocol   Pulmonary consult appreciated  Currently on 5 L supplemental oxygen  Repeat chest x-ray was done this morning  Ordered Lasix 40 IV once  Covid/flu/rsv is negative  Patient is weaned to prednisone.  Acute respiratory failure with hypoxia (HCC)  85% on room air  Placed on 4 L nasal cannula. Weaned to 2 L at time on admission   Patient reports he has concentrator available to him at facility but he seldomly uses.   Diffuse wheezing on exam.  Suspected in the setting of COPD exacerbation  IV steroids/nebulizer treatments  Respiratory protocol  Wean oxygen as able  Echo showed ejection of 65% with normal diastolic function  Home O2 evaluation  Coronary artery disease involving native coronary artery of native heart without angina pectoris  S/p 3 stents   Aspirin, statin, atenolol, isosorbide and torsemide  Primary hypertension  Home regimen:   Losartan   Amlodipine   Atenolol   Imdur   Torsemide    Benign prostatic hyperplasia with nocturia  Continue home finasteride  LINCOLN (obstructive sleep apnea)  CPAP HS   Type 2 diabetes mellitus with hyperglycemia, with long-term current use of insulin ([facility])  Lab Results   Component Value Date    HGBA1C 8.9 (H) 04/08/2025       Recent Labs     04/09/25  1119 04/09/25  1600 04/09/25  2031 04/10/25  0731   POCGLU 243* 309* 319* 163*       Blood Sugar Average: Last 72 hrs:  (P) 237.2Home regimen:  "  Jardiance  Prandin  Lantus 35 units every morning  Plan:   Insulin Prandin  Continue Lantus  SSI    Obesity  Obesity, class 1, due to excess calories, evidenced by BMI of 33.72 kg/mA², treated with CHO controlled diet and education on nutrition and lifestyle modifications.   Parainfluenza  Patient tested positive for parainfluenza 3  Continue symptomatic management    Labs & Imaging: Results Review Statement: No pertinent imaging studies reviewed.    VTE Prophylaxis: in place.    Code Status:   Level 1 - Full Code    Patient Centered Rounds: I have performed bedside rounds with nursing staff today.    Mobility:   Basic Mobility Inpatient Raw Score: 22  JH-HLM Goal: 7: Walk 25 feet or more  JH-HLM Achieved: 7: Walk 25 feet or more  JH-HLM Goal achieved. Continue to encourage appropriate mobility.    Discussions with Specialists or Other Care Team Provider: Pulmonary    Education and Discussions with Family / Patient: Declined update    Total Time Spent on Date of Encounter in care of patient: 35 mins. This time was spent on one or more of the following: performing physical exam; counseling and coordination of care; obtaining or reviewing history; documenting in the medical record; reviewing/ordering tests, medications or procedures; communicating with other healthcare professionals and discussing with patient's family/caregivers.    Current Length of Stay: 3 day(s)    Current Patient Status: Inpatient   Certification Statement: The patient will continue to require additional inpatient hospital stay due to see my assessment and plan.     Subjective:   Patient is seen and examined at bedside.  Still complains of shortness of breath.  No other complaints.  Afebrile  All other ROS are negative.    Objective:    Vitals: Blood pressure 164/83, pulse 63, temperature 97.6 °F (36.4 °C), temperature source Oral, resp. rate 22, height 5' 8\" (1.727 m), weight 100 kg (221 lb), SpO2 91%.,Body mass index is 33.6 kg/m².  SPO2 RA " Rest      Flowsheet Row ED to Hosp-Admission (Current) from 4/7/2025 in [facility] Med Surg Unit   SpO2 91 %   SpO2 Activity At Rest   O2 Device Nasal cannula   O2 Flow Rate --          I&O:   Intake/Output Summary (Last 24 hours) at 4/10/2025 1054  Last data filed at 4/10/2025 0811  Gross per 24 hour   Intake 1852 ml   Output 2425 ml   Net -573 ml       Physical Exam:    General- Alert, sitting in chair. Not in any acute distress.  Neck- Supple, No JVD  CVS- regular, S1 and S2 normal  Chest- Bilateral Air entry, No rhochi, crackles or wheezing present.  Abdomen- soft, nontender, not distended, no guarding or rigidity, BS+  Extremities-  No pedal edema, No calf tenderness  CNS-   Alert, awake and orientedx3. No focal deficits present.    Invasive Devices       Peripheral Intravenous Line  Duration             Peripheral IV 04/07/25 Right Antecubital 2 days                          Social History  reviewed  Family History   Problem Relation Age of Onset    Dementia Mother     Diabetes Mother     Peripheral vascular disease Father     Mental illness Neg Hx     Substance Abuse Neg Hx     reviewed    Meds:  Current Facility-Administered Medications   Medication Dose Route Frequency Provider Last Rate Last Admin    acetaminophen (TYLENOL) tablet 650 mg  650 mg Oral Q6H PRN Camilla Berry PA-C        amLODIPine (NORVASC) tablet 10 mg  10 mg Oral QAM Camilla Berry PA-C   10 mg at 04/10/25 0756    Artificial Tears Op Soln 1 drop  1 drop Both Eyes Q12H MOLINA Camilla Berry PA-C   1 drop at 04/10/25 0807    aspirin (ECOTRIN LOW STRENGTH) EC tablet 81 mg  81 mg Oral QAM Camilla Berry PA-C   81 mg at 04/10/25 0755    atenolol (TENORMIN) tablet 50 mg  50 mg Oral BID Camilla Berry PA-C   50 mg at 04/10/25 0755    atorvastatin (LIPITOR) tablet 80 mg  80 mg Oral HS Camilla Berry PA-C   80 mg at 04/09/25 2120    benzonatate (TESSALON PERLES) capsule 100 mg  100 mg Oral TID PRN Camilla Berry PA-C        Cholecalciferol (VITAMIN D3)  tablet 1,000 Units  1,000 Units Oral Daily Camilla Berry PA-C   1,000 Units at 04/10/25 0755    cyanocobalamin (VITAMIN B-12) tablet 1,000 mcg  1,000 mcg Oral Daily Camilla Berry PA-C   1,000 mcg at 04/10/25 0755    finasteride (PROSCAR) tablet 5 mg  5 mg Oral Daily Camilla Berry PA-C   5 mg at 04/10/25 0755    fluticasone (FLONASE) 50 mcg/act nasal spray 1 spray  1 spray Each Nare Daily Camilla Berry PA-C   1 spray at 04/10/25 0807    fluticasone-vilanterol 200-25 mcg/actuation 1 puff  1 puff Inhalation Daily Camilla Berry PA-C   1 puff at 04/10/25 0807    And    umeclidinium 62.5 mcg/actuation inhaler AEPB 1 puff  1 puff Inhalation Daily Camilla Berry PA-C   1 puff at 04/10/25 0807    furosemide (LASIX) injection 40 mg  40 mg Intravenous Once CONCEPCION Haas        guaiFENesin (MUCINEX) 12 hr tablet 600 mg  600 mg Oral Q12H [facility] Camilla Berry PA-C   600 mg at 04/10/25 0755    heparin (porcine) subcutaneous injection 5,000 Units  5,000 Units Subcutaneous Q8H [facility] Camilla Berry PA-C   5,000 Units at 04/10/25 0454    insulin glargine (LANTUS) subcutaneous injection 35 Units 0.35 mL  35 Units Subcutaneous QAM Camilla Berry PA-C   35 Units at 04/10/25 0753    insulin glargine (LANTUS) subcutaneous injection 40 Units 0.4 mL  40 Units Subcutaneous HS Camilla Berry PA-C   40 Units at 04/09/25 2122    insulin lispro (HumALOG/ADMELOG) 100 units/mL subcutaneous injection 1-6 Units  1-6 Units Subcutaneous TID AC Camilla Berry PA-C   1 Units at 04/10/25 0751    insulin lispro (HumALOG/ADMELOG) 100 units/mL subcutaneous injection 1-6 Units  1-6 Units Subcutaneous HS Camilla Berry PA-C   5 Units at 04/09/25 2122    ipratropium (ATROVENT) 0.02 % inhalation solution 0.5 mg  0.5 mg Nebulization TID Camilla Berry PA-C   0.5 mg at 04/10/25 0729    ipratropium-albuterol (DUO-NEB) 0.5-2.5 mg/3 mL inhalation solution 3 mL  3 mL Nebulization Q6H PRN Camilla Berry PA-C        isosorbide mononitrate (IMDUR) 24 hr tablet 60 mg  60 mg Oral QAM Camilla Berry PA-C    60 mg at 04/10/25 0755    levalbuterol (XOPENEX) inhalation solution 1.25 mg  1.25 mg Nebulization TID Camilla Berry PA-C   1.25 mg at 04/10/25 0729    losartan (COZAAR) tablet 100 mg  100 mg Oral QAM Camilla Berry PA-C   100 mg at 04/10/25 0756    multivitamin stress formula tablet 1 tablet  1 tablet Oral QAMURTAZA Berry PA-C   1 tablet at 04/10/25 0755    nystatin (MYCOSTATIN) powder   Topical BID Camilla Berry PA-C   Given at 04/10/25 0807    pantoprazole (PROTONIX) EC tablet 40 mg  40 mg Oral QAMURTAZA Berry PA-C   40 mg at 04/10/25 0755    [START ON 4/11/2025] predniSONE tablet 40 mg  40 mg Oral Daily CONCEPCION Haas        torsemide (DEMADEX) tablet 20 mg  20 mg Oral QAMURTAZA Berry PA-C   20 mg at 04/10/25 0755        Medications Prior to Admission:     acetaminophen (TYLENOL) 325 mg tablet    albuterol (2.5 mg/3 mL) 0.083 % nebulizer solution    alfuzosin (UROXATRAL) 10 mg 24 hr tablet    amLODIPine (NORVASC) 10 mg tablet    aspirin (Aspirin Low Dose) 81 mg EC tablet    atorvastatin (LIPITOR) 80 mg tablet    Continuous Glucose  (Dexcom G7 ) ZAYNAB    Continuous Glucose Sensor (Dexcom G7 Sensor)    Empagliflozin (Jardiance) 10 MG TABS tablet    finasteride (PROSCAR) 5 mg tablet    Insulin Glargine Solostar (Lantus SoloStar) 100 UNIT/ML SOPN    losartan (COZAAR) 100 MG tablet    nystatin (MYCOSTATIN) powder    pantoprazole (PROTONIX) 40 mg tablet    repaglinide (PRANDIN) 0.5 mg tablet    torsemide (DEMADEX) 20 mg tablet    Alcohol Swabs (Alcohol Prep Pad) 70 % PADS    atenolol (TENORMIN) 50 mg tablet    Cholecalciferol (Vitamin D3) 25 MCG (1000 UT) CAPS    clotrimazole-betamethasone (LOTRISONE) 1-0.05 % cream    Cyanocobalamin (Vitamin B-12) 2500 MCG SUBL    cycloSPORINE (RESTASIS) 0.05 % ophthalmic emulsion    fluticasone (FLONASE) 50 mcg/act nasal spray    fluticasone-umeclidinium-vilanterol 200-62.5-25 mcg/actuation AEPB inhaler    Insulin Pen Needle (BD AutoShield Duo) 30G X 5 MM MISC     "ipratropium-albuterol (DUO-NEB) 0.5-2.5 mg/3 mL nebulizer solution    isosorbide mononitrate (IMDUR) 60 mg 24 hr tablet    loperamide (IMODIUM) 2 mg capsule    Multiple Vitamin (Daily-Jhonatan) TABS    Multiple Vitamin (MULTIVITAMIN ADULT PO)    NovoFine Autocover Pen Needle 30G X 8 MM MISC    Nutritional Supplements (Ensure)    Labs:  Results from last 7 days   Lab Units 04/10/25  0339 04/09/25  0529 04/08/25  0631 04/07/25  1935   WBC Thousand/uL 13.15* 13.55* 8.23 9.50   HEMOGLOBIN g/dL 14.2 14.5 15.2 14.2   HEMATOCRIT % 44.4 44.9 44.7 44.3   PLATELETS Thousands/uL 216 151 185 183   SEGS PCT % 89*  --   --  75   LYMPHO PCT % 3*  --  4* 7*   MONO PCT % 7  --  1* 15*   EOS PCT % 0  --  0 2     Results from last 7 days   Lab Units 04/10/25  0339 04/09/25  0529 04/08/25  0631 04/07/25  1935   POTASSIUM mmol/L 3.7 4.1 3.7 3.7   CHLORIDE mmol/L 100 102 103 99   CO2 mmol/L 28 25 26 30   BUN mg/dL 45* 38* 22 24   CREATININE mg/dL 1.17 1.22 1.12 1.34*   CALCIUM mg/dL 8.8 8.8 9.2 9.1   ALK PHOS U/L  --   --   --  78   ALT U/L  --   --   --  23   AST U/L  --   --   --  18     No results found for: \"TROPONINI\", \"CKMB\", \"CKTOTAL\"  Results from last 7 days   Lab Units 04/1935   INR  1.11     Lab Results   Component Value Date    BLOODCX No Growth at 48 hrs. 04/07/2025    BLOODCX No Growth at 48 hrs. 04/07/2025    URINECX No Growth <1000 cfu/mL 12/22/2024    SPUTUMCULTUR Test not performed. Suggest repeat specimen. 04/08/2025         Imaging:  Results for orders placed during the hospital encounter of 04/07/25    XR chest portable    Narrative  XR CHEST PORTABLE    INDICATION: hypoxia.    COMPARISON: None    FINDINGS:    Low lung volumes, which causes crowding of bronchovascular markings.  Within that limitation, there is no focal lung opacity. No pneumothorax or pleural effusion.    Unremarkable cardiomediastinal silhouette.  Atherosclerotic vascular calcifications noted.    Bones are unremarkable for age.    Impression  No " acute pulmonary disease on this examination, which is somewhat limited secondary to low lung volumes.        Workstation performed: [id number]    No results found for this or any previous visit.      Last 24 Hours Medication List:   Current Facility-Administered Medications   Medication Dose Route Frequency Provider Last Rate    acetaminophen  650 mg Oral Q6H PRN Camilla Berry PA-C      amLODIPine  10 mg Oral QAM Camilla Berry PA-C      Artificial Tears  1 drop Both Eyes Q12H Replaced by [facility] Camilla Berry PA-C      aspirin  81 mg Oral QAM Camilla Berry PA-C      atenolol  50 mg Oral BID Camilla Berry PA-C      atorvastatin  80 mg Oral HS Camilla Berry PA-C      benzonatate  100 mg Oral TID PRN Camilla Berry PA-C      cholecalciferol  1,000 Units Oral Daily Camilla Berry PA-C      cyanocobalamin  1,000 mcg Oral Daily Camilla Berry PA-C      finasteride  5 mg Oral Daily Camilla Berry PA-C      fluticasone  1 spray Each Nare Daily Camilla Berry PA-C      fluticasone-vilanterol  1 puff Inhalation Daily Camilla Berry PA-C      And    umeclidinium  1 puff Inhalation Daily Camilla Berry PA-C      furosemide  40 mg Intravenous Once CONCEPCION Haas      guaiFENesin  600 mg Oral Q12H Replaced by [facility] Camilla Berry PA-C      heparin (porcine)  5,000 Units Subcutaneous Q8H Replaced by [facility] Camilla Berry PA-C      insulin glargine  35 Units Subcutaneous [facility] Camilla Berry PA-C      insulin glargine  40 Units Subcutaneous HS Camilla Berry PA-C      insulin lispro  1-6 Units Subcutaneous TID AC Camilla Berry PA-C      insulin lispro  1-6 Units Subcutaneous HS Camilla Berry PA-C      ipratropium  0.5 mg Nebulization TID Camilla Berry PA-C      ipratropium-albuterol  3 mL Nebulization Q6H PRN Camilla Berry PA-C      isosorbide mononitrate  60 mg Oral QAM Camilla Berry PA-C      levalbuterol  1.25 mg Nebulization TID Camilla Berry PA-C      losartan  100 mg Oral QAM Camilla Berry PA-C      multivitamin stress formula  1 tablet Oral QAM Camilla Berry PA-C      nystatin   Topical BID Camilla Berry PA-C       pantoprazole  40 mg Oral INGRIS Berry PA-C      [START ON 4/11/2025] predniSONE  40 mg Oral Daily CONCEPCION Haas      torsemide  20 mg Oral INGRIS Berry PA-C          Today, Patient Was Seen By: Lit Gottlieb MD    ** Please Note: Dictation voice to text software may have been used in the creation of this document. **

## 2025-04-10 NOTE — ASSESSMENT & PLAN NOTE
Presented from facility due to shortness of breath, congestion and hypoxia  85% on room air.  Placed on 4 L nasal cannula in ER  Home regimen:  Fluticasone-umeclidinium-vilanterol daily   Albuterol prn   Duoneb prn   Plan:  IV Solu-Medrol 40 mg every 8 hours on admission  Xopenex/Atrovent 3 times daily  IV azithromycin   Respiratory protocol   Pulmonary consult appreciated  Currently on 5 L supplemental oxygen  Repeat chest x-ray was done this morning  Ordered Lasix 40 IV once  Covid/flu/rsv is negative  Patient is weaned to prednisone.

## 2025-04-10 NOTE — ASSESSMENT & PLAN NOTE
-4/8/2025  -Completed 3 days of azithromycin  -Given patient is still requiring 5 L we will update chest x-ray and BNP  -Will continue supportive time and will likely need some time    Addendum: Chest x-ray showing some vascular congestion we will give 1 dose IV Lasix          -Pulmonary will sign off  -Outpatient follow-up per discharge instructions

## 2025-04-10 NOTE — CASE MANAGEMENT
Case Management Discharge Planning Note    Patient name Kyler Hernandez  Location /-01 MRN [id number]  : 1935 Date 4/10/2025       Current Admission Date: 2025  Current Admission Diagnosis:COPD with acute exacerbation (HCC)   Patient Active Problem List    Diagnosis Date Noted Date Diagnosed    Acute respiratory failure with hypoxia (HCC) 2025     Type 2 diabetes mellitus with hyperglycemia, with long-term current use of insulin (HCC) 2025     Obesity 2025     Pulmonary hypertension (HCC) 2024     Coronary artery disease involving native coronary artery of native heart without angina pectoris 05/10/2024     Primary hypertension 05/10/2024     Benign prostatic hyperplasia with nocturia 05/10/2024     COPD with acute exacerbation (HCC) 05/10/2024     LINCOLN (obstructive sleep apnea) 05/10/2024     B12 deficiency 05/10/2024     Vitamin D deficiency 05/10/2024     Mixed hyperlipidemia 05/10/2024       LOS (days): 3  Geometric Mean LOS (GMLOS) (days): 3.4  Days to GMLOS:0.9     OBJECTIVE:  Risk of Unplanned Readmission Score: 16.28         Current admission status: Inpatient   Preferred Pharmacy:   [id number] - [place] [address]  Phone: [phone] Fax: [phone]    [facility], [place], [place] - [address] [place] [address]  Phone: [phone] Fax: [phone]    Primary Care Provider: Gordon Álvarez MD    [facility]  Secondary Insurance:     DISCHARGE DETAILS:        Requested Adult Residential Licensing-DME faxed to [facility] at [phone]. O2 concentrator to be delivered to [facility] today by [employer]. Time TBD.

## 2025-04-10 NOTE — PROGRESS NOTES
"Progress Note - Pulmonology   Name: Kyler Hernandez 89 y.o. male I MRN: [id number]  Unit/Bed#: -01 I Date of Admission: 4/7/2025   Date of Service: 4/10/2025 I Hospital Day: 3     Assessment & Plan  COPD with acute exacerbation (HCC)  -With unknown severity -   no formal PFTs  -Inpatient: Day # 1/3-prednisone 40 mg decrease by 10 mg every 3 days  -Home regimen: Trelegy 200/62.5/25 MCG 1 puff daily, Proventil 2 puffs every 6 as needed, albuterol nebulizer every 6 as needed  -Completed 3 days of    Acute respiratory failure with hypoxia (HCC)  -Currently on 5 L, patient reports home O2 requirements 2 to 4 L  -Continue sats greater than 88%  -Pulmonary toileting: Deep breathing cough out of bed as tolerated incentive spirometry  -Home O2 eval: 5 L at rest and upon ambulation    LINCOLN (obstructive sleep apnea)  -Patient reports 100% compliance with his CPAP  -Continue his home device nightly    Parainfluenza  -4/8/2025  -Completed 3 days of azithromycin  -Given patient is still requiring 5 L we will update chest x-ray and BNP  -Will continue supportive time and will likely need some time    Addendum: Chest x-ray showing some vascular congestion we will give 1 dose IV Lasix          -Pulmonary will sign off  -Outpatient follow-up per discharge instructions    24 Hour Events : na  Subjective : \"I feel ok\"    Objective :  Temp:  [96.4 °F (35.8 °C)-97.6 °F (36.4 °C)] 97.6 °F (36.4 °C)  HR:  [59-63] 63  BP: ()/(60-83) 164/83  Resp:  [20] 20  SpO2:  [91 %-93 %] 91 %  O2 Device: Nasal cannula  Nasal Cannula O2 Flow Rate (L/min):  [5 L/min] 5 L/min    Physical Exam  Constitutional:       General: He is not in acute distress.     Appearance: Normal appearance. He is normal weight.   HENT:      Head: Normocephalic and atraumatic.      Nose: Nose normal. No congestion or rhinorrhea.      Mouth/Throat:      Mouth: Mucous membranes are dry.      Pharynx: Oropharynx is clear. No oropharyngeal exudate or posterior " oropharyngeal erythema.   Cardiovascular:      Rate and Rhythm: Normal rate and regular rhythm.      Pulses: Normal pulses.      Heart sounds: Normal heart sounds. No murmur heard.     No friction rub. No gallop.   Pulmonary:      Effort: Pulmonary effort is normal. No respiratory distress.      Breath sounds: No stridor. No wheezing, rhonchi or rales.      Comments: Distant aeration  Chest:      Chest wall: No tenderness.   Abdominal:      General: Abdomen is flat. Bowel sounds are normal. There is no distension.      Palpations: Abdomen is soft. There is no mass.   Musculoskeletal:         General: No swelling or tenderness. Normal range of motion.      Cervical back: Normal range of motion. No rigidity or tenderness.   Skin:     General: Skin is warm and dry.      Coloration: Skin is not jaundiced or pale.   Neurological:      General: No focal deficit present.      Mental Status: He is alert and oriented to person, place, and time. Mental status is at baseline.   Psychiatric:         Mood and Affect: Mood normal.         Behavior: Behavior normal.           Lab Results: I have reviewed the following results:   .     04/10/25  0339   WBC 13.15*   HGB 14.2   HCT 44.4      SODIUM 138   K 3.7      CO2 28   BUN 45*   CREATININE 1.17   GLUC 187*     ABG: No new results in last 24 hours.    Imaging Results Review: I personally reviewed the following image studies/reports in PACS and discussed pertinent findings with Radiology: chest xray. My interpretation of the radiology images/reports is:  .  Other Study Results Review: EKG was reviewed.   PFT Results Reviewed: reviewed

## 2025-04-10 NOTE — ASSESSMENT & PLAN NOTE
Lab Results   Component Value Date    HGBA1C 8.9 (H) 04/08/2025       Recent Labs     04/09/25  1119 04/09/25  1600 04/09/25  2031 04/10/25  0731   POCGLU 243* 309* 319* 163*       Blood Sugar Average: Last 72 hrs:  (P) 237.2Home regimen:   Jardiance  Prandin  Lantus 35 units every morning  Plan:   Insulin Prandin  Continue Lantus  SSI     Patient in no acute distress, patient pending Keppra administration at this time- pending pharmacy pickup- pharmacy aware. Suction at bedside, ambu bag and emergency equipment at bedside, patient on cardiac monitor. Mom at bedside, aware of plan of care, verbalized understanding. Will continue to monitor.

## 2025-04-10 NOTE — ASSESSMENT & PLAN NOTE
-With unknown severity -   no formal PFTs  -Inpatient: Day # 1/3-prednisone 40 mg decrease by 10 mg every 3 days  -Home regimen: Trelegy 200/62.5/25 MCG 1 puff daily, Proventil 2 puffs every 6 as needed, albuterol nebulizer every 6 as needed  -Completed 3 days of

## 2025-04-10 NOTE — PLAN OF CARE
Problem: Potential for Falls  Goal: Patient will remain free of falls  Description: INTERVENTIONS:- Educate patient/family on patient safety including physical limitations- Instruct patient to call for assistance with activity - Consult OT/PT to assist with strengthening/mobility - Keep Call bell within reach- Keep bed low and locked with side rails adjusted as appropriate- Keep care items and personal belongings within reach- Initiate and maintain comfort rounds- Make Fall Risk Sign visible to staff- Offer Toileting every 2 Hours, in advance of need- Initiate/Maintain bed/ chair alarm- Obtain necessary fall risk management equipment: - Apply yellow socks and bracelet for high fall risk patients- Consider moving patient to room near nurses station  Outcome: Progressing     Problem: RESPIRATORY - ADULT  Goal: Achieves optimal ventilation and oxygenation  Description: INTERVENTIONS:- Assess for changes in respiratory status- Assess for changes in mentation and behavior- Position to facilitate oxygenation and minimize respiratory effort- Oxygen administered by appropriate delivery if ordered- Initiate smoking cessation education as indicated- Encourage broncho-pulmonary hygiene including cough, deep breathe, Incentive Spirometry- Assess the need for suctioning and aspirate as needed- Assess and instruct to report SOB or any respiratory difficulty- Respiratory Therapy support as indicated  Outcome: Progressing     Problem: RESPIRATORY - ADULT  Goal: Achieves optimal ventilation and oxygenation  Description: INTERVENTIONS:- Assess for changes in respiratory status- Assess for changes in mentation and behavior- Position to facilitate oxygenation and minimize respiratory effort- Oxygen administered by appropriate delivery if ordered- Initiate smoking cessation education as indicated- Encourage broncho-pulmonary hygiene including cough, deep breathe, Incentive Spirometry- Assess the need for suctioning and aspirate as  needed- Assess and instruct to report SOB or any respiratory difficulty- Respiratory Therapy support as indicated  Outcome: Progressing

## 2025-04-10 NOTE — CASE MANAGEMENT
Case Management Discharge Planning Note    Patient name Kyler Hernandez  Location /-01 MRN [id number]  : 1935 Date 4/10/2025       Current Admission Date: 2025  Current Admission Diagnosis:COPD with acute exacerbation (HCC)   Patient Active Problem List    Diagnosis Date Noted Date Diagnosed    Parainfluenza 04/10/2025     Acute respiratory failure with hypoxia (HCC) 2025     Type 2 diabetes mellitus with hyperglycemia, with long-term current use of insulin (HCC) 2025     Obesity 2025     Pulmonary hypertension ([facility]) 2024     Coronary artery disease involving native coronary artery of native heart without angina pectoris 05/10/2024     Primary hypertension 05/10/2024     Benign prostatic hyperplasia with nocturia 05/10/2024     COPD with acute exacerbation (HCC) 05/10/2024     LINCOLN (obstructive sleep apnea) 05/10/2024     B12 deficiency 05/10/2024     Vitamin D deficiency 05/10/2024     Mixed hyperlipidemia 05/10/2024       LOS (days): 3  Geometric Mean LOS (GMLOS) (days): 3.4  Days to GMLOS:0.6     OBJECTIVE:  Risk of Unplanned Readmission Score: 16.49         Current admission status: Inpatient   Preferred Pharmacy:   [facility] [id number] - [place] [address]  Phone: [phone] Fax: [phone]    [facility], [facility]. - [place] [address]  Phone: [phone] Fax: [phone]    Primary Care Provider: Gordon Álvarez MD    Primary Insurance: [employer] REP  Secondary Insurance:     DISCHARGE DETAILS:     As per patient care rounds, patient not stable for discharge, He is currently requiring 5 liters O2. Spoke with Christy at [phone] and he confirmed a concentrator and portable O2 tanks were delivered to [facility] this AM. Was informed igfpatient is discharged on higher O2 levels, a new order will need to be submitted. CM to follow.                                                                                    IMM Given (Date):: 04/10/25  IMM Given to:: Patient

## 2025-04-10 NOTE — PLAN OF CARE
Problem: Potential for Falls  Goal: Patient will remain free of falls  Description: INTERVENTIONS:- Educate patient/family on patient safety including physical limitations- Instruct patient to call for assistance with activity - Consult OT/PT to assist with strengthening/mobility - Keep Call bell within reach- Keep bed low and locked with side rails adjusted as appropriate- Keep care items and personal belongings within reach- Initiate and maintain comfort rounds- Make Fall Risk Sign visible to staff- Offer Toileting every 2 Hours, in advance of need- Initiate/Maintain alarm- Obtain necessary fall risk management equipment- Apply yellow socks and bracelet for high fall risk patients- Consider moving patient to room near nurses station  Outcome: Progressing     Problem: RESPIRATORY - ADULT  Goal: Achieves optimal ventilation and oxygenation  Description: INTERVENTIONS:- Assess for changes in respiratory status- Assess for changes in mentation and behavior- Position to facilitate oxygenation and minimize respiratory effort- Oxygen administered by appropriate delivery if ordered- Initiate smoking cessation education as indicated- Encourage broncho-pulmonary hygiene including cough, deep breathe, Incentive Spirometry- Assess the need for suctioning and aspirate as needed- Assess and instruct to report SOB or any respiratory difficulty- Respiratory Therapy support as indicated  Outcome: Progressing

## 2025-04-11 LAB
ANION GAP SERPL CALCULATED.3IONS-SCNC: 8 MMOL/L (ref 4–13)
BASOPHILS # BLD AUTO: 0.02 THOUSANDS/ÂΜL (ref 0–0.1)
BASOPHILS NFR BLD AUTO: 0 % (ref 0–1)
BUN SERPL-MCNC: 38 MG/DL (ref 5–25)
CALCIUM SERPL-MCNC: 8.8 MG/DL (ref 8.4–10.2)
CHLORIDE SERPL-SCNC: 103 MMOL/L (ref 96–108)
CO2 SERPL-SCNC: 28 MMOL/L (ref 21–32)
CREAT SERPL-MCNC: 1.12 MG/DL (ref 0.6–1.3)
EOSINOPHIL # BLD AUTO: 0 THOUSAND/ÂΜL (ref 0–0.61)
EOSINOPHIL NFR BLD AUTO: 0 % (ref 0–6)
ERYTHROCYTE [DISTWIDTH] IN BLOOD BY AUTOMATED COUNT: 15.5 % (ref 11.6–15.1)
GFR SERPL CREATININE-BSD FRML MDRD: 57 ML/MIN/1.73SQ M
GLUCOSE SERPL-MCNC: 103 MG/DL (ref 65–140)
GLUCOSE SERPL-MCNC: 132 MG/DL (ref 65–140)
GLUCOSE SERPL-MCNC: 251 MG/DL (ref 65–140)
GLUCOSE SERPL-MCNC: 254 MG/DL (ref 65–140)
GLUCOSE SERPL-MCNC: 304 MG/DL (ref 65–140)
HCT VFR BLD AUTO: 45.2 % (ref 36.5–49.3)
HGB BLD-MCNC: 15 G/DL (ref 12–17)
IMM GRANULOCYTES # BLD AUTO: 0.11 THOUSAND/UL (ref 0–0.2)
IMM GRANULOCYTES NFR BLD AUTO: 1 % (ref 0–2)
LYMPHOCYTES # BLD AUTO: 1.03 THOUSANDS/ÂΜL (ref 0.6–4.47)
LYMPHOCYTES NFR BLD AUTO: 9 % (ref 14–44)
MAGNESIUM SERPL-MCNC: 2.6 MG/DL (ref 1.9–2.7)
MCH RBC QN AUTO: 30.5 PG (ref 26.8–34.3)
MCHC RBC AUTO-ENTMCNC: 33.2 G/DL (ref 31.4–37.4)
MCV RBC AUTO: 92 FL (ref 82–98)
MONOCYTES # BLD AUTO: 1.44 THOUSAND/ÂΜL (ref 0.17–1.22)
MONOCYTES NFR BLD AUTO: 12 % (ref 4–12)
NEUTROPHILS # BLD AUTO: 9.31 THOUSANDS/ÂΜL (ref 1.85–7.62)
NEUTS SEG NFR BLD AUTO: 78 % (ref 43–75)
NRBC BLD AUTO-RTO: 0 /100 WBCS
PLATELET # BLD AUTO: 204 THOUSANDS/UL (ref 149–390)
PMV BLD AUTO: 10.1 FL (ref 8.9–12.7)
POTASSIUM SERPL-SCNC: 3.4 MMOL/L (ref 3.5–5.3)
QRS AXIS: 40 DEGREES
QRSD INTERVAL: 106 MS
QT INTERVAL: 430 MS
QTC INTERVAL: 443 MS
RBC # BLD AUTO: 4.92 MILLION/UL (ref 3.88–5.62)
SODIUM SERPL-SCNC: 139 MMOL/L (ref 135–147)
T WAVE AXIS: 62 DEGREES
VENTRICULAR RATE: 64 BPM
WBC # BLD AUTO: 11.91 THOUSAND/UL (ref 4.31–10.16)

## 2025-04-11 PROCEDURE — 80048 BASIC METABOLIC PNL TOTAL CA: CPT | Performed by: INTERNAL MEDICINE

## 2025-04-11 PROCEDURE — 93010 ELECTROCARDIOGRAM REPORT: CPT | Performed by: INTERNAL MEDICINE

## 2025-04-11 PROCEDURE — 85025 COMPLETE CBC W/AUTO DIFF WBC: CPT | Performed by: INTERNAL MEDICINE

## 2025-04-11 PROCEDURE — 82948 REAGENT STRIP/BLOOD GLUCOSE: CPT

## 2025-04-11 PROCEDURE — 94760 N-INVAS EAR/PLS OXIMETRY 1: CPT

## 2025-04-11 PROCEDURE — 83735 ASSAY OF MAGNESIUM: CPT | Performed by: INTERNAL MEDICINE

## 2025-04-11 PROCEDURE — 94640 AIRWAY INHALATION TREATMENT: CPT

## 2025-04-11 PROCEDURE — 99232 SBSQ HOSP IP/OBS MODERATE 35: CPT | Performed by: INTERNAL MEDICINE

## 2025-04-11 RX ORDER — ECHINACEA PURPUREA EXTRACT 125 MG
1 TABLET ORAL
Status: DISCONTINUED | OUTPATIENT
Start: 2025-04-11 | End: 2025-04-14 | Stop reason: HOSPADM

## 2025-04-11 RX ORDER — FUROSEMIDE 10 MG/ML
40 INJECTION INTRAMUSCULAR; INTRAVENOUS ONCE
Status: COMPLETED | OUTPATIENT
Start: 2025-04-11 | End: 2025-04-11

## 2025-04-11 RX ORDER — POTASSIUM CHLORIDE 1500 MG/1
40 TABLET, EXTENDED RELEASE ORAL ONCE
Status: COMPLETED | OUTPATIENT
Start: 2025-04-11 | End: 2025-04-11

## 2025-04-11 RX ADMIN — FLUTICASONE PROPIONATE 1 SPRAY: 50 SPRAY, METERED NASAL at 08:40

## 2025-04-11 RX ADMIN — ISOSORBIDE MONONITRATE 60 MG: 60 TABLET, EXTENDED RELEASE ORAL at 08:41

## 2025-04-11 RX ADMIN — HEPARIN SODIUM 5000 UNITS: 5000 INJECTION INTRAVENOUS; SUBCUTANEOUS at 14:43

## 2025-04-11 RX ADMIN — NYSTATIN: 100000 POWDER TOPICAL at 08:40

## 2025-04-11 RX ADMIN — ASPIRIN 81 MG: 81 TABLET, COATED ORAL at 08:41

## 2025-04-11 RX ADMIN — HEPARIN SODIUM 5000 UNITS: 5000 INJECTION INTRAVENOUS; SUBCUTANEOUS at 21:47

## 2025-04-11 RX ADMIN — AMLODIPINE BESYLATE 10 MG: 5 TABLET ORAL at 08:41

## 2025-04-11 RX ADMIN — CYANOCOBALAMIN TAB 500 MCG 1000 MCG: 500 TAB at 08:41

## 2025-04-11 RX ADMIN — INSULIN LISPRO 4 UNITS: 100 INJECTION, SOLUTION INTRAVENOUS; SUBCUTANEOUS at 21:49

## 2025-04-11 RX ADMIN — LEVALBUTEROL HYDROCHLORIDE 1.25 MG: 1.25 SOLUTION RESPIRATORY (INHALATION) at 13:51

## 2025-04-11 RX ADMIN — B-COMPLEX W/ C & FOLIC ACID TAB 1 TABLET: TAB at 08:41

## 2025-04-11 RX ADMIN — LEVALBUTEROL HYDROCHLORIDE 1.25 MG: 1.25 SOLUTION RESPIRATORY (INHALATION) at 07:48

## 2025-04-11 RX ADMIN — FUROSEMIDE 40 MG: 10 INJECTION, SOLUTION INTRAVENOUS at 09:27

## 2025-04-11 RX ADMIN — IPRATROPIUM BROMIDE 0.5 MG: 0.5 SOLUTION RESPIRATORY (INHALATION) at 13:51

## 2025-04-11 RX ADMIN — GUAIFENESIN 600 MG: 600 TABLET ORAL at 08:41

## 2025-04-11 RX ADMIN — PREDNISONE 40 MG: 20 TABLET ORAL at 08:41

## 2025-04-11 RX ADMIN — FLUTICASONE FUROATE AND VILANTEROL TRIFENATATE 1 PUFF: 200; 25 POWDER RESPIRATORY (INHALATION) at 08:40

## 2025-04-11 RX ADMIN — ATORVASTATIN CALCIUM 80 MG: 40 TABLET, FILM COATED ORAL at 21:47

## 2025-04-11 RX ADMIN — IPRATROPIUM BROMIDE 0.5 MG: 0.5 SOLUTION RESPIRATORY (INHALATION) at 19:04

## 2025-04-11 RX ADMIN — INSULIN LISPRO 3 UNITS: 100 INJECTION, SOLUTION INTRAVENOUS; SUBCUTANEOUS at 17:12

## 2025-04-11 RX ADMIN — INSULIN GLARGINE 35 UNITS: 100 INJECTION, SOLUTION SUBCUTANEOUS at 08:40

## 2025-04-11 RX ADMIN — Medication 1000 UNITS: at 08:41

## 2025-04-11 RX ADMIN — DEXTRAN 70, GLYCERIN, HYPROMELLOSE 1 DROP: 1; 2; 3 SOLUTION/ DROPS OPHTHALMIC at 08:40

## 2025-04-11 RX ADMIN — HEPARIN SODIUM 5000 UNITS: 5000 INJECTION INTRAVENOUS; SUBCUTANEOUS at 04:57

## 2025-04-11 RX ADMIN — NYSTATIN: 100000 POWDER TOPICAL at 17:16

## 2025-04-11 RX ADMIN — IPRATROPIUM BROMIDE 0.5 MG: 0.5 SOLUTION RESPIRATORY (INHALATION) at 07:49

## 2025-04-11 RX ADMIN — INSULIN GLARGINE 40 UNITS: 100 INJECTION, SOLUTION SUBCUTANEOUS at 21:46

## 2025-04-11 RX ADMIN — FINASTERIDE 5 MG: 5 TABLET, FILM COATED ORAL at 08:41

## 2025-04-11 RX ADMIN — DEXTRAN 70, GLYCERIN, HYPROMELLOSE 1 DROP: 1; 2; 3 SOLUTION/ DROPS OPHTHALMIC at 21:47

## 2025-04-11 RX ADMIN — POTASSIUM CHLORIDE 40 MEQ: 1500 TABLET, EXTENDED RELEASE ORAL at 12:31

## 2025-04-11 RX ADMIN — GUAIFENESIN 600 MG: 600 TABLET ORAL at 21:47

## 2025-04-11 RX ADMIN — ATENOLOL 50 MG: 50 TABLET ORAL at 21:47

## 2025-04-11 RX ADMIN — ATENOLOL 50 MG: 50 TABLET ORAL at 08:41

## 2025-04-11 RX ADMIN — PANTOPRAZOLE SODIUM 40 MG: 40 TABLET, DELAYED RELEASE ORAL at 08:41

## 2025-04-11 RX ADMIN — LOSARTAN POTASSIUM 100 MG: 50 TABLET, FILM COATED ORAL at 08:41

## 2025-04-11 RX ADMIN — LEVALBUTEROL HYDROCHLORIDE 1.25 MG: 1.25 SOLUTION RESPIRATORY (INHALATION) at 19:05

## 2025-04-11 RX ADMIN — TORSEMIDE 20 MG: 20 TABLET ORAL at 08:41

## 2025-04-11 RX ADMIN — UMECLIDINIUM 1 PUFF: 62.5 AEROSOL, POWDER ORAL at 08:40

## 2025-04-11 NOTE — ASSESSMENT & PLAN NOTE
Presented from facility due to shortness of breath, congestion and hypoxia  85% on room air.  Placed on 4 L nasal cannula in ER  Home regimen:  Fluticasone-umeclidinium-vilanterol daily   Albuterol prn   Duoneb prn   Plan:  IV Solu-Medrol 40 mg every 8 hours on admission  Xopenex/Atrovent 3 times daily  IV azithromycin   Respiratory protocol   Pulmonary consult appreciated  Currently on 5 L supplemental oxygen  Wean oxygen as tolerated  Repeat chest x-ray showed no acute cardiopulmonary disease  Received Lasix 40 mg IV x 1-will repeated again today  Covid/flu/rsv is negative  Patient is weaned to prednisone.

## 2025-04-11 NOTE — CASE MANAGEMENT
Case Management Discharge Planning Note    Patient name Kyler Hernandez  Location /-01 MRN [id number]  : 1935 Date 2025       Current Admission Date: 2025  Current Admission Diagnosis:COPD with acute exacerbation (HCC)   Patient Active Problem List    Diagnosis Date Noted Date Diagnosed    Parainfluenza 04/10/2025     Acute respiratory failure with hypoxia (HCC) 2025     Type 2 diabetes mellitus with hyperglycemia, with long-term current use of insulin (HCC) 2025     Obesity 2025     Pulmonary hypertension ([facility]) 2024     Coronary artery disease involving native coronary artery of native heart without angina pectoris 05/10/2024     Primary hypertension 05/10/2024     Benign prostatic hyperplasia with nocturia 05/10/2024     COPD with acute exacerbation (HCC) 05/10/2024     LINCOLN (obstructive sleep apnea) 05/10/2024     B12 deficiency 05/10/2024     Vitamin D deficiency 05/10/2024     Mixed hyperlipidemia 05/10/2024       LOS (days): 4  Geometric Mean LOS (GMLOS) (days): 3.4  Days to GMLOS:-0.4     OBJECTIVE:  Risk of Unplanned Readmission Score: 16.39         Current admission status: Inpatient   Preferred Pharmacy:   [facility] [id number] - [place], [place] - [address] [place] [address]  Phone: [phone] Fax: [phone]    [facility], [facility]. - [place], [place] - [address] [place] [address]  Phone: [phone] Fax: [phone]    Primary Care Provider: Gordon Álvarez MD    Primary Insurance: [employer] REP  Secondary Insurance:     DISCHARGE DETAILS:     CM spoke with ETHEL España, at [facility] and they cannot accept pt back tomorrow as they do not do readmissions on weekends since there is no nurse on staff over the weekend. CM updated MD via secure chat.

## 2025-04-11 NOTE — ASSESSMENT & PLAN NOTE
Lab Results   Component Value Date    HGBA1C 8.9 (H) 04/08/2025       Recent Labs     04/10/25  1047 04/10/25  1556 04/10/25  2041 04/11/25  0736   POCGLU 279* 237* 209* 103       Blood Sugar Average: Last 72 hrs:  (P) [id number] regimen:   Jardiance  Prandin  Lantus 35 units every morning  Plan:   Insulin Prandin  Continue Lantus  SSI

## 2025-04-11 NOTE — PLAN OF CARE
Problem: Potential for Falls  Goal: Patient will remain free of falls  Description: INTERVENTIONS:- Educate patient/family on patient safety including physical limitations- Instruct patient to call for assistance with activity - Consult OT/PT to assist with strengthening/mobility - Keep Call bell within reach- Keep bed low and locked with side rails adjusted as appropriate- Keep care items and personal belongings within reach- Initiate and maintain comfort rounds- Make Fall Risk Sign visible to staff- Offer Toileting every 2 Hours, in advance of need- Initiate/Maintain alarm- Obtain necessary fall risk management equipment: - Apply yellow socks and bracelet for high fall risk patients- Consider moving patient to room near nurses station  Outcome: Progressing     Problem: RESPIRATORY - ADULT  Goal: Achieves optimal ventilation and oxygenation  Description: INTERVENTIONS:- Assess for changes in respiratory status- Assess for changes in mentation and behavior- Position to facilitate oxygenation and minimize respiratory effort- Oxygen administered by appropriate delivery if ordered- Initiate smoking cessation education as indicated- Encourage broncho-pulmonary hygiene including cough, deep breathe, Incentive Spirometry- Assess the need for suctioning and aspirate as needed- Assess and instruct to report SOB or any respiratory difficulty- Respiratory Therapy support as indicated  Outcome: Progressing

## 2025-04-11 NOTE — CASE MANAGEMENT
Case Management Discharge Planning Note    Patient name Kyler Hernandez  Location /-01 MRN [id number]  : 1935 Date 2025       Current Admission Date: 2025  Current Admission Diagnosis:COPD with acute exacerbation (HCC)   Patient Active Problem List    Diagnosis Date Noted Date Diagnosed    Parainfluenza 04/10/2025     Acute respiratory failure with hypoxia (HCC) 2025     Type 2 diabetes mellitus with hyperglycemia, with long-term current use of insulin (HCC) 2025     Obesity 2025     Pulmonary hypertension ([facility]) 2024     Coronary artery disease involving native coronary artery of native heart without angina pectoris 05/10/2024     Primary hypertension 05/10/2024     Benign prostatic hyperplasia with nocturia 05/10/2024     COPD with acute exacerbation (HCC) 05/10/2024     LINCOLN (obstructive sleep apnea) 05/10/2024     B12 deficiency 05/10/2024     Vitamin D deficiency 05/10/2024     Mixed hyperlipidemia 05/10/2024       LOS (days): 4  Geometric Mean LOS (GMLOS) (days): 3.4  Days to GMLOS:-0.1     OBJECTIVE:  Risk of Unplanned Readmission Score: 16.31         Current admission status: Inpatient   Preferred Pharmacy:   [facility] [id number] - [place], [place] - [address] [place], [place] [id number]  Phone: [phone] Fax: [phone]    [facility], [facility]. - [place], [place] - [address] [place], [place] [address]  Phone: [phone] Fax: [phone]    Primary Care Provider: Gordon Álvarez MD    Primary Insurance: [employer] REP  Secondary Insurance:     DISCHARGE DETAILS:     Script for O2 is 5L at rest and with exertion from . If it is over 5L will need new script.

## 2025-04-11 NOTE — PLAN OF CARE
Problem: RESPIRATORY - ADULT  Goal: Achieves optimal ventilation and oxygenation  Description: INTERVENTIONS:- Assess for changes in respiratory status- Assess for changes in mentation and behavior- Position to facilitate oxygenation and minimize respiratory effort- Oxygen administered by appropriate delivery if ordered- Initiate smoking cessation education as indicated- Encourage broncho-pulmonary hygiene including cough, deep breathe, Incentive Spirometry- Assess the need for suctioning and aspirate as needed- Assess and instruct to report SOB or any respiratory difficulty- Respiratory Therapy support as indicated  Outcome: Progressing

## 2025-04-11 NOTE — ASSESSMENT & PLAN NOTE
85% on room air  Placed on 4 L nasal cannula. Weaned to 2 L at time on admission   Patient reports he has concentrator available to him at facility but he seldomly uses.   Diffuse wheezing on exam.  Suspected in the setting of COPD exacerbation  IV steroids/nebulizer treatments  Respiratory protocol  Currently on 5 L of supplemental oxygen.  Wean oxygen as able  Echo showed ejection of 65% with normal diastolic function  Home O2 evaluation

## 2025-04-11 NOTE — CASE MANAGEMENT
Case Management Discharge Planning Note    Patient name Kyler Hernandez  Location /-01 MRN [id number]  : 1935 Date 2025       Current Admission Date: 2025  Current Admission Diagnosis:COPD with acute exacerbation (HCC)   Patient Active Problem List    Diagnosis Date Noted Date Diagnosed    Parainfluenza 04/10/2025     Acute respiratory failure with hypoxia (HCC) 2025     Type 2 diabetes mellitus with hyperglycemia, with long-term current use of insulin (HCC) 2025     Obesity 2025     Pulmonary hypertension (HCC) 2024     Coronary artery disease involving native coronary artery of native heart without angina pectoris 05/10/2024     Primary hypertension 05/10/2024     Benign prostatic hyperplasia with nocturia 05/10/2024     COPD with acute exacerbation (HCC) 05/10/2024     LINCOLN (obstructive sleep apnea) 05/10/2024     B12 deficiency 05/10/2024     Vitamin D deficiency 05/10/2024     Mixed hyperlipidemia 05/10/2024       LOS (days): 4  Geometric Mean LOS (GMLOS) (days): 3.4  Days to GMLOS:-0.3     OBJECTIVE:  Risk of Unplanned Readmission Score: 16.39         Current admission status: Inpatient   Preferred Pharmacy:   [facility] [id number] - [place], [place] - [address] [place] [address]  Phone: [phone] Fax: [phone]    [facility], [facility]. - [place], [place] - [address] [place] [address]  Phone: [phone] Fax: [phone]    Primary Care Provider: Gordon Álvarez MD    Primary Insurance: [facility] REP  Secondary Insurance:     DISCHARGE DETAILS:     CM called [facility] and left message for ETHEL España, to determine if pt could return over the weekend. Awaiting call back.

## 2025-04-11 NOTE — PROGRESS NOTES
Progress Note - Hospitalist   Name: Kyler Hernandez 89 y.o. male I MRN: [id number]  Unit/Bed#: -01 I Date of Admission: 4/7/2025   Date of Service: 4/11/2025 I Hospital Day: 4    Assessment & Plan  COPD with acute exacerbation (HCC)  Presented from facility due to shortness of breath, congestion and hypoxia  85% on room air.  Placed on 4 L nasal cannula in ER  Home regimen:  Fluticasone-umeclidinium-vilanterol daily   Albuterol prn   Duoneb prn   Plan:  IV Solu-Medrol 40 mg every 8 hours on admission  Xopenex/Atrovent 3 times daily  IV azithromycin   Respiratory protocol   Pulmonary consult appreciated  Currently on 5 L supplemental oxygen  Wean oxygen as tolerated  Repeat chest x-ray showed no acute cardiopulmonary disease  Received Lasix 40 mg IV x 1-will repeated again today  Covid/flu/rsv is negative  Patient is weaned to prednisone.  Acute respiratory failure with hypoxia (HCC)  85% on room air  Placed on 4 L nasal cannula. Weaned to 2 L at time on admission   Patient reports he has concentrator available to him at facility but he seldomly uses.   Diffuse wheezing on exam.  Suspected in the setting of COPD exacerbation  IV steroids/nebulizer treatments  Respiratory protocol  Currently on 5 L of supplemental oxygen.  Wean oxygen as able  Echo showed ejection of 65% with normal diastolic function  Home O2 evaluation  Coronary artery disease involving native coronary artery of native heart without angina pectoris  S/p 3 stents   Aspirin, statin, atenolol, isosorbide and torsemide  Primary hypertension  Home regimen:   Losartan   Amlodipine   Atenolol   Imdur   Torsemide    Benign prostatic hyperplasia with nocturia  Continue home finasteride  LINCOLN (obstructive sleep apnea)  CPAP HS   Type 2 diabetes mellitus with hyperglycemia, with long-term current use of insulin (HCC)  Lab Results   Component Value Date    HGBA1C 8.9 (H) 04/08/2025       Recent Labs     04/10/25  1047 04/10/25  1556 04/10/25  2041  04/11/25  0736   POCGLU 279* 237* 209* 103       Blood Sugar Average: Last 72 hrs:  (P) [id number] regimen:   Jardiance  Prandin  Lantus 35 units every morning  Plan:   Insulin Prandin  Continue Lantus  SSI    Obesity  Obesity, class 1, due to excess calories, evidenced by BMI of 33.72 kg/mA², treated with CHO controlled diet and education on nutrition and lifestyle modifications.   Parainfluenza  Patient tested positive for parainfluenza 3  Continue symptomatic management    Labs & Imaging: Results Review Statement: No pertinent imaging studies reviewed.    VTE Prophylaxis: in place.    Code Status:   Level 1 - Full Code    Patient Centered Rounds: I have performed bedside rounds with nursing staff today.    Mobility:   Basic Mobility Inpatient Raw Score: 22  JH-HLM Goal: 7: Walk 25 feet or more  JH-HLM Achieved: 7: Walk 25 feet or more  JH-HLM Goal achieved. Continue to encourage appropriate mobility.    Discussions with Specialists or Other Care Team Provider: Pulmonary    Education and Discussions with Family / Patient: Declined    Total Time Spent on Date of Encounter in care of patient: 35 mins. This time was spent on one or more of the following: performing physical exam; counseling and coordination of care; obtaining or reviewing history; documenting in the medical record; reviewing/ordering tests, medications or procedures; communicating with other healthcare professionals and discussing with patient's family/caregivers.    Current Length of Stay: 4 day(s)    Current Patient Status: Inpatient   Certification Statement: The patient will continue to require additional inpatient hospital stay due to see my assessment and plan.     Subjective:   Patient is seen and examined at bedside.  Still with shortness of breath.  No other complaints.  Afebrile  All other ROS are negative.    Objective:    Vitals: Blood pressure (!) 173/99, pulse 65, temperature (!) 97.1 °F (36.2 °C), temperature source  "Axillary, resp. rate 20, height 5' 8\" (1.727 m), weight 100 kg (221 lb), SpO2 92%.,Body mass index is 33.6 kg/m².  SPO2 RA Rest      Flowsheet Row ED to Hosp-Admission (Current) from 4/7/2025 in [facility]   SpO2 92 %   SpO2 Activity At Rest   O2 Device Nasal cannula   O2 Flow Rate --          I&O:   Intake/Output Summary (Last 24 hours) at 4/11/2025 0907  Last data filed at 4/11/2025 0812  Gross per 24 hour   Intake 1160 ml   Output 3100 ml   Net -1940 ml       Physical Exam:    General- Alert, sitting in chair. Not in any acute distress.  Neck- Supple, No JVD  CVS- regular, S1 and S2 normal  Chest- Bilateral Air entry, decreased at bases with some Rales  Abdomen- soft, nontender, not distended, no guarding or rigidity, BS+  Extremities-  No pedal edema, No calf tenderness                         Normal ROM in all extremities.  CNS-   Alert, awake and orientedx3. No focal deficits present.    Invasive Devices       Peripheral Intravenous Line  Duration             Peripheral IV 04/10/25 Dorsal (posterior);Right Forearm <1 day                          Social History  reviewed  Family History   Problem Relation Age of Onset    Dementia Mother     Diabetes Mother     Peripheral vascular disease Father     Mental illness Neg Hx     Substance Abuse Neg Hx     reviewed    Meds:  Current Facility-Administered Medications   Medication Dose Route Frequency Provider Last Rate Last Admin    acetaminophen (TYLENOL) tablet 650 mg  650 mg Oral Q6H PRN Camilla Berry PA-C        amLODIPine (NORVASC) tablet 10 mg  10 mg Oral INGRIS Beryr PA-C   10 mg at 04/11/25 0841    Artificial Tears Op Soln 1 drop  1 drop Both Eyes Q12H MOLINA Camilla Berry PA-C   1 drop at 04/11/25 0840    aspirin (ECOTRIN LOW STRENGTH) EC tablet 81 mg  81 mg Oral INGRIS Berry PA-C   81 mg at 04/11/25 0841    atenolol (TENORMIN) tablet 50 mg  50 mg Oral BID Camilla Berry PA-C   50 mg at 04/11/25 0841    atorvastatin (LIPITOR) " tablet 80 mg  80 mg Oral HS Camilla Berry PA-C   80 mg at 04/10/25 2052    benzonatate (TESSALON PERLES) capsule 100 mg  100 mg Oral TID PRN Camilla Berry PA-C        Cholecalciferol (VITAMIN D3) tablet 1,000 Units  1,000 Units Oral Daily Camilla Berry PA-C   1,000 Units at 04/11/25 0841    cyanocobalamin (VITAMIN B-12) tablet 1,000 mcg  1,000 mcg Oral Daily Camilla Berry PA-C   1,000 mcg at 04/11/25 0841    finasteride (PROSCAR) tablet 5 mg  5 mg Oral Daily Camilla Berry PA-C   5 mg at 04/11/25 0841    fluticasone (FLONASE) 50 mcg/act nasal spray 1 spray  1 spray Each Nare Daily Camilla Berry PA-C   1 spray at 04/11/25 0840    fluticasone-vilanterol 200-25 mcg/actuation 1 puff  1 puff Inhalation Daily Camilla Berry PA-C   1 puff at 04/11/25 0840    And    umeclidinium 62.5 mcg/actuation inhaler AEPB 1 puff  1 puff Inhalation Daily Camilla Berry PA-C   1 puff at 04/11/25 0840    furosemide (LASIX) injection 40 mg  40 mg Intravenous Once Lit MARILUZ Gottlieb MD        guaiFENesin (MUCINEX) 12 hr tablet 600 mg  600 mg Oral Q12H [facility] Camilla Berry PA-C   600 mg at 04/11/25 0841    heparin (porcine) subcutaneous injection 5,000 Units  5,000 Units Subcutaneous Q8H [facility] Camilla Berry PA-C   5,000 Units at 04/11/25 0457    insulin glargine (LANTUS) subcutaneous injection 35 Units 0.35 mL  35 Units Subcutaneous QAM Camilla Berry PA-C   35 Units at 04/11/25 0840    insulin glargine (LANTUS) subcutaneous injection 40 Units 0.4 mL  40 Units Subcutaneous HS Camilla Berry PA-C   40 Units at 04/10/25 2054    insulin lispro (HumALOG/ADMELOG) 100 units/mL subcutaneous injection 1-6 Units  1-6 Units Subcutaneous TID AC Camilla Berry PA-C   3 Units at 04/10/25 1610    insulin lispro (HumALOG/ADMELOG) 100 units/mL subcutaneous injection 1-6 Units  1-6 Units Subcutaneous HS Camilla Berry PA-C   2 Units at 04/10/25 2054    ipratropium (ATROVENT) 0.02 % inhalation solution 0.5 mg  0.5 mg Nebulization TID Camilla Berry PA-C   0.5 mg at 04/11/25 1983     ipratropium-albuterol (DUO-NEB) 0.5-2.5 mg/3 mL inhalation solution 3 mL  3 mL Nebulization Q6H PRN Camilla Berry PA-C        isosorbide mononitrate (IMDUR) 24 hr tablet 60 mg  60 mg Oral QAMURTAZA Berry PA-C   60 mg at 04/11/25 0841    levalbuterol (XOPENEX) inhalation solution 1.25 mg  1.25 mg Nebulization TID Camilla Berry PA-C   1.25 mg at 04/11/25 0748    losartan (COZAAR) tablet 100 mg  100 mg Oral INGRIS Berry PA-C   100 mg at 04/11/25 0841    multivitamin stress formula tablet 1 tablet  1 tablet Oral INGRIS Berry PA-C   1 tablet at 04/11/25 0841    nystatin (MYCOSTATIN) powder   Topical BID Camilla Berry PA-C   Given at 04/11/25 0840    pantoprazole (PROTONIX) EC tablet 40 mg  40 mg Oral INGRIS Berry PA-C   40 mg at 04/11/25 0841    predniSONE tablet 40 mg  40 mg Oral Daily CONCEPCION Haas   40 mg at 04/11/25 0841    torsemide (DEMADEX) tablet 20 mg  20 mg Oral INGRIS Berry PA-C   20 mg at 04/11/25 0841        Medications Prior to Admission:     acetaminophen (TYLENOL) 325 mg tablet    albuterol (2.5 mg/3 mL) 0.083 % nebulizer solution    alfuzosin (UROXATRAL) 10 mg 24 hr tablet    amLODIPine (NORVASC) 10 mg tablet    aspirin (Aspirin Low Dose) 81 mg EC tablet    atorvastatin (LIPITOR) 80 mg tablet    Continuous Glucose  (Dexcom G7 ) ZAYNAB    Continuous Glucose Sensor (Dexcom G7 Sensor)    Empagliflozin (Jardiance) 10 MG TABS tablet    finasteride (PROSCAR) 5 mg tablet    Insulin Glargine Solostar (Lantus SoloStar) 100 UNIT/ML SOPN    losartan (COZAAR) 100 MG tablet    nystatin (MYCOSTATIN) powder    pantoprazole (PROTONIX) 40 mg tablet    repaglinide (PRANDIN) 0.5 mg tablet    torsemide (DEMADEX) 20 mg tablet    Alcohol Swabs (Alcohol Prep Pad) 70 % PADS    atenolol (TENORMIN) 50 mg tablet    Cholecalciferol (Vitamin D3) 25 MCG (1000 UT) CAPS    clotrimazole-betamethasone (LOTRISONE) 1-0.05 % cream    Cyanocobalamin (Vitamin B-12) 2500 MCG SUBL    cycloSPORINE (RESTASIS) 0.05 %  "ophthalmic emulsion    fluticasone (FLONASE) 50 mcg/act nasal spray    fluticasone-umeclidinium-vilanterol 200-62.5-25 mcg/actuation AEPB inhaler    Insulin Pen Needle (BD AutoShield Duo) 30G X 5 MM MISC    ipratropium-albuterol (DUO-NEB) 0.5-2.5 mg/3 mL nebulizer solution    isosorbide mononitrate (IMDUR) 60 mg 24 hr tablet    Multiple Vitamin (Daily-Jhonatan) TABS    Multiple Vitamin (MULTIVITAMIN ADULT PO)    NovoFine Autocover Pen Needle 30G X 8 MM MISC    Nutritional Supplements (Ensure)    Labs:  Results from last 7 days   Lab Units 04/11/25  0333 04/10/25  0339 04/09/25  0529 04/08/25  0631 04/07/25  1935   WBC Thousand/uL 11.91* 13.15* 13.55* 8.23 9.50   HEMOGLOBIN g/dL 15.0 14.2 14.5 15.2 14.2   HEMATOCRIT % 45.2 44.4 44.9 44.7 44.3   PLATELETS Thousands/uL 204 216 151 185 183   SEGS PCT % 78* 89*  --   --  75   LYMPHO PCT % 9* 3*  --  4* 7*   MONO PCT % 12 7  --  1* 15*   EOS PCT % 0 0  --  0 2     Results from last 7 days   Lab Units 04/11/25  0333 04/10/25  0339 04/09/25  0529 04/08/25  0631 04/07/25  1935   POTASSIUM mmol/L 3.4* 3.7 4.1   < > 3.7   CHLORIDE mmol/L 103 100 102   < > 99   CO2 mmol/L 28 28 25   < > 30   BUN mg/dL 38* 45* 38*   < > 24   CREATININE mg/dL 1.12 1.17 1.22   < > 1.34*   CALCIUM mg/dL 8.8 8.8 8.8   < > 9.1   ALK PHOS U/L  --   --   --   --  78   ALT U/L  --   --   --   --  23   AST U/L  --   --   --   --  18    < > = values in this interval not displayed.     No results found for: \"TROPONINI\", \"CKMB\", \"CKTOTAL\"  Results from last 7 days   Lab Units 04/07/25  1935   INR  1.11     Lab Results   Component Value Date    BLOODCX No Growth at 72 hrs. 04/07/2025    BLOODCX No Growth at 72 hrs. 04/07/2025    URINECX No Growth <1000 cfu/mL 12/22/2024    SPUTUMCULTUR Test not performed. Suggest repeat specimen. 04/08/2025         Imaging:  Results for orders placed during the hospital encounter of 04/07/25    XR chest portable    Narrative  XR CHEST PORTABLE    INDICATION: pna.    COMPARISON: " Radiograph 4/7/2025    FINDINGS:    Bibasilar atelectasis. It is difficult to assess for small pleural effusions, given the semiupright positioning. No visible pneumothorax.    Normal cardiomediastinal silhouette.    Bones are unremarkable for age.    Normal upper abdomen.    Impression  No acute cardiopulmonary disease.        Workstation performed: [id number]    No results found for this or any previous visit.      Last 24 Hours Medication List:   Current Facility-Administered Medications   Medication Dose Route Frequency Provider Last Rate    acetaminophen  650 mg Oral Q6H PRN Camilla Berry PA-C      amLODIPine  10 mg Oral QAM Camilla Berry PA-C      Artificial Tears  1 drop Both Eyes Q12H [facility] Camilla Berry PA-C      aspirin  81 mg Oral QAM Camilla Berry PA-C      atenolol  50 mg Oral BID Camilla Berry PA-C      atorvastatin  80 mg Oral HS Camilla Berry PA-C      benzonatate  100 mg Oral TID PRN Camilla Berry PA-C      cholecalciferol  1,000 Units Oral Daily Camilla Berry PA-C      cyanocobalamin  1,000 mcg Oral Daily Camilla Berry PA-C      finasteride  5 mg Oral Daily Camilla Berry PA-C      fluticasone  1 spray Each Nare Daily Camilla Berry PA-C      fluticasone-vilanterol  1 puff Inhalation Daily Camilla Berry PA-C      And    umeclidinium  1 puff Inhalation Daily Camilla Berry PA-C      furosemide  40 mg Intravenous Once Lit MARILUZ Gottlieb MD      guaiFENesin  600 mg Oral Q12H [facility] Camilla Berry PA-C      heparin (porcine)  5,000 Units Subcutaneous Q8H [facility] Camilla Berry PA-C      insulin glargine  35 Units Subcutaneous QA Camilla Berry PA-C      insulin glargine  40 Units Subcutaneous HS Camilla Berry PA-C      insulin lispro  1-6 Units Subcutaneous TID AC Camilla Berry PA-C      insulin lispro  1-6 Units Subcutaneous HS Camilla Berry PA-C      ipratropium  0.5 mg Nebulization TID Camilla Berry PA-C      ipratropium-albuterol  3 mL Nebulization Q6H PRN Camilla Berry PA-C      isosorbide mononitrate  60 mg Oral QAM Camilla Berry PA-C      levalbuterol   1.25 mg Nebulization TID Camilla Berry PA-C      losartan  100 mg Oral INGRIS Berry PA-C      multivitamin stress formula  1 tablet Oral INGRIS Berry PA-C      nystatin   Topical BID Camilla Berry PA-C      pantoprazole  40 mg Oral INGRIS Berry PA-C      predniSONE  40 mg Oral Daily CONCEPCION Haas      torsemide  20 mg Oral INGRIS Berry PA-C          Today, Patient Was Seen By: iLt Gottlieb MD    ** Please Note: Dictation voice to text software may have been used in the creation of this document. **

## 2025-04-12 LAB
ANION GAP SERPL CALCULATED.3IONS-SCNC: 7 MMOL/L (ref 4–13)
BASOPHILS # BLD AUTO: 0.03 THOUSANDS/ÂΜL (ref 0–0.1)
BASOPHILS NFR BLD AUTO: 0 % (ref 0–1)
BUN SERPL-MCNC: 34 MG/DL (ref 5–25)
CALCIUM SERPL-MCNC: 8.9 MG/DL (ref 8.4–10.2)
CHLORIDE SERPL-SCNC: 103 MMOL/L (ref 96–108)
CO2 SERPL-SCNC: 31 MMOL/L (ref 21–32)
CREAT SERPL-MCNC: 1.17 MG/DL (ref 0.6–1.3)
EOSINOPHIL # BLD AUTO: 0 THOUSAND/ÂΜL (ref 0–0.61)
EOSINOPHIL NFR BLD AUTO: 0 % (ref 0–6)
ERYTHROCYTE [DISTWIDTH] IN BLOOD BY AUTOMATED COUNT: 15.1 % (ref 11.6–15.1)
GFR SERPL CREATININE-BSD FRML MDRD: 54 ML/MIN/1.73SQ M
GLUCOSE SERPL-MCNC: 127 MG/DL (ref 65–140)
GLUCOSE SERPL-MCNC: 137 MG/DL (ref 65–140)
GLUCOSE SERPL-MCNC: 164 MG/DL (ref 65–140)
GLUCOSE SERPL-MCNC: 223 MG/DL (ref 65–140)
GLUCOSE SERPL-MCNC: 249 MG/DL (ref 65–140)
HCT VFR BLD AUTO: 46.9 % (ref 36.5–49.3)
HGB BLD-MCNC: 15.2 G/DL (ref 12–17)
IMM GRANULOCYTES # BLD AUTO: 0.21 THOUSAND/UL (ref 0–0.2)
IMM GRANULOCYTES NFR BLD AUTO: 2 % (ref 0–2)
LYMPHOCYTES # BLD AUTO: 0.91 THOUSANDS/ÂΜL (ref 0.6–4.47)
LYMPHOCYTES NFR BLD AUTO: 9 % (ref 14–44)
MCH RBC QN AUTO: 29.3 PG (ref 26.8–34.3)
MCHC RBC AUTO-ENTMCNC: 32.4 G/DL (ref 31.4–37.4)
MCV RBC AUTO: 90 FL (ref 82–98)
MONOCYTES # BLD AUTO: 1.24 THOUSAND/ÂΜL (ref 0.17–1.22)
MONOCYTES NFR BLD AUTO: 12 % (ref 4–12)
NEUTROPHILS # BLD AUTO: 7.58 THOUSANDS/ÂΜL (ref 1.85–7.62)
NEUTS SEG NFR BLD AUTO: 77 % (ref 43–75)
NRBC BLD AUTO-RTO: 0 /100 WBCS
PLATELET # BLD AUTO: 211 THOUSANDS/UL (ref 149–390)
PMV BLD AUTO: 9.9 FL (ref 8.9–12.7)
POTASSIUM SERPL-SCNC: 3.5 MMOL/L (ref 3.5–5.3)
RBC # BLD AUTO: 5.19 MILLION/UL (ref 3.88–5.62)
SODIUM SERPL-SCNC: 141 MMOL/L (ref 135–147)
WBC # BLD AUTO: 9.97 THOUSAND/UL (ref 4.31–10.16)

## 2025-04-12 PROCEDURE — 99232 SBSQ HOSP IP/OBS MODERATE 35: CPT | Performed by: INTERNAL MEDICINE

## 2025-04-12 PROCEDURE — 80048 BASIC METABOLIC PNL TOTAL CA: CPT | Performed by: INTERNAL MEDICINE

## 2025-04-12 PROCEDURE — 94760 N-INVAS EAR/PLS OXIMETRY 1: CPT

## 2025-04-12 PROCEDURE — 85025 COMPLETE CBC W/AUTO DIFF WBC: CPT | Performed by: INTERNAL MEDICINE

## 2025-04-12 PROCEDURE — 94640 AIRWAY INHALATION TREATMENT: CPT

## 2025-04-12 PROCEDURE — 82948 REAGENT STRIP/BLOOD GLUCOSE: CPT

## 2025-04-12 RX ORDER — FUROSEMIDE 10 MG/ML
20 INJECTION INTRAMUSCULAR; INTRAVENOUS ONCE
Status: COMPLETED | OUTPATIENT
Start: 2025-04-12 | End: 2025-04-12

## 2025-04-12 RX ORDER — POTASSIUM CHLORIDE 1500 MG/1
40 TABLET, EXTENDED RELEASE ORAL ONCE
Status: COMPLETED | OUTPATIENT
Start: 2025-04-12 | End: 2025-04-12

## 2025-04-12 RX ADMIN — FLUTICASONE FUROATE AND VILANTEROL TRIFENATATE 1 PUFF: 200; 25 POWDER RESPIRATORY (INHALATION) at 08:29

## 2025-04-12 RX ADMIN — HEPARIN SODIUM 5000 UNITS: 5000 INJECTION INTRAVENOUS; SUBCUTANEOUS at 05:54

## 2025-04-12 RX ADMIN — INSULIN GLARGINE 35 UNITS: 100 INJECTION, SOLUTION SUBCUTANEOUS at 08:36

## 2025-04-12 RX ADMIN — UMECLIDINIUM 1 PUFF: 62.5 AEROSOL, POWDER ORAL at 08:29

## 2025-04-12 RX ADMIN — HEPARIN SODIUM 5000 UNITS: 5000 INJECTION INTRAVENOUS; SUBCUTANEOUS at 13:31

## 2025-04-12 RX ADMIN — ASPIRIN 81 MG: 81 TABLET, COATED ORAL at 08:29

## 2025-04-12 RX ADMIN — FLUTICASONE PROPIONATE 1 SPRAY: 50 SPRAY, METERED NASAL at 08:29

## 2025-04-12 RX ADMIN — CYANOCOBALAMIN TAB 500 MCG 1000 MCG: 500 TAB at 08:29

## 2025-04-12 RX ADMIN — ISOSORBIDE MONONITRATE 60 MG: 60 TABLET, EXTENDED RELEASE ORAL at 08:29

## 2025-04-12 RX ADMIN — LEVALBUTEROL HYDROCHLORIDE 1.25 MG: 1.25 SOLUTION RESPIRATORY (INHALATION) at 07:29

## 2025-04-12 RX ADMIN — LOSARTAN POTASSIUM 100 MG: 50 TABLET, FILM COATED ORAL at 08:29

## 2025-04-12 RX ADMIN — GUAIFENESIN 600 MG: 600 TABLET ORAL at 08:29

## 2025-04-12 RX ADMIN — GUAIFENESIN 600 MG: 600 TABLET ORAL at 22:29

## 2025-04-12 RX ADMIN — TORSEMIDE 20 MG: 20 TABLET ORAL at 08:29

## 2025-04-12 RX ADMIN — POTASSIUM CHLORIDE 40 MEQ: 1500 TABLET, EXTENDED RELEASE ORAL at 10:06

## 2025-04-12 RX ADMIN — INSULIN LISPRO 2 UNITS: 100 INJECTION, SOLUTION INTRAVENOUS; SUBCUTANEOUS at 16:36

## 2025-04-12 RX ADMIN — INSULIN GLARGINE 40 UNITS: 100 INJECTION, SOLUTION SUBCUTANEOUS at 22:29

## 2025-04-12 RX ADMIN — ATENOLOL 50 MG: 50 TABLET ORAL at 08:29

## 2025-04-12 RX ADMIN — LEVALBUTEROL HYDROCHLORIDE 1.25 MG: 1.25 SOLUTION RESPIRATORY (INHALATION) at 14:03

## 2025-04-12 RX ADMIN — AMLODIPINE BESYLATE 10 MG: 5 TABLET ORAL at 08:29

## 2025-04-12 RX ADMIN — FINASTERIDE 5 MG: 5 TABLET, FILM COATED ORAL at 08:29

## 2025-04-12 RX ADMIN — ATORVASTATIN CALCIUM 80 MG: 40 TABLET, FILM COATED ORAL at 22:29

## 2025-04-12 RX ADMIN — B-COMPLEX W/ C & FOLIC ACID TAB 1 TABLET: TAB at 08:29

## 2025-04-12 RX ADMIN — HEPARIN SODIUM 5000 UNITS: 5000 INJECTION INTRAVENOUS; SUBCUTANEOUS at 22:28

## 2025-04-12 RX ADMIN — Medication 1000 UNITS: at 08:29

## 2025-04-12 RX ADMIN — FUROSEMIDE 20 MG: 10 INJECTION, SOLUTION INTRAVENOUS at 09:55

## 2025-04-12 RX ADMIN — LEVALBUTEROL HYDROCHLORIDE 1.25 MG: 1.25 SOLUTION RESPIRATORY (INHALATION) at 19:12

## 2025-04-12 RX ADMIN — DEXTRAN 70, GLYCERIN, HYPROMELLOSE 1 DROP: 1; 2; 3 SOLUTION/ DROPS OPHTHALMIC at 08:30

## 2025-04-12 RX ADMIN — IPRATROPIUM BROMIDE 0.5 MG: 0.5 SOLUTION RESPIRATORY (INHALATION) at 07:30

## 2025-04-12 RX ADMIN — IPRATROPIUM BROMIDE 0.5 MG: 0.5 SOLUTION RESPIRATORY (INHALATION) at 14:03

## 2025-04-12 RX ADMIN — INSULIN LISPRO 3 UNITS: 100 INJECTION, SOLUTION INTRAVENOUS; SUBCUTANEOUS at 22:27

## 2025-04-12 RX ADMIN — PREDNISONE 40 MG: 20 TABLET ORAL at 08:29

## 2025-04-12 RX ADMIN — IPRATROPIUM BROMIDE 0.5 MG: 0.5 SOLUTION RESPIRATORY (INHALATION) at 19:13

## 2025-04-12 RX ADMIN — PANTOPRAZOLE SODIUM 40 MG: 40 TABLET, DELAYED RELEASE ORAL at 08:29

## 2025-04-12 NOTE — PLAN OF CARE
Problem: Potential for Falls  Goal: Patient will remain free of falls  Description: INTERVENTIONS:- Educate patient/family on patient safety including physical limitations- Instruct patient to call for assistance with activity - Consult OT/PT to assist with strengthening/mobility - Keep Call bell within reach- Keep bed low and locked with side rails adjusted as appropriate- Keep care items and personal belongings within reach- Initiate and maintain comfort rounds- Make Fall Risk Sign visible to staff- Offer Toileting every 2 Hours, in advance of need- Initiate/Maintain bed/chair alarm- Obtain necessary fall risk management equipment: yellow bracelet and yellow socks - Apply yellow socks and bracelet for high fall risk patients- Consider moving patient to room near nurses station  Outcome: Progressing     Problem: RESPIRATORY - ADULT  Goal: Achieves optimal ventilation and oxygenation  Description: INTERVENTIONS:- Assess for changes in respiratory status- Assess for changes in mentation and behavior- Position to facilitate oxygenation and minimize respiratory effort- Oxygen administered by appropriate delivery if ordered- Initiate smoking cessation education as indicated- Encourage broncho-pulmonary hygiene including cough, deep breathe, Incentive Spirometry- Assess the need for suctioning and aspirate as needed- Assess and instruct to report SOB or any respiratory difficulty- Respiratory Therapy support as indicated  Outcome: Progressing     Problem: DISCHARGE PLANNING  Goal: Discharge to home or other facility with appropriate resources  Description: INTERVENTIONS:- Identify barriers to discharge w/patient and caregiver- Arrange for needed discharge resources and transportation as appropriate- Identify discharge learning needs (meds, wound care, etc.)- Arrange for interpretive services to assist at discharge as needed- Refer to Case Management Department for coordinating discharge planning if the patient needs  post-hospital services based on physician/advanced practitioner order or complex needs related to functional status, cognitive ability, or social support system  Outcome: Progressing     Problem: Knowledge Deficit  Goal: Patient/family/caregiver demonstrates understanding of disease process, treatment plan, medications, and discharge instructions  Description: Complete learning assessment and assess knowledge base.Interventions:- Provide teaching at level of understanding- Provide teaching via preferred learning methods  Outcome: Progressing

## 2025-04-12 NOTE — ASSESSMENT & PLAN NOTE
Lab Results   Component Value Date    HGBA1C 8.9 (H) 04/08/2025       Recent Labs     04/11/25  0736 04/11/25  1134 04/11/25  1555 04/11/25 2050   POCGLU 103 254* 251* 304*       Blood Sugar Average: Last 72 hrs:  (P) 238Home regimen:   Jardiance  Prandin  Lantus 35 units every morning  Plan:   Insulin Prandin  Continue Lantus  SSI

## 2025-04-12 NOTE — PROGRESS NOTES
Progress Note - Hospitalist   Name: Kyler Hernandez 89 y.o. male I MRN: [id number]  Unit/Bed#: -01 I Date of Admission: 4/7/2025   Date of Service: 4/12/2025 I Hospital Day: 5    Assessment & Plan  COPD with acute exacerbation (HCC)  Presented from facility due to shortness of breath, congestion and hypoxia  85% on room air.  Placed on 4 L nasal cannula in ER  Home regimen:  Fluticasone-umeclidinium-vilanterol daily   Albuterol prn   Duoneb prn   Plan:  IV Solu-Medrol 40 mg every 8 hours on admission  Xopenex/Atrovent 3 times daily  IV azithromycin   Respiratory protocol   Pulmonary consult appreciated  Currently on 4-5 L supplemental oxygen  Wean oxygen as tolerated  Repeat chest x-ray showed no acute cardiopulmonary disease  Received Lasix 40 mg IV x 2 days  Will give another dose of Lasix today  Covid/flu/rsv is negative  Patient is weaned to prednisone.  Acute respiratory failure with hypoxia (HCC)  85% on room air  Placed on 4 L nasal cannula. Weaned to 2 L at time on admission   Patient reports he has concentrator available to him at facility but he seldomly uses.   Diffuse wheezing on exam.  Suspected in the setting of COPD exacerbation  IV steroids/nebulizer treatments  Respiratory protocol  Currently on 5 L of supplemental oxygen.  Wean oxygen as able  Echo showed ejection of 65% with normal diastolic function  Home O2 evaluation  Coronary artery disease involving native coronary artery of native heart without angina pectoris  S/p 3 stents   Aspirin, statin, atenolol, isosorbide and torsemide  Primary hypertension  Home regimen:   Losartan   Amlodipine   Atenolol   Imdur   Torsemide    Benign prostatic hyperplasia with nocturia  Continue home finasteride  LINCOLN (obstructive sleep apnea)  CPAP HS   Type 2 diabetes mellitus with hyperglycemia, with long-term current use of insulin (HCC)  Lab Results   Component Value Date    HGBA1C 8.9 (H) 04/08/2025       Recent Labs     04/11/25  0736 04/11/25  1134  04/11/25  1555 04/11/25 2050   POCGLU 103 254* 251* 304*       Blood Sugar Average: Last 72 hrs:  (P) 238Home regimen:   Jardiance  Prandin  Lantus 35 units every morning  Plan:   Insulin Prandin  Continue Lantus  SSI    Obesity  Obesity, class 1, due to excess calories, evidenced by BMI of 33.72 kg/mA², treated with CHO controlled diet and education on nutrition and lifestyle modifications.   Parainfluenza  Patient tested positive for parainfluenza 3  Continue symptomatic management    Labs & Imaging: Results Review Statement: No pertinent imaging studies reviewed.    VTE Prophylaxis: in place.    Code Status:   Level 1 - Full Code    Patient Centered Rounds: I have performed bedside rounds with nursing staff today.    Mobility:   Basic Mobility Inpatient Raw Score: 22  JH-HLM Goal: 7: Walk 25 feet or more  JH-HLM Achieved: 7: Walk 25 feet or more  JH-HLM Goal NOT achieved. Continue with multidisciplinary rounding and encourage appropriate mobility to improve upon JH-HLM goals.    Discussions with Specialists or Other Care Team Provider: RN    Education and Discussions with Family / Patient: Declined update    Total Time Spent on Date of Encounter in care of patient: 35 mins. This time was spent on one or more of the following: performing physical exam; counseling and coordination of care; obtaining or reviewing history; documenting in the medical record; reviewing/ordering tests, medications or procedures; communicating with other healthcare professionals and discussing with patient's family/caregivers.    Current Length of Stay: 5 day(s)    Current Patient Status: Inpatient   Certification Statement: The patient will continue to require additional inpatient hospital stay due to see my assessment and plan.     Subjective:   Patient is seen and examined at bedside.  No new complains. Feels better.  Afebrile  All other ROS are negative.    Objective:    Vitals: Blood pressure 123/91, pulse (!) 52, temperature (!)  "97.1 °F (36.2 °C), temperature source Axillary, resp. rate 20, height 5' 8\" (1.727 m), weight 100 kg (221 lb), SpO2 90%.,Body mass index is 33.6 kg/m².  SPO2 RA Rest      Flowsheet Row ED to Hosp-Admission (Current) from 4/7/2025 in [facility] Med [facility]urg Unit   SpO2 90 %   SpO2 Activity At Rest   O2 Device Nasal cannula   O2 Flow Rate --          I&O:   Intake/Output Summary (Last 24 hours) at 4/12/2025 0719  Last data filed at 4/12/2025 0538  Gross per 24 hour   Intake 1382 ml   Output 3250 ml   Net -1868 ml       Physical Exam:    General- Alert, sitting in chair. Not in any acute distress.  Neck- Supple, No JVD  CVS- regular, S1 and S2 normal  Chest- Bilateral Air entry, decreased at bases/coarse breath sounds  Abdomen- soft, nontender, not distended, no guarding or rigidity, BS+  Extremities-  No pedal edema, No calf tenderness  CNS-   Alert, awake and orientedx3. No focal deficits present.    Invasive Devices       Peripheral Intravenous Line  Duration             Peripheral IV 04/10/25 Dorsal (posterior);Right Forearm 1 day                          Social History  reviewed  Family History   Problem Relation Age of Onset    Dementia Mother     Diabetes Mother     Peripheral vascular disease Father     Mental illness Neg Hx     Substance Abuse Neg Hx     reviewed    Meds:  Current Facility-Administered Medications   Medication Dose Route Frequency Provider Last Rate Last Admin    acetaminophen (TYLENOL) tablet 650 mg  650 mg Oral Q6H PRN Camilla Berry PA-C        amLODIPine (NORVASC) tablet 10 mg  10 mg Oral QAM Camilla Berry PA-C   10 mg at 04/11/25 0841    Artificial Tears Op Soln 1 drop  1 drop Both Eyes Q12H MOLINA Camilla Berry PA-C   1 drop at 04/11/25 2147    aspirin (ECOTRIN LOW STRENGTH) EC tablet 81 mg  81 mg Oral QAM Camilla Berry PA-C   81 mg at 04/11/25 0841    atenolol (TENORMIN) tablet 50 mg  50 mg Oral BID Camilla Berry PA-C   50 mg at 04/11/25 2147    atorvastatin (LIPITOR) tablet 80 mg  80 " mg Oral HS Camilla Berry PA-C   80 mg at 04/11/25 2147    benzonatate (TESSALON PERLES) capsule 100 mg  100 mg Oral TID PRN Camilla Berry PA-C        Cholecalciferol (VITAMIN D3) tablet 1,000 Units  1,000 Units Oral Daily Camilla Berry PA-C   1,000 Units at 04/11/25 0841    cyanocobalamin (VITAMIN B-12) tablet 1,000 mcg  1,000 mcg Oral Daily Camilla Berry PA-C   1,000 mcg at 04/11/25 0841    finasteride (PROSCAR) tablet 5 mg  5 mg Oral Daily Camilla Berry PA-C   5 mg at 04/11/25 0841    fluticasone (FLONASE) 50 mcg/act nasal spray 1 spray  1 spray Each Nare Daily Camilla Berry PA-C   1 spray at 04/11/25 0840    fluticasone-vilanterol 200-25 mcg/actuation 1 puff  1 puff Inhalation Daily Camilla Berry PA-C   1 puff at 04/11/25 0840    And    umeclidinium 62.5 mcg/actuation inhaler AEPB 1 puff  1 puff Inhalation Daily Camilla Berry PA-C   1 puff at 04/11/25 0840    guaiFENesin (MUCINEX) 12 hr tablet 600 mg  600 mg Oral Q12H [facility] Camilla Berry PA-C   600 mg at 04/11/25 2147    heparin (porcine) subcutaneous injection 5,000 Units  5,000 Units Subcutaneous Q8H [facility] Camilla Berry PA-C   5,000 Units at 04/12/25 0554    insulin glargine (LANTUS) subcutaneous injection 35 Units 0.35 mL  35 Units Subcutaneous QAM Camilla Berry PA-C   35 Units at 04/11/25 0840    insulin glargine (LANTUS) subcutaneous injection 40 Units 0.4 mL  40 Units Subcutaneous HS Camilla Berry PA-C   40 Units at 04/11/25 2146    insulin lispro (HumALOG/ADMELOG) 100 units/mL subcutaneous injection 1-6 Units  1-6 Units Subcutaneous TID AC Camilla Berry PA-C   3 Units at 04/11/25 1712    insulin lispro (HumALOG/ADMELOG) 100 units/mL subcutaneous injection 1-6 Units  1-6 Units Subcutaneous HS Camilla Berry PA-C   4 Units at 04/11/25 2149    ipratropium (ATROVENT) 0.02 % inhalation solution 0.5 mg  0.5 mg Nebulization TID Camilla Berry PA-C   0.5 mg at 04/11/25 1904    ipratropium-albuterol (DUO-NEB) 0.5-2.5 mg/3 mL inhalation solution 3 mL  3 mL Nebulization Q6H PRN Camilla Berry PA-C         isosorbide mononitrate (IMDUR) 24 hr tablet 60 mg  60 mg Oral QAM Camilla Berry PA-C   60 mg at 04/11/25 0841    levalbuterol (XOPENEX) inhalation solution 1.25 mg  1.25 mg Nebulization TID Camilla Berry PA-C   1.25 mg at 04/11/25 1905    losartan (COZAAR) tablet 100 mg  100 mg Oral QAMURTAZA Berry PA-C   100 mg at 04/11/25 0841    multivitamin stress formula tablet 1 tablet  1 tablet Oral QAM Camilla Berry PA-C   1 tablet at 04/11/25 0841    nystatin (MYCOSTATIN) powder   Topical BID Camilla Berry PA-C   Given at 04/11/25 1716    pantoprazole (PROTONIX) EC tablet 40 mg  40 mg Oral INGRIS Berry PA-C   40 mg at 04/11/25 0841    predniSONE tablet 40 mg  40 mg Oral Daily CONCEPCION Haas   40 mg at 04/11/25 0841    sodium chloride (OCEAN) 0.65 % nasal spray 1 spray  1 spray Each Nare Q1H PRN Lit Gottlieb MD        torsemide (DEMADEX) tablet 20 mg  20 mg Oral INGRIS Berry PA-C   20 mg at 04/11/25 0841        Medications Prior to Admission:     acetaminophen (TYLENOL) 325 mg tablet    albuterol (2.5 mg/3 mL) 0.083 % nebulizer solution    alfuzosin (UROXATRAL) 10 mg 24 hr tablet    amLODIPine (NORVASC) 10 mg tablet    aspirin (Aspirin Low Dose) 81 mg EC tablet    atorvastatin (LIPITOR) 80 mg tablet    Continuous Glucose  (Dexcom G7 ) ZAYNAB    Continuous Glucose Sensor (Dexcom G7 Sensor)    Empagliflozin (Jardiance) 10 MG TABS tablet    finasteride (PROSCAR) 5 mg tablet    Insulin Glargine Solostar (Lantus SoloStar) 100 UNIT/ML SOPN    losartan (COZAAR) 100 MG tablet    nystatin (MYCOSTATIN) powder    pantoprazole (PROTONIX) 40 mg tablet    repaglinide (PRANDIN) 0.5 mg tablet    torsemide (DEMADEX) 20 mg tablet    Alcohol Swabs (Alcohol Prep Pad) 70 % PADS    atenolol (TENORMIN) 50 mg tablet    Cholecalciferol (Vitamin D3) 25 MCG (1000 UT) CAPS    clotrimazole-betamethasone (LOTRISONE) 1-0.05 % cream    Cyanocobalamin (Vitamin B-12) 2500 MCG SUBL    cycloSPORINE (RESTASIS) 0.05 % ophthalmic  "emulsion    fluticasone (FLONASE) 50 mcg/act nasal spray    fluticasone-umeclidinium-vilanterol 200-62.5-25 mcg/actuation AEPB inhaler    Insulin Pen Needle (BD AutoShield Duo) 30G X 5 MM MISC    ipratropium-albuterol (DUO-NEB) 0.5-2.5 mg/3 mL nebulizer solution    isosorbide mononitrate (IMDUR) 60 mg 24 hr tablet    Multiple Vitamin (Daily-Jhonatan) TABS    Multiple Vitamin (MULTIVITAMIN ADULT PO)    NovoFine Autocover Pen Needle 30G X 8 MM MISC    Nutritional Supplements (Ensure)    Labs:  Results from last 7 days   Lab Units 04/12/25  0301 04/11/25  0333 04/10/25  0339   WBC Thousand/uL 9.97 11.91* 13.15*   HEMOGLOBIN g/dL 15.2 15.0 14.2   HEMATOCRIT % 46.9 45.2 44.4   PLATELETS Thousands/uL 211 204 216   SEGS PCT % 77* 78* 89*   LYMPHO PCT % 9* 9* 3*   MONO PCT % 12 12 7   EOS PCT % 0 0 0     Results from last 7 days   Lab Units 04/12/25  0301 04/11/25  0333 04/10/25  0339 04/08/25  0631 04/07/25  1935   POTASSIUM mmol/L 3.5 3.4* 3.7   < > 3.7   CHLORIDE mmol/L 103 103 100   < > 99   CO2 mmol/L 31 28 28   < > 30   BUN mg/dL 34* 38* 45*   < > 24   CREATININE mg/dL 1.17 1.12 1.17   < > 1.34*   CALCIUM mg/dL 8.9 8.8 8.8   < > 9.1   ALK PHOS U/L  --   --   --   --  78   ALT U/L  --   --   --   --  23   AST U/L  --   --   --   --  18    < > = values in this interval not displayed.     No results found for: \"TROPONINI\", \"CKMB\", \"CKTOTAL\"  Results from last 7 days   Lab Units 04/1935   INR  1.11     Lab Results   Component Value Date    BLOODCX No Growth After 4 Days. 04/07/2025    BLOODCX No Growth After 4 Days. 04/07/2025    URINECX No Growth <1000 cfu/mL 12/22/2024    SPUTUMCULTUR Test not performed. Suggest repeat specimen. 04/08/2025         Imaging:  Results for orders placed during the hospital encounter of 04/07/25    XR chest portable    Narrative  XR CHEST PORTABLE    INDICATION: pna.    COMPARISON: Radiograph 4/7/2025    FINDINGS:    Bibasilar atelectasis. It is difficult to assess for small pleural " effusions, given the semiupright positioning. No visible pneumothorax.    Normal cardiomediastinal silhouette.    Bones are unremarkable for age.    Normal upper abdomen.    Impression  No acute cardiopulmonary disease.        Workstation performed: [id number]    No results found for this or any previous visit.      Last 24 Hours Medication List:   Current Facility-Administered Medications   Medication Dose Route Frequency Provider Last Rate    acetaminophen  650 mg Oral Q6H PRN Camilla Berry PA-C      amLODIPine  10 mg Oral QAM Camilla Berry PA-C      Artificial Tears  1 drop Both Eyes Q12H [facility] Camilla Berry PA-C      aspirin  81 mg Oral QAM Camilla Berry PA-C      atenolol  50 mg Oral BID Camilla Berry PA-C      atorvastatin  80 mg Oral HS Camilla Berry PA-C      benzonatate  100 mg Oral TID PRN Camilla Berry PA-C      cholecalciferol  1,000 Units Oral Daily Camilla Berry PA-C      cyanocobalamin  1,000 mcg Oral Daily Camlila Berry PA-C      finasteride  5 mg Oral Daily Camilla Berry PA-C      fluticasone  1 spray Each Nare Daily Camilla Berry PA-C      fluticasone-vilanterol  1 puff Inhalation Daily Camilla Berry PA-C      And    umeclidinium  1 puff Inhalation Daily Camilla Berry PA-C      guaiFENesin  600 mg Oral Q12H [facility] Camilla Berry PA-C      heparin (porcine)  5,000 Units Subcutaneous Q8H [facility] Camilla Berry PA-C      insulin glargine  35 Units Subcutaneous [facility] Camilla Berry PA-C      insulin glargine  40 Units Subcutaneous HS Camilla Berry PA-C      insulin lispro  1-6 Units Subcutaneous TID AC Camilla Berry PA-C      insulin lispro  1-6 Units Subcutaneous HS Camilla Berry PA-C      ipratropium  0.5 mg Nebulization TID Camilla Berry PA-C      ipratropium-albuterol  3 mL Nebulization Q6H PRN Camilla Berry PA-C      isosorbide mononitrate  60 mg Oral QA Camilla Berry PA-C      levalbuterol  1.25 mg Nebulization TID Camilla Berry PA-C      losartan  100 mg Oral QAM Camilla Berry PA-C      multivitamin stress formula  1 tablet Oral QAM Camilla Berry PA-C       nystatin   Topical BID Camilla Berry PA-C      pantoprazole  40 mg Oral QAM Camilla Berry PA-C      predniSONE  40 mg Oral Daily CONCEPCION Haas      sodium chloride  1 spray Each Nare Q1H PRN Lit Gottlieb MD      torsemide  20 mg Oral QAM Camilla Berry PA-C          Today, Patient Was Seen By: Lit Gottlieb MD    ** Please Note: Dictation voice to text software may have been used in the creation of this document. **

## 2025-04-12 NOTE — ASSESSMENT & PLAN NOTE
Presented from facility due to shortness of breath, congestion and hypoxia  85% on room air.  Placed on 4 L nasal cannula in ER  Home regimen:  Fluticasone-umeclidinium-vilanterol daily   Albuterol prn   Duoneb prn   Plan:  IV Solu-Medrol 40 mg every 8 hours on admission  Xopenex/Atrovent 3 times daily  IV azithromycin   Respiratory protocol   Pulmonary consult appreciated  Currently on 4-5 L supplemental oxygen  Wean oxygen as tolerated  Repeat chest x-ray showed no acute cardiopulmonary disease  Received Lasix 40 mg IV x 2 days  Will give another dose of Lasix today  Covid/flu/rsv is negative  Patient is weaned to prednisone.

## 2025-04-13 LAB
ANION GAP SERPL CALCULATED.3IONS-SCNC: 7 MMOL/L (ref 4–13)
BACTERIA BLD CULT: NORMAL
BACTERIA BLD CULT: NORMAL
BASOPHILS # BLD MANUAL: 0 THOUSAND/UL (ref 0–0.1)
BASOPHILS NFR MAR MANUAL: 0 % (ref 0–1)
BUN SERPL-MCNC: 34 MG/DL (ref 5–25)
CALCIUM SERPL-MCNC: 8.9 MG/DL (ref 8.4–10.2)
CHLORIDE SERPL-SCNC: 99 MMOL/L (ref 96–108)
CO2 SERPL-SCNC: 32 MMOL/L (ref 21–32)
CREAT SERPL-MCNC: 1.12 MG/DL (ref 0.6–1.3)
EOSINOPHIL # BLD MANUAL: 0 THOUSAND/UL (ref 0–0.4)
EOSINOPHIL NFR BLD MANUAL: 0 % (ref 0–6)
ERYTHROCYTE [DISTWIDTH] IN BLOOD BY AUTOMATED COUNT: 15.4 % (ref 11.6–15.1)
GFR SERPL CREATININE-BSD FRML MDRD: 57 ML/MIN/1.73SQ M
GLUCOSE SERPL-MCNC: 148 MG/DL (ref 65–140)
GLUCOSE SERPL-MCNC: 274 MG/DL (ref 65–140)
GLUCOSE SERPL-MCNC: 289 MG/DL (ref 65–140)
GLUCOSE SERPL-MCNC: 87 MG/DL (ref 65–140)
GLUCOSE SERPL-MCNC: 91 MG/DL (ref 65–140)
HCT VFR BLD AUTO: 47.1 % (ref 36.5–49.3)
HGB BLD-MCNC: 16 G/DL (ref 12–17)
LYMPHOCYTES # BLD AUTO: 1.46 THOUSAND/UL (ref 0.6–4.47)
LYMPHOCYTES # BLD AUTO: 11 % (ref 14–44)
MCH RBC QN AUTO: 30.3 PG (ref 26.8–34.3)
MCHC RBC AUTO-ENTMCNC: 34 G/DL (ref 31.4–37.4)
MCV RBC AUTO: 89 FL (ref 82–98)
MONOCYTES # BLD AUTO: 1.12 THOUSAND/UL (ref 0–1.22)
MONOCYTES NFR BLD: 10 % (ref 4–12)
NEUTROPHILS # BLD MANUAL: 8.62 THOUSAND/UL (ref 1.85–7.62)
NEUTS BAND NFR BLD MANUAL: 1 % (ref 0–8)
NEUTS SEG NFR BLD AUTO: 76 % (ref 43–75)
PLATELET # BLD AUTO: 206 THOUSANDS/UL (ref 149–390)
PLATELET BLD QL SMEAR: ADEQUATE
PMV BLD AUTO: 9.5 FL (ref 8.9–12.7)
POTASSIUM SERPL-SCNC: 3.4 MMOL/L (ref 3.5–5.3)
RBC # BLD AUTO: 5.28 MILLION/UL (ref 3.88–5.62)
RBC MORPH BLD: NORMAL
SODIUM SERPL-SCNC: 138 MMOL/L (ref 135–147)
VARIANT LYMPHS # BLD AUTO: 2 %
WBC # BLD AUTO: 11.2 THOUSAND/UL (ref 4.31–10.16)

## 2025-04-13 PROCEDURE — 99232 SBSQ HOSP IP/OBS MODERATE 35: CPT | Performed by: INTERNAL MEDICINE

## 2025-04-13 PROCEDURE — 85027 COMPLETE CBC AUTOMATED: CPT | Performed by: INTERNAL MEDICINE

## 2025-04-13 PROCEDURE — 82948 REAGENT STRIP/BLOOD GLUCOSE: CPT

## 2025-04-13 PROCEDURE — 85007 BL SMEAR W/DIFF WBC COUNT: CPT | Performed by: INTERNAL MEDICINE

## 2025-04-13 PROCEDURE — 94760 N-INVAS EAR/PLS OXIMETRY 1: CPT

## 2025-04-13 PROCEDURE — 94640 AIRWAY INHALATION TREATMENT: CPT

## 2025-04-13 PROCEDURE — 80048 BASIC METABOLIC PNL TOTAL CA: CPT | Performed by: INTERNAL MEDICINE

## 2025-04-13 RX ORDER — POTASSIUM CHLORIDE 1500 MG/1
40 TABLET, EXTENDED RELEASE ORAL ONCE
Status: COMPLETED | OUTPATIENT
Start: 2025-04-13 | End: 2025-04-13

## 2025-04-13 RX ADMIN — INSULIN LISPRO 4 UNITS: 100 INJECTION, SOLUTION INTRAVENOUS; SUBCUTANEOUS at 17:55

## 2025-04-13 RX ADMIN — NYSTATIN: 100000 POWDER TOPICAL at 08:41

## 2025-04-13 RX ADMIN — HEPARIN SODIUM 5000 UNITS: 5000 INJECTION INTRAVENOUS; SUBCUTANEOUS at 14:05

## 2025-04-13 RX ADMIN — PREDNISONE 40 MG: 20 TABLET ORAL at 08:29

## 2025-04-13 RX ADMIN — INSULIN GLARGINE 35 UNITS: 100 INJECTION, SOLUTION SUBCUTANEOUS at 08:38

## 2025-04-13 RX ADMIN — IPRATROPIUM BROMIDE 0.5 MG: 0.5 SOLUTION RESPIRATORY (INHALATION) at 08:07

## 2025-04-13 RX ADMIN — DEXTRAN 70, GLYCERIN, HYPROMELLOSE 1 DROP: 1; 2; 3 SOLUTION/ DROPS OPHTHALMIC at 08:33

## 2025-04-13 RX ADMIN — ISOSORBIDE MONONITRATE 60 MG: 60 TABLET, EXTENDED RELEASE ORAL at 08:29

## 2025-04-13 RX ADMIN — CYANOCOBALAMIN TAB 500 MCG 1000 MCG: 500 TAB at 08:30

## 2025-04-13 RX ADMIN — FINASTERIDE 5 MG: 5 TABLET, FILM COATED ORAL at 08:31

## 2025-04-13 RX ADMIN — LEVALBUTEROL HYDROCHLORIDE 1.25 MG: 1.25 SOLUTION RESPIRATORY (INHALATION) at 19:25

## 2025-04-13 RX ADMIN — Medication 1000 UNITS: at 08:31

## 2025-04-13 RX ADMIN — LEVALBUTEROL HYDROCHLORIDE 1.25 MG: 1.25 SOLUTION RESPIRATORY (INHALATION) at 08:07

## 2025-04-13 RX ADMIN — AMLODIPINE BESYLATE 10 MG: 5 TABLET ORAL at 08:31

## 2025-04-13 RX ADMIN — LEVALBUTEROL HYDROCHLORIDE 1.25 MG: 1.25 SOLUTION RESPIRATORY (INHALATION) at 13:16

## 2025-04-13 RX ADMIN — ATORVASTATIN CALCIUM 80 MG: 40 TABLET, FILM COATED ORAL at 21:57

## 2025-04-13 RX ADMIN — TORSEMIDE 20 MG: 20 TABLET ORAL at 08:31

## 2025-04-13 RX ADMIN — GUAIFENESIN 600 MG: 600 TABLET ORAL at 08:32

## 2025-04-13 RX ADMIN — LOSARTAN POTASSIUM 100 MG: 50 TABLET, FILM COATED ORAL at 08:30

## 2025-04-13 RX ADMIN — HEPARIN SODIUM 5000 UNITS: 5000 INJECTION INTRAVENOUS; SUBCUTANEOUS at 21:54

## 2025-04-13 RX ADMIN — GUAIFENESIN 600 MG: 600 TABLET ORAL at 21:57

## 2025-04-13 RX ADMIN — IPRATROPIUM BROMIDE 0.5 MG: 0.5 SOLUTION RESPIRATORY (INHALATION) at 13:16

## 2025-04-13 RX ADMIN — INSULIN GLARGINE 40 UNITS: 100 INJECTION, SOLUTION SUBCUTANEOUS at 21:55

## 2025-04-13 RX ADMIN — ASPIRIN 81 MG: 81 TABLET, COATED ORAL at 08:32

## 2025-04-13 RX ADMIN — B-COMPLEX W/ C & FOLIC ACID TAB 1 TABLET: TAB at 08:29

## 2025-04-13 RX ADMIN — POTASSIUM CHLORIDE 40 MEQ: 1500 TABLET, EXTENDED RELEASE ORAL at 11:56

## 2025-04-13 RX ADMIN — FLUTICASONE PROPIONATE 1 SPRAY: 50 SPRAY, METERED NASAL at 08:33

## 2025-04-13 RX ADMIN — PANTOPRAZOLE SODIUM 40 MG: 40 TABLET, DELAYED RELEASE ORAL at 08:31

## 2025-04-13 RX ADMIN — FLUTICASONE FUROATE AND VILANTEROL TRIFENATATE 1 PUFF: 200; 25 POWDER RESPIRATORY (INHALATION) at 08:33

## 2025-04-13 RX ADMIN — INSULIN LISPRO 4 UNITS: 100 INJECTION, SOLUTION INTRAVENOUS; SUBCUTANEOUS at 21:54

## 2025-04-13 RX ADMIN — ATENOLOL 50 MG: 50 TABLET ORAL at 08:40

## 2025-04-13 RX ADMIN — IPRATROPIUM BROMIDE 0.5 MG: 0.5 SOLUTION RESPIRATORY (INHALATION) at 19:25

## 2025-04-13 RX ADMIN — UMECLIDINIUM 1 PUFF: 62.5 AEROSOL, POWDER ORAL at 08:33

## 2025-04-13 RX ADMIN — HEPARIN SODIUM 5000 UNITS: 5000 INJECTION INTRAVENOUS; SUBCUTANEOUS at 05:13

## 2025-04-13 NOTE — PLAN OF CARE
Problem: Potential for Falls  Goal: Patient will remain free of falls  Description: INTERVENTIONS:- Educate patient/family on patient safety including physical limitations- Instruct patient to call for assistance with activity - Consult OT/PT to assist with strengthening/mobility - Keep Call bell within reach- Keep bed low and locked with side rails adjusted as appropriate- Keep care items and personal belongings within reach- Initiate and maintain comfort rounds- Make Fall Risk Sign visible to staff- Offer Toileting every  Hours, in advance of need- Initiate/Maintain alarm- Obtain necessary fall risk management equipme- Apply yellow socks and bracelet for high fall risk patients- Consider moving patient to room near nurses station  Outcome: Progressing     Problem: RESPIRATORY - ADULT  Goal: Achieves optimal ventilation and oxygenation  Description: INTERVENTIONS:- Assess for changes in respiratory status- Assess for changes in mentation and behavior- Position to facilitate oxygenation and minimize respiratory effort- Oxygen administered by appropriate delivery if ordered- Initiate smoking cessation education as indicated- Encourage broncho-pulmonary hygiene including cough, deep breathe, Incentive Spirometry- Assess the need for suctioning and aspirate as needed- Assess and instruct to report SOB or any respiratory difficulty- Respiratory Therapy support as indicated  Outcome: Progressing     Problem: DISCHARGE PLANNING  Goal: Discharge to home or other facility with appropriate resources  Description: INTERVENTIONS:- Identify barriers to discharge w/patient and caregiver- Arrange for needed discharge resources and transportation as appropriate- Identify discharge learning needs (meds, wound care, etc.)- Arrange for interpretive services to assist at discharge as needed- Refer to Case Management Department for coordinating discharge planning if the patient needs post-hospital services based on physician/advanced  practitioner order or complex needs related to functional status, cognitive ability, or social support system  Outcome: Progressing     Problem: Knowledge Deficit  Goal: Patient/family/caregiver demonstrates understanding of disease process, treatment plan, medications, and discharge instructions  Description: Complete learning assessment and assess knowledge base.Interventions:- Provide teaching at level of understanding- Provide teaching via preferred learning methods  Outcome: Progressing

## 2025-04-13 NOTE — PROGRESS NOTES
Progress Note - Hospitalist   Name: Kyler Hernandez 89 y.o. male I MRN: [id number]  Unit/Bed#: -01 I Date of Admission: 4/7/2025   Date of Service: 4/13/2025 I Hospital Day: 6    Assessment & Plan  COPD with acute exacerbation (HCC)  Presented from facility due to shortness of breath, congestion and hypoxia  85% on room air.  Placed on 4 L nasal cannula in ER  Home regimen:  Fluticasone-umeclidinium-vilanterol daily   Albuterol prn   Duoneb prn   Plan:  IV Solu-Medrol 40 mg every 8 hours on admission  Xopenex/Atrovent 3 times daily  IV azithromycin   Respiratory protocol   Pulmonary consult appreciated  Currently on 4 L supplemental oxygen  Wean oxygen as tolerated  Repeat chest x-ray showed no acute cardiopulmonary disease  Received Lasix 40 mg IV daily x 3 days  Covid/flu/rsv is negative  Patient is weaned to prednisone.  Encourage incentive spirometer  Home O2 evaluation was done.  Assisted living cannot take patient over the weekend.  Likely discharge on Monday  Acute respiratory failure with hypoxia (HCC)  85% on room air  Placed on 4 L nasal cannula. Weaned to 2 L at time on admission   Patient reports he has concentrator available to him at facility but he seldomly uses.   Diffuse wheezing on exam.  Suspected in the setting of COPD exacerbation  IV steroids/nebulizer treatments  Respiratory protocol  Currently on 4 L of supplemental oxygen.  Wean oxygen as able  Echo showed ejection of 65% with normal diastolic function  Home O2 evaluation was done and patient will need oxygen at discharge  Coronary artery disease involving native coronary artery of native heart without angina pectoris  S/p 3 stents   Aspirin, statin, atenolol, isosorbide and torsemide  Primary hypertension  Home regimen:   Losartan   Amlodipine   Atenolol   Imdur   Torsemide    Benign prostatic hyperplasia with nocturia  Continue home finasteride  LINCOLN (obstructive sleep apnea)  CPAP HS   Type 2 diabetes mellitus with hyperglycemia, with  long-term current use of insulin (HCC)  Lab Results   Component Value Date    HGBA1C 8.9 (H) 04/08/2025       Recent Labs     04/12/25  1032 04/12/25  1608 04/12/25  2130 04/13/25  0733   POCGLU 137 223* 249* 87       Blood Sugar Average: Last 72 hrs:  (P) [id number] regimen:   Jardiance  Prandin  Lantus 35 units every morning  Plan:   Insulin Prandin  Continue Lantus  SSI    Obesity  Obesity, class 1, due to excess calories, evidenced by BMI of 33.72 kg/mA², treated with CHO controlled diet and education on nutrition and lifestyle modifications.   Parainfluenza  Patient tested positive for parainfluenza 3  Continue symptomatic management    Labs & Imaging: Results Review Statement: No pertinent imaging studies reviewed.    VTE Prophylaxis: in place.    Code Status:   Level 1 - Full Code    Patient Centered Rounds: I have performed bedside rounds with nursing staff today.    Mobility:   Basic Mobility Inpatient Raw Score: 22  JH-HLM Goal: 7: Walk 25 feet or more  JH-HLM Achieved: 7: Walk 25 feet or more  JH-HLM Goal achieved. Continue to encourage appropriate mobility.    Discussions with Specialists or Other Care Team Provider: RN    Education and Discussions with Family / Patient: Declined update    Total Time Spent on Date of Encounter in care of patient: 35 mins. This time was spent on one or more of the following: performing physical exam; counseling and coordination of care; obtaining or reviewing history; documenting in the medical record; reviewing/ordering tests, medications or procedures; communicating with other healthcare professionals and discussing with patient's family/caregivers.    Current Length of Stay: 6 day(s)    Current Patient Status: Inpatient   Certification Statement: The patient will continue to require additional inpatient hospital stay due to see my assessment and plan.     Subjective:   Patient is seen and examined at bedside.  No new complaints. Afebrile  All other ROS  "are negative.    Objective:    Vitals: Blood pressure 127/75, pulse 68, temperature 97.7 °F (36.5 °C), temperature source Axillary, resp. rate 18, height 5' 8\" (1.727 m), weight 100 kg (221 lb), SpO2 94%.,Body mass index is 33.6 kg/m².  SPO2 RA Rest      Flowsheet Row ED to Hosp-Admission (Current) from 4/7/2025 in [facility]   SpO2 94 %   SpO2 Activity At Rest   O2 Device Nasal cannula   O2 Flow Rate --          I&O:   Intake/Output Summary (Last 24 hours) at 4/13/2025 1024  Last data filed at 4/13/2025 0840  Gross per 24 hour   Intake 1380 ml   Output 2425 ml   Net -1045 ml       Physical Exam:    General- Alert, sitting in chair. Not in any acute distress.  Neck- Supple, No JVD  CVS- regular, S1 and S2 normal  Chest- Bilateral Air entry, decreased at bases  Abdomen- soft, nontender, not distended, no guarding or rigidity, BS+  Extremities-  No pedal edema, No calf tenderness                         Normal ROM in all extremities.  CNS-   Alert, awake and orientedx3. No focal deficits present.    Invasive Devices       Peripheral Intravenous Line  Duration             Peripheral IV 04/10/25 Dorsal (posterior);Right Forearm 2 days                          Social History  reviewed  Family History   Problem Relation Age of Onset    Dementia Mother     Diabetes Mother     Peripheral vascular disease Father     Mental illness Neg Hx     Substance Abuse Neg Hx     reviewed    Meds:  Current Facility-Administered Medications   Medication Dose Route Frequency Provider Last Rate Last Admin    acetaminophen (TYLENOL) tablet 650 mg  650 mg Oral Q6H PRN Camilla Berry PA-C        amLODIPine (NORVASC) tablet 10 mg  10 mg Oral INGRIS Berry PA-C   10 mg at 04/13/25 0831    Artificial Tears Op Soln 1 drop  1 drop Both Eyes Q12H [facility] Camilla Berry PA-C   1 drop at 04/13/25 0833    aspirin (ECOTRIN LOW STRENGTH) EC tablet 81 mg  81 mg Oral INGRIS Berry PA-C   81 mg at 04/13/25 0832    atenolol " (TENORMIN) tablet 50 mg  50 mg Oral BID Camilla Berry PA-C   50 mg at 04/13/25 0840    atorvastatin (LIPITOR) tablet 80 mg  80 mg Oral HS Camilla Berry PA-C   80 mg at 04/12/25 2229    benzonatate (TESSALON PERLES) capsule 100 mg  100 mg Oral TID PRN Camilla Berry PA-C        Cholecalciferol (VITAMIN D3) tablet 1,000 Units  1,000 Units Oral Daily Camilla Berry PA-C   1,000 Units at 04/13/25 0831    cyanocobalamin (VITAMIN B-12) tablet 1,000 mcg  1,000 mcg Oral Daily Camilla Berry PA-C   1,000 mcg at 04/13/25 0830    finasteride (PROSCAR) tablet 5 mg  5 mg Oral Daily Camilla Berry PA-C   5 mg at 04/13/25 0831    fluticasone (FLONASE) 50 mcg/act nasal spray 1 spray  1 spray Each Nare Daily Camilla Berry PA-C   1 spray at 04/13/25 0833    fluticasone-vilanterol 200-25 mcg/actuation 1 puff  1 puff Inhalation Daily Camilla Berry PA-C   1 puff at 04/13/25 0833    And    umeclidinium 62.5 mcg/actuation inhaler AEPB 1 puff  1 puff Inhalation Daily Camilla Berry PA-C   1 puff at 04/13/25 0833    guaiFENesin (MUCINEX) 12 hr tablet 600 mg  600 mg Oral Q12H [facility] Camilla Berry PA-C   600 mg at 04/13/25 0832    heparin (porcine) subcutaneous injection 5,000 Units  5,000 Units Subcutaneous Q8H [facility] Camilla Berry PA-C   5,000 Units at 04/13/25 0513    insulin glargine (LANTUS) subcutaneous injection 35 Units 0.35 mL  35 Units Subcutaneous QAM Camilla Berry PA-C   35 Units at 04/13/25 0838    insulin glargine (LANTUS) subcutaneous injection 40 Units 0.4 mL  40 Units Subcutaneous HS Camilla Berry PA-C   40 Units at 04/12/25 2229    insulin lispro (HumALOG/ADMELOG) 100 units/mL subcutaneous injection 1-6 Units  1-6 Units Subcutaneous TID AC Camilla Berry PA-C   2 Units at 04/12/25 1636    insulin lispro (HumALOG/ADMELOG) 100 units/mL subcutaneous injection 1-6 Units  1-6 Units Subcutaneous HS Camilla Berry PA-C   3 Units at 04/12/25 2227    ipratropium (ATROVENT) 0.02 % inhalation solution 0.5 mg  0.5 mg Nebulization TID Camilla Berry PA-C   0.5 mg at 04/13/25 0807     ipratropium-albuterol (DUO-NEB) 0.5-2.5 mg/3 mL inhalation solution 3 mL  3 mL Nebulization Q6H PRN Camilla Berry PA-C        isosorbide mononitrate (IMDUR) 24 hr tablet 60 mg  60 mg Oral QAM Camilla Berry PA-C   60 mg at 04/13/25 0829    levalbuterol (XOPENEX) inhalation solution 1.25 mg  1.25 mg Nebulization TID Camilla Berry PA-C   1.25 mg at 04/13/25 0807    losartan (COZAAR) tablet 100 mg  100 mg Oral INGRIS Berry PA-C   100 mg at 04/13/25 0830    multivitamin stress formula tablet 1 tablet  1 tablet Oral INGRIS Berry PA-C   1 tablet at 04/13/25 0829    nystatin (MYCOSTATIN) powder   Topical BID Camilla Berry PA-C   Given at 04/13/25 0841    pantoprazole (PROTONIX) EC tablet 40 mg  40 mg Oral INGRIS Berry PA-C   40 mg at 04/13/25 0831    predniSONE tablet 40 mg  40 mg Oral Daily CONCEPCION Haas   40 mg at 04/13/25 0829    sodium chloride (OCEAN) 0.65 % nasal spray 1 spray  1 spray Each Nare Q1H PRN Lit Gottlieb MD        torsemide (DEMADEX) tablet 20 mg  20 mg Oral INGRIS Berry PA-C   20 mg at 04/13/25 0831        Medications Prior to Admission:     acetaminophen (TYLENOL) 325 mg tablet    albuterol (2.5 mg/3 mL) 0.083 % nebulizer solution    alfuzosin (UROXATRAL) 10 mg 24 hr tablet    amLODIPine (NORVASC) 10 mg tablet    aspirin (Aspirin Low Dose) 81 mg EC tablet    atorvastatin (LIPITOR) 80 mg tablet    Continuous Glucose  (Dexcom G7 ) ZAYNAB    Continuous Glucose Sensor (Dexcom G7 Sensor)    Empagliflozin (Jardiance) 10 MG TABS tablet    finasteride (PROSCAR) 5 mg tablet    Insulin Glargine Solostar (Lantus SoloStar) 100 UNIT/ML SOPN    losartan (COZAAR) 100 MG tablet    nystatin (MYCOSTATIN) powder    pantoprazole (PROTONIX) 40 mg tablet    repaglinide (PRANDIN) 0.5 mg tablet    torsemide (DEMADEX) 20 mg tablet    Alcohol Swabs (Alcohol Prep Pad) 70 % PADS    atenolol (TENORMIN) 50 mg tablet    Cholecalciferol (Vitamin D3) 25 MCG (1000 UT) CAPS    clotrimazole-betamethasone  "(LOTRISONE) 1-0.05 % cream    Cyanocobalamin (Vitamin B-12) 2500 MCG SUBL    cycloSPORINE (RESTASIS) 0.05 % ophthalmic emulsion    fluticasone (FLONASE) 50 mcg/act nasal spray    fluticasone-umeclidinium-vilanterol 200-62.5-25 mcg/actuation AEPB inhaler    Insulin Pen Needle (BD AutoShield Duo) 30G X 5 MM MISC    ipratropium-albuterol (DUO-NEB) 0.5-2.5 mg/3 mL nebulizer solution    isosorbide mononitrate (IMDUR) 60 mg 24 hr tablet    Multiple Vitamin (Daily-Jhonatan) TABS    Multiple Vitamin (MULTIVITAMIN ADULT PO)    NovoFine Autocover Pen Needle 30G X 8 MM MISC    Nutritional Supplements (Ensure)    Labs:  Results from last 7 days   Lab Units 04/13/25  0625 04/12/25  0301 04/11/25  0333 04/10/25  0339   WBC Thousand/uL 11.20* 9.97 11.91* 13.15*   HEMOGLOBIN g/dL 16.0 15.2 15.0 14.2   HEMATOCRIT % 47.1 46.9 45.2 44.4   PLATELETS Thousands/uL 206 211 204 216   SEGS PCT %  --  77* 78* 89*   LYMPHO PCT % 11* 9* 9* 3*   MONO PCT % 10 12 12 7   EOS PCT % 0 0 0 0     Results from last 7 days   Lab Units 04/13/25  0625 04/12/25  0301 04/11/25  0333 04/08/25  0631 04/07/25  1935   POTASSIUM mmol/L 3.4* 3.5 3.4*   < > 3.7   CHLORIDE mmol/L 99 103 103   < > 99   CO2 mmol/L 32 31 28   < > 30   BUN mg/dL 34* 34* 38*   < > 24   CREATININE mg/dL 1.12 1.17 1.12   < > 1.34*   CALCIUM mg/dL 8.9 8.9 8.8   < > 9.1   ALK PHOS U/L  --   --   --   --  78   ALT U/L  --   --   --   --  23   AST U/L  --   --   --   --  18    < > = values in this interval not displayed.     No results found for: \"TROPONINI\", \"CKMB\", \"CKTOTAL\"  Results from last 7 days   Lab Units 04/07/25  1935   INR  1.11     Lab Results   Component Value Date    BLOODCX No Growth After 5 Days. 04/07/2025    BLOODCX No Growth After 5 Days. 04/07/2025    URINECX No Growth <1000 cfu/mL 12/22/2024    SPUTUMCULTUR Test not performed. Suggest repeat specimen. 04/08/2025         Imaging:  Results for orders placed during the hospital encounter of 04/07/25    XR chest " portable    Narrative  XR CHEST PORTABLE    INDICATION: pna.    COMPARISON: Radiograph 4/7/2025    FINDINGS:    Bibasilar atelectasis. It is difficult to assess for small pleural effusions, given the semiupright positioning. No visible pneumothorax.    Normal cardiomediastinal silhouette.    Bones are unremarkable for age.    Normal upper abdomen.    Impression  No acute cardiopulmonary disease.        Workstation performed: [id number]    No results found for this or any previous visit.      Last 24 Hours Medication List:   Current Facility-Administered Medications   Medication Dose Route Frequency Provider Last Rate    acetaminophen  650 mg Oral Q6H PRN Camilla Berry PA-C      amLODIPine  10 mg Oral QAM Camilla Berry PA-C      Artificial Tears  1 drop Both Eyes Q12H [facility] Camilla Berry PA-C      aspirin  81 mg Oral QAM Camilla Berry PA-C      atenolol  50 mg Oral BID Camilla Berry PA-C      atorvastatin  80 mg Oral HS Camilla Berry PA-C      benzonatate  100 mg Oral TID PRN Camilla Berry PA-C      cholecalciferol  1,000 Units Oral Daily Camilla Berry PA-C      cyanocobalamin  1,000 mcg Oral Daily Camilla SUREKHA Berry      finasteride  5 mg Oral Daily Camilla Berry PA-C      fluticasone  1 spray Each Nare Daily Camilla Berry PA-C      fluticasone-vilanterol  1 puff Inhalation Daily Camilla Berry PA-C      And    umeclidinium  1 puff Inhalation Daily Camilla Berry PA-C      guaiFENesin  600 mg Oral Q12H [facility] Camilla Berry PA-C      heparin (porcine)  5,000 Units Subcutaneous Q8H [facility] Camilla Berry PA-C      insulin glargine  35 Units Subcutaneous [facility] Camilla Berry PA-C      insulin glargine  40 Units Subcutaneous HS Camilla Berry PA-C      insulin lispro  1-6 Units Subcutaneous TID AC Camilla Berry PA-C      insulin lispro  1-6 Units Subcutaneous HS Camilla Berry PA-C      ipratropium  0.5 mg Nebulization TID Camilla Berry PA-C      ipratropium-albuterol  3 mL Nebulization Q6H PRN Camilla Berry PA-C      isosorbide mononitrate  60 mg Oral QAM Camilla Berry PA-C       levalbuterol  1.25 mg Nebulization TID Camilla Berry PA-C      losartan  100 mg Oral INGRIS Berry PA-C      multivitamin stress formula  1 tablet Oral INGRIS Berry PA-C      nystatin   Topical BID Camilla Berry PA-C      pantoprazole  40 mg Oral INGRIS Berry PA-C      predniSONE  40 mg Oral Daily CONCEPCION Haas      sodium chloride  1 spray Each Nare Q1H PRN Lit Gottlieb MD      torsemide  20 mg Oral INGRIS Berry PA-C          Today, Patient Was Seen By: Lit Gottlieb MD    ** Please Note: Dictation voice to text software may have been used in the creation of this document. **

## 2025-04-13 NOTE — ASSESSMENT & PLAN NOTE
85% on room air  Placed on 4 L nasal cannula. Weaned to 2 L at time on admission   Patient reports he has concentrator available to him at facility but he seldomly uses.   Diffuse wheezing on exam.  Suspected in the setting of COPD exacerbation  IV steroids/nebulizer treatments  Respiratory protocol  Currently on 4 L of supplemental oxygen.  Wean oxygen as able  Echo showed ejection of 65% with normal diastolic function  Home O2 evaluation was done and patient will need oxygen at discharge

## 2025-04-13 NOTE — ASSESSMENT & PLAN NOTE
Lab Results   Component Value Date    HGBA1C 8.9 (H) 04/08/2025       Recent Labs     04/12/25  1032 04/12/25  1608 04/12/25  2130 04/13/25  0733   POCGLU 137 223* 249* 87       Blood Sugar Average: Last 72 hrs:  (P) [id number] regimen:   Jardiance  Prandin  Lantus 35 units every morning  Plan:   Insulin Prandin  Continue Lantus  SSI

## 2025-04-13 NOTE — ASSESSMENT & PLAN NOTE
Presented from facility due to shortness of breath, congestion and hypoxia  85% on room air.  Placed on 4 L nasal cannula in ER  Home regimen:  Fluticasone-umeclidinium-vilanterol daily   Albuterol prn   Duoneb prn   Plan:  IV Solu-Medrol 40 mg every 8 hours on admission  Xopenex/Atrovent 3 times daily  IV azithromycin   Respiratory protocol   Pulmonary consult appreciated  Currently on 4 L supplemental oxygen  Wean oxygen as tolerated  Repeat chest x-ray showed no acute cardiopulmonary disease  Received Lasix 40 mg IV daily x 3 days  Covid/flu/rsv is negative  Patient is weaned to prednisone.  Encourage incentive spirometer  Home O2 evaluation was done.  Assisted living cannot take patient over the weekend.  Likely discharge on Monday

## 2025-04-14 VITALS
HEIGHT: 68 IN | RESPIRATION RATE: 18 BRPM | OXYGEN SATURATION: 98 % | TEMPERATURE: 96.9 F | HEART RATE: 69 BPM | BODY MASS INDEX: 33.49 KG/M2 | SYSTOLIC BLOOD PRESSURE: 152 MMHG | DIASTOLIC BLOOD PRESSURE: 77 MMHG | WEIGHT: 221 LBS

## 2025-04-14 LAB
ANION GAP SERPL CALCULATED.3IONS-SCNC: 8 MMOL/L (ref 4–13)
BUN SERPL-MCNC: 34 MG/DL (ref 5–25)
CALCIUM SERPL-MCNC: 9.1 MG/DL (ref 8.4–10.2)
CHLORIDE SERPL-SCNC: 98 MMOL/L (ref 96–108)
CO2 SERPL-SCNC: 32 MMOL/L (ref 21–32)
CREAT SERPL-MCNC: 1.19 MG/DL (ref 0.6–1.3)
DME PARACHUTE DELIVERY DATE REQUESTED: NORMAL
DME PARACHUTE DELIVERY DATE REQUESTED: NORMAL
DME PARACHUTE DELIVERY NOTE: NORMAL
DME PARACHUTE ITEM DESCRIPTION: NORMAL
DME PARACHUTE ITEM DESCRIPTION: NORMAL
DME PARACHUTE ORDER STATUS: NORMAL
DME PARACHUTE ORDER STATUS: NORMAL
DME PARACHUTE SUPPLIER NAME: NORMAL
DME PARACHUTE SUPPLIER NAME: NORMAL
DME PARACHUTE SUPPLIER PHONE: NORMAL
DME PARACHUTE SUPPLIER PHONE: NORMAL
ERYTHROCYTE [DISTWIDTH] IN BLOOD BY AUTOMATED COUNT: 15.1 % (ref 11.6–15.1)
GFR SERPL CREATININE-BSD FRML MDRD: 53 ML/MIN/1.73SQ M
GLUCOSE SERPL-MCNC: 102 MG/DL (ref 65–140)
GLUCOSE SERPL-MCNC: 130 MG/DL (ref 65–140)
GLUCOSE SERPL-MCNC: 83 MG/DL (ref 65–140)
HCT VFR BLD AUTO: 48.4 % (ref 36.5–49.3)
HGB BLD-MCNC: 15.9 G/DL (ref 12–17)
MCH RBC QN AUTO: 29.5 PG (ref 26.8–34.3)
MCHC RBC AUTO-ENTMCNC: 32.9 G/DL (ref 31.4–37.4)
MCV RBC AUTO: 90 FL (ref 82–98)
PLATELET # BLD AUTO: 223 THOUSANDS/UL (ref 149–390)
PMV BLD AUTO: 9.6 FL (ref 8.9–12.7)
POTASSIUM SERPL-SCNC: 3.5 MMOL/L (ref 3.5–5.3)
RBC # BLD AUTO: 5.39 MILLION/UL (ref 3.88–5.62)
SODIUM SERPL-SCNC: 138 MMOL/L (ref 135–147)
WBC # BLD AUTO: 12.81 THOUSAND/UL (ref 4.31–10.16)

## 2025-04-14 PROCEDURE — 85027 COMPLETE CBC AUTOMATED: CPT | Performed by: INTERNAL MEDICINE

## 2025-04-14 PROCEDURE — 94760 N-INVAS EAR/PLS OXIMETRY 1: CPT

## 2025-04-14 PROCEDURE — 99239 HOSP IP/OBS DSCHRG MGMT >30: CPT | Performed by: INTERNAL MEDICINE

## 2025-04-14 PROCEDURE — 94761 N-INVAS EAR/PLS OXIMETRY MLT: CPT

## 2025-04-14 PROCEDURE — 94640 AIRWAY INHALATION TREATMENT: CPT

## 2025-04-14 PROCEDURE — 80048 BASIC METABOLIC PNL TOTAL CA: CPT | Performed by: INTERNAL MEDICINE

## 2025-04-14 PROCEDURE — 82948 REAGENT STRIP/BLOOD GLUCOSE: CPT

## 2025-04-14 RX ORDER — PREDNISONE 20 MG/1
TABLET ORAL
Qty: 9 TABLET | Refills: 0 | Status: SHIPPED | OUTPATIENT
Start: 2025-04-15 | End: 2025-04-24

## 2025-04-14 RX ORDER — LEVALBUTEROL INHALATION SOLUTION 1.25 MG/3ML
1.25 SOLUTION RESPIRATORY (INHALATION)
Status: DISCONTINUED | OUTPATIENT
Start: 2025-04-14 | End: 2025-04-14 | Stop reason: HOSPADM

## 2025-04-14 RX ADMIN — LOSARTAN POTASSIUM 100 MG: 50 TABLET, FILM COATED ORAL at 07:52

## 2025-04-14 RX ADMIN — B-COMPLEX W/ C & FOLIC ACID TAB 1 TABLET: TAB at 07:52

## 2025-04-14 RX ADMIN — PANTOPRAZOLE SODIUM 40 MG: 40 TABLET, DELAYED RELEASE ORAL at 07:52

## 2025-04-14 RX ADMIN — DEXTRAN 70, GLYCERIN, HYPROMELLOSE 1 DROP: 1; 2; 3 SOLUTION/ DROPS OPHTHALMIC at 07:51

## 2025-04-14 RX ADMIN — CYANOCOBALAMIN TAB 500 MCG 1000 MCG: 500 TAB at 07:52

## 2025-04-14 RX ADMIN — PREDNISONE 40 MG: 20 TABLET ORAL at 07:52

## 2025-04-14 RX ADMIN — ASPIRIN 81 MG: 81 TABLET, COATED ORAL at 07:52

## 2025-04-14 RX ADMIN — FINASTERIDE 5 MG: 5 TABLET, FILM COATED ORAL at 07:52

## 2025-04-14 RX ADMIN — ISOSORBIDE MONONITRATE 60 MG: 60 TABLET, EXTENDED RELEASE ORAL at 07:52

## 2025-04-14 RX ADMIN — AMLODIPINE BESYLATE 10 MG: 5 TABLET ORAL at 07:52

## 2025-04-14 RX ADMIN — INSULIN GLARGINE 35 UNITS: 100 INJECTION, SOLUTION SUBCUTANEOUS at 07:51

## 2025-04-14 RX ADMIN — TORSEMIDE 20 MG: 20 TABLET ORAL at 07:52

## 2025-04-14 RX ADMIN — GUAIFENESIN 600 MG: 600 TABLET ORAL at 07:52

## 2025-04-14 RX ADMIN — UMECLIDINIUM 1 PUFF: 62.5 AEROSOL, POWDER ORAL at 07:51

## 2025-04-14 RX ADMIN — FLUTICASONE PROPIONATE 1 SPRAY: 50 SPRAY, METERED NASAL at 07:51

## 2025-04-14 RX ADMIN — HEPARIN SODIUM 5000 UNITS: 5000 INJECTION INTRAVENOUS; SUBCUTANEOUS at 05:08

## 2025-04-14 RX ADMIN — FLUTICASONE FUROATE AND VILANTEROL TRIFENATATE 1 PUFF: 200; 25 POWDER RESPIRATORY (INHALATION) at 07:51

## 2025-04-14 RX ADMIN — LEVALBUTEROL HYDROCHLORIDE 1.25 MG: 1.25 SOLUTION RESPIRATORY (INHALATION) at 08:14

## 2025-04-14 RX ADMIN — NYSTATIN: 100000 POWDER TOPICAL at 07:51

## 2025-04-14 RX ADMIN — IPRATROPIUM BROMIDE 0.5 MG: 0.5 SOLUTION RESPIRATORY (INHALATION) at 08:14

## 2025-04-14 RX ADMIN — ATENOLOL 50 MG: 50 TABLET ORAL at 07:52

## 2025-04-14 RX ADMIN — Medication 1000 UNITS: at 07:52

## 2025-04-14 NOTE — RESPIRATORY THERAPY NOTE
Home Oxygen Qualifying Test     Patient name: Kyler Hernandez        : 1935   Date of Test:  2025  Diagnosis:    Home Oxygen Test:    **Medicare Guidelines require item(s) 1-5 on all ambulatory patients or 1 and 2 on non-ambulatory patients.    1. Baseline SPO2 on Room Air at rest 94 %   If <= 88% on Room Air add O2 via NC to obtain SpO2 >=88%. If LPM needed, document LPM  needed to reach =>88%    SPO2 during exertion on Room Air 84  %  During exertion monitor SPO2. If SPO2 increases >=89%, do not add supplemental oxygen    SPO2 on Oxygen at Rest 96 % at 3 LPM    SPO2 during exertion on Oxygen 91 % at 3 LPM    Test performed during exertion activity.      [x]  Supplemental Home Oxygen is indicated.    []  Client does not qualify for home oxygen.    Respiratory Additional Notes-pt ambulated approx. 200 feet    Deyanira Luna, RT

## 2025-04-14 NOTE — ASSESSMENT & PLAN NOTE
Home regimen:   Losartan   Amlodipine   Atenolol   Imdur   Torsemide  Continuing home regimen on discharge

## 2025-04-14 NOTE — CASE MANAGEMENT
Case Management Discharge Planning Note    Patient name Kyler Hernandez  Location /-01 MRN [id number]  : 1935 Date 2025       Current Admission Date: 2025  Current Admission Diagnosis:COPD with acute exacerbation (HCC)   Patient Active Problem List    Diagnosis Date Noted Date Diagnosed    Parainfluenza 04/10/2025     Acute respiratory failure with hypoxia ([facility]) 2025     Type 2 diabetes mellitus with hyperglycemia, with long-term current use of insulin ([facility]) 2025     Obesity 2025     Pulmonary hypertension ([facility]) 2024     Coronary artery disease involving native coronary artery of native heart without angina pectoris 05/10/2024     Primary hypertension 05/10/2024     Benign prostatic hyperplasia with nocturia 05/10/2024     COPD with acute exacerbation ([facility]) 05/10/2024     LINCOLN (obstructive sleep apnea) 05/10/2024     B12 deficiency 05/10/2024     Vitamin D deficiency 05/10/2024     Mixed hyperlipidemia 05/10/2024       LOS (days): 7  Geometric Mean LOS (GMLOS) (days): 3.4  Days to GMLOS:-3.2     OBJECTIVE:  Risk of Unplanned Readmission Score: 17.26         Current admission status: Inpatient   Preferred Pharmacy:   [id number] - [place] [address]  Phone: [phone] Fax: [phone]    [facility], [facility]. - [place] [address]  Phone: [phone] Fax: [phone]    Primary Care Provider: Gordon Álvarez MD    Primary Insurance: [employer] REP  Secondary Insurance:     DISCHARGE DETAILS:     CM received call back from [facility] and she confirmed receiving DME form, home O2 concentrator, and portable tanks. IC will pick pt up at 12p. CM notified MD and RN via secure chat.   IMM reviewed with  yuly Germain , yuly Germain agrees with discharge determination.    IMM Given (Date):: 25 (1025am)  IMM Given to:: Family  Family  notified:: yuly Germain

## 2025-04-14 NOTE — ASSESSMENT & PLAN NOTE
Presented from facility due to shortness of breath, congestion and hypoxia  S/p IV steroids, azithromycin  Pulmonology consulted and transition to p.o. steroids,  received 5 days of p.o. prednisone 40 mg-transition to taper from tomorrow

## 2025-04-14 NOTE — ASSESSMENT & PLAN NOTE
Lab Results   Component Value Date    HGBA1C 8.9 (H) 04/08/2025       Recent Labs     04/13/25  1146 04/13/25  1654 04/13/25  2118 04/14/25  0723   POCGLU 148* 289* 274* 83       Blood Sugar Average: Last 72 hrs:  (P) 194.3320253131289996Ekjf regimen:   Jardiance  Prandin  Lantus 35 units every morning  Home regimen on discharge

## 2025-04-14 NOTE — NURSING NOTE
Discharge instructions gone over with patient. COPD booklet given for education as well as the COPD zones worksheet.

## 2025-04-14 NOTE — CASE MANAGEMENT
Case Management Discharge Planning Note    Patient name Kyler Hernandez  Location /-01 MRN [id number]  : 1935 Date 2025       Current Admission Date: 2025  Current Admission Diagnosis:COPD with acute exacerbation (HCC)   Patient Active Problem List    Diagnosis Date Noted Date Diagnosed    Parainfluenza 04/10/2025     Acute respiratory failure with hypoxia (HCC) 2025     Type 2 diabetes mellitus with hyperglycemia, with long-term current use of insulin (HCC) 2025     Obesity 2025     Pulmonary hypertension ([facility]) 2024     Coronary artery disease involving native coronary artery of native heart without angina pectoris 05/10/2024     Primary hypertension 05/10/2024     Benign prostatic hyperplasia with nocturia 05/10/2024     COPD with acute exacerbation (HCC) 05/10/2024     LINCOLN (obstructive sleep apnea) 05/10/2024     B12 deficiency 05/10/2024     Vitamin D deficiency 05/10/2024     Mixed hyperlipidemia 05/10/2024       LOS (days): 7  Geometric Mean LOS (GMLOS) (days): 3.4  Days to GMLOS:-3.2     OBJECTIVE:  Risk of Unplanned Readmission Score: 17.26         Current admission status: Inpatient   Preferred Pharmacy:   [id number] - [place], [place] - [address] [place] [address]  Phone: [phone] Fax: [phone]    [facility], [facility]. - [place], [place] - [address] [place] [address]  Phone: [phone] Fax: [phone]    Primary Care Provider: Gordon Álvarez MD    Primary Insurance: [facility] REP  Secondary Insurance:     DISCHARGE DETAILS:        CM reached out to [facility] to alert them to discharge today. ETHEL Espaañ, was not available but apparently has some concerns about him returning now. CM to call back in a half hour unless CM hears from her prior.

## 2025-04-14 NOTE — CASE MANAGEMENT
Case Management Discharge Planning Note    Patient name Kyler Hernandez  Location /-01 MRN [id number]  : 1935 Date 2025       Current Admission Date: 2025  Current Admission Diagnosis:COPD with acute exacerbation (HCC)   Patient Active Problem List    Diagnosis Date Noted Date Diagnosed    Parainfluenza 04/10/2025     Acute respiratory failure with hypoxia (HCC) 2025     Type 2 diabetes mellitus with hyperglycemia, with long-term current use of insulin (HCC) 2025     Obesity 2025     Pulmonary hypertension (HCC) 2024     Coronary artery disease involving native coronary artery of native heart without angina pectoris 05/10/2024     Primary hypertension 05/10/2024     Benign prostatic hyperplasia with nocturia 05/10/2024     COPD with acute exacerbation (HCC) 05/10/2024     LINCOLN (obstructive sleep apnea) 05/10/2024     B12 deficiency 05/10/2024     Vitamin D deficiency 05/10/2024     Mixed hyperlipidemia 05/10/2024       LOS (days): 7  Geometric Mean LOS (GMLOS) (days): 3.4  Days to GMLOS:-3.4     OBJECTIVE:  Risk of Unplanned Readmission Score: 17.33         Current admission status: Inpatient   Preferred Pharmacy:   [id number] - [place], [place] - [address] [place] [address]  Phone: [phone] Fax: [phone]    [facility], [facility]. - [place], [place] - [address] [place] [address]  Phone: [phone] Fax: [phone]    Primary Care Provider: Gordon Álvarez MD    Primary Insurance: [facility] REP  Secondary Insurance:     DISCHARGE DETAILS:     Transport arrived from [facility] without a portable tank from his room. CM supplied one from closet stock and had pt sign delivery ticket. CM placed delivery ticket in [facility] folder.

## 2025-04-14 NOTE — CASE MANAGEMENT
Case Management Discharge Planning Note    Patient name Kyler Hernandez  Location /-01 MRN [id number]  : 1935 Date 2025       Current Admission Date: 2025  Current Admission Diagnosis:COPD with acute exacerbation (HCC)   Patient Active Problem List    Diagnosis Date Noted Date Diagnosed    Parainfluenza 04/10/2025     Acute respiratory failure with hypoxia (HCC) 2025     Type 2 diabetes mellitus with hyperglycemia, with long-term current use of insulin (HCC) 2025     Obesity 2025     Pulmonary hypertension (HCC) 2024     Coronary artery disease involving native coronary artery of native heart without angina pectoris 05/10/2024     Primary hypertension 05/10/2024     Benign prostatic hyperplasia with nocturia 05/10/2024     COPD with acute exacerbation (HCC) 05/10/2024     LINCOLN (obstructive sleep apnea) 05/10/2024     B12 deficiency 05/10/2024     Vitamin D deficiency 05/10/2024     Mixed hyperlipidemia 05/10/2024       LOS (days): 7  Geometric Mean LOS (GMLOS) (days): 3.4  Days to GMLOS:-3.2     OBJECTIVE:  Risk of Unplanned Readmission Score: 17.26         Current admission status: Inpatient   Preferred Pharmacy:   [id number] - [place], [place] - [address] [place], [place] [address]  Phone: [phone] Fax: [phone]    [facility], [facility]. - [place], [place] - [address] [place] [id number]  Phone: [phone] Fax: [phone]    Primary Care Provider: Gordon Álvarez MD    [facility]  Secondary Insurance:     DISCHARGE DETAILS:     Pt had a home O2 eval done and is now only needing 3L O2 continuously. CM updated order in [facility].

## 2025-04-14 NOTE — DISCHARGE SUMMARY
Discharge Summary - Hospitalist   Name: Kyler Hernandez 89 y.o. male I MRN: [id number]  Unit/Bed#: -01 I Date of Admission: 4/7/2025   Date of Service: 4/14/2025 I Hospital Day: 7     Assessment & Plan  COPD with acute exacerbation (HCC)  Presented from facility due to shortness of breath, congestion and hypoxia  S/p IV steroids, azithromycin  Pulmonology consulted and transition to p.o. steroids,  received 5 days of p.o. prednisone 40 mg-transition to taper from tomorrow  Acute respiratory failure with hypoxia (HCC)  85% on room air  Updated home O2 eval  Discharge with 3 L of oxygen  Coronary artery disease involving native coronary artery of native heart without angina pectoris  S/p 3 stents   Aspirin, statin, atenolol, isosorbide and torsemide  Primary hypertension  Home regimen:   Losartan   Amlodipine   Atenolol   Imdur   Torsemide  Continuing home regimen on discharge    Benign prostatic hyperplasia with nocturia  Continue home finasteride  LINCOLN (obstructive sleep apnea)  CPAP HS   Type 2 diabetes mellitus with hyperglycemia, with long-term current use of insulin ([facility])  Lab Results   Component Value Date    HGBA1C 8.9 (H) 04/08/2025       Recent Labs     04/13/25  1146 04/13/25  1654 04/13/25  2118 04/14/25  0723   POCGLU 148* 289* 274* 83       Blood Sugar Average: Last 72 hrs:  (P) [id number] regimen:   Jardiance  Prandin  Lantus 35 units every morning  Home regimen on discharge    Obesity  Obesity, class 1, due to excess calories, evidenced by BMI of 33.72 kg/mA², treated with CHO controlled diet and education on nutrition and lifestyle modifications.   Parainfluenza  Patient tested positive for parainfluenza 3  Continue symptomatic management     Medical Problems       Resolved Problems  Date Reviewed: 2/27/2025   None       Discharging Physician / Practitioner: Brittani Rosales MD  PCP: Gordon Álvarez MD  Admission Date:   Admission Orders (From admission, onward)       Ordered         "04/07/25 2012  INPATIENT ADMISSION  Once                          Discharge Date: 04/14/25    Consultations During Hospital Stay:  Pulmonology      Significant Findings / Test Results:   XR chest portable  Result Date: 4/10/2025  Impression: No acute cardiopulmonary disease. Workstation performed: [id number]     XR chest portable  Result Date: 4/8/2025  Impression: No acute pulmonary disease on this examination, which is somewhat limited secondary to low lung volumes. Workstation performed: [id number]       No Chest XR results available for this patient.          Reason for Admission:   Chief Complaint   Patient presents with    Shortness of Breath     Pt to ED via EMS from [facility] with reports of sinus pressure and SOB that started over the last few days. EMS found pt's O2 to be 85% on RA, put him on 4L NC and his O2 went up to 94.        Hospital Course:   Kyler Hernandez is a 89 y.o. male patient who originally presented to the hospital on 4/7/2025 due to acute respiratory failure with hypoxia likely secondary to COPD exacerbation, viral URI.  Pulmonology was consulted.  Patient was started on IV steroids and transition to p.o. steroids.  Patient was updated with home O2 eval at the time of discharge.  Assisted living could not take patient over the weekend, discharging patient today      Please see above list of diagnoses and related plan for additional information.     Condition at Discharge: stable    Discharge Day Visit / Exam:   Subjective: States\" I want to leave today\"  Vitals: Blood Pressure: 152/77 (04/14/25 0727)  Pulse: 69 (04/14/25 0727)  Temperature: (!) 96.9 °F (36.1 °C) (04/14/25 0727)  Temp Source: Axillary (04/14/25 0727)  Respirations: 18 (04/13/25 0820)  Height: 5' 8\" (172.7 cm) (04/08/25 1104)  Weight - Scale: 100 kg (221 lb) (04/08/25 1104)  SpO2: 98 % (04/14/25 0830)  Physical Exam  Vitals and nursing note reviewed.   HENT:      Head: Normocephalic.      Mouth/Throat:      Mouth: Mucous " membranes are moist.      Pharynx: Oropharynx is clear.   Eyes:      Conjunctiva/sclera: Conjunctivae normal.   Cardiovascular:      Rate and Rhythm: Normal rate.      Pulses: Normal pulses.   Pulmonary:      Effort: Pulmonary effort is normal.      Comments: On 3 L of oxygen, decreased breath sounds bilateral, no wheezes or rhonchi  Abdominal:      General: Bowel sounds are normal.      Palpations: Abdomen is soft.   Skin:     General: Skin is warm and dry.      Capillary Refill: Capillary refill takes 2 to 3 seconds.   Neurological:      Mental Status: He is alert and oriented to person, place, and time.            Discussion with Family: Patient declined call to .     Discharge instructions/Information to patient and family:   See after visit summary for information provided to patient and family.      Provisions for Follow-Up Care:  See after visit summary for information related to follow-up care and any pertinent home health orders.      Mobility at time of Discharge:   Basic Mobility Inpatient Raw Score: 24  JH-HLM Goal: 8: Walk 250 feet or more  JH-HLM Achieved: 8: Walk 250 feet ot more  HLM Goal achieved. Continue to encourage appropriate mobility.     Disposition:   Assisted Living Facility at dependence court    Planned Readmission: none    Discharge Medications:  See after visit summary for reconciled discharge medications provided to patient and/or family.      Administrative Statements   Discharge Statement:  I have spent a total time of 33 minutes in caring for this patient on the day of the visit/encounter. >30 minutes of time was spent on: Diagnostic results, Risk factor reductions, Impressions, Counseling / Coordination of care, Documenting in the medical record, Reviewing / ordering tests, medicine, procedures  , and Communicating with other healthcare professionals .    **Please Note: This note may have been constructed using a voice recognition system**

## 2025-04-15 ENCOUNTER — IN HOME VISIT (OUTPATIENT)
Dept: FAMILY MEDICINE CLINIC | Facility: HOSPITAL | Age: OVER 89
End: 2025-04-15
Payer: COMMERCIAL

## 2025-04-15 ENCOUNTER — TRANSITIONAL CARE MANAGEMENT (OUTPATIENT)
Dept: FAMILY MEDICINE CLINIC | Facility: HOSPITAL | Age: OVER 89
End: 2025-04-15

## 2025-04-15 VITALS — RESPIRATION RATE: 16 BRPM | SYSTOLIC BLOOD PRESSURE: 108 MMHG | DIASTOLIC BLOOD PRESSURE: 56 MMHG | HEART RATE: 76 BPM

## 2025-04-15 DIAGNOSIS — I10 PRIMARY HYPERTENSION: ICD-10-CM

## 2025-04-15 DIAGNOSIS — E11.65 TYPE 2 DIABETES MELLITUS WITH HYPERGLYCEMIA, WITH LONG-TERM CURRENT USE OF INSULIN (HCC): Primary | ICD-10-CM

## 2025-04-15 DIAGNOSIS — E11.65 TYPE 2 DIABETES MELLITUS WITH HYPERGLYCEMIA, WITH LONG-TERM CURRENT USE OF INSULIN (HCC): ICD-10-CM

## 2025-04-15 DIAGNOSIS — N40.1 BENIGN PROSTATIC HYPERPLASIA WITH NOCTURIA: ICD-10-CM

## 2025-04-15 DIAGNOSIS — Z79.4 TYPE 2 DIABETES MELLITUS WITH HYPERGLYCEMIA, WITH LONG-TERM CURRENT USE OF INSULIN (HCC): ICD-10-CM

## 2025-04-15 DIAGNOSIS — B00.9 HERPES SIMPLEX: ICD-10-CM

## 2025-04-15 DIAGNOSIS — Z99.89 USES WALKER: ICD-10-CM

## 2025-04-15 DIAGNOSIS — N18.31 STAGE 3A CHRONIC KIDNEY DISEASE (HCC): ICD-10-CM

## 2025-04-15 DIAGNOSIS — J96.01 ACUTE RESPIRATORY FAILURE WITH HYPOXIA (HCC): ICD-10-CM

## 2025-04-15 DIAGNOSIS — Z79.4 TYPE 2 DIABETES MELLITUS WITH HYPERGLYCEMIA, WITH LONG-TERM CURRENT USE OF INSULIN (HCC): Primary | ICD-10-CM

## 2025-04-15 DIAGNOSIS — R35.1 BENIGN PROSTATIC HYPERPLASIA WITH NOCTURIA: ICD-10-CM

## 2025-04-15 DIAGNOSIS — I27.20 PULMONARY HYPERTENSION (HCC): ICD-10-CM

## 2025-04-15 DIAGNOSIS — J44.1 COPD WITH ACUTE EXACERBATION (HCC): Primary | ICD-10-CM

## 2025-04-15 PROBLEM — E66.9 OBESITY: Status: RESOLVED | Noted: 2025-01-03 | Resolved: 2025-04-15

## 2025-04-15 PROBLEM — B34.8 PARAINFLUENZA: Status: RESOLVED | Noted: 2025-04-10 | Resolved: 2025-04-15

## 2025-04-15 LAB
DME PARACHUTE DELIVERY DATE ACTUAL: NORMAL
DME PARACHUTE DELIVERY DATE EXPECTED: NORMAL
DME PARACHUTE DELIVERY DATE REQUESTED: NORMAL
DME PARACHUTE ITEM DESCRIPTION: NORMAL
DME PARACHUTE ITEM DESCRIPTION: NORMAL
DME PARACHUTE ORDER STATUS: NORMAL
DME PARACHUTE SUPPLIER NAME: NORMAL
DME PARACHUTE SUPPLIER PHONE: NORMAL

## 2025-04-15 PROCEDURE — 99496 TRANSJ CARE MGMT HIGH F2F 7D: CPT | Performed by: INTERNAL MEDICINE

## 2025-04-15 RX ORDER — ACYCLOVIR 400 MG/1
TABLET ORAL
Qty: 9 EACH | Refills: 1 | Status: SHIPPED | OUTPATIENT
Start: 2025-04-15

## 2025-04-15 RX ORDER — VALACYCLOVIR HYDROCHLORIDE 1 G/1
1000 TABLET, FILM COATED ORAL 2 TIMES DAILY
Qty: 4 TABLET | Refills: 0 | Status: SHIPPED | OUTPATIENT
Start: 2025-04-15 | End: 2025-04-24

## 2025-04-15 NOTE — ASSESSMENT & PLAN NOTE
Patient had echocardiogram in the hospital that showed an RVSP of 40 mmHg.  Patient has pulmonary hypertension likely due to COPD and chronic hypoxic respiratory failure.  He received IV Lasix during the hospital stay.  He had no evidence of volume overload today.  He will continue torsemide 20 mg daily

## 2025-04-15 NOTE — ASSESSMENT & PLAN NOTE
He has BPH.  He feels that he is able to empty his bladder.  Denies dysuria or hematuria.  Continue finasteride

## 2025-04-15 NOTE — ASSESSMENT & PLAN NOTE
He had acute hypoxic respiratory failure due to acute COPD extubation requiring increased oxygen.  Currently he feels comfortable and looks comfortable on oxygen via nasal cannula at 3L/per minute.  He will follow-up with pulmonary as scheduled next week and he will request a portable oxygen concentrator.

## 2025-04-15 NOTE — PROGRESS NOTES
Transition of Care Visit:  Name: Kyler Hernandez      : 1935      MRN: [id number]  Encounter Provider: Gordon Álvarez MD  Encounter Date: 4/15/2025   Encounter department: [facility] [address]    Assessment & Plan  COPD with acute exacerbation (HCC)  I saw patient at [facility] where he resides as patient is not able to leave the facility.  He was discharged yesterday after hospital stay for acute COPD extubation.    Patient feels that his breathing is better compared to hospital admission.  He has a dry cough on deep inspiration but he was able to walk in the hallways without shortness of breath, chest tightness or wheezing.    He was treated with nebulizers, azithromycin, IV Solu-Medrol in the hospital and was discharged on tapering course of prednisone.  COVID test was negative, parainfluenza 3 virus test came back positive.  Chest x-ray showed no focal infiltrates in the hospital.    His lungs were clear to auscultation today.  I heard expiratory wheezing when he coughed.    He will continue Trelegy and a tapering course of prednisone.  Will follow-up with pulmonary as scheduled on        Acute respiratory failure with hypoxia (HCC)  He had acute hypoxic respiratory failure due to acute COPD extubation requiring increased oxygen.  Currently he feels comfortable and looks comfortable on oxygen via nasal cannula at 3L/per minute.  He will follow-up with pulmonary as scheduled next week and he will request a portable oxygen concentrator.       Type 2 diabetes mellitus with hyperglycemia, with long-term current use of insulin (HCC)    Lab Results   Component Value Date    HGBA1C 8.9 (H) 2025   Patient has type 2 diabetes with hyperglycemia.  His A1c increased from 8.1 last December to 8.9.  He wishes to discuss diabetic management with endocrinology.  He has an appointment coming up on .  In the meantime he will continue same dose  of Lantus, Jardiance and Prandin         Pulmonary hypertension (HCC)  Patient had echocardiogram in the hospital that showed an RVSP of 40 mmHg.  Patient has pulmonary hypertension likely due to COPD and chronic hypoxic respiratory failure.  He received IV Lasix during the hospital stay.  He had no evidence of volume overload today.  He will continue torsemide 20 mg daily       Primary hypertension  Patient blood pressure is well-controlled today.  His electrolytes were normal yesterday and renal function was stable  Continue losartan, torsemide, amlodipine, atenolol       Stage 3a chronic kidney disease (HCC)  Lab Results   Component Value Date    EGFR 53 04/14/2025    EGFR 57 04/13/2025    EGFR 54 04/12/2025    CREATININE 1.19 04/14/2025    CREATININE 1.12 04/13/2025    CREATININE 1.17 04/12/2025     Patient has stage IIIa CKD likely due to diabetic nephropathy, hypertensive nephrosclerosis.  His renal function was stable in the hospital.  He will continue losartan, Jardiance       Benign prostatic hyperplasia with nocturia  He has BPH.  He feels that he is able to empty his bladder.  Denies dysuria or hematuria.  Continue finasteride       Uses walker  Patient is dependent on a a walker to prevent falls.  Continue walker       Herpes simplex  He developed herpes simplex labialis infection of the upper lip.  Will treat him with Valtrex for 2 days  Orders:  •  valACYclovir (VALTREX) 1,000 mg tablet; Take 1 tablet (1,000 mg total) by mouth 2 (two) times a day for 2 days      Depression Screening and Follow-up Plan: Patient was screened for depression during today's encounter. They screened negative with a PHQ-2 score of 0.          History of Present Illness     Transitional Care Management Review:   Kyler Hernandez is a 89 y.o. male here for TCM follow up.     During the TCM phone call patient stated:  TCM Call (since 4/1/2025)     Date and time call was made  4/15/2025  8:40 AM    Hospital care reviewed  Records  reviewed    Patient was hospitialized at  [facility]    Date of Admission  04/07/25    Date of discharge  04/14/25    Diagnosis  admitting dx--copd, d/c dx--active problems.    Disposition  Assisted Living    Were the patients medications reviewed and updated  Yes    Current Symptoms  None      TCM Call (since 4/1/2025)     Post hospital issues  None    Scheduled for follow up?  Yes    Did you obtain your prescribed medications  Yes    I have advised the patient to call PCP with any new or worsening symptoms  [facility], [address].  pt doing good.  meds reviewed, chart current.  cb to see pt today at .  dk        HPI  Review of Systems   Constitutional:  Negative for fever.   HENT:  Negative for sore throat.    Respiratory:  Positive for cough.    Cardiovascular:  Negative for chest pain, palpitations and leg swelling.   Gastrointestinal:  Negative for abdominal pain, blood in stool and constipation.   Genitourinary:  Negative for difficulty urinating, dysuria and hematuria.   Skin:  Positive for rash.   Neurological:  Negative for weakness and numbness.   Psychiatric/Behavioral:  Negative for dysphoric mood.      Objective   /56 (BP Location: Left arm, Patient Position: Sitting, Cuff Size: Standard)   Pulse 76   Resp 16     Physical Exam  Constitutional:       General: He is not in acute distress.     Appearance: He is obese. He is not toxic-appearing.   HENT:      Head: Normocephalic.      Mouth/Throat:      Mouth: Mucous membranes are moist.      Pharynx: No oropharyngeal exudate.   Cardiovascular:      Rate and Rhythm: Normal rate and regular rhythm.      Heart sounds:      No gallop.   Pulmonary:      Effort: No respiratory distress.      Breath sounds: Wheezing present. No rhonchi or rales.   Abdominal:      General: Bowel sounds are normal.      Palpations: Abdomen is soft.      Tenderness: There is no abdominal tenderness.   Skin:     General:  Skin is warm and dry.      Findings: Rash (Grouped blisters are present on the upper lip) present.      Comments: He had no lower extremity edema   Neurological:      Mental Status: He is alert.      Cranial Nerves: No cranial nerve deficit.      Motor: No weakness.       Medications have been reviewed by provider in current encounter

## 2025-04-15 NOTE — ASSESSMENT & PLAN NOTE
Lab Results   Component Value Date    HGBA1C 8.9 (H) 04/08/2025   Patient has type 2 diabetes with hyperglycemia.  His A1c increased from 8.1 last December to 8.9.  He wishes to discuss diabetic management with endocrinology.  He has an appointment coming up on April 18.  In the meantime he will continue same dose of Lantus, Jardiance and Prandin

## 2025-04-15 NOTE — ASSESSMENT & PLAN NOTE
I saw patient at [facility] where he resides as patient is not able to leave the facility.  He was discharged yesterday after hospital stay for acute COPD extubation.    Patient feels that his breathing is better compared to hospital admission.  He has a dry cough on deep inspiration but he was able to walk in the hallways without shortness of breath, chest tightness or wheezing.    He was treated with nebulizers, azithromycin, IV Solu-Medrol in the hospital and was discharged on tapering course of prednisone.  COVID test was negative, parainfluenza 3 virus test came back positive.  Chest x-ray showed no focal infiltrates in the hospital.    His lungs were clear to auscultation today.  I heard expiratory wheezing when he coughed.    He will continue Trelegy and a tapering course of prednisone.  Will follow-up with pulmonary as scheduled on April 24

## 2025-04-15 NOTE — ASSESSMENT & PLAN NOTE
Lab Results   Component Value Date    EGFR 53 04/14/2025    EGFR 57 04/13/2025    EGFR 54 04/12/2025    CREATININE 1.19 04/14/2025    CREATININE 1.12 04/13/2025    CREATININE 1.17 04/12/2025     Patient has stage IIIa CKD likely due to diabetic nephropathy, hypertensive nephrosclerosis.  His renal function was stable in the hospital.  He will continue losartan, Jardiance

## 2025-04-15 NOTE — ASSESSMENT & PLAN NOTE
Patient blood pressure is well-controlled today.  His electrolytes were normal yesterday and renal function was stable  Continue losartan, torsemide, amlodipine, atenolol

## 2025-04-15 NOTE — UTILIZATION REVIEW
NOTIFICATION OF ADMISSION DISCHARGE   This is a Notification of Discharge from [facility]. Please be advised that this patient has been discharge from our facility. Below you will find the admission and discharge date and time including the patient’s disposition.   UTILIZATION REVIEW CONTACT:  Utilization Review Assistants  Network Utilization Review Department  Phone: [phone] x carefully listen to the prompts. All voicemails are confidential.  Email: [email or web address]     ADMISSION INFORMATION  PRESENTATION DATE: 4/7/2025  7:26 PM  OBERVATION ADMISSION DATE: N/A  INPATIENT ADMISSION DATE: 4/7/25  8:12 PM   DISCHARGE DATE: 4/14/2025  2:54 PM   DISPOSITION:Home/Self Care    Network Utilization Review Department  ATTENTION: Please call with any questions or concerns to [phone] and carefully listen to the prompts so that you are directed to the right person. All voicemails are confidential.   For Discharge needs, contact Care Management DC Support Team at [phone] opt. [phone]  Send all requests for admission clinical reviews, approved or denied determinations and any other requests to dedicated fax number below belonging to the campus where the patient is receiving treatment. List of dedicated fax numbers for the Facilities:  FACILITY NAME UR FAX NUMBER   ADMISSION DENIALS (Administrative/Medical Necessity) [phone]   DISCHARGE SUPPORT TEAM ([facility]) [phone]   PARENT CHILD HEALTH (Maternity/NICU/Pediatrics) [phone]   [facility] [phone]   [facility] [phone]   [facility] [phone]   [facility] [phone]   [facility] [phone]   [facility] [phone]   [facility] [phone]   [facility] [phone]   [facility]  [facility] [phone]   [facility] [phone]   [facility] [phone]   [facility] [phone]

## 2025-04-18 ENCOUNTER — OFFICE VISIT (OUTPATIENT)
Dept: ENDOCRINOLOGY | Facility: HOSPITAL | Age: OVER 89
End: 2025-04-18
Payer: COMMERCIAL

## 2025-04-18 VITALS
DIASTOLIC BLOOD PRESSURE: 70 MMHG | OXYGEN SATURATION: 92 % | SYSTOLIC BLOOD PRESSURE: 116 MMHG | BODY MASS INDEX: 33.65 KG/M2 | HEIGHT: 68 IN | HEART RATE: 67 BPM | WEIGHT: 222 LBS

## 2025-04-18 DIAGNOSIS — Z09 HOSPITAL DISCHARGE FOLLOW-UP: ICD-10-CM

## 2025-04-18 DIAGNOSIS — I25.10 CORONARY ARTERY DISEASE INVOLVING NATIVE CORONARY ARTERY OF NATIVE HEART WITHOUT ANGINA PECTORIS: ICD-10-CM

## 2025-04-18 DIAGNOSIS — E66.01 OBESITY, MORBID (HCC): ICD-10-CM

## 2025-04-18 DIAGNOSIS — Z79.4 TYPE 2 DIABETES MELLITUS WITH OTHER SPECIFIED COMPLICATION, WITH LONG-TERM CURRENT USE OF INSULIN (HCC): Primary | ICD-10-CM

## 2025-04-18 DIAGNOSIS — Z79.4 TYPE 2 DIABETES MELLITUS WITH HYPERGLYCEMIA, WITH LONG-TERM CURRENT USE OF INSULIN (HCC): ICD-10-CM

## 2025-04-18 DIAGNOSIS — I10 PRIMARY HYPERTENSION: ICD-10-CM

## 2025-04-18 DIAGNOSIS — E11.65 TYPE 2 DIABETES MELLITUS WITH HYPERGLYCEMIA, WITH LONG-TERM CURRENT USE OF INSULIN (HCC): ICD-10-CM

## 2025-04-18 DIAGNOSIS — E78.2 MIXED HYPERLIPIDEMIA: ICD-10-CM

## 2025-04-18 DIAGNOSIS — E11.69 TYPE 2 DIABETES MELLITUS WITH OTHER SPECIFIED COMPLICATION, WITH LONG-TERM CURRENT USE OF INSULIN (HCC): Primary | ICD-10-CM

## 2025-04-18 PROCEDURE — 95251 CONT GLUC MNTR ANALYSIS I&R: CPT | Performed by: STUDENT IN AN ORGANIZED HEALTH CARE EDUCATION/TRAINING PROGRAM

## 2025-04-18 PROCEDURE — 99215 OFFICE O/P EST HI 40 MIN: CPT | Performed by: STUDENT IN AN ORGANIZED HEALTH CARE EDUCATION/TRAINING PROGRAM

## 2025-04-18 RX ORDER — INSULIN LISPRO 100 [IU]/ML
1-10 INJECTION, SOLUTION INTRAVENOUS; SUBCUTANEOUS
Qty: 15 ML | Refills: 1 | Status: SHIPPED | OUTPATIENT
Start: 2025-04-18

## 2025-04-18 RX ORDER — INSULIN HUMAN 100 [IU]/ML
20 INJECTION, SUSPENSION SUBCUTANEOUS DAILY
Qty: 15 ML | Refills: 0 | Status: SHIPPED | OUTPATIENT
Start: 2025-04-18

## 2025-04-18 NOTE — PROGRESS NOTES
"    Follow up Progress note      No chief complaint on file.     History of Present Illness:   Kyler Hernandez is a 89 y.o. male with a history of type 2 diabetes since 1987 With complications of CABG s.p stenting x3 who presents today for diabetes management. Other Pmhx of htn, hlp, class 1 obesity, COPD. Last visit 01/25.     Patient was recently admitted to [facility] for COPD exacerbation and was started on prednisone. Currently on prednisone taper at 20mg for 3 days and then 10mg for 3 days and will stop on 04/25.     Patient was first diagnosed with T2DM- 1987  Control since diagnosis:- only 2 A1C on file 03/24 9.4% now 9%   Medications tried thus far: metformin for years but reports wont be on it anymore since it will kill his kidney, trulicity- stopped d/t having diarrhea.   Current regime:- Lantus 35 units in AM and 40 units in PM,   Prandin 1mg with bk, 1mg with dinner  Jardiance 10 mg one daily   WAS prescribed lispro per scale with meals-  150-200=1u, 200-250 =2u, 250-300= 3u, 300-350=4u, >350 = 6u but doesn't seem to be on it.     BG control:- Kyler Hernandez   Device used Dexcom G7    Indication   Type 2 Diabetes    More than 72 hours of data was reviewed. Report to be scanned to chart.     Date Range: Apr 5- 18 2025    Analysis of data:   Average Glucose: 221  GMI 8.6%  Time in Target Range:30%  Time Above Range: 35%/35%  Time Below Range: 0%    Interpretation of data:   High BG during the day    Hypoglycemic episodes:  None  Hyperglycemia symptoms: no polyuria or polydipsia\",\"no chest pain, dyspnea or TIAs\",\"no numbness, tingling or pain in extremities\"  Diet: reports eats whatever they give him   Activity: stays active    Opthamology: oct 2023; no retinopathy, no macular edema  Hx of hyperlipidemia: yes  Hx of hypertension: yes  Last Lipid:- 12/24 LDL <100  Last urine microalbumin: 2/25 normal   Last hbA1C: 4/25 8.9%,  9/24 7.5%, 05/24 9%    Social Hx:- lives at [employer]  Family HX:- nephew had T1DM, " sister had T2DM    Patient Active Problem List   Diagnosis    Coronary artery disease involving native coronary artery of native heart without angina pectoris    Primary hypertension    Benign prostatic hyperplasia with nocturia    COPD with acute exacerbation (HCC)    LINCOLN (obstructive sleep apnea)    B12 deficiency    Vitamin D deficiency    Mixed hyperlipidemia    Pulmonary hypertension (HCC)    Type 2 diabetes mellitus with hyperglycemia, with long-term current use of insulin (HCC)    Acute respiratory failure with hypoxia (HCC)    Stage 3a chronic kidney disease (HCC)      Past Medical History:   Diagnosis Date    Atherosclerotic heart disease of native coronary artery without angina pectoris     Chronic obstructive pulmonary disease, unspecified COPD type (HCC)     Diabetes mellitus (HCC)     Dry eye syndrome     Hyperlipidemia     Hypertension     Nonrheumatic mitral (valve) insufficiency     Obstructive sleep apnea (adult) (pediatric)     Vitamin B12 deficiency     Vitamin D deficiency       Past Surgical History:   Procedure Laterality Date    HERNIA REPAIR        Family History   Problem Relation Age of Onset    Dementia Mother     Diabetes Mother     Peripheral vascular disease Father     Mental illness Neg Hx     Substance Abuse Neg Hx      Social History     Tobacco Use    Smoking status: Former     Types: Cigarettes     Start date:      Quit date:      Years since quittin.3    Smokeless tobacco: Never   Substance Use Topics    Alcohol use: Yes     Comment: occasional whiskey sour     Allergies   Allergen Reactions    Mounjaro [Tirzepatide] Diarrhea         Current Outpatient Medications:     acetaminophen (TYLENOL) 325 mg tablet, 2 tabs q 6 hrs prn pain or fever greater than 100, Disp: , Rfl:     albuterol (2.5 mg/3 mL) 0.083 % nebulizer solution, 1 vial q 4 hrs prn, Disp: , Rfl:     Alcohol Swabs (Alcohol Prep Pad) 70 % PADS, As directed, Disp: , Rfl:     alfuzosin (UROXATRAL) 10 mg 24 hr  tablet, 1 TAB ORALLY EVERY MORNING DX: BPH, Disp: 30 tablet, Rfl: 5    amLODIPine (NORVASC) 10 mg tablet, 1 TAB ORALLY EVERY MORNING DX: HYPERTENSION, Disp: 30 tablet, Rfl: 5    aspirin (Aspirin Low Dose) 81 mg EC tablet, 1 TAB ORALLY EVERY MORNING DX: STROKE PREVENTION, Disp: 30 tablet, Rfl: 5    atenolol (TENORMIN) 50 mg tablet, 1 TAB ORALLY TWICE DAILY DX: HYPERTENSION, Disp: 60 tablet, Rfl: 4    atorvastatin (LIPITOR) 80 mg tablet, 1 TAB ORALLY AT BEDTIME DX:HYPERLIPIDEMIA, Disp: 30 tablet, Rfl: 10    Cholecalciferol (Vitamin D3) 25 MCG (1000 UT) CAPS, Take 1 capsule (1,000 Units total) by mouth in the morning 1 daily, Disp: 30 capsule, Rfl: 11    Continuous Glucose  (Dexcom G7 ) ZAYNAB, USE AS DIRECTED PER  DIRECTIONS, Disp: , Rfl:     Continuous Glucose Sensor (Dexcom G7 Sensor), USE PER  DIRECTIONS. CHANGE SENSOR EVERY 10 DAYS., Disp: 9 each, Rfl: 1    Cyanocobalamin (Vitamin B-12) 2500 MCG SUBL, 1 TAB UNDER THE TONGUE 5 TIMES/WK (OMIT SA,WESTON) AT 8:30AM DX: SUPPLEMENT *COLD SEAL*, Disp: 20 tablet, Rfl: 4    cycloSPORINE (RESTASIS) 0.05 % ophthalmic emulsion, Administer to both eyes Every 12 hours, Disp: , Rfl:     Empagliflozin (Jardiance) 10 MG TABS tablet, 1 TAB ORALLY EVERY MORNING DX: DIABETES (IDDM), Disp: 30 tablet, Rfl: 5    finasteride (PROSCAR) 5 mg tablet, 1 TAB ORALLY EVERY MORNING DX: BPH **NIOSH 3 HAZARDOUS DRUG**, Disp: 30 tablet, Rfl: 5    fluticasone (FLONASE) 50 mcg/act nasal spray, every 24 hours, Disp: , Rfl:     fluticasone-umeclidinium-vilanterol 200-62.5-25 mcg/actuation AEPB inhaler, Inhale 1 puff daily Rinse mouth after use., Disp: 1 each, Rfl: 5    Insulin Glargine Solostar (Lantus SoloStar) 100 UNIT/ML SOPN, INJECT 35U SUB-Q EVERY MORNING DX: DIABETES DX: **DISCARD UNUSED PEN 28 DAYS AFTER 1ST USE** INJECT 40 UNITS SUB-Q AT BEDTIME DX; DIABETES, Disp: 15 mL, Rfl: 5    Insulin Pen Needle (BD AutoShield Duo) 30G X 5 MM MISC, USE FOR INSULIN  INJECTION 3 TIMES DAILY, Disp: 100 each, Rfl: 1    ipratropium-albuterol (DUO-NEB) 0.5-2.5 mg/3 mL nebulizer solution, 4 times daily prn wheezing, Disp: , Rfl:     isosorbide mononitrate (IMDUR) 60 mg 24 hr tablet, 1 TAB ORALLY EVERY MORNING DX:ANGINA, Disp: 30 tablet, Rfl: 5    loperamide (IMODIUM) 2 mg capsule, 1 CAP ORALLY DAILY AS NEEDED FOR DIARRHEA *NOT TO EXCEED 8 CAPS/24 HR*, Disp: 30 capsule, Rfl: 4    losartan (COZAAR) 100 MG tablet, 1 TAB ORALLY EVERY MORNING DX: HYPERTENSION, Disp: 30 tablet, Rfl: 10    Multiple Vitamin (Daily-Jhonatan) TABS, 1 TAB ORALLY EVERY MORNING DX: SUPPLEMENT, Disp: 30 tablet, Rfl: 5    Multiple Vitamin (MULTIVITAMIN ADULT PO), every 24 hours, Disp: , Rfl:     NovoFine Autocover Pen Needle 30G X 8 MM MISC, 2 pen needles daily, Disp: , Rfl:     Nutritional Supplements (Ensure), Take by mouth 1 can daily, Disp: , Rfl:     nystatin (MYCOSTATIN) powder, Apply topically 2 (two) times a day, Disp: 15 g, Rfl: 0    pantoprazole (PROTONIX) 40 mg tablet, 1 TAB ORALLY EVERY MORNING DX: GERD, Disp: 30 tablet, Rfl: 5    predniSONE 20 mg tablet, Take 1.5 tablets (30 mg total) by mouth daily for 3 days, THEN 1 tablet (20 mg total) daily for 3 days, THEN 0.5 tablets (10 mg total) daily for 3 days., Disp: 9 tablet, Rfl: 0    repaglinide (PRANDIN) 0.5 mg tablet, Take 1 tablet with breakfast and 1 tablet with dinner, hold if not eating or blood sugar less than 100, Disp: 180 tablet, Rfl: 1    torsemide (DEMADEX) 20 mg tablet, Take 1 tablet (20 mg total) by mouth every morning, Disp: 30 tablet, Rfl: 5    valACYclovir (VALTREX) 1,000 mg tablet, Take 1 tablet (1,000 mg total) by mouth 2 (two) times a day for 2 days, Disp: 4 tablet, Rfl: 0  Review of Systems   Constitutional:  Negative for diaphoresis, fatigue and unexpected weight change.   Respiratory:  Negative for shortness of breath.    Cardiovascular:  Negative for chest pain and palpitations.   Gastrointestinal:  Negative for constipation and  "diarrhea.   Endocrine: Negative for polydipsia and polyuria.       Physical Exam:  Body mass index is 33.75 kg/m².  Ht 5' 8\" (1.727 m)   Wt 101 kg (222 lb)   BMI 33.75 kg/m²    Wt Readings from Last 3 Encounters:   04/18/25 101 kg (222 lb)   04/08/25 100 kg (221 lb)   02/27/25 99.8 kg (220 lb)       Physical Exam  Constitutional:       Appearance: Normal appearance. He is obese.   Cardiovascular:      Rate and Rhythm: Normal rate and regular rhythm.      Pulses: Normal pulses.           Dorsalis pedis pulses are 2+ on the right side and 2+ on the left side.   Pulmonary:      Effort: Pulmonary effort is normal.   Abdominal:      General: Abdomen is flat.      Palpations: Abdomen is soft.   Feet:      Right foot:      Skin integrity: Callus and dry skin present. No ulcer, skin breakdown, erythema or warmth.      Left foot:      Skin integrity: Callus and dry skin present. No ulcer, skin breakdown, erythema or warmth.   Skin:     General: Skin is warm.      Capillary Refill: Capillary refill takes less than 2 seconds.   Neurological:      General: No focal deficit present.      Mental Status: He is alert.         Labs:    Latest Reference Range & Units 03/06/24 08:10 05/15/24 07:55 08/21/24 07:49 09/18/24 07:57   Hemoglobin A1C Normal 4.0-5.6%; PreDiabetic 5.7-6.4%; Diabetic >=6.5%; Glycemic control for adults with diabetes <7.0% % 9.4 (H) 9.0 (H) 7.8 (H) 7.5 (H)   eAG, EST AVG Glucose mg/dl 223 212 177 169   (H): Data is abnormally high     Latest Reference Range & Units 03/07/24 20:55 09/18/24 07:57   EXT Creatinine Urine Reference range not established. mg/dL 54.9 83.1   Albumin Creat Ratio 0 - 30 mg/g creatinine 13 See Comment   Albumin,U,Random <20.0 mg/L 7.2 <7.0      Latest Reference Range & Units 05/15/24 07:55 09/18/24 07:57   Cholesterol See Comment mg/dL 161 139   Triglycerides See Comment mg/dL 142 197 (H)   HDL >=40 mg/dL 46 38 (L)   Non-HDL Cholesterol mg/dl  101   LDL Calculated 0 - 100 mg/dL 87 62 "   (H): Data is abnormally high  (L): Data is abnormally low    Impression:  1. Type 2 diabetes mellitus with other specified complication, with long-term current use of insulin (HCC)    2. Type 2 diabetes mellitus with hyperglycemia, with long-term current use of insulin (HCC)    3. Obesity, morbid (HCC)    4. Primary hypertension    5. Coronary artery disease involving native coronary artery of native heart without angina pectoris    6. Mixed hyperlipidemia             Plan:    Diagnoses and all orders for this visit:    Type 2 diabetes mellitus with other specified complication, with long-term current use of insulin (HCC)    Type 2 diabetes mellitus with hyperglycemia, with long-term current use of insulin (HCC)    Obesity, morbid (HCC)    Primary hypertension    Coronary artery disease involving native coronary artery of native heart without angina pectoris    Mixed hyperlipidemia        There are no diagnoses linked to this encounter.      Patient is a 88yM With PMHx of T2DM since 1983 With any complications CAD with Other PMHx of htn, hlp, COPD, class 1 obesity Who presents today for diabetic care.     1) T2DM:- patients A1C is higher than before at 8.9% recently 04/25 possibly d/t being on steroids for COPD exacerbation this month. Was hospitalized from 04/07 until 04/14. Currently on basal insulin, prandin and jardiance. Did not tolerate trulicity or metformin in past. Currently having PPH with pednisone use and hence would recommend starting on NPH in AM to help with prednisone effect and can use additional lispro per scale for correction. Will not adjust his basal insulin for now. Discussed once off prednisone can d/c NPH and only remain on Lispro per scale. Send BG logs in 6-8 weeks       Plan   - c/w lantus 35/40  - c/w Lispro scale  - Start NPH 20u AM for 3-4 days, 10u after for 3-4 days and then stop  - c.w jardiance 10mg   - c/w CGM  - labs in 3 months     Screening:-   Retinopathy- UTD  Nephropathy-  Will order for next visit  Lipide levels- Will order for next visit    2) Hypertension:- BP in clinic 116/70, currently on losartan    3) Hyperlipidemia:- LDL <100, c/w statin     RTC in 3 months     Discussed with the patient and all questioned fully answered. He will call me if any problems arise.    Counseled patient on diagnostic results, prognosis, risk and benefit of treatment options, instruction for management, importance of treatment compliance, Risk  factor reduction and impressions    I have spent a total time of 50 minutes in caring for this patient on the day of the visit/encounter including Patient and family education, Importance of tx compliance, Impressions, Documenting in the medical record, and Obtaining or reviewing history   Post hospital follow up with high acuity and time spend chart reviewing hospital notes and assessment and plan for safe post hospitalization disease management.   Nighat Lemos MD

## 2025-04-19 ENCOUNTER — NURSE TRIAGE (OUTPATIENT)
Dept: OTHER | Facility: OTHER | Age: OVER 89
End: 2025-04-19

## 2025-04-19 NOTE — TELEPHONE ENCOUNTER
"Reason for Disposition  • Caller has medicine question only, adult not sick, AND triager answers question    Answer Assessment - Initial Assessment Questions  1. NAME of MEDICINE: \"What medicine(s) are you calling about?\"        Assisted living facility nurse was called and verified the patient has all of his medications. No further needs at this time.    Protocols used: Medication Question Call-Adult-    "

## 2025-04-19 NOTE — TELEPHONE ENCOUNTER
"Regarding: Medication refill request  ----- Message from Tea MONZON sent at 4/19/2025  9:12 AM EDT -----  \"I was supposed to get my refill for my insulin and my doctor sent it yesterday, but the pharmacy here is saying they never got it.\"    Medication:  insulin lispro (HumaLOG KwikPen) 100 units/mL injection pen  insulin isophane (HumuLIN N KwikPen) 100 units/mL injection pen    [id number]   [address] [place] [address]  Phone: [phone]  Fax: [phone]    "

## 2025-04-24 ENCOUNTER — OFFICE VISIT (OUTPATIENT)
Age: OVER 89
End: 2025-04-24
Attending: INTERNAL MEDICINE
Payer: COMMERCIAL

## 2025-04-24 VITALS
HEIGHT: 68 IN | WEIGHT: 223.4 LBS | TEMPERATURE: 99.3 F | SYSTOLIC BLOOD PRESSURE: 112 MMHG | BODY MASS INDEX: 33.86 KG/M2 | DIASTOLIC BLOOD PRESSURE: 60 MMHG | OXYGEN SATURATION: 93 % | HEART RATE: 59 BPM

## 2025-04-24 DIAGNOSIS — G47.33 OSA (OBSTRUCTIVE SLEEP APNEA): ICD-10-CM

## 2025-04-24 DIAGNOSIS — J96.11 CHRONIC HYPOXIC RESPIRATORY FAILURE (HCC): Primary | ICD-10-CM

## 2025-04-24 DIAGNOSIS — J43.2 CENTRILOBULAR EMPHYSEMA (HCC): ICD-10-CM

## 2025-04-24 DIAGNOSIS — J44.1 COPD EXACERBATION (HCC): ICD-10-CM

## 2025-04-24 PROCEDURE — 99213 OFFICE O/P EST LOW 20 MIN: CPT

## 2025-04-24 PROCEDURE — 94618 PULMONARY STRESS TESTING: CPT

## 2025-04-24 RX ORDER — ALBUTEROL SULFATE 0.83 MG/ML
2.5 SOLUTION RESPIRATORY (INHALATION) EVERY 6 HOURS PRN
Qty: 180 ML | Refills: 5 | Status: ON HOLD | OUTPATIENT
Start: 2025-04-24

## 2025-04-24 NOTE — ASSESSMENT & PLAN NOTE
He was discharged home from hospital on 3 L with both exertion and rest.  Prior to hospitalization patient was not maintained on any oxygen  Will complete 6-minute walk in office today to reassess oxygen demands  Patient was initially saturating 92% on room air at rest, and then desaturated to 86% on exertion. Patient was then placed on 2L of oxygen by nasal cannula and then made to ambulate, oxygen saturations remained at 90% on exertion with the 2L. Patient ambulated 108 meters total.  Discussed with the patient to continue with 2 L of oxygen by nasal cannula with exertion.  Placed order for home oxygen with portability and portable concentrator  Orders:    POCT Oxygen Titration    Home Oxygen with Portability    Portable Concentrators

## 2025-04-24 NOTE — PROGRESS NOTES
Follow-up  Visit - Pulmonary Medicine   Name: Kyler Hernandez      : 1935      MRN: [id number]  Encounter Provider: CONCEPCION Reed  Encounter Date: 2025   [facility]  :  Assessment & Plan  Chronic hypoxic respiratory failure (HCC)  He was discharged home from hospital on 3 L with both exertion and rest.  Prior to hospitalization patient was not maintained on any oxygen  Will complete 6-minute walk in office today to reassess oxygen demands  Patient was initially saturating 92% on room air at rest, and then desaturated to 86% on exertion. Patient was then placed on 2L of oxygen by nasal cannula and then made to ambulate, oxygen saturations remained at 90% on exertion with the 2L. Patient ambulated 108 meters total.  Discussed with the patient to continue with 2 L of oxygen by nasal cannula with exertion.  Placed order for home oxygen with portability and portable concentrator  Orders:    POCT Oxygen Titration    Home Oxygen with Portability    Portable Concentrators    Centrilobular emphysema (HCC)  Emphysema noted on prior CT chest imaging.  Patient with no history of PFTs.  Given his age, would not recommend due to likelihood of inaccurate results  Continue Trelegy 200 one puff once daily, albuterol nebulizer/inhaler as needed  Aside from recent hospitalization, patient denies frequent hospitalizations or need for frequent steroids.  We discussed the Trelegy is maintaining his symptoms despite him not feeling any benefit  Orders:    albuterol (2.5 mg/3 mL) 0.083 % nebulizer solution; Take 3 mL (2.5 mg total) by nebulization every 6 (six) hours as needed for wheezing or shortness of breath 1 vial q 4 hrs prn    COPD exacerbation (HCC)  COPD of unknown severity, no longer in acute exacerbation  Plan as above  Orders:    Ambulatory referral to Pulmonology    LINCOLN (obstructive sleep apnea)  Pending diagnostic sleep study, scheduled 25            Return in about 6 months (around 10/24/2025) for Next scheduled follow up.    History of Present Illness   Kyler Hernandez is a 89 y.o. male who presents for hospitalization.  He was hospitalized 4/7/25 - 4/14/25 with COPD exacerbation secondary to parainfluenza infection.  He was treated with IV steroids, nebulizer, oxygen. He was discharged home on 3 L of oxygen with rest and exertion. Was also discharged home with prednisone taper.    He resides at [facility].  He is compliant with his Trelegy, states he has been taking it since 2007 and does not note any difference with taking it.  Does have albuterol inhaler and nebulizer which he uses nebulizer once daily in mornings. He denies any chest pain, chest tightness or wheezing.  Reports an occasional nonproductive cough.  Does state he has some chest congestion. He reports some exertional dyspnea.    He is a former smoker.  He smoked 2.5ppd for 23 years.  He quit 36 years ago in 1988.      Review of Systems   Constitutional:  Negative for chills and fever.   HENT:  Negative for ear pain and sore throat.    Eyes:  Negative for pain and visual disturbance.   Respiratory:  Positive for cough (occasional) and shortness of breath (with exertion). Negative for chest tightness and wheezing.    Cardiovascular:  Negative for chest pain and palpitations.   Gastrointestinal:  Negative for abdominal pain and vomiting.   Musculoskeletal:  Negative for arthralgias and back pain.   Skin:  Negative for color change and rash.   Neurological:  Negative for dizziness and syncope.   All other systems reviewed and are negative.      Aside from what is mentioned in the HPI, ROS is otherwise negative    Medical History Reviewed by provider this encounter:  Tobacco  Allergies  Meds  Problems  Med Hx  Surg Hx  Fam Hx     .  Current Outpatient Medications on File Prior to Visit   Medication Sig Dispense Refill    acetaminophen (TYLENOL) 325 mg tablet 2 tabs q 6 hrs prn  pain or fever greater than 100 (Patient taking differently: as needed 2 tabs q 6 hrs prn pain or fever greater than 100)      Alcohol Swabs (Alcohol Prep Pad) 70 % PADS As directed      alfuzosin (UROXATRAL) 10 mg 24 hr tablet 1 TAB ORALLY EVERY MORNING DX: BPH 30 tablet 5    amLODIPine (NORVASC) 10 mg tablet 1 TAB ORALLY EVERY MORNING DX: HYPERTENSION 30 tablet 5    aspirin (Aspirin Low Dose) 81 mg EC tablet 1 TAB ORALLY EVERY MORNING DX: STROKE PREVENTION 30 tablet 5    atenolol (TENORMIN) 50 mg tablet 1 TAB ORALLY TWICE DAILY DX: HYPERTENSION 60 tablet 4    atorvastatin (LIPITOR) 80 mg tablet 1 TAB ORALLY AT BEDTIME DX:HYPERLIPIDEMIA 30 tablet 10    Cholecalciferol (Vitamin D3) 25 MCG (1000 UT) CAPS Take 1 capsule (1,000 Units total) by mouth in the morning 1 daily 30 capsule 11    Continuous Glucose  (Dexcom G7 ) ZAYNAB USE AS DIRECTED PER  DIRECTIONS      Continuous Glucose Sensor (Dexcom G7 Sensor) USE PER  DIRECTIONS. CHANGE SENSOR EVERY 10 DAYS. 9 each 1    Cyanocobalamin (Vitamin B-12) 2500 MCG SUBL 1 TAB UNDER THE TONGUE 5 TIMES/WK (OMIT SA,WESTON) AT 8:30AM DX: SUPPLEMENT *COLD SEAL* 20 tablet 4    cycloSPORINE (RESTASIS) 0.05 % ophthalmic emulsion Administer to both eyes Every 12 hours      Empagliflozin (Jardiance) 10 MG TABS tablet 1 TAB ORALLY EVERY MORNING DX: DIABETES (IDDM) 30 tablet 5    finasteride (PROSCAR) 5 mg tablet 1 TAB ORALLY EVERY MORNING DX: BPH **NIOSH 3 HAZARDOUS DRUG** 30 tablet 5    fluticasone (FLONASE) 50 mcg/act nasal spray every 24 hours      fluticasone-umeclidinium-vilanterol 200-62.5-25 mcg/actuation AEPB inhaler Inhale 1 puff daily Rinse mouth after use. 1 each 5    Insulin Glargine Solostar (Lantus SoloStar) 100 UNIT/ML SOPN INJECT 35U SUB-Q EVERY MORNING DX: DIABETES DX: **DISCARD UNUSED PEN 28 DAYS AFTER 1ST USE** INJECT 40 UNITS SUB-Q AT BEDTIME DX; DIABETES 15 mL 5    insulin lispro (HumaLOG KwikPen) 100 units/mL injection pen Inject  1-10 Units under the skin 3 (three) times a day with meals MDD 30u, per scale. 150-200=1u, 200-250 =2u, 250-300= 3u, 300-350=4u, >350 = 6u 15 mL 1    Insulin Pen Needle (BD AutoShield Duo) 30G X 5 MM MISC USE FOR INSULIN INJECTION 3 TIMES DAILY 100 each 1    ipratropium-albuterol (DUO-NEB) 0.5-2.5 mg/3 mL nebulizer solution 4 times daily prn wheezing (Patient taking differently: as needed 4 times daily prn wheezing)      isosorbide mononitrate (IMDUR) 60 mg 24 hr tablet 1 TAB ORALLY EVERY MORNING DX:ANGINA 30 tablet 5    loperamide (IMODIUM) 2 mg capsule 1 CAP ORALLY DAILY AS NEEDED FOR DIARRHEA *NOT TO EXCEED 8 CAPS/24 HR* (Patient taking differently: as needed) 30 capsule 4    losartan (COZAAR) 100 MG tablet 1 TAB ORALLY EVERY MORNING DX: HYPERTENSION 30 tablet 10    Multiple Vitamin (MULTIVITAMIN ADULT PO) every 24 hours      NovoFine Autocover Pen Needle 30G X 8 MM MISC 2 pen needles daily      Nutritional Supplements (Ensure) Take by mouth 1 can daily      nystatin (MYCOSTATIN) powder Apply topically 2 (two) times a day 15 g 0    pantoprazole (PROTONIX) 40 mg tablet 1 TAB ORALLY EVERY MORNING DX: GERD 30 tablet 5    repaglinide (PRANDIN) 0.5 mg tablet Take 1 tablet with breakfast and 1 tablet with dinner, hold if not eating or blood sugar less than 100 180 tablet 1    torsemide (DEMADEX) 20 mg tablet Take 1 tablet (20 mg total) by mouth every morning 30 tablet 5    [DISCONTINUED] albuterol (2.5 mg/3 mL) 0.083 % nebulizer solution 1 vial q 4 hrs prn (Patient taking differently: as needed 1 vial q 4 hrs prn)      insulin isophane (HumuLIN N KwikPen) 100 units/mL injection pen Inject 20 Units under the skin in the morning Taper schedule, 20u for 4 days and then 10u for 4 days and then stop once off prednisone (Patient not taking: Reported on 4/24/2025) 15 mL 0    Multiple Vitamin (Daily-Jhonatan) TABS 1 TAB ORALLY EVERY MORNING DX: SUPPLEMENT (Patient not taking: Reported on 4/18/2025) 30 tablet 5    predniSONE 20 mg  "tablet Take 1.5 tablets (30 mg total) by mouth daily for 3 days, THEN 1 tablet (20 mg total) daily for 3 days, THEN 0.5 tablets (10 mg total) daily for 3 days. (Patient not taking: Reported on 2025) 9 tablet 0    valACYclovir (VALTREX) 1,000 mg tablet Take 1 tablet (1,000 mg total) by mouth 2 (two) times a day for 2 days (Patient not taking: Reported on 2025) 4 tablet 0     No current facility-administered medications on file prior to visit.      Social History     Tobacco Use    Smoking status: Former     Types: Cigarettes     Start date:      Quit date:      Years since quittin.3    Smokeless tobacco: Never   Vaping Use    Vaping status: Never Used   Substance and Sexual Activity    Alcohol use: Yes     Comment: occasional whiskey sour    Drug use: Never    Sexual activity: Not Currently        Medical History Reviewed by provider this encounter:  Tobacco  Allergies  Meds  Problems  Med Hx  Surg Hx  Fam Hx     .    Objective   /60 (BP Location: Right arm, Patient Position: Sitting, Cuff Size: Standard)   Pulse 59   Temp 99.3 °F (37.4 °C) (Tympanic)   Ht 5' 8\" (1.727 m)   Wt 101 kg (223 lb 6.4 oz)   SpO2 93%   BMI 33.97 kg/m²     Physical Exam  Vitals and nursing note reviewed.   Constitutional:       General: He is not in acute distress.     Appearance: He is well-developed.   HENT:      Head: Normocephalic and atraumatic.   Eyes:      Conjunctiva/sclera: Conjunctivae normal.   Cardiovascular:      Rate and Rhythm: Normal rate and regular rhythm.      Heart sounds: No murmur heard.  Pulmonary:      Effort: Pulmonary effort is normal. No respiratory distress.      Breath sounds: Decreased breath sounds present. No wheezing or rhonchi.   Abdominal:      General: Abdomen is flat.   Musculoskeletal:         General: No swelling. Normal range of motion.      Cervical back: Neck supple.   Skin:     General: Skin is warm and dry.      Capillary Refill: Capillary refill takes less " "than 2 seconds.   Neurological:      General: No focal deficit present.      Mental Status: He is alert and oriented to person, place, and time.   Psychiatric:         Mood and Affect: Mood normal.           Diagnostic Data:  Labs: I personally reviewed the most recent laboratory data pertinent to today's visit.      Radiology results:  Radiology Results Review: I have reviewed radiology reports from 11/15/17 including: CT chest.  Changes of mild centrilobular emphysema with greater involvement   in the upper lobes.   Mild cylindrical bronchiectasis with bronchial wall thickening in   the right middle lobe and both lower lobes. Small areas of scarring   in right middle lobe and both lower lobes.   No nodules or masses. No areas of lung consolidation.     PFT/spirometry results:  No results found for: \"FEV1\", \"FVC\", \"UJA5KCE\", \"TLC\", \"DLCO\"       Oximetry testing:  As above      DANIEL Haque RN FNP-BC  Nurse Practitioner  [facility]      "

## 2025-04-25 ENCOUNTER — TELEPHONE (OUTPATIENT)
Age: OVER 89
End: 2025-04-25

## 2025-04-25 NOTE — PROGRESS NOTES
Home Oxygen with Portability/POC order placed with [facility] via [facility].  Ordered as a set up since Pt was d/c from hosp with o2 but will revise if needed.

## 2025-04-25 NOTE — TELEPHONE ENCOUNTER
Nursing home calling asking if we can fax patient new sliding scale to them. Medication order for lispro was sent as it has sliding scale directions listed. Fax number [phone].

## 2025-04-28 ENCOUNTER — TELEPHONE (OUTPATIENT)
Age: OVER 89
End: 2025-04-28

## 2025-04-28 DIAGNOSIS — J44.9 CHRONIC OBSTRUCTIVE PULMONARY DISEASE, UNSPECIFIED COPD TYPE (HCC): ICD-10-CM

## 2025-04-28 NOTE — TELEPHONE ENCOUNTER
Called Pt and LM on VM.  [facility] has been trying to get Juan for scheduling. MA left the number provided to reach the Scheduling Team at [facility] [phone]        Left Office contact info as well if he has further questions. Pt resides in [address]. Mobile number is Pt's phone, Home number in chart is Facility.

## 2025-04-28 NOTE — TELEPHONE ENCOUNTER
Patient calling stating he has been without oxygen for 3 days now.  He stated it was supposed to be delivered yesterday from [facility].    Patient requesting someone check into this for him ASAP

## 2025-04-28 NOTE — TELEPHONE ENCOUNTER
Patient called to speak to someone about his oxygen as a POC was to be ordered and patient has been with no oxygen for three days now. I placed the patient on hold to check if one of our medical assistants were available, but the call ended as I was doing so. As per Joe, [employer] is trying to reach out to him to deliver the oxygen and schedule a delivery. Their number is [phone]. Avery states he will reach out to the patient.      Thank you.

## 2025-04-28 NOTE — TELEPHONE ENCOUNTER
Pt called refill line checking on status of orders for O2 and portable concentrators. He states he has been out of O2 for 3 days now. I tried warm transferring to office as I am unfamiliar with the process. Pt disconnected call while on hold. Please call pt back and explain process and what next step is at [phone]

## 2025-04-28 NOTE — TELEPHONE ENCOUNTER
Patient called to request a refill for their  Trelegy Ellipta 200-62.5-25mcg/act advised a refill was requested on 04/28/25 and is pending approval. Patient verbalized understanding and is in agreement.     Does the patient have enough for 3 days?   [x] Yes   [] No - Send as HP to POD

## 2025-04-29 LAB
DME PARACHUTE DELIVERY DATE ACTUAL: NORMAL
DME PARACHUTE DELIVERY DATE EXPECTED: NORMAL
DME PARACHUTE DELIVERY DATE REQUESTED: NORMAL
DME PARACHUTE ITEM DESCRIPTION: NORMAL
DME PARACHUTE ORDER STATUS: NORMAL
DME PARACHUTE SUPPLIER NAME: NORMAL
DME PARACHUTE SUPPLIER PHONE: NORMAL

## 2025-04-29 RX ORDER — FLUTICASONE FUROATE, UMECLIDINIUM BROMIDE AND VILANTEROL TRIFENATATE 200; 62.5; 25 UG/1; UG/1; UG/1
POWDER RESPIRATORY (INHALATION)
Qty: 60 BLISTER | Refills: 0 | Status: ON HOLD | OUTPATIENT
Start: 2025-04-29

## 2025-04-29 NOTE — TELEPHONE ENCOUNTER
[address] following up on trelegy inhaler. Pharmacy is sending electronic request for refill.

## 2025-05-01 DIAGNOSIS — Z79.4 TYPE 2 DIABETES MELLITUS WITH HYPERGLYCEMIA, WITH LONG-TERM CURRENT USE OF INSULIN (HCC): ICD-10-CM

## 2025-05-01 DIAGNOSIS — E11.65 TYPE 2 DIABETES MELLITUS WITH HYPERGLYCEMIA, WITH LONG-TERM CURRENT USE OF INSULIN (HCC): ICD-10-CM

## 2025-05-02 RX ORDER — PEN NEEDLE, DIABETIC, SAFETY 30 GX3/16"
NEEDLE, DISPOSABLE MISCELLANEOUS
Qty: 100 EACH | Refills: 1 | Status: ON HOLD | OUTPATIENT
Start: 2025-05-02

## 2025-05-02 RX ORDER — CYCLOSPORINE 0.5 MG/ML
EMULSION OPHTHALMIC
Refills: 0 | OUTPATIENT
Start: 2025-05-02

## 2025-05-02 NOTE — TELEPHONE ENCOUNTER
I do not manage eyedrops.  I asked [address] to have eyedrops prescribed by patient's ophthalmologist

## 2025-05-03 DIAGNOSIS — H04.123 DRY EYE SYNDROME OF BOTH EYES: Primary | ICD-10-CM

## 2025-05-04 ENCOUNTER — HOSPITAL ENCOUNTER (INPATIENT)
Facility: HOSPITAL | Age: OVER 89
LOS: 5 days | Discharge: HOME HEALTH | DRG: 177 | End: 2025-05-09
Attending: EMERGENCY MEDICINE | Admitting: STUDENT IN AN ORGANIZED HEALTH CARE EDUCATION/TRAINING PROGRAM
Payer: COMMERCIAL

## 2025-05-04 ENCOUNTER — APPOINTMENT (EMERGENCY)
Dept: CT IMAGING | Facility: HOSPITAL | Age: OVER 89
DRG: 177 | End: 2025-05-04
Payer: COMMERCIAL

## 2025-05-04 ENCOUNTER — APPOINTMENT (EMERGENCY)
Dept: RADIOLOGY | Facility: HOSPITAL | Age: OVER 89
DRG: 177 | End: 2025-05-04
Payer: COMMERCIAL

## 2025-05-04 DIAGNOSIS — J44.1 COPD WITH ACUTE EXACERBATION (HCC): ICD-10-CM

## 2025-05-04 DIAGNOSIS — R13.10 DYSPHAGIA: ICD-10-CM

## 2025-05-04 DIAGNOSIS — H10.9 BILATERAL CONJUNCTIVITIS: ICD-10-CM

## 2025-05-04 DIAGNOSIS — J40 BRONCHITIS: ICD-10-CM

## 2025-05-04 DIAGNOSIS — R06.03 RESPIRATORY DISTRESS: ICD-10-CM

## 2025-05-04 DIAGNOSIS — H10.33 ACUTE BACTERIAL CONJUNCTIVITIS OF BOTH EYES: ICD-10-CM

## 2025-05-04 DIAGNOSIS — I49.9 ARRHYTHMIA: ICD-10-CM

## 2025-05-04 DIAGNOSIS — T17.800A ASPIRATION INTO LOWER RESPIRATORY TRACT, INITIAL ENCOUNTER: ICD-10-CM

## 2025-05-04 DIAGNOSIS — J18.9 PNEUMONIA: Primary | ICD-10-CM

## 2025-05-04 PROBLEM — N18.31 ACUTE KIDNEY INJURY SUPERIMPOSED ON STAGE 3A CHRONIC KIDNEY DISEASE (HCC): Status: ACTIVE | Noted: 2025-05-04

## 2025-05-04 PROBLEM — N17.9 ACUTE KIDNEY INJURY SUPERIMPOSED ON STAGE 3A CHRONIC KIDNEY DISEASE (HCC): Status: ACTIVE | Noted: 2025-05-04

## 2025-05-04 PROBLEM — N40.0 BENIGN PROSTATIC HYPERPLASIA WITHOUT LOWER URINARY TRACT SYMPTOMS: Status: ACTIVE | Noted: 2025-05-04

## 2025-05-04 LAB
2HR DELTA HS TROPONIN: -1 NG/L
ALBUMIN SERPL BCG-MCNC: 3.4 G/DL (ref 3.5–5)
ALP SERPL-CCNC: 74 U/L (ref 34–104)
ALT SERPL W P-5'-P-CCNC: 28 U/L (ref 7–52)
ANION GAP SERPL CALCULATED.3IONS-SCNC: 8 MMOL/L (ref 4–13)
APTT PPP: 30 SECONDS (ref 23–34)
AST SERPL W P-5'-P-CCNC: 36 U/L (ref 13–39)
ATRIAL RATE: 80 BPM
ATRIAL RATE: 83 BPM
BACTERIA UR QL AUTO: ABNORMAL /HPF
BASOPHILS # BLD AUTO: 0.01 THOUSANDS/ÂΜL (ref 0–0.1)
BASOPHILS NFR BLD AUTO: 0 % (ref 0–1)
BILIRUB SERPL-MCNC: 0.69 MG/DL (ref 0.2–1)
BILIRUB UR QL STRIP: NEGATIVE
BNP SERPL-MCNC: 201 PG/ML (ref 0–100)
BUN SERPL-MCNC: 25 MG/DL (ref 5–25)
CALCIUM ALBUM COR SERPL-MCNC: 9 MG/DL (ref 8.3–10.1)
CALCIUM SERPL-MCNC: 8.5 MG/DL (ref 8.4–10.2)
CARDIAC TROPONIN I PNL SERPL HS: 10 NG/L (ref ?–50)
CARDIAC TROPONIN I PNL SERPL HS: 11 NG/L (ref ?–50)
CHLORIDE SERPL-SCNC: 100 MMOL/L (ref 96–108)
CLARITY UR: CLEAR
CO2 SERPL-SCNC: 31 MMOL/L (ref 21–32)
COLOR UR: YELLOW
CREAT SERPL-MCNC: 1.65 MG/DL (ref 0.6–1.3)
D DIMER PPP FEU-MCNC: 0.58 UG/ML FEU
EOSINOPHIL # BLD AUTO: 0.09 THOUSAND/ÂΜL (ref 0–0.61)
EOSINOPHIL NFR BLD AUTO: 1 % (ref 0–6)
ERYTHROCYTE [DISTWIDTH] IN BLOOD BY AUTOMATED COUNT: 15.9 % (ref 11.6–15.1)
FLUAV RNA RESP QL NAA+PROBE: NEGATIVE
FLUBV RNA RESP QL NAA+PROBE: NEGATIVE
GFR SERPL CREATININE-BSD FRML MDRD: 36 ML/MIN/1.73SQ M
GLUCOSE SERPL-MCNC: 134 MG/DL (ref 65–140)
GLUCOSE SERPL-MCNC: 208 MG/DL (ref 65–140)
GLUCOSE UR STRIP-MCNC: ABNORMAL MG/DL
HCT VFR BLD AUTO: 35.2 % (ref 36.5–49.3)
HGB BLD-MCNC: 11.2 G/DL (ref 12–17)
HGB UR QL STRIP.AUTO: NEGATIVE
IMM GRANULOCYTES # BLD AUTO: 0.11 THOUSAND/UL (ref 0–0.2)
IMM GRANULOCYTES NFR BLD AUTO: 1 % (ref 0–2)
INR PPP: 1.15 (ref 0.85–1.19)
KETONES UR STRIP-MCNC: NEGATIVE MG/DL
LACTATE SERPL-SCNC: 1 MMOL/L (ref 0.5–2)
LEUKOCYTE ESTERASE UR QL STRIP: NEGATIVE
LYMPHOCYTES # BLD AUTO: 0.54 THOUSANDS/ÂΜL (ref 0.6–4.47)
LYMPHOCYTES NFR BLD AUTO: 7 % (ref 14–44)
MAGNESIUM SERPL-MCNC: 2.5 MG/DL (ref 1.9–2.7)
MCH RBC QN AUTO: 29.6 PG (ref 26.8–34.3)
MCHC RBC AUTO-ENTMCNC: 31.8 G/DL (ref 31.4–37.4)
MCV RBC AUTO: 93 FL (ref 82–98)
MONOCYTES # BLD AUTO: 1.34 THOUSAND/ÂΜL (ref 0.17–1.22)
MONOCYTES NFR BLD AUTO: 17 % (ref 4–12)
MUCOUS THREADS UR QL AUTO: ABNORMAL
NEUTROPHILS # BLD AUTO: 5.63 THOUSANDS/ÂΜL (ref 1.85–7.62)
NEUTS SEG NFR BLD AUTO: 74 % (ref 43–75)
NITRITE UR QL STRIP: NEGATIVE
NON-SQ EPI CELLS URNS QL MICRO: ABNORMAL /HPF
NRBC BLD AUTO-RTO: 0 /100 WBCS
P AXIS: 52 DEGREES
P AXIS: 55 DEGREES
PH UR STRIP.AUTO: 5 [PH]
PLATELET # BLD AUTO: 168 THOUSANDS/UL (ref 149–390)
PMV BLD AUTO: 9.5 FL (ref 8.9–12.7)
POTASSIUM SERPL-SCNC: 4.4 MMOL/L (ref 3.5–5.3)
PR INTERVAL: 296 MS
PR INTERVAL: 296 MS
PROCALCITONIN SERPL-MCNC: 0.15 NG/ML
PROT SERPL-MCNC: 7.1 G/DL (ref 6.4–8.4)
PROT UR STRIP-MCNC: NEGATIVE MG/DL
PROTHROMBIN TIME: 15.2 SECONDS (ref 12.3–15)
QRS AXIS: 47 DEGREES
QRS AXIS: 49 DEGREES
QRSD INTERVAL: 84 MS
QRSD INTERVAL: 86 MS
QT INTERVAL: 362 MS
QT INTERVAL: 364 MS
QTC INTERVAL: 419 MS
QTC INTERVAL: 425 MS
RBC # BLD AUTO: 3.78 MILLION/UL (ref 3.88–5.62)
RBC #/AREA URNS AUTO: ABNORMAL /HPF
RSV RNA RESP QL NAA+PROBE: NEGATIVE
SARS-COV-2 RNA RESP QL NAA+PROBE: NEGATIVE
SODIUM SERPL-SCNC: 139 MMOL/L (ref 135–147)
SP GR UR STRIP.AUTO: <1.005 (ref 1–1.03)
T WAVE AXIS: 55 DEGREES
T WAVE AXIS: 56 DEGREES
UROBILINOGEN UR STRIP-ACNC: <2 MG/DL
VENTRICULAR RATE: 80 BPM
VENTRICULAR RATE: 83 BPM
WBC # BLD AUTO: 7.72 THOUSAND/UL (ref 4.31–10.16)
WBC #/AREA URNS AUTO: ABNORMAL /HPF

## 2025-05-04 PROCEDURE — 96366 THER/PROPH/DIAG IV INF ADDON: CPT

## 2025-05-04 PROCEDURE — 96375 TX/PRO/DX INJ NEW DRUG ADDON: CPT

## 2025-05-04 PROCEDURE — 85610 PROTHROMBIN TIME: CPT | Performed by: EMERGENCY MEDICINE

## 2025-05-04 PROCEDURE — 82948 REAGENT STRIP/BLOOD GLUCOSE: CPT

## 2025-05-04 PROCEDURE — 71275 CT ANGIOGRAPHY CHEST: CPT

## 2025-05-04 PROCEDURE — 71045 X-RAY EXAM CHEST 1 VIEW: CPT

## 2025-05-04 PROCEDURE — 0241U HB NFCT DS VIR RESP RNA 4 TRGT: CPT | Performed by: EMERGENCY MEDICINE

## 2025-05-04 PROCEDURE — 93010 ELECTROCARDIOGRAM REPORT: CPT | Performed by: INTERNAL MEDICINE

## 2025-05-04 PROCEDURE — 81001 URINALYSIS AUTO W/SCOPE: CPT

## 2025-05-04 PROCEDURE — 94002 VENT MGMT INPAT INIT DAY: CPT

## 2025-05-04 PROCEDURE — 36415 COLL VENOUS BLD VENIPUNCTURE: CPT | Performed by: EMERGENCY MEDICINE

## 2025-05-04 PROCEDURE — 85730 THROMBOPLASTIN TIME PARTIAL: CPT | Performed by: EMERGENCY MEDICINE

## 2025-05-04 PROCEDURE — 80053 COMPREHEN METABOLIC PANEL: CPT | Performed by: EMERGENCY MEDICINE

## 2025-05-04 PROCEDURE — 94760 N-INVAS EAR/PLS OXIMETRY 1: CPT

## 2025-05-04 PROCEDURE — 84145 PROCALCITONIN (PCT): CPT | Performed by: EMERGENCY MEDICINE

## 2025-05-04 PROCEDURE — 87040 BLOOD CULTURE FOR BACTERIA: CPT | Performed by: EMERGENCY MEDICINE

## 2025-05-04 PROCEDURE — 99285 EMERGENCY DEPT VISIT HI MDM: CPT

## 2025-05-04 PROCEDURE — 87081 CULTURE SCREEN ONLY: CPT | Performed by: STUDENT IN AN ORGANIZED HEALTH CARE EDUCATION/TRAINING PROGRAM

## 2025-05-04 PROCEDURE — 84484 ASSAY OF TROPONIN QUANT: CPT | Performed by: EMERGENCY MEDICINE

## 2025-05-04 PROCEDURE — 94640 AIRWAY INHALATION TREATMENT: CPT

## 2025-05-04 PROCEDURE — 83735 ASSAY OF MAGNESIUM: CPT | Performed by: EMERGENCY MEDICINE

## 2025-05-04 PROCEDURE — 85025 COMPLETE CBC W/AUTO DIFF WBC: CPT | Performed by: EMERGENCY MEDICINE

## 2025-05-04 PROCEDURE — 85379 FIBRIN DEGRADATION QUANT: CPT | Performed by: EMERGENCY MEDICINE

## 2025-05-04 PROCEDURE — 83880 ASSAY OF NATRIURETIC PEPTIDE: CPT | Performed by: EMERGENCY MEDICINE

## 2025-05-04 PROCEDURE — 96365 THER/PROPH/DIAG IV INF INIT: CPT

## 2025-05-04 PROCEDURE — 94644 CONT INHLJ TX 1ST HOUR: CPT

## 2025-05-04 PROCEDURE — 99285 EMERGENCY DEPT VISIT HI MDM: CPT | Performed by: EMERGENCY MEDICINE

## 2025-05-04 PROCEDURE — 83605 ASSAY OF LACTIC ACID: CPT | Performed by: EMERGENCY MEDICINE

## 2025-05-04 PROCEDURE — 99223 1ST HOSP IP/OBS HIGH 75: CPT

## 2025-05-04 PROCEDURE — 93005 ELECTROCARDIOGRAM TRACING: CPT

## 2025-05-04 RX ORDER — ACETAMINOPHEN 325 MG/1
650 TABLET ORAL EVERY 6 HOURS PRN
Status: DISCONTINUED | OUTPATIENT
Start: 2025-05-04 | End: 2025-05-09 | Stop reason: HOSPADM

## 2025-05-04 RX ORDER — ATORVASTATIN CALCIUM 40 MG/1
80 TABLET, FILM COATED ORAL
Status: DISCONTINUED | OUTPATIENT
Start: 2025-05-04 | End: 2025-05-09 | Stop reason: HOSPADM

## 2025-05-04 RX ORDER — HEPARIN SODIUM 5000 [USP'U]/ML
5000 INJECTION, SOLUTION INTRAVENOUS; SUBCUTANEOUS EVERY 8 HOURS SCHEDULED
Status: DISCONTINUED | OUTPATIENT
Start: 2025-05-04 | End: 2025-05-09 | Stop reason: HOSPADM

## 2025-05-04 RX ORDER — SODIUM CHLORIDE FOR INHALATION 0.9 %
3 VIAL, NEBULIZER (ML) INHALATION
Status: DISCONTINUED | OUTPATIENT
Start: 2025-05-04 | End: 2025-05-04

## 2025-05-04 RX ORDER — POLYETHYLENE GLYCOL 3350 17 G/17G
17 POWDER, FOR SOLUTION ORAL DAILY PRN
Status: DISCONTINUED | OUTPATIENT
Start: 2025-05-04 | End: 2025-05-09 | Stop reason: HOSPADM

## 2025-05-04 RX ORDER — SODIUM CHLORIDE FOR INHALATION 0.9 %
3 VIAL, NEBULIZER (ML) INHALATION ONCE
Status: COMPLETED | OUTPATIENT
Start: 2025-05-04 | End: 2025-05-04

## 2025-05-04 RX ORDER — SODIUM CHLORIDE 9 MG/ML
3 INJECTION INTRAVENOUS EVERY 8 HOURS SCHEDULED
Status: DISCONTINUED | OUTPATIENT
Start: 2025-05-04 | End: 2025-05-09 | Stop reason: HOSPADM

## 2025-05-04 RX ORDER — ALBUTEROL SULFATE 5 MG/ML
10 SOLUTION RESPIRATORY (INHALATION) ONCE
Status: COMPLETED | OUTPATIENT
Start: 2025-05-04 | End: 2025-05-04

## 2025-05-04 RX ORDER — CEFTRIAXONE 1 G/50ML
1000 INJECTION, SOLUTION INTRAVENOUS ONCE
Status: COMPLETED | OUTPATIENT
Start: 2025-05-04 | End: 2025-05-04

## 2025-05-04 RX ORDER — AMLODIPINE BESYLATE 2.5 MG/1
10 TABLET ORAL EVERY MORNING
Status: DISCONTINUED | OUTPATIENT
Start: 2025-05-05 | End: 2025-05-09 | Stop reason: HOSPADM

## 2025-05-04 RX ORDER — GUAIFENESIN/DEXTROMETHORPHAN 100-10MG/5
10 SYRUP ORAL EVERY 4 HOURS PRN
Status: DISCONTINUED | OUTPATIENT
Start: 2025-05-04 | End: 2025-05-09 | Stop reason: HOSPADM

## 2025-05-04 RX ORDER — INSULIN LISPRO 100 [IU]/ML
5 INJECTION, SOLUTION INTRAVENOUS; SUBCUTANEOUS
Status: DISCONTINUED | OUTPATIENT
Start: 2025-05-05 | End: 2025-05-09 | Stop reason: HOSPADM

## 2025-05-04 RX ORDER — FINASTERIDE 5 MG/1
5 TABLET, FILM COATED ORAL DAILY
Status: DISCONTINUED | OUTPATIENT
Start: 2025-05-05 | End: 2025-05-09 | Stop reason: HOSPADM

## 2025-05-04 RX ORDER — ISOSORBIDE MONONITRATE 30 MG/1
60 TABLET, EXTENDED RELEASE ORAL EVERY MORNING
Status: DISCONTINUED | OUTPATIENT
Start: 2025-05-05 | End: 2025-05-09 | Stop reason: HOSPADM

## 2025-05-04 RX ORDER — MAGNESIUM SULFATE HEPTAHYDRATE 40 MG/ML
2 INJECTION, SOLUTION INTRAVENOUS ONCE
Status: COMPLETED | OUTPATIENT
Start: 2025-05-04 | End: 2025-05-04

## 2025-05-04 RX ORDER — PANTOPRAZOLE SODIUM 40 MG/1
40 TABLET, DELAYED RELEASE ORAL EVERY MORNING
Status: DISCONTINUED | OUTPATIENT
Start: 2025-05-05 | End: 2025-05-09 | Stop reason: HOSPADM

## 2025-05-04 RX ORDER — MAGNESIUM HYDROXIDE/ALUMINUM HYDROXICE/SIMETHICONE 120; 1200; 1200 MG/30ML; MG/30ML; MG/30ML
30 SUSPENSION ORAL EVERY 6 HOURS PRN
Status: DISCONTINUED | OUTPATIENT
Start: 2025-05-04 | End: 2025-05-09 | Stop reason: HOSPADM

## 2025-05-04 RX ORDER — INSULIN GLARGINE 100 [IU]/ML
35 INJECTION, SOLUTION SUBCUTANEOUS EVERY 12 HOURS SCHEDULED
Status: DISCONTINUED | OUTPATIENT
Start: 2025-05-04 | End: 2025-05-09 | Stop reason: HOSPADM

## 2025-05-04 RX ORDER — SODIUM CHLORIDE, SODIUM GLUCONATE, SODIUM ACETATE, POTASSIUM CHLORIDE, MAGNESIUM CHLORIDE, SODIUM PHOSPHATE, DIBASIC, AND POTASSIUM PHOSPHATE .53; .5; .37; .037; .03; .012; .00082 G/100ML; G/100ML; G/100ML; G/100ML; G/100ML; G/100ML; G/100ML
50 INJECTION, SOLUTION INTRAVENOUS CONTINUOUS
Status: DISCONTINUED | OUTPATIENT
Start: 2025-05-04 | End: 2025-05-09 | Stop reason: HOSPADM

## 2025-05-04 RX ORDER — INSULIN LISPRO 100 [IU]/ML
1-6 INJECTION, SOLUTION INTRAVENOUS; SUBCUTANEOUS
Status: DISCONTINUED | OUTPATIENT
Start: 2025-05-04 | End: 2025-05-05

## 2025-05-04 RX ORDER — LEVALBUTEROL INHALATION SOLUTION 1.25 MG/3ML
1.25 SOLUTION RESPIRATORY (INHALATION)
Status: DISCONTINUED | OUTPATIENT
Start: 2025-05-04 | End: 2025-05-09 | Stop reason: HOSPADM

## 2025-05-04 RX ORDER — ASPIRIN 81 MG/1
81 TABLET ORAL EVERY MORNING
Status: DISCONTINUED | OUTPATIENT
Start: 2025-05-05 | End: 2025-05-09 | Stop reason: HOSPADM

## 2025-05-04 RX ORDER — TOBRAMYCIN 3 MG/ML
2 SOLUTION/ DROPS OPHTHALMIC
Status: DISPENSED | OUTPATIENT
Start: 2025-05-04 | End: 2025-05-09

## 2025-05-04 RX ORDER — TAMSULOSIN HYDROCHLORIDE 0.4 MG/1
0.4 CAPSULE ORAL
Status: DISCONTINUED | OUTPATIENT
Start: 2025-05-05 | End: 2025-05-09 | Stop reason: HOSPADM

## 2025-05-04 RX ORDER — FLUTICASONE PROPIONATE 50 MCG
1 SPRAY, SUSPENSION (ML) NASAL DAILY
Status: DISCONTINUED | OUTPATIENT
Start: 2025-05-05 | End: 2025-05-09 | Stop reason: HOSPADM

## 2025-05-04 RX ORDER — METHYLPREDNISOLONE SODIUM SUCCINATE 40 MG/ML
40 INJECTION, POWDER, LYOPHILIZED, FOR SOLUTION INTRAMUSCULAR; INTRAVENOUS EVERY 8 HOURS
Status: DISCONTINUED | OUTPATIENT
Start: 2025-05-05 | End: 2025-05-05

## 2025-05-04 RX ORDER — ATENOLOL 50 MG/1
50 TABLET ORAL DAILY
Status: DISCONTINUED | OUTPATIENT
Start: 2025-05-05 | End: 2025-05-06

## 2025-05-04 RX ORDER — DEXAMETHASONE SODIUM PHOSPHATE 10 MG/ML
10 INJECTION, SOLUTION INTRAMUSCULAR; INTRAVENOUS ONCE
Status: COMPLETED | OUTPATIENT
Start: 2025-05-04 | End: 2025-05-04

## 2025-05-04 RX ADMIN — CEFTRIAXONE 1000 MG: 1 INJECTION, SOLUTION INTRAVENOUS at 16:13

## 2025-05-04 RX ADMIN — DEXTRAN 70, GLYCERIN, HYPROMELLOSE 1 DROP: 1; 2; 3 SOLUTION/ DROPS OPHTHALMIC at 22:22

## 2025-05-04 RX ADMIN — IOHEXOL 85 ML: 350 INJECTION, SOLUTION INTRAVENOUS at 17:07

## 2025-05-04 RX ADMIN — IPRATROPIUM BROMIDE 0.5 MG: 0.5 SOLUTION RESPIRATORY (INHALATION) at 21:39

## 2025-05-04 RX ADMIN — SODIUM CHLORIDE 250 ML: 0.9 INJECTION, SOLUTION INTRAVENOUS at 17:19

## 2025-05-04 RX ADMIN — SODIUM CHLORIDE, PRESERVATIVE FREE 3 ML: 5 INJECTION INTRAVENOUS at 21:45

## 2025-05-04 RX ADMIN — ATORVASTATIN CALCIUM 80 MG: 40 TABLET, FILM COATED ORAL at 22:26

## 2025-05-04 RX ADMIN — HEPARIN SODIUM 5000 UNITS: 5000 INJECTION INTRAVENOUS; SUBCUTANEOUS at 22:25

## 2025-05-04 RX ADMIN — IPRATROPIUM BROMIDE 0.5 MG: 0.5 SOLUTION RESPIRATORY (INHALATION) at 16:13

## 2025-05-04 RX ADMIN — TOBRAMYCIN 2 DROP: 3 SOLUTION/ DROPS OPHTHALMIC at 17:47

## 2025-05-04 RX ADMIN — SODIUM CHLORIDE, SODIUM GLUCONATE, SODIUM ACETATE, POTASSIUM CHLORIDE, MAGNESIUM CHLORIDE, SODIUM PHOSPHATE, DIBASIC, AND POTASSIUM PHOSPHATE 100 ML/HR: .53; .5; .37; .037; .03; .012; .00082 INJECTION, SOLUTION INTRAVENOUS at 20:23

## 2025-05-04 RX ADMIN — MAGNESIUM SULFATE HEPTAHYDRATE 2 G: 2 INJECTION, SOLUTION INTRAVENOUS at 16:27

## 2025-05-04 RX ADMIN — LEVALBUTEROL HYDROCHLORIDE 1.25 MG: 1.25 SOLUTION RESPIRATORY (INHALATION) at 21:39

## 2025-05-04 RX ADMIN — TOBRAMYCIN 2 DROP: 3 SOLUTION/ DROPS OPHTHALMIC at 22:28

## 2025-05-04 RX ADMIN — INSULIN GLARGINE 35 UNITS: 100 INJECTION, SOLUTION SUBCUTANEOUS at 22:21

## 2025-05-04 RX ADMIN — INSULIN LISPRO 2 UNITS: 100 INJECTION, SOLUTION INTRAVENOUS; SUBCUTANEOUS at 22:23

## 2025-05-04 RX ADMIN — ISODIUM CHLORIDE 3 ML: 0.03 SOLUTION RESPIRATORY (INHALATION) at 16:13

## 2025-05-04 RX ADMIN — ALBUTEROL SULFATE 10 MG: 2.5 SOLUTION RESPIRATORY (INHALATION) at 16:13

## 2025-05-04 RX ADMIN — DEXAMETHASONE SODIUM PHOSPHATE 10 MG: 10 INJECTION, SOLUTION INTRAMUSCULAR; INTRAVENOUS at 16:13

## 2025-05-04 NOTE — ED NOTES
Romy Andrade, called and provided update on pt's inpatient status and plan of care.      Maryuri Resendiz RN  05/04/25 192

## 2025-05-04 NOTE — ASSESSMENT & PLAN NOTE
"Hold oral antidiabetics  Sliding scale insulin, add 5 units with meals  Continue twice daily Lantus  Carbohydrate restrictive diet  Hypoglycemia protocol    Lab Results   Component Value Date    HGBA1C 8.9 (H) 04/08/2025       No results for input(s): \"POCGLU\" in the last 72 hours.    Blood Sugar Average: Last 72 hrs:      "

## 2025-05-04 NOTE — ED NOTES
Romy Andrade, updated on pt's status per pt's approval; requesting call back once all results are back/plan of care is decided. Phone number [phone].     Maryuri Resendiz RN  05/04/25 0729

## 2025-05-04 NOTE — ASSESSMENT & PLAN NOTE
Baseline creatinine between 1.0 and 1.25, currently 1.65  Avoid nephrotoxins and hypotension  Check urinalysis  Provide intravenous Isolyte and trend renal function    Lab Results   Component Value Date    EGFR 36 05/04/2025    EGFR 53 04/14/2025    EGFR 57 04/13/2025    CREATININE 1.65 (H) 05/04/2025    CREATININE 1.19 04/14/2025    CREATININE 1.12 04/13/2025

## 2025-05-04 NOTE — ED PROVIDER NOTES
Time reflects when diagnosis was documented in both MDM as applicable and the Disposition within this note       Time User Action Codes Description Comment    5/4/2025  6:33 PM Torrey Yuen [J18.9] Pneumonia     5/4/2025  6:34 PM Torrey Yuen [R06.03] Respiratory distress     5/4/2025  6:38 PM Torrey Yuen [J40] Bronchitis     5/4/2025  6:38 PM Torrey Yuen [T17.800A] Aspiration into lower respiratory tract, initial encounter     5/4/2025  7:12 PM TimmaikHoward Add [J44.1] COPD with acute exacerbation (HCC)     5/5/2025  1:40 AM Torrey Yuen [H10.9] Bilateral conjunctivitis           ED Disposition       ED Disposition   Admit    Condition   Stable    Date/Time   Sun May 4, 2025  6:55 PM    Comment   Case was discussed with MARILUZ razo and the patient's admission status was agreed to be Admission Status: inpatient status to the service of Dr. Razo .               Assessment & Plan       Medical Decision Making  Differential includes COPD exacerbation, pneumonia, CHF viral illness will check for chest x-ray EKG for arrhythmia electrolytes leukocytosis at risk for sepsis    Improved after calzada neb but sats dropped into low 80s when stood up.    Amount and/or Complexity of Data Reviewed  Labs: ordered.  Radiology: ordered and independent interpretation performed.    Risk  Prescription drug management.  Decision regarding hospitalization.        ED Course as of 05/05/25 0140   Sun May 04, 2025   1656 Hemolyzed blood samples - will get ct scan   1804 Pulse ox 87-88% on the 3L, 91% on 4L       Medications   sodium chloride (PF) 0.9 % injection 3 mL (has no administration in time range)   tobramycin (TOBREX) 0.3 % ophthalmic solution 2 drop (2 drops Both Eyes Given 5/4/25 3429)   aluminum-magnesium hydroxide-simethicone (MAALOX) oral suspension 30 mL (has no administration in time range)   acetaminophen (TYLENOL) tablet 650 mg (has no administration in time range)   methylPREDNISolone sodium succinate  (Solu-MEDROL) injection 40 mg (has no administration in time range)   heparin (porcine) subcutaneous injection 5,000 Units (has no administration in time range)   levalbuterol (XOPENEX) inhalation solution 1.25 mg (has no administration in time range)   ipratropium (ATROVENT) 0.02 % inhalation solution 0.5 mg (has no administration in time range)   dextromethorphan-guaiFENesin (ROBITUSSIN DM) oral syrup 10 mL (has no administration in time range)   polyethylene glycol (MIRALAX) packet 17 g (has no administration in time range)   multi-electrolyte (Plasmalyte-A/Isolyte-S PH 7.4/Normosol-R) IV solution (has no administration in time range)   insulin lispro (HumALOG/ADMELOG) 100 units/mL subcutaneous injection 1-6 Units (has no administration in time range)   albuterol inhalation solution 10 mg (10 mg Nebulization Given 5/4/25 1613)     And   ipratropium (ATROVENT) 0.02 % inhalation solution 0.5 mg (0.5 mg Nebulization Given 5/4/25 1613)     And   sodium chloride 0.9 % inhalation solution 3 mL (3 mL Nebulization Given 5/4/25 1613)   dexamethasone (PF) (DECADRON) injection 10 mg (10 mg Intravenous Given 5/4/25 1613)   magnesium sulfate 2 g/50 mL IVPB (premix) 2 g (0 g Intravenous Stopped 5/4/25 1827)   cefTRIAXone (ROCEPHIN) IVPB (premix in dextrose) 1,000 mg 50 mL (0 mg Intravenous Stopped 5/4/25 1628)   sodium chloride 0.9 % bolus 250 mL (0 mL Intravenous Stopped 5/4/25 1819)   iohexol (OMNIPAQUE) 350 MG/ML injection (MULTI-DOSE) 100 mL (85 mL Intravenous Given 5/4/25 1707)       ED Risk Strat Scores                    No data recorded    PERC Rule for PE      Flowsheet Row Most Recent Value   PERC Rule for PE    Age >=50 1 Filed at: 05/04/2025 1603   HR >=100 0 Filed at: 05/04/2025 1603   O2 Sat on room air < 95% --   History of PE or DVT --   Recent trauma or surgery --   Hemoptysis --   Exogenous estrogen --   Unilateral leg swelling --   PERC Rule for PE Results 1 Filed at: 05/04/2025 1603            SBIRT 20yo+       Flowsheet Row Most Recent Value   Initial Alcohol Screen: US AUDIT-C     1. How often do you have a drink containing alcohol? 0 Filed at: 05/04/2025 1541   2. How many drinks containing alcohol do you have on a typical day you are drinking?  0 Filed at: 05/04/2025 1549   3a. Male UNDER 65: How often do you have five or more drinks on one occasion? 0 Filed at: 05/04/2025 1547   3b. FEMALE Any Age, or MALE 65+: How often do you have 4 or more drinks on one occassion? 0 Filed at: 05/04/2025 1549   Audit-C Score 0 Filed at: 05/04/2025 1549   EDWIN: How many times in the past year have you...    Used an illegal drug or used a prescription medication for non-medical reasons? Never Filed at: 05/04/2025 1549            Wells' Criteria for PE      Flowsheet Row Most Recent Value   Wells' Criteria for PE    Clinical signs and symptoms of DVT 0 Filed at: 05/04/2025 1603   PE is primary diagnosis or equally likely 0 Filed at: 05/04/2025 1603   HR >100 0 Filed at: 05/04/2025 1603   Immobilization at least 3 days or Surgery in the previous 4 weeks 0 Filed at: 05/04/2025 1603   Previous, objectively diagnosed PE or DVT 0 Filed at: 05/04/2025 1603   Hemoptysis 0 Filed at: 05/04/2025 1603   Malignancy with treatment within 6 months or palliative 0 Filed at: 05/04/2025 1603   Wells' Criteria Total 0 Filed at: 05/04/2025 1603                        History of Present Illness       Chief Complaint   Patient presents with    Cough     Ems stated that pt is from [place]. Pt state that he is still having a cough which he sometimes coughs up green mucus       Past Medical History:   Diagnosis Date    Atherosclerotic heart disease of native coronary artery without angina pectoris     Chronic obstructive pulmonary disease, unspecified COPD type (HCC)     Diabetes mellitus (HCC)     Dry eye syndrome     Hyperlipidemia     Hypertension     Nonrheumatic mitral (valve) insufficiency     Obstructive sleep apnea (adult) (pediatric)      Vitamin B12 deficiency     Vitamin D deficiency       Past Surgical History:   Procedure Laterality Date    HERNIA REPAIR        Family History   Problem Relation Age of Onset    Dementia Mother     Diabetes Mother     Peripheral vascular disease Father     Mental illness Neg Hx     Substance Abuse Neg Hx       Social History     Tobacco Use    Smoking status: Former     Types: Cigarettes     Start date:      Quit date:      Years since quittin.3    Smokeless tobacco: Never   Vaping Use    Vaping status: Never Used   Substance Use Topics    Alcohol use: Yes     Comment: occasional whiskey sour    Drug use: Never      E-Cigarette/Vaping    E-Cigarette Use Never User       E-Cigarette/Vaping Substances    Nicotine No     THC No     CBD No     Flavoring No     Other No     Unknown No       I have reviewed and agree with the history as documented.     History from paramedics and patient as well as medical records, past medical history CAD, COPD chronic 4 L oxygen, recent admission a month ago with COPD exacerbation and pneumonia.  Paramedics state patient has had coughing shortness of breath for the last month.  Patient states that it is worse over the last 2 days with coughing up phlegm and concern for pneumonia.  Patient has been using breathing treatments without relief.  No chest pain.  He does not feel any further swelling in his legs.  Sats were around 90% on his 4 L.        Review of Systems   Constitutional:  Negative for chills and fever.   HENT:  Negative for rhinorrhea and sore throat.    Respiratory:  Positive for cough and shortness of breath.    Cardiovascular:  Negative for chest pain.   Gastrointestinal:  Negative for constipation, diarrhea, nausea and vomiting.   Genitourinary:  Negative for dysuria and frequency.   Skin:  Negative for rash.   All other systems reviewed and are negative.          Objective       ED Triage Vitals   Temperature Pulse Blood Pressure Respirations SpO2  Patient Position - Orthostatic VS   05/04/25 1542 05/04/25 1542 05/04/25 1542 05/04/25 1542 05/04/25 1542 05/04/25 2001   98.7 °F (37.1 °C) 79 138/65 18 90 % Lying      Temp Source Heart Rate Source BP Location FiO2 (%) Pain Score    05/04/25 1542 05/04/25 1730 05/04/25 1830 -- 05/04/25 2001    Temporal Monitor Left arm  No Pain      Vitals      Date and Time Temp Pulse SpO2 Resp BP Pain Score FACES Pain Rating User   05/04/25 2300 -- -- 94 % -- -- -- -- PS   05/04/25 2144 -- 76 91 % 18 -- -- -- PS   05/04/25 2030 -- -- -- -- -- No Pain -- NS   05/04/25 2001 -- -- -- 18 -- No Pain -- NS   05/04/25 2001 98.1 °F (36.7 °C) 72 90 % -- 127/63 -- -- DII   05/04/25 1830 -- 77 91 % 22 122/71 -- -- AD   05/04/25 1730 -- 79 91 % 20 121/58 -- -- AD   05/04/25 1542 98.7 °F (37.1 °C) 79 90 % 18 138/65 -- -- LD            Physical Exam  Vitals and nursing note reviewed.   Constitutional:       General: He is in acute distress.      Appearance: He is well-developed. He is diaphoretic.   HENT:      Head: Normocephalic and atraumatic.      Right Ear: External ear normal.      Left Ear: External ear normal.      Nose: Congestion present.      Mouth/Throat:      Mouth: Mucous membranes are moist.   Eyes:      Extraocular Movements: Extraocular movements intact.      Conjunctiva/sclera:      Right eye: Right conjunctiva is injected.      Left eye: Left conjunctiva is injected.      Pupils: Pupils are equal, round, and reactive to light.   Cardiovascular:      Rate and Rhythm: Normal rate and regular rhythm.      Heart sounds: Normal heart sounds.   Pulmonary:      Effort: Pulmonary effort is normal. No respiratory distress.      Breath sounds: Decreased air movement present. Wheezing present.   Abdominal:      General: Bowel sounds are normal. There is no distension.      Palpations: Abdomen is soft.      Tenderness: There is no abdominal tenderness.   Musculoskeletal:         General: No deformity. Normal range of motion.       Cervical back: Normal range of motion and neck supple. No spinous process tenderness.      Right lower leg: Edema present.      Left lower leg: Edema present.   Skin:     General: Skin is warm.      Findings: No rash.   Neurological:      General: No focal deficit present.      Mental Status: He is alert.      GCS: GCS eye subscore is 4. GCS verbal subscore is 5. GCS motor subscore is 6.      Sensory: No sensory deficit.   Psychiatric:         Mood and Affect: Mood normal.         Results Reviewed       Procedure Component Value Units Date/Time    Blood culture #1 [[id number]] Collected: 05/04/25 1609    Lab Status: Preliminary result Specimen: Blood from Arm, Left Updated: 05/05/25 0001     Blood Culture Received in Microbiology Lab. Culture in Progress.    Blood culture #2 [[id number]] Collected: 05/04/25 1609    Lab Status: Preliminary result Specimen: Blood from Arm, Right Updated: 05/05/25 0001     Blood Culture Received in Microbiology Lab. Culture in Progress.    Urinalysis with microscopic [[id number]]  (Abnormal) Collected: 05/04/25 2229    Lab Status: Final result Specimen: Urine, Other Updated: 05/04/25 2259     Color, UA Yellow     Clarity, UA Clear     Specific Gravity, UA <1.005     pH, UA 5.0     Leukocytes, UA Negative     Nitrite, UA Negative     Protein, UA Negative mg/dl      Glucose, UA 1000 (1%) mg/dl      Ketones, UA Negative mg/dl      Urobilinogen, UA <2.0 mg/dl      Bilirubin, UA Negative     Occult Blood, UA Negative     RBC, UA None Seen /hpf      WBC, UA 0-1 /hpf      Epithelial Cells None Seen /hpf      Bacteria, UA None Seen /hpf      MUCUS THREADS None Seen    HS Troponin I 2hr [[id number]]  (Normal) Collected: 05/04/25 1817    Lab Status: Final result Specimen: Blood from Arm, Right Updated: 05/04/25 1848     hs TnI 2hr 10 ng/L      Delta 2hr hsTnI -1 ng/L     HS Troponin I 4hr [[id number]]     Lab Status: No result Specimen: Blood     D-dimer, quantitative [[id number]]  (Abnormal)  Collected: 05/04/25 1704    Lab Status: Final result Specimen: Blood from Arm, Right Updated: 05/04/25 1732     D-Dimer, Quant 0.58 ug/ml FEU     Narrative:      In the evaluation for possible pulmonary embolism, in the appropriate (Well's Score of 4 or less) patient, the age adjusted d-dimer cutoff for this patient can be calculated as:    Age x 0.01 (in ug/mL) for Age-adjusted D-dimer exclusion threshold for a patient over 50 years.    Protime-INR [[id number]]  (Abnormal) Collected: 05/04/25 1704    Lab Status: Final result Specimen: Blood from Arm, Right Updated: 05/04/25 1730     Protime 15.2 seconds      INR 1.15    Narrative:      INR Therapeutic Range    Indication                                             INR Range      Atrial Fibrillation                                               2.0-3.0  Hypercoagulable State                                    2.0.2.3  Left Ventricular Asist Device                            2.0-3.0  Mechanical Heart Valve                                  -    Aortic(with afib, MI, embolism, HF, LA enlargement,    and/or coagulopathy)                                     2.0-3.0 (2.5-3.5)     Mitral                                                             2.5-3.5  Prosthetic/Bioprosthetic Heart Valve               2.0-3.0  Venous thromboembolism (VTE: VT, PE        2.0-3.0    APTT [[id number]]  (Normal) Collected: 05/04/25 1704    Lab Status: Final result Specimen: Blood from Arm, Right Updated: 05/04/25 1730     PTT 30 seconds     FLU/RSV/COVID - if FLU/RSV clinically relevant [[id number]]  (Normal) Collected: 05/04/25 1609    Lab Status: Final result Specimen: Nares from Nose Updated: 05/04/25 1657     SARS-CoV-2 Negative     INFLUENZA A PCR Negative     INFLUENZA B PCR Negative     RSV PCR Negative    Narrative:      This test has been performed using the CoV-2/Flu/RSV plus assay on the [employer] GeneXpert platform. This test has been validated by the  and verified by the  performing laboratory.     This test is designed to amplify and detect the following: nucleocapsid (N), envelope (E), and RNA-dependent RNA polymerase (RdRP) genes of the SARS-CoV-2 genome; matrix (M), basic polymerase (PB2), and acidic protein (PA) segments of the influenza A genome; matrix (M) and non-structural protein (NS) segments of the influenza B genome, and the nucleocapsid genes of RSV A and RSV B.     Positive results are indicative of the presence of Flu A, Flu B, RSV, and/or SARS-CoV-2 RNA. Positive results for SARS-CoV-2 or suspected novel influenza should be reported to state, local, or federal health departments according to local reporting requirements.      All results should be assessed in conjunction with clinical presentation and other laboratory markers for clinical management.     FOR PEDIATRIC PATIENTS - copy/paste COVID Guidelines URL to browser: [email or web address]       B-Type Natriuretic Peptide(BNP) [[id number]]  (Abnormal) Collected: 05/04/25 1627    Lab Status: Final result Specimen: Blood from Arm, Right Updated: 05/04/25 1656      pg/mL     Procalcitonin [[id number]]  (Normal) Collected: 05/04/25 1609    Lab Status: Final result Specimen: Blood from Arm, Left Updated: 05/04/25 1646     Procalcitonin 0.15 ng/ml     Comprehensive metabolic panel [[id number]]  (Abnormal) Collected: 05/04/25 1609    Lab Status: Final result Specimen: Blood from Arm, Left Updated: 05/04/25 1643     Sodium 139 mmol/L      Potassium 4.4 mmol/L      Chloride 100 mmol/L      CO2 31 mmol/L      ANION GAP 8 mmol/L      BUN 25 mg/dL      Creatinine 1.65 mg/dL      Glucose 134 mg/dL      Calcium 8.5 mg/dL      Corrected Calcium 9.0 mg/dL      AST 36 U/L      ALT 28 U/L      Alkaline Phosphatase 74 U/L      Total Protein 7.1 g/dL      Albumin 3.4 g/dL      Total Bilirubin 0.69 mg/dL      eGFR 36 ml/min/1.73sq m     Narrative:      National Kidney Disease  Foundation guidelines for Chronic Kidney Disease (CKD):     Stage 1 with normal or high GFR (GFR > 90 mL/min/1.73 square meters)    Stage 2 Mild CKD (GFR = 60-89 mL/min/1.73 square meters)    Stage 3A Moderate CKD (GFR = 45-59 mL/min/1.73 square meters)    Stage 3B Moderate CKD (GFR = 30-44 mL/min/1.73 square meters)    Stage 4 Severe CKD (GFR = 15-29 mL/min/1.73 square meters)    Stage 5 End Stage CKD (GFR <15 mL/min/1.73 square meters)  Note: GFR calculation is accurate only with a steady state creatinine    Magnesium [[id number]]  (Normal) Collected: 05/04/25 1609    Lab Status: Final result Specimen: Blood from Arm, Left Updated: 05/04/25 1643     Magnesium 2.5 mg/dL     HS Troponin 0hr (reflex protocol) [[id number]]  (Normal) Collected: 05/04/25 1609    Lab Status: Final result Specimen: Blood from Arm, Left Updated: 05/04/25 1643     hs TnI 0hr 11 ng/L     Lactic acid [[id number]]  (Normal) Collected: 05/04/25 1609    Lab Status: Final result Specimen: Blood from Arm, Right Updated: 05/04/25 1639     LACTIC ACID 1.0 mmol/L     Narrative:      Result may be elevated if tourniquet was used during collection.    CBC and differential [[id number]]  (Abnormal) Collected: 05/04/25 1627    Lab Status: Final result Specimen: Blood from Arm, Right Updated: 05/04/25 1633     WBC 7.72 Thousand/uL      RBC 3.78 Million/uL      Hemoglobin 11.2 g/dL      Hematocrit 35.2 %      MCV 93 fL      MCH 29.6 pg      MCHC 31.8 g/dL      RDW 15.9 %      MPV 9.5 fL      Platelets 168 Thousands/uL      nRBC 0 /100 WBCs      Segmented % 74 %      Immature Grans % 1 %      Lymphocytes % 7 %      Monocytes % 17 %      Eosinophils Relative 1 %      Basophils Relative 0 %      Absolute Neutrophils 5.63 Thousands/µL      Absolute Immature Grans 0.11 Thousand/uL      Absolute Lymphocytes 0.54 Thousands/µL      Absolute Monocytes 1.34 Thousand/µL      Eosinophils Absolute 0.09 Thousand/µL      Basophils Absolute 0.01 Thousands/µL             CTA  chest pe study   ED Interpretation by Torrey Yuen DO (05/04 1803)   Appears as left lower focal infiltrate, waiting for formal reading      Final Interpretation by Iftikhar Wolfe MD (05/04 1824)      No pulmonary embolism.      Nodular opacities in the bilateral lungs and surrounding the inferior right kevin as described are most consistent with an infectious/inflammatory infiltrate considering clinical history.   Follow-up chest CT in 3 months recommended to assess for interval resolution.      Proximal left lower lobar bronchial occlusion with near complete left lower lobe atelectasis.   Right lower lobe bronchial wall thickening with segmental occlusions.   Likely aspiration-related consideration mid esophageal dilation with debris.   No obvious obstructing mass, consider bronchoscopy for further evaluation.      The study was marked in EPIC for immediate notification.                           Workstation performed: [employer]         XR chest portable    (Results Pending)       ECG 12 Lead Documentation Only    Date/Time: 5/4/2025 4:21 PM    Performed by: Torrey Yuen DO  Authorized by: Torrey Yuen DO    Indications / Diagnosis:  Sob  ECG reviewed by me, the ED Provider: yes    Patient location:  ED  Interpretation:     Interpretation: normal    Rate:     ECG rate:  83    ECG rate assessment: normal    Rhythm:     Rhythm: sinus rhythm    Ectopy:     Ectopy: none    QRS:     QRS axis:  Normal    QRS intervals:  Normal  Conduction:     Conduction: normal    ST segments:     ST segments:  Normal  T waves:     T waves: normal    Comments:      This EKG was interpreted by me.      ED Medication and Procedure Management   Prior to Admission Medications   Prescriptions Last Dose Informant Patient Reported? Taking?   Alcohol Swabs (Alcohol Prep Pad) 70 % PADS Unknown Outside Facility (Specify) Yes No   Sig: As directed   Cholecalciferol (Vitamin D3) 25 MCG (1000 UT) CAPS 5/4/2025 Morning Outside Facility  (Specify) No Yes   Sig: Take 1 capsule (1,000 Units total) by mouth in the morning 1 daily   Continuous Glucose  (Dexcom G7 ) ZAYNAB  Outside Facility (Specify) Yes No   Sig: USE AS DIRECTED PER  DIRECTIONS   Continuous Glucose Sensor (Dexcom G7 Sensor)   No No   Sig: USE PER  DIRECTIONS. CHANGE SENSOR EVERY 10 DAYS.   Cyanocobalamin (Vitamin B-12) 2500 MCG SUBL 2025 Morning Outside Facility (Specify) No Yes   Si TAB UNDER THE TONGUE 5 TIMES/WK (OMIT SA,WESTON) AT 8:30AM DX: SUPPLEMENT *COLD SEAL*   Empagliflozin (Jardiance) 10 MG TABS tablet  Outside Facility (Specify) No No   Si TAB ORALLY EVERY MORNING DX: DIABETES (IDDM)   Insulin Glargine Solostar (Lantus SoloStar) 100 UNIT/ML SOPN   No No   Sig: INJECT 35U SUB-Q EVERY MORNING DX: DIABETES DX: **DISCARD UNUSED PEN 28 DAYS AFTER 1ST USE** INJECT 40 UNITS SUB-Q AT BEDTIME DX; DIABETES   Insulin Pen Needle (BD AutoShield Duo) 30G X 5 MM MISC   No No   Sig: USE FOR INSULIN INJECTION 3 TIMES DAILY   Multiple Vitamin (Daily-Jhonatan) TABS 2025 Morning Outside Facility (Specify) No Yes   Si TAB ORALLY EVERY MORNING DX: SUPPLEMENT   Multiple Vitamin (MULTIVITAMIN ADULT PO) 2025 Morning Outside Facility (Specify) Yes Yes   Sig: every 24 hours   NovoFine Autocover Pen Needle 30G X 8 MM MISC  Outside Facility (Specify) Yes No   Si pen needles daily   Nutritional Supplements (Ensure)  Outside Facility (Specify) Yes No   Sig: Take by mouth 1 can daily   Trelegy Ellipta 200-62.5-25 MCG/ACT AEPB inhaler   No No   Sig: INHALE 1 PUFF ORALLY EVERY MORNING DX: COPD *RINSE MOUTH AFTER USE*   acetaminophen (TYLENOL) 325 mg tablet 2025 Morning Outside Facility (Specify) Yes Yes   Si tabs q 6 hrs prn pain or fever greater than 100   Patient taking differently: as needed 2 tabs q 6 hrs prn pain or fever greater than 100   albuterol (2.5 mg/3 mL) 0.083 % nebulizer solution 2025 Morning  No Yes   Sig: Take 3 mL (2.5  mg total) by nebulization every 6 (six) hours as needed for wheezing or shortness of breath 1 vial q 4 hrs prn   alfuzosin (UROXATRAL) 10 mg 24 hr tablet 2025 Morning Outside Facility (Specify) No Yes   Si TAB ORALLY EVERY MORNING DX: BPH   amLODIPine (NORVASC) 10 mg tablet 2025 Morning Outside Facility (Specify) No Yes   Si TAB ORALLY EVERY MORNING DX: HYPERTENSION   aspirin (Aspirin Low Dose) 81 mg EC tablet 2025 Morning Outside Facility (Specify) No Yes   Si TAB ORALLY EVERY MORNING DX: STROKE PREVENTION   atenolol (TENORMIN) 50 mg tablet 2025 Morning Outside Facility (Specify) No Yes   Si TAB ORALLY TWICE DAILY DX: HYPERTENSION   atorvastatin (LIPITOR) 80 mg tablet 2025 Morning Outside Facility (Specify) No Yes   Si TAB ORALLY AT BEDTIME DX:HYPERLIPIDEMIA   cycloSPORINE (RESTASIS) 0.05 % ophthalmic emulsion  Outside Facility (Specify) Yes No   Sig: Administer to both eyes Every 12 hours   finasteride (PROSCAR) 5 mg tablet  Outside Facility (Specify) No No   Si TAB ORALLY EVERY MORNING DX: BPH **NIOSH 3 HAZARDOUS DRUG**   fluticasone (FLONASE) 50 mcg/act nasal spray Unknown Outside Facility (Specify) Yes No   Sig: every 24 hours   insulin isophane (HumuLIN N KwikPen) 100 units/mL injection pen   No No   Sig: Inject 20 Units under the skin in the morning Taper schedule, 20u for 4 days and then 10u for 4 days and then stop once off prednisone   Patient not taking: Reported on 2025   insulin lispro (HumaLOG KwikPen) 100 units/mL injection pen   No No   Sig: Inject 1-10 Units under the skin 3 (three) times a day with meals MDD 30u, per scale. 150-200=1u, 200-250 =2u, 250-300= 3u, 300-350=4u, >350 = 6u   ipratropium-albuterol (DUO-NEB) 0.5-2.5 mg/3 mL nebulizer solution  Outside Facility (Specify) Yes No   Si times daily prn wheezing   Patient taking differently: as needed 4 times daily prn wheezing   isosorbide mononitrate (IMDUR) 60 mg 24 hr tablet 2025  Morning Outside Facility (Specify) No Yes   Si TAB ORALLY EVERY MORNING DX:ANGINA   loperamide (IMODIUM) 2 mg capsule Unknown  No No   Si CAP ORALLY DAILY AS NEEDED FOR DIARRHEA *NOT TO EXCEED 8 CAPS/24 HR*   Patient taking differently: as needed   losartan (COZAAR) 100 MG tablet 2025 Morning Outside Facility (Specify) No Yes   Si TAB ORALLY EVERY MORNING DX: HYPERTENSION   nystatin (MYCOSTATIN) powder More than a month Outside Facility (Specify) No No   Sig: Apply topically 2 (two) times a day   pantoprazole (PROTONIX) 40 mg tablet 2025 Morning Outside Facility (Specify) No Yes   Si TAB ORALLY EVERY MORNING DX: GERD   repaglinide (PRANDIN) 0.5 mg tablet 2025 Morning Outside Facility (Specify) No Yes   Sig: Take 1 tablet with breakfast and 1 tablet with dinner, hold if not eating or blood sugar less than 100   torsemide (DEMADEX) 20 mg tablet 2025 Morning  No Yes   Sig: Take 1 tablet (20 mg total) by mouth every morning   valACYclovir (VALTREX) 1,000 mg tablet   No No   Sig: Take 1 tablet (1,000 mg total) by mouth 2 (two) times a day for 2 days   Patient not taking: Reported on 2025      Facility-Administered Medications: None     Current Discharge Medication List        CONTINUE these medications which have NOT CHANGED    Details   acetaminophen (TYLENOL) 325 mg tablet 2 tabs q 6 hrs prn pain or fever greater than 100      albuterol (2.5 mg/3 mL) 0.083 % nebulizer solution Take 3 mL (2.5 mg total) by nebulization every 6 (six) hours as needed for wheezing or shortness of breath 1 vial q 4 hrs prn  Qty: 180 mL, Refills: 5    Associated Diagnoses: Centrilobular emphysema (HCC)      alfuzosin (UROXATRAL) 10 mg 24 hr tablet 1 TAB ORALLY EVERY MORNING DX: BPH  Qty: 30 tablet, Refills: 5    Associated Diagnoses: Benign prostatic hyperplasia with nocturia      amLODIPine (NORVASC) 10 mg tablet 1 TAB ORALLY EVERY MORNING DX: HYPERTENSION  Qty: 30 tablet, Refills: 5    Associated  Diagnoses: Primary hypertension      aspirin (Aspirin Low Dose) 81 mg EC tablet 1 TAB ORALLY EVERY MORNING DX: STROKE PREVENTION  Qty: 30 tablet, Refills: 5    Associated Diagnoses: Primary hypertension      atenolol (TENORMIN) 50 mg tablet 1 TAB ORALLY TWICE DAILY DX: HYPERTENSION  Qty: 60 tablet, Refills: 4    Associated Diagnoses: Primary hypertension      atorvastatin (LIPITOR) 80 mg tablet 1 TAB ORALLY AT BEDTIME DX:HYPERLIPIDEMIA  Qty: 30 tablet, Refills: 10    Associated Diagnoses: Mixed hyperlipidemia      Cholecalciferol (Vitamin D3) 25 MCG (1000 UT) CAPS Take 1 capsule (1,000 Units total) by mouth in the morning 1 daily  Qty: 30 capsule, Refills: 11    Associated Diagnoses: Vitamin D deficiency      Cyanocobalamin (Vitamin B-12) 2500 MCG SUBL 1 TAB UNDER THE TONGUE 5 TIMES/WK (OMIT SA,[place]) AT 8:30AM DX: SUPPLEMENT *COLD SEAL*  Qty: 20 tablet, Refills: 4    Associated Diagnoses: B12 deficiency      isosorbide mononitrate (IMDUR) 60 mg 24 hr tablet 1 TAB ORALLY EVERY MORNING DX:ANGINA  Qty: 30 tablet, Refills: 5    Associated Diagnoses: Primary hypertension      losartan (COZAAR) 100 MG tablet 1 TAB ORALLY EVERY MORNING DX: HYPERTENSION  Qty: 30 tablet, Refills: 10    Associated Diagnoses: Primary hypertension      !! Multiple Vitamin (Daily-Jhonatan) TABS 1 TAB ORALLY EVERY MORNING DX: SUPPLEMENT  Qty: 30 tablet, Refills: 5    Associated Diagnoses: Vitamin D deficiency      !! Multiple Vitamin (MULTIVITAMIN ADULT PO) every 24 hours      pantoprazole (PROTONIX) 40 mg tablet 1 TAB ORALLY EVERY MORNING DX: GERD  Qty: 30 tablet, Refills: 5    Associated Diagnoses: Diarrhea, unspecified type      repaglinide (PRANDIN) 0.5 mg tablet Take 1 tablet with breakfast and 1 tablet with dinner, hold if not eating or blood sugar less than 100  Qty: 180 tablet, Refills: 1    Associated Diagnoses: Type 2 diabetes mellitus with hyperglycemia, with long-term current use of insulin (HCC)      torsemide (DEMADEX) 20 mg tablet Take 1  tablet (20 mg total) by mouth every morning  Qty: 30 tablet, Refills: 5    Comments: Discontinue furosemide  Associated Diagnoses: Primary hypertension      Alcohol Swabs (Alcohol Prep Pad) 70 % PADS As directed      Continuous Glucose  (Dexcom G7 ) ZAYNAB USE AS DIRECTED PER  DIRECTIONS      Continuous Glucose Sensor (Dexcom G7 Sensor) USE PER  DIRECTIONS. CHANGE SENSOR EVERY 10 DAYS.  Qty: 9 each, Refills: 1    Associated Diagnoses: Type 2 diabetes mellitus with hyperglycemia, with long-term current use of insulin ([facility])      cycloSPORINE (RESTASIS) 0.05 % ophthalmic emulsion Administer to both eyes Every 12 hours      Empagliflozin (Jardiance) 10 MG TABS tablet 1 TAB ORALLY EVERY MORNING DX: DIABETES (IDDM)  Qty: 30 tablet, Refills: 5    Associated Diagnoses: Type 2 diabetes mellitus with hyperglycemia, with long-term current use of insulin ([facility])      finasteride (PROSCAR) 5 mg tablet 1 TAB ORALLY EVERY MORNING DX: BPH **NIOSH 3 HAZARDOUS DRUG**  Qty: 30 tablet, Refills: 5    Associated Diagnoses: Primary hypertension      fluticasone (FLONASE) 50 mcg/act nasal spray every 24 hours      Insulin Glargine Solostar (Lantus SoloStar) 100 UNIT/ML SOPN INJECT 35U SUB-Q EVERY MORNING DX: DIABETES DX: **DISCARD UNUSED PEN 28 DAYS AFTER 1ST USE** INJECT 40 UNITS SUB-Q AT BEDTIME DX; DIABETES  Qty: 15 mL, Refills: 5    Associated Diagnoses: Type 2 diabetes mellitus with hyperglycemia, with long-term current use of insulin ([facility])      insulin isophane (HumuLIN N KwikPen) 100 units/mL injection pen Inject 20 Units under the skin in the morning Taper schedule, 20u for 4 days and then 10u for 4 days and then stop once off prednisone  Qty: 15 mL, Refills: 0    Associated Diagnoses: Type 2 diabetes mellitus with other specified complication, with long-term current use of insulin ([facility]); Type 2 diabetes mellitus with hyperglycemia, with long-term current use of insulin ([facility]); Obesity, morbid  ([facility]); Primary hypertension; Coronary artery disease involving native coronary artery of native heart without angina pectoris; Mixed hyperlipidemia      insulin lispro (HumaLOG KwikPen) 100 units/mL injection pen Inject 1-10 Units under the skin 3 (three) times a day with meals MDD 30u, per scale. 150-200=1u, 200-250 =2u, 250-300= 3u, 300-350=4u, >350 = 6u  Qty: 15 mL, Refills: 1    Associated Diagnoses: Type 2 diabetes mellitus with other specified complication, with long-term current use of insulin ([facility]); Type 2 diabetes mellitus with hyperglycemia, with long-term current use of insulin ([facility]); Obesity, morbid ([facility]); Primary hypertension; Coronary artery disease involving native coronary artery of native heart without angina pectoris; Mixed hyperlipidemia      !! Insulin Pen Needle (BD AutoShield Duo) 30G X 5 MM MISC USE FOR INSULIN INJECTION 3 TIMES DAILY  Qty: 100 each, Refills: 1    Associated Diagnoses: Type 2 diabetes mellitus with hyperglycemia, with long-term current use of insulin ([facility])      ipratropium-albuterol (DUO-NEB) 0.5-2.5 mg/3 mL nebulizer solution 4 times daily prn wheezing      loperamide (IMODIUM) 2 mg capsule 1 CAP ORALLY DAILY AS NEEDED FOR DIARRHEA *NOT TO EXCEED 8 CAPS/24 HR*  Qty: 30 capsule, Refills: 4    Associated Diagnoses: Diarrhea, unspecified type      !! NovoFine Autocover Pen Needle 30G X 8 MM MISC 2 pen needles daily      Nutritional Supplements (Ensure) Take by mouth 1 can daily      nystatin (MYCOSTATIN) powder Apply topically 2 (two) times a day  Qty: 15 g, Refills: 0    Associated Diagnoses: Rash and nonspecific skin eruption      Trelegy Ellipta 200-62.5-25 MCG/ACT AEPB inhaler INHALE 1 PUFF ORALLY EVERY MORNING DX: COPD *RINSE MOUTH AFTER USE*  Qty: 60 blister, Refills: 0    Associated Diagnoses: Chronic obstructive pulmonary disease, unspecified COPD type ([facility])      valACYclovir (VALTREX) 1,000 mg tablet Take 1 tablet (1,000 mg total) by mouth 2 (two) times a day for 2  days  Qty: 4 tablet, Refills: 0    Associated Diagnoses: Herpes simplex       !! - Potential duplicate medications found. Please discuss with provider.        No discharge procedures on file.  ED SEPSIS DOCUMENTATION   Time reflects when diagnosis was documented in both MDM as applicable and the Disposition within this note       Time User Action Codes Description Comment    5/4/2025  6:33 PM Torrey Yuen [J18.9] Pneumonia     5/4/2025  6:34 PM Torrey Yuen [R06.03] Respiratory distress     5/4/2025  6:38 PM Torrey Yuen [J40] Bronchitis     5/4/2025  6:38 PM Torrey Yuen [T17.800A] Aspiration into lower respiratory tract, initial encounter     5/4/2025  7:12 PM Howard Rangel [J44.1] COPD with acute exacerbation (HCC)     5/5/2025  1:40 AM Torrey Yuen [H10.9] Bilateral conjunctivitis                  Torrey Yuen DO  05/05/25 0140

## 2025-05-04 NOTE — ASSESSMENT & PLAN NOTE
Kyler is an 89-year-old male with past medical history of coronary artery disease, COPD (oxygen dependent 3 L), hypertension, hyperlipidemia, diabetes,and obstructive sleep apnea.  He presents to the emergency department from [facility] for shortness of breath and productive cough worsening over the last few days.  He was recently hospitalized between April 7 to 14 for COPD exacerbation and, per the [facility] staff, patient never fully returned to baseline.  The emergency department he was given nebulizers, steroids, and antibiotics and will be admitted for further treatment.    Pulmonary consulted, appreciate the assistance  Xopenex/Atrovent nebulizers, hold Trelegy for now  Solu-Medrol 40 mg every 8 hours  Currently on 4 L nasal cannula, wean to baseline as tolerated  Procalcitonin 0.15, ceftriaxone given in the emergency room.  Hold off on any further antibiotics unless patient develops a fever.  Lactic acid negative, patient afebrile and without leukocytosis  CT chest shows:  Nodular opacities in the bilateral lungs and surrounding the inferior right kevin as described are most consistent with an infectious/inflammatory infiltrate considering clinical history.   Proximal left lower lobar bronchial occlusion with near complete left lower lobe atelectasis.  Right lower lobe bronchial wall thickening with segmental occlusions.  Likely aspiration-related consideration mid esophageal dilation with debris.  No obvious obstructing mass, consider bronchoscopy for further evaluation.

## 2025-05-04 NOTE — ED NOTES
Pt stood at bedside to use urinal; pulse ox while on 4L NC dipped down to low- mid 80's and pt appeared tachypneic. Pt able to urinate 400 mLs and sit back in bed. Pt's O2 came back up to 91% once situated in the stretcher for a few minutes. Provider made aware of pt's O2 dropping while standing. Pt attached to vitals and cardiac monitor and remains on 4L O2 NC. Call bell within reach.       Maryuri Resendiz RN  05/04/25 3114

## 2025-05-04 NOTE — H&P
H&P - Hospitalist   Name: Kyler Hernandez 89 y.o. male I MRN: [id number]  Unit/Bed#: -01 I Date of Admission: 5/4/2025   Date of Service: 5/4/2025 I Hospital Day: 0     Assessment & Plan  COPD with acute exacerbation (HCC)  Kyler is an 89-year-old male with past medical history of coronary artery disease, COPD (oxygen dependent 3 L), hypertension, hyperlipidemia, diabetes,and obstructive sleep apnea.  He presents to the emergency department from [facility] for shortness of breath and productive cough worsening over the last few days.  He was recently hospitalized between April 7 to 14 for COPD exacerbation and, per the [facility] staff, patient never fully returned to baseline.  The emergency department he was given nebulizers, steroids, and antibiotics and will be admitted for further treatment.    Pulmonary consulted, appreciate the assistance  Xopenex/Atrovent nebulizers, hold Trelegy for now  Solu-Medrol 40 mg every 8 hours  Currently on 4 L nasal cannula, wean to baseline as tolerated  Procalcitonin 0.15, ceftriaxone given in the emergency room.  Hold off on any further antibiotics unless patient develops a fever.  Lactic acid negative, patient afebrile and without leukocytosis  CT chest shows:  Nodular opacities in the bilateral lungs and surrounding the inferior right kevin as described are most consistent with an infectious/inflammatory infiltrate considering clinical history.   Proximal left lower lobar bronchial occlusion with near complete left lower lobe atelectasis.  Right lower lobe bronchial wall thickening with segmental occlusions.  Likely aspiration-related consideration mid esophageal dilation with debris.  No obvious obstructing mass, consider bronchoscopy for further evaluation.  Acute kidney injury superimposed on stage 3a chronic kidney disease (HCC)  Baseline creatinine between 1.0 and 1.25, currently 1.65  Avoid nephrotoxins and hypotension  Check  "urinalysis  Provide intravenous Isolyte and trend renal function    Lab Results   Component Value Date    EGFR 36 05/04/2025    EGFR 53 04/14/2025    EGFR 57 04/13/2025    CREATININE 1.65 (H) 05/04/2025    CREATININE 1.19 04/14/2025    CREATININE 1.12 04/13/2025     Coronary artery disease involving native coronary artery of native heart without angina pectoris  No active chest pain  Continue aspirin/statin/isosorbide  Primary hypertension  Blood pressure stable  Continue:  Routine vital signs  LINCOLN (obstructive sleep apnea)  Continue CPAP  Mixed hyperlipidemia  Continue statin  Type 2 diabetes mellitus with hyperglycemia, with long-term current use of insulin (HCC)  Hold oral antidiabetics  Sliding scale insulin, add 5 units with meals  Continue twice daily Lantus  Carbohydrate restrictive diet  Hypoglycemia protocol    Lab Results   Component Value Date    HGBA1C 8.9 (H) 04/08/2025       No results for input(s): \"POCGLU\" in the last 72 hours.    Blood Sugar Average: Last 72 hrs:      Benign prostatic hyperplasia without lower urinary tract symptoms  Continue dose equivalent for alfuzosin (tamsulosin 0.4 mg), and finasteride 5 mg daily  Acute bacterial conjunctivitis of both eyes  Patient complains of eye crust recently  Tobramycin drops started in the emergency room  Continue      VTE Pharmacologic Prophylaxis: VTE Score: 8 High Risk (Score >/= 5) - Pharmacological DVT Prophylaxis Ordered: heparin. Sequential Compression Devices Ordered.  Code Status: Level 1 - Full Code   Discussion with patient, at bedside    Anticipated Length of Stay: Patient will be admitted on an inpatient basis with an anticipated length of stay of greater than 2 midnights secondary to treatment for COPD exacerbation.    History of Present Illness   Chief Complaint: Shortness of breath and wheezing    Chardon is an 89-year-old male with past medical history of coronary artery disease, COPD (oxygen dependent 3 L), hypertension, hyperlipidemia, " diabetes,and obstructive sleep apnea.  He presents to the emergency department from [facility] for shortness of breath and productive cough worsening over the last few days.  He was recently hospitalized between  to  for COPD exacerbation and, per the [facility] staff, patient never fully returned to baseline.  The emergency department he was given nebulizers, steroids, and antibiotics and will be admitted for further treatment.     Review of Systems   Constitutional:  Negative for chills, fatigue and fever.   HENT:  Negative for ear pain and sore throat.    Eyes:  Negative for pain and visual disturbance.   Respiratory:  Positive for cough, shortness of breath and wheezing.    Cardiovascular:  Positive for leg swelling. Negative for chest pain and palpitations.        Patient reports his legs are always swollen, they are currently at baseline   Gastrointestinal:  Negative for abdominal pain and vomiting.   Genitourinary:  Negative for dysuria and hematuria.   Musculoskeletal:  Negative for arthralgias and back pain.   Skin:  Negative for color change and rash.   Neurological:  Negative for seizures and syncope.   All other systems reviewed and are negative.      Historical Information   Past Medical History:   Diagnosis Date    Atherosclerotic heart disease of native coronary artery without angina pectoris     Chronic obstructive pulmonary disease, unspecified COPD type (HCC)     Diabetes mellitus (HCC)     Dry eye syndrome     Hyperlipidemia     Hypertension     Nonrheumatic mitral (valve) insufficiency     Obstructive sleep apnea (adult) (pediatric)     Vitamin B12 deficiency     Vitamin D deficiency      Past Surgical History:   Procedure Laterality Date    HERNIA REPAIR       Social History     Tobacco Use    Smoking status: Former     Types: Cigarettes     Start date:      Quit date: 1945     Years since quittin.3    Smokeless tobacco: Never   Vaping Use     Vaping status: Never Used   Substance and Sexual Activity    Alcohol use: Yes     Comment: occasional whiskey sour    Drug use: Never    Sexual activity: Not Currently     E-Cigarette/Vaping    E-Cigarette Use Never User      E-Cigarette/Vaping Substances    Nicotine No     THC No     CBD No     Flavoring No     Other No     Unknown No      Family History   Problem Relation Age of Onset    Dementia Mother     Diabetes Mother     Peripheral vascular disease Father     Mental illness Neg Hx     Substance Abuse Neg Hx      Social History:  Marital Status:    Occupation: Retired  Patient Pre-hospital Living Situation: Independent living  Patient Pre-hospital Level of Mobility: walks  Patient Pre-hospital Diet Restrictions: None    Meds/Allergies   I have reviewed home medications using recent Epic encounter.  Prior to Admission medications    Medication Sig Start Date End Date Taking? Authorizing Provider   acetaminophen (TYLENOL) 325 mg tablet 2 tabs q 6 hrs prn pain or fever greater than 100  Patient taking differently: as needed 2 tabs q 6 hrs prn pain or fever greater than 100    Historical Provider, MD   albuterol (2.5 mg/3 mL) 0.083 % nebulizer solution Take 3 mL (2.5 mg total) by nebulization every 6 (six) hours as needed for wheezing or shortness of breath 1 vial q 4 hrs prn 4/24/25   CONCEPCION Reed   Alcohol Swabs (Alcohol Prep Pad) 70 % PADS As directed    Historical Provider, MD   alfuzosin (UROXATRAL) 10 mg 24 hr tablet 1 TAB ORALLY EVERY MORNING DX: BPH 11/29/24   Gordon Álvarez MD   amLODIPine (NORVASC) 10 mg tablet 1 TAB ORALLY EVERY MORNING DX: HYPERTENSION 11/29/24   Gordon Álvarez MD   aspirin (Aspirin Low Dose) 81 mg EC tablet 1 TAB ORALLY EVERY MORNING DX: STROKE PREVENTION 11/29/24   Gordon Álvarez MD   atenolol (TENORMIN) 50 mg tablet 1 TAB ORALLY TWICE DAILY DX: HYPERTENSION 11/29/24   Gordon Álvarez MD   atorvastatin (LIPITOR) 80 mg tablet 1 TAB ORALLY AT BEDTIME DX:HYPERLIPIDEMIA  10/25/24   Gordon Álvarez MD   Cholecalciferol (Vitamin D3) 25 MCG (1000 UT) CAPS Take 1 capsule (1,000 Units total) by mouth in the morning 1 daily 12/2/24   Gordon Álvarez MD   Continuous Glucose  (Dexcom G7 ) ZAYNAB USE AS DIRECTED PER  DIRECTIONS 6/19/24   Historical Provider, MD   Continuous Glucose Sensor (Dexcom G7 Sensor) USE PER  DIRECTIONS. CHANGE SENSOR EVERY 10 DAYS. 4/15/25   Nighat Lemos MD   Cyanocobalamin (Vitamin B-12) 2500 MCG SUBL 1 TAB UNDER THE TONGUE 5 TIMES/WK (OMIT SA,WESTON) AT 8:30AM DX: SUPPLEMENT *COLD SEAL* 11/29/24   Gordon Álvarez MD   cycloSPORINE (RESTASIS) 0.05 % ophthalmic emulsion Administer to both eyes Every 12 hours    Historical Provider, MD   Empagliflozin (Jardiance) 10 MG TABS tablet 1 TAB ORALLY EVERY MORNING DX: DIABETES (IDDM) 11/29/24   Gordon Álvarez MD   finasteride (PROSCAR) 5 mg tablet 1 TAB ORALLY EVERY MORNING DX: BPH **NIOSH 3 HAZARDOUS DRUG** 11/29/24   Gordon Álvarez MD   fluticasone (FLONASE) 50 mcg/act nasal spray every 24 hours    Historical Provider, MD   Insulin Glargine Solostar (Lantus SoloStar) 100 UNIT/ML SOPN INJECT 35U SUB-Q EVERY MORNING DX: DIABETES DX: **DISCARD UNUSED PEN 28 DAYS AFTER 1ST USE** INJECT 40 UNITS SUB-Q AT BEDTIME DX; DIABETES 3/21/25   Gordon Álvarez MD   insulin isophane (HumuLIN N KwikPen) 100 units/mL injection pen Inject 20 Units under the skin in the morning Taper schedule, 20u for 4 days and then 10u for 4 days and then stop once off prednisone  Patient not taking: Reported on 4/24/2025 4/18/25   Nighat Lemos MD   insulin lispro (HumaLOG KwikPen) 100 units/mL injection pen Inject 1-10 Units under the skin 3 (three) times a day with meals MDD 30u, per scale. 150-200=1u, 200-250 =2u, 250-300= 3u, 300-350=4u, >350 = 6u 4/18/25   Nighat Lemos MD   Insulin Pen Needle (BD AutoShield Duo) 30G X 5 MM MISC USE FOR INSULIN INJECTION 3 TIMES DAILY 5/2/25   Gordon Álvarez MD   ipratropium-albuterol (DUO-NEB)  0.5-2.5 mg/3 mL nebulizer solution 4 times daily prn wheezing  Patient taking differently: as needed 4 times daily prn wheezing    Historical Provider, MD   isosorbide mononitrate (IMDUR) 60 mg 24 hr tablet 1 TAB ORALLY EVERY MORNING DX:ANGINA 11/29/24   Gordon Álvarez MD   loperamide (IMODIUM) 2 mg capsule 1 CAP ORALLY DAILY AS NEEDED FOR DIARRHEA *NOT TO EXCEED 8 CAPS/24 HR*  Patient taking differently: as needed 4/10/25   Gordon Álvarez MD   losartan (COZAAR) 100 MG tablet 1 TAB ORALLY EVERY MORNING DX: HYPERTENSION 10/25/24   Gordon Álvarez MD   Multiple Vitamin (Daily-Jhonatan) TABS 1 TAB ORALLY EVERY MORNING DX: SUPPLEMENT  Patient not taking: Reported on 4/18/2025 11/29/24   Gordon Álvarez MD   Multiple Vitamin (MULTIVITAMIN ADULT PO) every 24 hours    Historical Provider, MD   NovoFine Autocover Pen Needle 30G X 8 MM MISC 2 pen needles daily 4/8/24   Historical Provider, MD   Nutritional Supplements (Ensure) Take by mouth 1 can daily    Historical Provider, MD   nystatin (MYCOSTATIN) powder Apply topically 2 (two) times a day 7/8/24   Gordon Álvarez MD   pantoprazole (PROTONIX) 40 mg tablet 1 TAB ORALLY EVERY MORNING DX: GERD 11/29/24   Gordon Álvarez MD   repaglinide (PRANDIN) 0.5 mg tablet Take 1 tablet with breakfast and 1 tablet with dinner, hold if not eating or blood sugar less than 100 12/6/24   Miki Woody MD   torsemide (DEMADEX) 20 mg tablet Take 1 tablet (20 mg total) by mouth every morning 2/27/25   Gordon Álvarez MD   Trelegy Ellipta 200-62.5-25 MCG/ACT AEPB inhaler INHALE 1 PUFF ORALLY EVERY MORNING DX: COPD *RINSE MOUTH AFTER USE* 4/29/25   Gordon Álvarez MD   valACYclovir (VALTREX) 1,000 mg tablet Take 1 tablet (1,000 mg total) by mouth 2 (two) times a day for 2 days  Patient not taking: Reported on 4/24/2025 4/15/25 4/24/25  Gordon Álvarez MD     Allergies   Allergen Reactions    Mounjaro [Tirzepatide] Diarrhea       Objective :  Temp:  [98.7 °F (37.1 °C)] 98.7 °F (37.1 °C)  HR:  [77-79] 77  BP:  (121-138)/(58-71) 122/71  Resp:  [18-22] 22  SpO2:  [90 %-91 %] 91 %  O2 Device: Nasal cannula  Nasal Cannula O2 Flow Rate (L/min):  [2 L/min-5 L/min] 4 L/min    Physical Exam  Vitals and nursing note reviewed.   Constitutional:       General: He is not in acute distress.     Appearance: He is well-developed.   HENT:      Head: Normocephalic and atraumatic.   Eyes:      Conjunctiva/sclera: Conjunctivae normal.   Cardiovascular:      Rate and Rhythm: Normal rate and regular rhythm.      Heart sounds: No murmur heard.  Pulmonary:      Effort: Pulmonary effort is normal. No respiratory distress.      Breath sounds: Wheezing present.      Comments: Overall lungs are diminished, wheezes are heard when he coughs  Abdominal:      Palpations: Abdomen is soft.      Tenderness: There is no abdominal tenderness.   Musculoskeletal:         General: No swelling.      Cervical back: Neck supple.   Skin:     General: Skin is warm and dry.      Capillary Refill: Capillary refill takes less than 2 seconds.   Neurological:      Mental Status: He is alert.   Psychiatric:         Mood and Affect: Mood normal.          Lines/Drains:            Lab Results: I have reviewed the following results:  Results from last 7 days   Lab Units 05/04/25  1627   WBC Thousand/uL 7.72   HEMOGLOBIN g/dL 11.2*   HEMATOCRIT % 35.2*   PLATELETS Thousands/uL 168   SEGS PCT % 74   LYMPHO PCT % 7*   MONO PCT % 17*   EOS PCT % 1     Results from last 7 days   Lab Units 05/04/25  1609   SODIUM mmol/L 139   POTASSIUM mmol/L 4.4   CHLORIDE mmol/L 100   CO2 mmol/L 31   BUN mg/dL 25   CREATININE mg/dL 1.65*   ANION GAP mmol/L 8   CALCIUM mg/dL 8.5   ALBUMIN g/dL 3.4*   TOTAL BILIRUBIN mg/dL 0.69   ALK PHOS U/L 74   ALT U/L 28   AST U/L 36   GLUCOSE RANDOM mg/dL 134     Results from last 7 days   Lab Units 05/04/25  1704   INR  1.15         Lab Results   Component Value Date    HGBA1C 8.9 (H) 04/08/2025    HGBA1C 8.1 (H) 12/18/2024    HGBA1C 7.5 (H) 09/18/2024      Results from last 7 days   Lab Units 05/04/25  1609   LACTIC ACID mmol/L 1.0   PROCALCITONIN ng/ml 0.15       Imaging Results Review: I reviewed radiology reports from this admission including: CT chest.  Other Study Results Review: EKG was personally reviewed and my interpretation is: Normal sinus rhythm with a rate of 80, QTc 419..                      ** Please Note: This note has been constructed using a voice recognition system. **

## 2025-05-05 PROBLEM — J98.11 ATELECTASIS, BILATERAL: Status: ACTIVE | Noted: 2025-05-05

## 2025-05-05 PROBLEM — R13.19 OTHER DYSPHAGIA: Status: ACTIVE | Noted: 2025-05-05

## 2025-05-05 PROBLEM — J96.21 ACUTE ON CHRONIC RESPIRATORY FAILURE WITH HYPOXIA (HCC): Status: ACTIVE | Noted: 2025-04-07

## 2025-05-05 LAB
ALBUMIN SERPL BCG-MCNC: 3.3 G/DL (ref 3.5–5)
ALP SERPL-CCNC: 73 U/L (ref 34–104)
ALT SERPL W P-5'-P-CCNC: 24 U/L (ref 7–52)
ANION GAP SERPL CALCULATED.3IONS-SCNC: 13 MMOL/L (ref 4–13)
ANISOCYTOSIS BLD QL SMEAR: PRESENT
AST SERPL W P-5'-P-CCNC: 16 U/L (ref 13–39)
BASOPHILS # BLD MANUAL: 0 THOUSAND/UL (ref 0–0.1)
BASOPHILS NFR MAR MANUAL: 0 % (ref 0–1)
BILIRUB SERPL-MCNC: 0.5 MG/DL (ref 0.2–1)
BUN SERPL-MCNC: 31 MG/DL (ref 5–25)
CALCIUM ALBUM COR SERPL-MCNC: 9 MG/DL (ref 8.3–10.1)
CALCIUM SERPL-MCNC: 8.4 MG/DL (ref 8.4–10.2)
CHLORIDE SERPL-SCNC: 99 MMOL/L (ref 96–108)
CO2 SERPL-SCNC: 27 MMOL/L (ref 21–32)
CREAT SERPL-MCNC: 1.55 MG/DL (ref 0.6–1.3)
EOSINOPHIL # BLD MANUAL: 0 THOUSAND/UL (ref 0–0.4)
EOSINOPHIL NFR BLD MANUAL: 0 % (ref 0–6)
ERYTHROCYTE [DISTWIDTH] IN BLOOD BY AUTOMATED COUNT: 16 % (ref 11.6–15.1)
GFR SERPL CREATININE-BSD FRML MDRD: 39 ML/MIN/1.73SQ M
GLUCOSE SERPL-MCNC: 271 MG/DL (ref 65–140)
GLUCOSE SERPL-MCNC: 294 MG/DL (ref 65–140)
GLUCOSE SERPL-MCNC: 313 MG/DL (ref 65–140)
GLUCOSE SERPL-MCNC: 325 MG/DL (ref 65–140)
GLUCOSE SERPL-MCNC: 355 MG/DL (ref 65–140)
HCT VFR BLD AUTO: 34.9 % (ref 36.5–49.3)
HGB BLD-MCNC: 11.4 G/DL (ref 12–17)
LYMPHOCYTES # BLD AUTO: 0.28 THOUSAND/UL (ref 0.6–4.47)
LYMPHOCYTES # BLD AUTO: 4 % (ref 14–44)
MAGNESIUM SERPL-MCNC: 2.6 MG/DL (ref 1.9–2.7)
MCH RBC QN AUTO: 30.2 PG (ref 26.8–34.3)
MCHC RBC AUTO-ENTMCNC: 32.7 G/DL (ref 31.4–37.4)
MCV RBC AUTO: 93 FL (ref 82–98)
MONOCYTES # BLD AUTO: 0.21 THOUSAND/UL (ref 0–1.22)
MONOCYTES NFR BLD: 3 % (ref 4–12)
NEUTROPHILS # BLD MANUAL: 6.61 THOUSAND/UL (ref 1.85–7.62)
NEUTS BAND NFR BLD MANUAL: 1 % (ref 0–8)
NEUTS SEG NFR BLD AUTO: 92 % (ref 43–75)
NRBC BLD AUTO-RTO: 1 /100 WBC (ref 0–2)
PHOSPHATE SERPL-MCNC: 5 MG/DL (ref 2.3–4.1)
PLATELET # BLD AUTO: 168 THOUSANDS/UL (ref 149–390)
PLATELET BLD QL SMEAR: ADEQUATE
PMV BLD AUTO: 10 FL (ref 8.9–12.7)
POIKILOCYTOSIS BLD QL SMEAR: PRESENT
POLYCHROMASIA BLD QL SMEAR: PRESENT
POTASSIUM SERPL-SCNC: 3.7 MMOL/L (ref 3.5–5.3)
PROCALCITONIN SERPL-MCNC: 0.12 NG/ML
PROT SERPL-MCNC: 6.9 G/DL (ref 6.4–8.4)
RBC # BLD AUTO: 3.77 MILLION/UL (ref 3.88–5.62)
RBC MORPH BLD: PRESENT
SODIUM SERPL-SCNC: 139 MMOL/L (ref 135–147)
WBC # BLD AUTO: 7.11 THOUSAND/UL (ref 4.31–10.16)

## 2025-05-05 PROCEDURE — 94760 N-INVAS EAR/PLS OXIMETRY 1: CPT

## 2025-05-05 PROCEDURE — 94660 CPAP INITIATION&MGMT: CPT

## 2025-05-05 PROCEDURE — 99223 1ST HOSP IP/OBS HIGH 75: CPT | Performed by: INTERNAL MEDICINE

## 2025-05-05 PROCEDURE — 94640 AIRWAY INHALATION TREATMENT: CPT

## 2025-05-05 PROCEDURE — 85007 BL SMEAR W/DIFF WBC COUNT: CPT

## 2025-05-05 PROCEDURE — 83735 ASSAY OF MAGNESIUM: CPT

## 2025-05-05 PROCEDURE — 84145 PROCALCITONIN (PCT): CPT

## 2025-05-05 PROCEDURE — 92610 EVALUATE SWALLOWING FUNCTION: CPT

## 2025-05-05 PROCEDURE — 99222 1ST HOSP IP/OBS MODERATE 55: CPT | Performed by: STUDENT IN AN ORGANIZED HEALTH CARE EDUCATION/TRAINING PROGRAM

## 2025-05-05 PROCEDURE — 93005 ELECTROCARDIOGRAM TRACING: CPT

## 2025-05-05 PROCEDURE — 94668 MNPJ CHEST WALL SBSQ: CPT

## 2025-05-05 PROCEDURE — 99232 SBSQ HOSP IP/OBS MODERATE 35: CPT | Performed by: INTERNAL MEDICINE

## 2025-05-05 PROCEDURE — 94669 MECHANICAL CHEST WALL OSCILL: CPT

## 2025-05-05 PROCEDURE — 82948 REAGENT STRIP/BLOOD GLUCOSE: CPT

## 2025-05-05 PROCEDURE — 94664 DEMO&/EVAL PT USE INHALER: CPT

## 2025-05-05 PROCEDURE — 84100 ASSAY OF PHOSPHORUS: CPT

## 2025-05-05 PROCEDURE — 85027 COMPLETE CBC AUTOMATED: CPT

## 2025-05-05 PROCEDURE — 80053 COMPREHEN METABOLIC PANEL: CPT

## 2025-05-05 RX ORDER — METHYLPREDNISOLONE SODIUM SUCCINATE 40 MG/ML
40 INJECTION, POWDER, LYOPHILIZED, FOR SOLUTION INTRAMUSCULAR; INTRAVENOUS EVERY 12 HOURS SCHEDULED
Status: DISCONTINUED | OUTPATIENT
Start: 2025-05-05 | End: 2025-05-07

## 2025-05-05 RX ORDER — INSULIN LISPRO 100 [IU]/ML
2-12 INJECTION, SOLUTION INTRAVENOUS; SUBCUTANEOUS
Status: DISCONTINUED | OUTPATIENT
Start: 2025-05-05 | End: 2025-05-09 | Stop reason: HOSPADM

## 2025-05-05 RX ORDER — INSULIN LISPRO 100 [IU]/ML
4-20 INJECTION, SOLUTION INTRAVENOUS; SUBCUTANEOUS
Status: DISCONTINUED | OUTPATIENT
Start: 2025-05-05 | End: 2025-05-09 | Stop reason: HOSPADM

## 2025-05-05 RX ORDER — CYCLOSPORINE 0.5 MG/ML
EMULSION OPHTHALMIC
Qty: 5.5 ML | Refills: 4 | Status: SHIPPED | OUTPATIENT
Start: 2025-05-05

## 2025-05-05 RX ADMIN — TOBRAMYCIN 2 DROP: 3 SOLUTION/ DROPS OPHTHALMIC at 21:45

## 2025-05-05 RX ADMIN — PANTOPRAZOLE SODIUM 40 MG: 40 TABLET, DELAYED RELEASE ORAL at 08:19

## 2025-05-05 RX ADMIN — INSULIN LISPRO 5 UNITS: 100 INJECTION, SOLUTION INTRAVENOUS; SUBCUTANEOUS at 17:02

## 2025-05-05 RX ADMIN — FINASTERIDE 5 MG: 5 TABLET, FILM COATED ORAL at 08:15

## 2025-05-05 RX ADMIN — INSULIN LISPRO 4 UNITS: 100 INJECTION, SOLUTION INTRAVENOUS; SUBCUTANEOUS at 08:17

## 2025-05-05 RX ADMIN — INSULIN LISPRO 5 UNITS: 100 INJECTION, SOLUTION INTRAVENOUS; SUBCUTANEOUS at 08:18

## 2025-05-05 RX ADMIN — IPRATROPIUM BROMIDE 0.5 MG: 0.5 SOLUTION RESPIRATORY (INHALATION) at 14:13

## 2025-05-05 RX ADMIN — TOBRAMYCIN 2 DROP: 3 SOLUTION/ DROPS OPHTHALMIC at 17:03

## 2025-05-05 RX ADMIN — IPRATROPIUM BROMIDE 0.5 MG: 0.5 SOLUTION RESPIRATORY (INHALATION) at 08:33

## 2025-05-05 RX ADMIN — INSULIN LISPRO 8 UNITS: 100 INJECTION, SOLUTION INTRAVENOUS; SUBCUTANEOUS at 17:02

## 2025-05-05 RX ADMIN — SODIUM CHLORIDE, PRESERVATIVE FREE 3 ML: 5 INJECTION INTRAVENOUS at 05:23

## 2025-05-05 RX ADMIN — DEXTRAN 70, GLYCERIN, HYPROMELLOSE 1 DROP: 1; 2; 3 SOLUTION/ DROPS OPHTHALMIC at 17:02

## 2025-05-05 RX ADMIN — IPRATROPIUM BROMIDE 0.5 MG: 0.5 SOLUTION RESPIRATORY (INHALATION) at 19:00

## 2025-05-05 RX ADMIN — LEVALBUTEROL HYDROCHLORIDE 1.25 MG: 1.25 SOLUTION RESPIRATORY (INHALATION) at 19:00

## 2025-05-05 RX ADMIN — METHYLPREDNISOLONE SODIUM SUCCINATE 40 MG: 40 INJECTION, POWDER, FOR SOLUTION INTRAMUSCULAR; INTRAVENOUS at 05:23

## 2025-05-05 RX ADMIN — LEVALBUTEROL HYDROCHLORIDE 1.25 MG: 1.25 SOLUTION RESPIRATORY (INHALATION) at 08:33

## 2025-05-05 RX ADMIN — TOBRAMYCIN 2 DROP: 3 SOLUTION/ DROPS OPHTHALMIC at 12:02

## 2025-05-05 RX ADMIN — HEPARIN SODIUM 5000 UNITS: 5000 INJECTION INTRAVENOUS; SUBCUTANEOUS at 21:39

## 2025-05-05 RX ADMIN — ATENOLOL 50 MG: 50 TABLET ORAL at 08:15

## 2025-05-05 RX ADMIN — INSULIN LISPRO 5 UNITS: 100 INJECTION, SOLUTION INTRAVENOUS; SUBCUTANEOUS at 12:02

## 2025-05-05 RX ADMIN — AMLODIPINE BESYLATE 10 MG: 2.5 TABLET ORAL at 08:15

## 2025-05-05 RX ADMIN — METHYLPREDNISOLONE SODIUM SUCCINATE 40 MG: 40 INJECTION, POWDER, FOR SOLUTION INTRAMUSCULAR; INTRAVENOUS at 21:45

## 2025-05-05 RX ADMIN — ASPIRIN 81 MG: 81 TABLET, COATED ORAL at 08:15

## 2025-05-05 RX ADMIN — TAMSULOSIN HYDROCHLORIDE 0.4 MG: 0.4 CAPSULE ORAL at 17:02

## 2025-05-05 RX ADMIN — TOBRAMYCIN 2 DROP: 3 SOLUTION/ DROPS OPHTHALMIC at 05:23

## 2025-05-05 RX ADMIN — ISOSORBIDE MONONITRATE 60 MG: 30 TABLET, EXTENDED RELEASE ORAL at 08:14

## 2025-05-05 RX ADMIN — INSULIN GLARGINE 35 UNITS: 100 INJECTION, SOLUTION SUBCUTANEOUS at 21:38

## 2025-05-05 RX ADMIN — HEPARIN SODIUM 5000 UNITS: 5000 INJECTION INTRAVENOUS; SUBCUTANEOUS at 05:23

## 2025-05-05 RX ADMIN — TOBRAMYCIN 2 DROP: 3 SOLUTION/ DROPS OPHTHALMIC at 13:39

## 2025-05-05 RX ADMIN — INSULIN GLARGINE 35 UNITS: 100 INJECTION, SOLUTION SUBCUTANEOUS at 08:17

## 2025-05-05 RX ADMIN — SODIUM CHLORIDE, SODIUM GLUCONATE, SODIUM ACETATE, POTASSIUM CHLORIDE, MAGNESIUM CHLORIDE, SODIUM PHOSPHATE, DIBASIC, AND POTASSIUM PHOSPHATE 100 ML/HR: .53; .5; .37; .037; .03; .012; .00082 INJECTION, SOLUTION INTRAVENOUS at 16:24

## 2025-05-05 RX ADMIN — ATORVASTATIN CALCIUM 80 MG: 40 TABLET, FILM COATED ORAL at 21:37

## 2025-05-05 RX ADMIN — LEVALBUTEROL HYDROCHLORIDE 1.25 MG: 1.25 SOLUTION RESPIRATORY (INHALATION) at 14:13

## 2025-05-05 RX ADMIN — FLUTICASONE PROPIONATE 1 SPRAY: 50 SPRAY, METERED NASAL at 08:19

## 2025-05-05 RX ADMIN — DEXTRAN 70, GLYCERIN, HYPROMELLOSE 1 DROP: 1; 2; 3 SOLUTION/ DROPS OPHTHALMIC at 08:19

## 2025-05-05 RX ADMIN — SODIUM CHLORIDE, SODIUM GLUCONATE, SODIUM ACETATE, POTASSIUM CHLORIDE, MAGNESIUM CHLORIDE, SODIUM PHOSPHATE, DIBASIC, AND POTASSIUM PHOSPHATE 100 ML/HR: .53; .5; .37; .037; .03; .012; .00082 INJECTION, SOLUTION INTRAVENOUS at 05:33

## 2025-05-05 RX ADMIN — SODIUM CHLORIDE, PRESERVATIVE FREE 3 ML: 5 INJECTION INTRAVENOUS at 13:41

## 2025-05-05 RX ADMIN — SODIUM CHLORIDE, PRESERVATIVE FREE 3 ML: 5 INJECTION INTRAVENOUS at 21:38

## 2025-05-05 RX ADMIN — INSULIN LISPRO 16 UNITS: 100 INJECTION, SOLUTION INTRAVENOUS; SUBCUTANEOUS at 21:38

## 2025-05-05 RX ADMIN — HEPARIN SODIUM 5000 UNITS: 5000 INJECTION INTRAVENOUS; SUBCUTANEOUS at 13:35

## 2025-05-05 NOTE — PROGRESS NOTES
Progress Note - Hospitalist   Name: Kyler Hernandez 89 y.o. male I MRN: [id number]  Unit/Bed#: -01 I Date of Admission: 5/4/2025   Date of Service: 5/5/2025 I Hospital Day: 1    Assessment & Plan  COPD with acute exacerbation (HCC)  Kyler is an 89-year-old male with past medical history of coronary artery disease, COPD (oxygen dependent 3 L), hypertension, hyperlipidemia, diabetes,and obstructive sleep apnea.  He presents to the emergency department from [facility] for shortness of breath and productive cough worsening over the last few days.  He was recently hospitalized between April 7 to 14 for COPD exacerbation and, per the [facility] staff, patient never fully returned to baseline.  The emergency department he was given nebulizers, steroids, and antibiotics and will be admitted for further treatment.    Pulmonary consulted, appreciate the assistance  Xopenex/Atrovent nebulizers, hold Trelegy for now  Solu-Medrol 40 mg every 8 hours  Currently on 4 L nasal cannula, wean to baseline as tolerated  Procalcitonin 0.15, ceftriaxone given in the emergency room.  Hold off on any further antibiotics unless patient develops a fever.  Procalcitonin is normal  Lactic acid negative, patient afebrile and without leukocytosis  CT chest shows:  Nodular opacities in the bilateral lungs and surrounding the inferior right kevin as described are most consistent with an infectious/inflammatory infiltrate considering clinical history.   Proximal left lower lobar bronchial occlusion with near complete left lower lobe atelectasis.  Right lower lobe bronchial wall thickening with segmental occlusions.  Likely aspiration-related consideration mid esophageal dilation with debris.  No obvious obstructing mass, consider bronchoscopy for further evaluation.  Acute kidney injury superimposed on stage 3a chronic kidney disease (HCC)  Baseline creatinine between 1.0 and 1.25, currently 1.65  Avoid  nephrotoxins and hypotension  Check urinalysis  Provide intravenous Isolyte and trend renal function    Lab Results   Component Value Date    EGFR 39 05/05/2025    EGFR 36 05/04/2025    EGFR 53 04/14/2025    CREATININE 1.55 (H) 05/05/2025    CREATININE 1.65 (H) 05/04/2025    CREATININE 1.19 04/14/2025     Coronary artery disease involving native coronary artery of native heart without angina pectoris  No active chest pain  Continue aspirin/statin/isosorbide  Primary hypertension  Blood pressure stable  Continue:  Routine vital signs  LINCOLN (obstructive sleep apnea)  Continue CPAP  Mixed hyperlipidemia  Continue statin  Type 2 diabetes mellitus with hyperglycemia, with long-term current use of insulin (HCC)  Hold oral antidiabetics  Sliding scale insulin, add 5 units with meals  Continue twice daily Lantus  Carbohydrate restrictive diet  Hypoglycemia protocol    Lab Results   Component Value Date    HGBA1C 8.9 (H) 04/08/2025       Recent Labs     05/04/25  2119 05/05/25  0743   POCGLU 208* 271*       Blood Sugar Average: Last 72 hrs:  (P) 239.5    Benign prostatic hyperplasia without lower urinary tract symptoms  Continue dose equivalent for alfuzosin (tamsulosin 0.4 mg), and finasteride 5 mg daily  Acute bacterial conjunctivitis of both eyes  Patient complains of eye crust recently  Tobramycin drops started in the emergency room  Continue  Acute on chronic respiratory failure with hypoxia (HCC)  Patient admitted with acute on chronic respiratory failure with hypoxia POA due to COPD exacerbation.  Patient was discharged on 3 L of supplemental oxygen on 4/14/2025  Currently requiring 4-5 L supplemental oxygen  Wean oxygen as tolerated  See above  Atelectasis, bilateral        Labs & Imaging: Results Review Statement: No pertinent imaging studies reviewed.    VTE Prophylaxis: in place.    Code Status:   Level 1 - Full Code    Patient Centered Rounds: I have performed bedside rounds with nursing staff today.    Mobility:  "  Basic Mobility Inpatient Raw Score: 22  JH-HLM Goal: 7: Walk 25 feet or more  JH-HLM Achieved: 4: Move to chair/commode  JH-HLM Goal achieved. Continue to encourage appropriate mobility.    Discussions with Specialists or Other Care Team Provider: Pulmonary    Education and Discussions with Family / Patient: Declined update    Total Time Spent on Date of Encounter in care of patient: 35 mins. This time was spent on one or more of the following: performing physical exam; counseling and coordination of care; obtaining or reviewing history; documenting in the medical record; reviewing/ordering tests, medications or procedures; communicating with other healthcare professionals and discussing with patient's family/caregivers.    Current Length of Stay: 1 day(s)    Current Patient Status: Inpatient   Certification Statement: The patient will continue to require additional inpatient hospital stay due to see my assessment and plan.     Subjective:   Patient is seen and examined at bedside.  Has shortness of breath.  Afebrile  Objective:    Vitals: Blood pressure 126/61, pulse (!) 54, temperature 97.5 °F (36.4 °C), resp. rate 20, height 5' 8\" (1.727 m), weight 100 kg (220 lb 6.4 oz), SpO2 95%.,Body mass index is 33.51 kg/m².  SPO2 RA Rest      Flowsheet Row ED to Hosp-Admission (Current) from 5/4/2025 in [facility] Med Surg Unit   SpO2 95 %   SpO2 Activity At Rest   O2 Device Nasal cannula   O2 Flow Rate --          I&O:   Intake/Output Summary (Last 24 hours) at 5/5/2025 1035  Last data filed at 5/5/2025 0901  Gross per 24 hour   Intake 2090 ml   Output 1975 ml   Net 115 ml       Physical Exam:    General- Alert, sitting in chair. Not in any acute distress.  Neck- Supple, No JVD  CVS- regular, S1 and S2 normal  Chest- Bilateral Air entry, decreased bilaterally with wheezing  Abdomen- soft, nontender, not distended, no guarding or rigidity, BS+  Extremities-  No pedal edema, No calf tenderness            "              Normal ROM in all extremities.  CNS-   Alert, awake and orientedx3. No focal deficits present.    Invasive Devices       Peripheral Intravenous Line  Duration             Peripheral IV 05/04/25 Right Antecubital <1 day                          Social History  reviewed  Family History   Problem Relation Age of Onset    Dementia Mother     Diabetes Mother     Peripheral vascular disease Father     Mental illness Neg Hx     Substance Abuse Neg Hx     reviewed    Meds:  Current Facility-Administered Medications   Medication Dose Route Frequency Provider Last Rate Last Admin    acetaminophen (TYLENOL) tablet 650 mg  650 mg Oral Q6H PRN CONCEPCION Vora        aluminum-magnesium hydroxide-simethicone (MAALOX) oral suspension 30 mL  30 mL Oral Q6H PRN CONCEPCION Vora        amLODIPine (NORVASC) tablet 10 mg  10 mg Oral QAM CONCEPCION Vora   10 mg at 05/05/25 0815    Artificial Tears Op Soln 1 drop  1 drop Both Eyes BID CONCEPCION Vora   1 drop at 05/05/25 0819    aspirin (ECOTRIN LOW STRENGTH) EC tablet 81 mg  81 mg Oral QAM CONCEPCION Vora   81 mg at 05/05/25 0815    atenolol (TENORMIN) tablet 50 mg  50 mg Oral Daily CONCEPCION Vora   50 mg at 05/05/25 0815    atorvastatin (LIPITOR) tablet 80 mg  80 mg Oral HS CONCEPCION Vora   80 mg at 05/04/25 2226    dextromethorphan-guaiFENesin (ROBITUSSIN DM) oral syrup 10 mL  10 mL Oral Q4H PRN CONCEPCION Vora        finasteride (PROSCAR) tablet 5 mg  5 mg Oral Daily CONCEPCION Vora   5 mg at 05/05/25 0815    fluticasone (FLONASE) 50 mcg/act nasal spray 1 spray  1 spray Each Nare Daily CONCEPCION Vora   1 spray at 05/05/25 0819    heparin (porcine) subcutaneous injection 5,000 Units  5,000 Units Subcutaneous Q8H [facility] CONCEPCION Vora   5,000 Units at 05/05/25 0523    insulin glargine (LANTUS) subcutaneous injection 35  Units 0.35 mL  35 Units Subcutaneous Q12H [facility] CONCEPCION Vora   35 Units at 05/05/25 0817    insulin lispro (HumALOG/ADMELOG) 100 units/mL subcutaneous injection 1-6 Units  1-6 Units Subcutaneous 4x Daily (AC & HS) CONCEPCION Vora   4 Units at 05/05/25 0817    insulin lispro (HumALOG/ADMELOG) 100 units/mL subcutaneous injection 5 Units  5 Units Subcutaneous TID With Meals CONCEPCION Vora   5 Units at 05/05/25 0818    ipratropium (ATROVENT) 0.02 % inhalation solution 0.5 mg  0.5 mg Nebulization TID CONCEPCION Vora   0.5 mg at 05/05/25 0833    isosorbide mononitrate (IMDUR) 24 hr tablet 60 mg  60 mg Oral QAM CONCEPCION Vora   60 mg at 05/05/25 0814    levalbuterol (XOPENEX) inhalation solution 1.25 mg  1.25 mg Nebulization TID CONCEPCION Vora   1.25 mg at 05/05/25 0833    methylPREDNISolone sodium succinate (Solu-MEDROL) injection 40 mg  40 mg Intravenous Q12H [facility] Sera Oates DO        multi-electrolyte (Plasmalyte-A/Isolyte-S PH 7.4/Normosol-R) IV solution  100 mL/hr Intravenous Continuous CONCEPCION Vora 100 mL/hr at 05/05/25 0533 100 mL/hr at 05/05/25 0533    pantoprazole (PROTONIX) EC tablet 40 mg  40 mg Oral QAM CONCEPCION Vora   40 mg at 05/05/25 0819    polyethylene glycol (MIRALAX) packet 17 g  17 g Oral Daily PRN CONCEPCION Vora        sodium chloride (PF) 0.9 % injection 3 mL  3 mL Intravenous Q8H [facility] Torrey Yuen DO   3 mL at 05/05/25 0523    tamsulosin (FLOMAX) capsule 0.4 mg  0.4 mg Oral Daily With Dinner CONCEPCION Vora        tobramycin (TOBREX) 0.3 % ophthalmic solution 2 drop  2 drop Both Eyes Q4H While Awake Torrey Yuen, DO   2 drop at 05/05/25 0551        Medications Prior to Admission:     acetaminophen (TYLENOL) 325 mg tablet    albuterol (2.5 mg/3 mL) 0.083 % nebulizer solution    alfuzosin (UROXATRAL) 10 mg 24 hr tablet    amLODIPine (NORVASC) 10  mg tablet    aspirin (Aspirin Low Dose) 81 mg EC tablet    atenolol (TENORMIN) 50 mg tablet    atorvastatin (LIPITOR) 80 mg tablet    Cholecalciferol (Vitamin D3) 25 MCG (1000 UT) CAPS    Cyanocobalamin (Vitamin B-12) 2500 MCG SUBL    isosorbide mononitrate (IMDUR) 60 mg 24 hr tablet    losartan (COZAAR) 100 MG tablet    Multiple Vitamin (Daily-Jhonatan) TABS    Multiple Vitamin (MULTIVITAMIN ADULT PO)    pantoprazole (PROTONIX) 40 mg tablet    repaglinide (PRANDIN) 0.5 mg tablet    torsemide (DEMADEX) 20 mg tablet    Alcohol Swabs (Alcohol Prep Pad) 70 % PADS    Continuous Glucose  (Dexcom G7 ) ZAYNAB    Continuous Glucose Sensor (Dexcom G7 Sensor)    cycloSPORINE (RESTASIS) 0.05 % ophthalmic emulsion    Empagliflozin (Jardiance) 10 MG TABS tablet    finasteride (PROSCAR) 5 mg tablet    fluticasone (FLONASE) 50 mcg/act nasal spray    Insulin Glargine Solostar (Lantus SoloStar) 100 UNIT/ML SOPN    insulin isophane (HumuLIN N KwikPen) 100 units/mL injection pen    insulin lispro (HumaLOG KwikPen) 100 units/mL injection pen    Insulin Pen Needle (BD AutoShield Duo) 30G X 5 MM MISC    ipratropium-albuterol (DUO-NEB) 0.5-2.5 mg/3 mL nebulizer solution    loperamide (IMODIUM) 2 mg capsule    NovoFine Autocover Pen Needle 30G X 8 MM MISC    Nutritional Supplements (Ensure)    nystatin (MYCOSTATIN) powder    Trelegy Ellipta 200-62.5-25 MCG/ACT AEPB inhaler    valACYclovir (VALTREX) 1,000 mg tablet    Labs:  Results from last 7 days   Lab Units 05/05/25  0502 05/04/25  1627   WBC Thousand/uL 7.11 7.72   HEMOGLOBIN g/dL 11.4* 11.2*   HEMATOCRIT % 34.9* 35.2*   PLATELETS Thousands/uL 168 168   SEGS PCT %  --  74   LYMPHO PCT % 4* 7*   MONO PCT % 3* 17*   EOS PCT % 0 1     Results from last 7 days   Lab Units 05/05/25  0502 05/04/25  1609   POTASSIUM mmol/L 3.7 4.4   CHLORIDE mmol/L 99 100   CO2 mmol/L 27 31   BUN mg/dL 31* 25   CREATININE mg/dL 1.55* 1.65*   CALCIUM mg/dL 8.4 8.5   ALK PHOS U/L 73 74   ALT U/L 24 28  "  AST U/L 16 36     No results found for: \"TROPONINI\", \"CKMB\", \"CKTOTAL\"  Results from last 7 days   Lab Units 05/04/25  1704   INR  1.15     Lab Results   Component Value Date    BLOODCX Received in Microbiology Lab. Culture in Progress. 05/04/2025    BLOODCX Received in Microbiology Lab. Culture in Progress. 05/04/2025    BLOODCX No Growth After 5 Days. 04/07/2025    BLOODCX No Growth After 5 Days. 04/07/2025    URINECX No Growth <1000 cfu/mL 12/22/2024    SPUTUMCULTUR Test not performed. Suggest repeat specimen. 04/08/2025         Imaging:  Results for orders placed during the hospital encounter of 05/04/25    XR chest portable    Narrative  XR CHEST PORTABLE    INDICATION: sob cough.    COMPARISON: CXR 4/10/2025, chest CT 5/4/2025.    FINDINGS:    Left lower lobe atelectasis, better shown on subsequent chest CT. Mild pulmonary venous congestion. No pneumothorax or pleural effusion.    Mild cardiomegaly.    Bones are unremarkable for age.    Normal upper abdomen.    Impression  Mild pulmonary venous congestion.    Left lower lobe atelectasis, better shown on subsequent chest CT.        Workstation performed: [id number]    No results found for this or any previous visit.      Last 24 Hours Medication List:   Current Facility-Administered Medications   Medication Dose Route Frequency Provider Last Rate    acetaminophen  650 mg Oral Q6H PRN CONCEPCION Vora      aluminum-magnesium hydroxide-simethicone  30 mL Oral Q6H PRN CONCEPCION Vora      amLODIPine  10 mg Oral QAM CONCEPCION Vora      Artificial Tears  1 drop Both Eyes BID CONCEPCION Vora      aspirin  81 mg Oral QAM CONCEPCION Vora      atenolol  50 mg Oral Daily CONCEPCION Vora      atorvastatin  80 mg Oral HS CONCEPCION Vora      dextromethorphan-guaiFENesin  10 mL Oral Q4H PRN CONCEPCION Vora      finasteride  5 mg Oral Daily Howard Newsome" CONCEPCION Rangel      fluticasone  1 spray Each Nare Daily CONCEPCION Vora      heparin (porcine)  5,000 Units Subcutaneous Q8H [facility] CONCEPCION Vora      insulin glargine  35 Units Subcutaneous Q12H [facility] CONCEPCION Vora      insulin lispro  1-6 Units Subcutaneous 4x Daily (AC & HS) CONCEPCION Vora      insulin lispro  5 Units Subcutaneous TID With Meals CONCEPCION Vora      ipratropium  0.5 mg Nebulization TID CONCEPCION Vora      isosorbide mononitrate  60 mg Oral QAM CONCEPCION Vora      levalbuterol  1.25 mg Nebulization TID CONCEPCION Vora      methylPREDNISolone sodium succinate  40 mg Intravenous Q12H [facility] Sera Oates DO      multi-electrolyte  100 mL/hr Intravenous Continuous CONCEPCION Vora 100 mL/hr (05/05/25 0533)    pantoprazole  40 mg Oral QAM CONCEPCION Vora      polyethylene glycol  17 g Oral Daily PRN CONCEPCION Vora      sodium chloride (PF)  3 mL Intravenous Q8H [facility] Torrey Yuen DO      tamsulosin  0.4 mg Oral Daily With Dinner CONCEPCION Vora      tobramycin  2 drop Both Eyes Q4H While Awake Torrey Yuen DO          Today, Patient Was Seen By: Lit Gottlieb MD    ** Please Note: Dictation voice to text software may have been used in the creation of this document. **

## 2025-05-05 NOTE — CONSULTS
Consultation - Pulmonology   Name: Kyler Hernandez 89 y.o. male I MRN: [id number]  Unit/Bed#: -01 I Date of Admission: 5/4/2025   Date of Service: 5/5/2025 I Hospital Day: 1   Inpatient consult to Pulmonology  Consult performed by: Sera Oates DO  Consult ordered by: CONCEPCION Vora        Physician Requesting Evaluation: Lit Gottlieb MD   Reason for Evaluation / Principal Problem: COPD exacerbation    Assessment & Plan  COPD with acute exacerbation (HCC)  Patient was recently hospitalized with COPD exacerbation due to parainfluenza, completed course of prednisone.  Per HPI, patient was wheezing on arrival.  Wheezing has improved.  We can decrease IV steroids to twice daily dosing and likely transition to prednisone in the next 24-48 hours.  Continue ipratropium and Xopenex. No PFTs on file.  Maintained on Trelegy Ellipta as an outpatient.  Acute on chronic respiratory failure with hypoxia (HCC)  Patient is currently requiring 5 L/min.  Baseline oxygen needs 2-3 L/min.  Titrate to maintain saturations above 88%  Atelectasis, bilateral  Chest CT shows bibasilar atelectasis, suspect mucous plugging.  He is having difficulty expectorating his secretions.  Would rule out aspiration.  I will order vest therapy and flutter device to help with sputum expectoration.  Obtain speech therapy evaluation.  No evidence of acute infectious process.  Observe off antibiotics.        History of Present Illness   Kyler Hernandez is a 89 y.o. male who has a history of chronic hypoxic respiratory failure, maintained on 2-3 L/min and COPD/emphysema of unknown severity.  He was recently hospitalized from 4/7/2025 through 4/14/2025 with COPD exacerbation secondary to influenza.  He completed a course of steroids and was discharged home with prednisone taper.  He was seen by Annita Marin in our office on 4/24/2025 and he was steadily improving.  He is maintained on Trelegy Ellipta as an outpatient and uses the  nebulizer once daily in the mornings.  Patient was readmitted yesterday with ongoing issues of cough and green sputum production.  Patient has a cough but finds it difficult to expectorate sputum.  When he does he reports that it is white in color.  Denies hemoptysis.  He has increased shortness of breath compared with baseline.  Patient reports difficulty swallowing at times, notably with corn, peas and rice.      Review of Systems otherwise negative    Historical Information   Historical Information   Past Medical History:   Diagnosis Date    Atherosclerotic heart disease of native coronary artery without angina pectoris     Chronic obstructive pulmonary disease, unspecified COPD type (HCC)     Diabetes mellitus (HCC)     Dry eye syndrome     Hyperlipidemia     Hypertension     Nonrheumatic mitral (valve) insufficiency     Obstructive sleep apnea (adult) (pediatric)     Vitamin B12 deficiency     Vitamin D deficiency      Past Surgical History:   Procedure Laterality Date    HERNIA REPAIR       Social History     Tobacco Use    Smoking status: Former     Types: Cigarettes     Start date:      Quit date:      Years since quittin.3    Smokeless tobacco: Never   Vaping Use    Vaping status: Never Used   Substance and Sexual Activity    Alcohol use: Yes     Comment: occasional whiskey sour    Drug use: Never    Sexual activity: Not Currently     E-Cigarette/Vaping    E-Cigarette Use Never User      E-Cigarette/Vaping Substances    Nicotine No     THC No     CBD No     Flavoring No     Other No     Unknown No      Family history non-contributory  Tobacco History: Patient has a significant smoking history of 2.5 packs per day for 23 years, quit in       Objective :  Temp:  [97.4 °F (36.3 °C)-98.7 °F (37.1 °C)] 97.5 °F (36.4 °C)  HR:  [37-79] 54  BP: (121-138)/(58-71) 126/61  Resp:  [18-22] 20  SpO2:  [90 %-94 %] 93 %  O2 Device: Nasal cannula  Nasal Cannula O2 Flow Rate (L/min):  [2 L/min-6 L/min] 5  L/min    Physical Exam  Vitals reviewed.   Constitutional:       General: He is not in acute distress.     Appearance: He is well-developed.   Eyes:      General: No scleral icterus.  Neck:      Vascular: No JVD.   Cardiovascular:      Rate and Rhythm: Normal rate and regular rhythm.   Pulmonary:      Breath sounds: No wheezing, rhonchi or rales.      Comments: Decreased at the bases  Abdominal:      Tenderness: There is no abdominal tenderness.   Skin:     General: Skin is warm and dry.   Neurological:      Mental Status: He is alert and oriented to person, place, and time.   Psychiatric:         Mood and Affect: Mood normal.         Lab Results: I have reviewed the following results:  .     05/04/25  1609 05/04/25  1609 05/04/25  1627 05/04/25  1704 05/04/25  1817 05/05/25  0502   WBC  --    < > 7.72  --   --  7.11   HGB  --    < > 11.2*  --   --  11.4*   HCT  --    < > 35.2*  --   --  34.9*   PLT  --    < > 168  --   --  168   BANDSPCT  --   --   --   --   --  1   SODIUM 139  --   --   --   --  139   K 4.4  --   --   --   --  3.7     --   --   --   --  99   CO2 31  --   --   --   --  27   BUN 25  --   --   --   --  31*   CREATININE 1.65*  --   --   --   --  1.55*   GLUC 134  --   --   --   --  294*   MG 2.5  --   --   --   --  2.6   PHOS  --   --   --   --   --  5.0*   AST 36  --   --   --   --  16   ALT 28  --   --   --   --  24   ALB 3.4*  --   --   --   --  3.3*   TBILI 0.69  --   --   --   --  0.50   ALKPHOS 74  --   --   --   --  73   PTT  --   --   --  30  --   --    INR  --   --   --  1.15  --   --    HSTNI0 11  --   --   --   --   --    HSTNI2  --   --   --   --  10  --    BNP  --   --  201*  --   --   --    LACTICACID 1.0  --   --   --   --   --     < > = values in this interval not displayed.     Procalcitonin 0.12    ABG: No new results in last 24 hours.    Imaging Results Review: I personally reviewed the following image studies/reports in PACS and discussed pertinent findings with Radiology: CT  chest. My interpretation of the radiology images/reports is: CT of the chest shows no evidence of pulmonary embolism.  There is right lower lobe atelectasis and complete left lower lobe atelectasis.  There are ill-defined upper lobe opacities.    PFT : None on file

## 2025-05-05 NOTE — PLAN OF CARE
Problem: PAIN - ADULT  Goal: Verbalizes/displays adequate comfort level or baseline comfort level  Description: Interventions:- Encourage patient to monitor pain and request assistance- Assess pain using appropriate pain scale- Administer analgesics based on type and severity of pain and evaluate response- Implement non-pharmacological measures as appropriate and evaluate response- Consider cultural and social influences on pain and pain management- Notify physician/advanced practitioner if interventions unsuccessful or patient reports new pain  Outcome: Progressing     Problem: INFECTION - ADULT  Goal: Absence or prevention of progression during hospitalization  Description: INTERVENTIONS:- Assess and monitor for signs and symptoms of infection- Monitor lab/diagnostic results- Monitor all insertion sites, i.e. indwelling lines, tubes, and drains- Monitor endotracheal if appropriate and nasal secretions for changes in amount and color- [place] appropriate cooling/warming therapies per order- Administer medications as ordered- Instruct and encourage patient and family to use good hand hygiene technique- Identify and instruct in appropriate isolation precautions for identified infection/condition  Outcome: Progressing

## 2025-05-05 NOTE — RESPIRATORY THERAPY NOTE
RT Protocol Note  Kyler Hernandez 89 y.o. male MRN: [id number]  Unit/Bed#: -01 Encounter: [id number]    Assessment    Principal Problem:    COPD with acute exacerbation (HCC)  Active Problems:    Coronary artery disease involving native coronary artery of native heart without angina pectoris    Primary hypertension    LINCOLN (obstructive sleep apnea)    Mixed hyperlipidemia    Type 2 diabetes mellitus with hyperglycemia, with long-term current use of insulin (HCC)    Acute kidney injury superimposed on stage 3a chronic kidney disease (HCC)    Benign prostatic hyperplasia without lower urinary tract symptoms    Acute bacterial conjunctivitis of both eyes      Home Pulmonary Medications:Trelegy  Home Devices/Therapy: BiPAP/CPAP    Past Medical History:   Diagnosis Date    Atherosclerotic heart disease of native coronary artery without angina pectoris     Chronic obstructive pulmonary disease, unspecified COPD type (HCC)     Diabetes mellitus (HCC)     Dry eye syndrome     Hyperlipidemia     Hypertension     Nonrheumatic mitral (valve) insufficiency     Obstructive sleep apnea (adult) (pediatric)     Vitamin B12 deficiency     Vitamin D deficiency      Social History     Socioeconomic History    Marital status:      Spouse name: None    Number of children: None    Years of education: None    Highest education level: None   Occupational History    None   Tobacco Use    Smoking status: Former     Types: Cigarettes     Start date:      Quit date:      Years since quittin.3    Smokeless tobacco: Never   Vaping Use    Vaping status: Never Used   Substance and Sexual Activity    Alcohol use: Yes     Comment: occasional whiskey sour    Drug use: Never    Sexual activity: Not Currently   Other Topics Concern    None   Social History Narrative    Lives at .    Pt still drives.     Social Drivers of Health     Financial Resource Strain: Not on file   Food Insecurity: No Food Insecurity (2025)     "Nursing - Inadequate Food Risk Classification     Worried About Running Out of Food in the Last Year: Not on file     Ran Out of Food in the Last Year: Not on file     Ran Out of Food in the Last Year: Never true   Transportation Needs: No Transportation Needs (2025)    Nursing - Transportation Risk Classification     Lack of Transportation: Not on file     Lack of Transportation: No   Physical Activity: Not on file   Stress: Not on file   Social Connections: Not on file   Intimate Partner Violence: Unknown (2025)    Nursing IPS     Feels Physically and Emotionally Safe: Not on file     Physically Hurt by Someone: Not on file     Humiliated or Emotionally Abused by Someone: Not on file     Physically Hurt by Someone: No     Hurt or Threatened by Someone: No   Housing Stability: Unknown (2025)    Nursing: Inadequate Housing Risk Classification     Has Housing: Not on file     Worried About Losing Housing: Not on file     Unable to Get Utilities: Not on file     Unable to Pay for Housing in the Last Year: No     Has Housin       Subjective         Objective    Physical Exam:   Assessment Type: During-treatment  General Appearance: Alert, Awake  Respiratory Pattern: Normal  Chest Assessment: Chest expansion symmetrical  Bilateral Breath Sounds: Diminished, Expiratory wheezes  Cough: Strong, Non-productive  O2 Device: NC    Vitals:  Blood pressure 127/63, pulse 76, temperature 98.1 °F (36.7 °C), temperature source Oral, resp. rate 18, height 5' 8\" (1.727 m), weight 100 kg (220 lb 6.4 oz), SpO2 91%.          Imaging and other studies:     O2 Device: NC     Plan    Respiratory Plan: Mild Distress pathway        Resp Comments: hour long neb started   "

## 2025-05-05 NOTE — UTILIZATION REVIEW
Initial Clinical Review    Admission: Date/Time/Statement:   Admission Orders (From admission, onward)       Ordered        05/04/25 1855  INPATIENT ADMISSION  Once                          Orders Placed This Encounter   Procedures    INPATIENT ADMISSION     Standing Status:   Standing     Number of Occurrences:   1     Level of Care:   Med Surg [16]     Estimated length of stay:   More than 2 Midnights     Certification:   I certify that inpatient services are medically necessary for this patient for a duration of greater than two midnights. See H&P and MD Progress Notes for additional information about the patient's course of treatment.     ED Arrival Information       Expected   -    Arrival   5/4/2025 15:36    Acuity   Urgent              Means of arrival   Ambulance    Escorted by   [facility])    Service   Hospitalist    Admission type   Emergency              Arrival complaint   SOB             Chief Complaint   Patient presents with    Cough     Ems stated that pt is from [facility]. Pt state that he is still having a cough which he sometimes coughs up green mucus       Initial Presentation: 89 y.o. male  to ED via EMS from assisted living.    Admitted to inpatient with Dx: COPD with acute exacerbation/KAY on Stage 3 CKD/Type 2 DM with hyperglycemia/acute bacterial conjunctivitis of both eyes.  Presented to ED with continued coughing and shortness of breath worsening 2 days prior to arrival and started about one month ago.  Using breathing treatments without effect.  Sat 90% on 4 liters.  . PMHx: coronary artery disease, COPD (oxygen dependent 3 L), hypertension, hyperlipidemia, diabetes,and obstructive sleep apnea.   On exam:  acute distress.  Diaphoretic.  Nasal congestion.  Bilateral conjunctiva injected.  Breath sounds decreased air movement and wheezing.  BLE edema.  D Dimer 0.58.  .  Bun 25.  Creatinine 1.65 with baseline of 10 - 1.25.  H&H 11.2/35.2.     Imaging shows possible  LLL infiltrate in cta chest per ED read.   ED treatment:  oxygen titrated, max of 5 liters.   Given Goldman neb, Decadron, Mg IVF bolus.  Started on ceftriaxone and optic tobramycin.   Plan includes to wean oxygen as needed.  Hold antibiotics.   Start Solu medrol.  Scheduled nebs.  Hold trelegy.  Start IVF. Trend BMP.  Hold oral antidiabetics.  Continue Lantus, add Humalog, start SSI  Continue remainder of home medications.     Anticipated Length of Stay/Certification Statement:  Patient will be admitted on an inpatient basis with an anticipated length of stay of greater than 2 midnights secondary to treatment for COPD exacerbation.     Date: 5/5/25   Day 2: continued cough, difficult to expectorate mucous   increased shortness of breath compared to baseline.   At times difficulty with swallowing,   mary corn, peas and rice.  On exam decreased breath sounds at bases.   H&H 11.4/34.9.   bun 31.  Creatinine 1.55 with baseline of 1 - 1.25.  glucose 294.   Continue solu medrol.  Scheduled nebs, home Trelegy on hold.  Wean oxygen as able.  Hold antibiotics.  IVF and trend BMP.   Continue Lantus and SSI.   Today received additional Humalog 5 units with meals.   Continue Tobramycin eye drops    Per Pulmonary 5/5/25:  COPD with acute exacerbation/acute on chronic respiratory failure with hypoxia/Bilateral atelectasis.  Baseline oxygen 3 to 3 liter and currently on 5 liters.  On Trelegy and Ellipta as OP.   Goal sat > 88%.  Decrease IV solu medrol to twice daily,  possible transition to prednisone tomorrow.  Continue ipratropium and Xopenex.  Start vest therapy and flutter device to help with sputum expectoration.  Speech consult.  No antibiotics.     Per Gi 5/5/25 -  Esophageal dysphagia/ Abnormal CT chest, mid esophageal dilation/ COPD exacerbation.  Continue diet per speech.  Will need to be optimized respiratory then timing of egd to be determined.  Continue pantoprazole.    Patient has crossed 3 midnights and requires  ongoing care    5/6/2025 .  Patient presents with episode of Stuart reading overnight intermittently to 30s.   EKGs obtained with sinus tachycardia with second-degree AV block Mobitz 2 then repeat EKG with mobitz 1. Patient placed on telemetry.  Atenolol placed on hold.   On exam glucose 212>301.  Bun 41. Creatinine 1.33.   Abnormal labs or imaging:    Diagnosis/Plan    COPD with acute exacerbation/KAY on CKD/type 2 DM with hyperglycemia/acute conjunctivitis of both eyes.   Continue scheduled nebs.  Trelegy on hold.  Continue IV Solu medrol.   Wean oxygen as able - on 5 liters.  Hold antibiotics.   Continue IVF and rate decreased.    SSI, Humalog with meals, Lantus.     5/6/25 per Cardiology - bradycardia.  Suspect erroneous reading due to PAC/PVC with compensatory pause.  No AV Block.  No need to continue telemetry.  For hypertension, suggest changing atenolol to Toprol XL due to CKD.      ED Treatment-Medication Administration from 05/04/2025 1536 to 05/04/2025 1942         Date/Time Order Dose Route Action     05/04/2025 1613 albuterol inhalation solution 10 mg 10 mg Nebulization Given     05/04/2025 1613 ipratropium (ATROVENT) 0.02 % inhalation solution 0.5 mg 0.5 mg Nebulization Given     05/04/2025 1613 sodium chloride 0.9 % inhalation solution 3 mL 3 mL Nebulization Given     05/04/2025 1613 dexamethasone (PF) (DECADRON) injection 10 mg 10 mg Intravenous Given     05/04/2025 1627 magnesium sulfate 2 g/50 mL IVPB (premix) 2 g 2 g Intravenous New Bag     05/04/2025 1613 cefTRIAXone (ROCEPHIN) IVPB (premix in dextrose) 1,000 mg 50 mL 1,000 mg Intravenous New Bag     05/04/2025 1719 sodium chloride 0.9 % bolus 250 mL 250 mL Intravenous New Bag     05/04/2025 1747 tobramycin (TOBREX) 0.3 % ophthalmic solution 2 drop 2 drop Both Eyes Given          Scheduled Medications:  amLODIPine, 10 mg, Oral, QAM  Artificial Tears, 1 drop, Both Eyes, BID  aspirin, 81 mg, Oral, QAM  atenolol, 50 mg, Oral, Daily - NE 1125  5/6/25  atorvastatin, 80 mg, Oral, HS  finasteride, 5 mg, Oral, Daily  fluticasone, 1 spray, Each Nare, Daily  heparin (porcine), 5,000 Units, Subcutaneous, Q8H MOLINA  insulin glargine, 35 Units, Subcutaneous, Q12H MOLINA  insulin lispro, 1-6 Units, Subcutaneous, 4x Daily (AC & HS)  insulin lispro, 5 Units, Subcutaneous, TID With Meals  ipratropium, 0.5 mg, Nebulization, TID  isosorbide mononitrate, 60 mg, Oral, QAM  levalbuterol, 1.25 mg, Nebulization, TID  methylPREDNISolone sodium succinate, 40 mg, Intravenous, Q12H MOLINA  pantoprazole, 40 mg, Oral, QAM  sodium chloride (PF), 3 mL, Intravenous, Q8H MOLINA  tamsulosin, 0.4 mg, Oral, Daily With Dinner  tobramycin, 2 drop, Both Eyes, Q4H While Awake    methylPREDNISolone sodium succinate (Solu-MEDROL) injection 40 mg  Dose: 40 mg  Freq: Every 8 hours Route: IV  Start: 05/05/25 0600 End: 05/05/25 0820    magnesium sulfate IVPB (premix) SOLN 1 g  Dose: 1 g  Freq: Once Route: IV  Last Dose: Stopped (05/06/25 0125)  Start: 05/06/25 0015 End: 05/06/25 0125    Continuous IV Infusions:  multi-electrolyte, 100 mL/hr, Intravenous, Continuous - decreased to 50 ml 5/6 at 0935    PRN Meds: not used.   acetaminophen, 650 mg, Oral, Q6H PRN  aluminum-magnesium hydroxide-simethicone, 30 mL, Oral, Q6H PRN  dextromethorphan-guaiFENesin, 10 mL, Oral, Q4H PRN x 1 5/6  polyethylene glycol, 17 g, Oral, Daily PRN    ED Triage Vitals   Temperature Pulse Respirations Blood Pressure SpO2 Pain Score   05/04/25 1542 05/04/25 1542 05/04/25 1542 05/04/25 1542 05/04/25 1542 05/04/25 2001   98.7 °F (37.1 °C) 79 18 138/65 90 % No Pain     Weight (last 2 days)       Date/Time Weight    05/04/25 1948 100 (220.4)    05/04/25 1542 103 (226)          Vital Signs (last 3 days)       Date/Time Temp Pulse Resp BP MAP (mmHg) SpO2 Calculated FIO2 (%) - Nasal Cannula Nasal Cannula O2 Flow Rate (L/min) O2 Device O2 Interface Device Patient Position - Orthostatic VS Surya Coma Scale Score Pain    05/06/25 10:11:47 --  59 -- 143/68 93 92 % -- -- -- -- -- -- --    05/06/25 0900 -- -- -- -- -- 93 % -- -- -- -- -- -- --    05/06/25 07:03:41 98.1 °F (36.7 °C) 37 16 124/71 89 94 % -- -- -- -- -- -- --    05/06/25 0354 -- -- -- -- -- -- -- -- -- -- -- 15 No Pain    05/06/25 0259 -- -- -- -- -- -- -- -- -- Face mask -- -- --    05/05/25 23:53:50 -- 40 -- 128/59 82 95 % -- -- -- -- -- -- --    05/05/25 2155 -- -- -- -- -- -- -- -- -- Face mask -- -- --    05/05/25 20:21:18 97.6 °F (36.4 °C) 85 19 112/63 79 89 % 40 5 L/min Nasal cannula -- -- -- --    05/05/25 1901 -- -- -- -- -- 94 % 40 5 L/min Nasal cannula -- -- -- --    05/05/25 15:25:01 97 °F (36.1 °C) 57 -- 126/59 81 92 % -- -- -- -- -- -- --    05/05/25 15:24:33 97 °F (36.1 °C) 48 -- -- -- 92 % -- -- -- -- -- -- --    05/05/25 1413 -- 69 18 -- -- 92 % -- -- -- -- -- -- --    05/05/25 0835 -- -- -- -- -- 95 % 40 5 L/min Nasal cannula -- -- -- --    05/05/25 0800 -- -- -- -- -- 92 % 36 4 L/min Nasal cannula -- -- 15 No Pain    05/05/25 07:44:54 97.5 °F (36.4 °C) 54 -- 126/61 83 93 % -- -- -- -- -- -- --    05/05/25 05:15:36 97.4 °F (36.3 °C) 40 20 132/59 83 94 % 40 5 L/min Nasal cannula -- Lying -- No Pain    05/05/25 0201 -- -- -- -- -- -- 44 6 L/min  CPAP Face mask -- -- --    05/05/25 0200 -- 37 -- -- -- -- -- -- -- -- -- -- --    05/04/25 2300 -- -- -- -- -- 94 % -- -- -- Face mask -- -- --    05/04/25 2144 -- 76 18 -- -- 91 % 36 4 L/min Nasal cannula -- -- -- --    05/04/25 2030 -- -- -- -- -- -- 36 4 L/min Nasal cannula -- -- 15 No Pain    05/04/25 20:01:03 98.1 °F (36.7 °C) 72 18 127/63 84 90 % 36 4 L/min Nasal cannula -- Lying -- No Pain    05/04/25 1830 -- 77 22 122/71 88 91 % 36 4 L/min Nasal cannula -- -- -- --    05/04/25 1730 -- 79 20 121/58 83 91 % 28 2 L/min Nasal cannula -- -- -- --    05/04/25 1619 -- -- -- -- -- -- 40 5 L/min Nasal cannula -- -- -- --    05/04/25 1549 -- -- -- -- -- -- -- -- Nasal cannula -- -- 15 --    05/04/25 1542 98.7 °F (37.1 °C) 79 18 138/65 --  90 % 36 4 L/min Nasal cannula -- -- -- --          Pertinent Labs/Diagnostic Test Results:   Radiology:  CTA chest pe study   ED Interpretation by Torrey Yuen DO (05/04 1803)   Appears as left lower focal infiltrate, waiting for formal reading      Final Interpretation by Iftikhar Wolfe MD (05/04 1824)      No pulmonary embolism.      Nodular opacities in the bilateral lungs and surrounding the inferior right kevin as described are most consistent with an infectious/inflammatory infiltrate considering clinical history.   Follow-up chest CT in 3 months recommended to assess for interval resolution.      Proximal left lower lobar bronchial occlusion with near complete left lower lobe atelectasis.   Right lower lobe bronchial wall thickening with segmental occlusions.   Likely aspiration-related consideration mid esophageal dilation with debris.   No obvious obstructing mass, consider bronchoscopy for further evaluation.      The study was marked in EPIC for immediate notification.                           Workstation performed: [id number]         XR chest portable   Final Interpretation by Gunjan Howard MD (05/05 0611)      Mild pulmonary venous congestion.      Left lower lobe atelectasis, better shown on subsequent chest CT.            Workstation performed: [id number]           Cardiology:  ECG 12 lead   Final Result by Omi Herman MD (05/06 0738)   Sinus tachycardia with Premature supraventricular complexes with    occasional Premature ventricular complexes   Poor R wave progression   Abnormal ECG   When compared with ECG of 05-May-2025 23:52, (unconfirmed)   No significant change was found   Confirmed by Omi Herman ([id number]) on 5/6/2025 7:32:10 AM      ECG 12 lead   Final Result by Omi Herman MD (05/06 0714)   Sinus rhythm with 1st degree A-V block with frequent Premature atrial    complexes with occasional Premature ventricular complexes   Septal infarct (cited on or before 04-May-2025)    Abnormal ECG   When compared with ECG of 05-May-2025 23:20, (unconfirmed)   No significant change was found   Confirmed by Omi Herman ([id number]) on 5/6/2025 7:31:35 AM      ECG 12 lead   Final Result by Omi Herman MD (05/06 0731)   Sinus rhythm with frequent Premature atrial complexes with occasional    Premature ventricular complexes   Poor R Wave Progression   Abnormal ECG   When compared with ECG of 04-May-2025 16:15,   No significant change was found   Confirmed by Omi Herman ([id number]) on 5/6/2025 7:31:05 AM      ECG 12 lead   Final Result by Letitia Langford MD (05/04 1835)   Sinus rhythm with sinus arrhythmia with 1st degree A-V block   Anteroseptal infarct (cited on or before 04-May-2025)   Abnormal ECG   When compared with ECG of 04-May-2025 16:11, (unconfirmed)   No significant change was found   Confirmed by Letitia Langford ([id number]) on 5/4/2025 6:35:50 PM      ECG 12 lead   Final Result by Letitia Langford MD (05/04 1836)   Sinus rhythm with 1st degree A-V block   Anteroseptal infarct , age undetermined   Abnormal ECG   When compared with ECG of 10-Apr-2025 08:27,   Questionable change in QRS duration   Anteroseptal infarct is now Present   Confirmed by Letitia Langford ([id number]) on 5/4/2025 6:36:14 PM        GI:  No orders to display     Results from last 7 days   Lab Units 05/04/25  1609   SARS-COV-2  Negative     Results from last 7 days   Lab Units 05/06/25  0450 05/06/25  0037 05/05/25  0502 05/04/25  1627   WBC Thousand/uL 10.70* 9.55 7.11 7.72   HEMOGLOBIN g/dL 11.5* 11.0* 11.4* 11.2*   HEMATOCRIT % 35.6* 34.3* 34.9* 35.2*   PLATELETS Thousands/uL 196 197 168 168   TOTAL NEUT ABS Thousands/µL 9.24* 8.34*  --  5.63   BANDS PCT %  --   --  1  --      Results from last 7 days   Lab Units 05/06/25  0450 05/06/25  0037 05/05/25  0502 05/04/25  1609   SODIUM mmol/L 136 136 139 139   POTASSIUM mmol/L 3.9 3.7 3.7 4.4   CHLORIDE mmol/L 99 97 99 100   CO2 mmol/L 27 29 27 31   ANION GAP mmol/L 10 10  13 8   BUN mg/dL 41* 41* 31* 25   CREATININE mg/dL 1.33* 1.44* 1.55* 1.65*   EGFR ml/min/1.73sq m 47 42 39 36   CALCIUM mg/dL 8.5 8.4 8.4 8.5   MAGNESIUM mg/dL 3.1* 2.7 2.6 2.5   PHOSPHORUS mg/dL  --   --  5.0*  --      Results from last 7 days   Lab Units 05/06/25  0037 05/05/25  0502 05/04/25  1609   AST U/L 19 16 36   ALT U/L 27 24 28   ALK PHOS U/L 68 73 74   TOTAL PROTEIN g/dL 6.5 6.9 7.1   ALBUMIN g/dL 3.1* 3.3* 3.4*   TOTAL BILIRUBIN mg/dL 0.37 0.50 0.69     Results from last 7 days   Lab Units 05/06/25  1019 05/06/25  0700 05/05/25  2115 05/05/25  1558 05/05/25  1123 05/05/25  0743 05/04/25  2119   POC GLUCOSE mg/dl 301* 212* 355* 325* 313* 271* 208*     Results from last 7 days   Lab Units 05/06/25  0450 05/06/25  0037 05/05/25  0502 05/04/25  1609   GLUCOSE RANDOM mg/dL 223* 271* 294* 134     Results from last 7 days   Lab Units 05/04/25  1817 05/04/25  1609   HS TNI 0HR ng/L  --  11   HS TNI 2HR ng/L 10  --    HSTNI D2 ng/L -1  --      Results from last 7 days   Lab Units 05/04/25  1704   D-DIMER QUANTITATIVE ug/ml FEU 0.58*     Results from last 7 days   Lab Units 05/04/25  1704   PROTIME seconds 15.2*   INR  1.15   PTT seconds 30     Results from last 7 days   Lab Units 05/06/25  0450 05/05/25  0502 05/04/25  1609   PROCALCITONIN ng/ml 0.14 0.12 0.15     Results from last 7 days   Lab Units 05/04/25  1609   LACTIC ACID mmol/L 1.0     Results from last 7 days   Lab Units 05/04/25  1627   BNP pg/mL 201*     Results from last 7 days   Lab Units 05/04/25  2229   CLARITY UA  Clear   COLOR UA  Yellow   SPEC GRAV UA  <1.005*   PH UA  5.0   GLUCOSE UA mg/dl 1000 (1%)*   KETONES UA mg/dl Negative   BLOOD UA  Negative   PROTEIN UA mg/dl Negative   NITRITE UA  Negative   BILIRUBIN UA  Negative   UROBILINOGEN UA (BE) mg/dl <2.0   LEUKOCYTES UA  Negative   WBC UA /hpf 0-1*   RBC UA /hpf None Seen   BACTERIA UA /hpf None Seen   EPITHELIAL CELLS WET PREP /hpf None Seen   MUCUS THREADS  None Seen     Results from last  7 days   Lab Units 05/04/25  1609   INFLUENZA A PCR  Negative   INFLUENZA B PCR  Negative   RSV PCR  Negative     Results from last 7 days   Lab Units 05/04/25  1609   BLOOD CULTURE  No Growth at 24 hrs.  No Growth at 24 hrs.         Past Medical History:   Diagnosis Date    Atherosclerotic heart disease of native coronary artery without angina pectoris     Chronic obstructive pulmonary disease, unspecified COPD type (HCC)     Diabetes mellitus (HCC)     Dry eye syndrome     Hyperlipidemia     Hypertension     Nonrheumatic mitral (valve) insufficiency     Obstructive sleep apnea (adult) (pediatric)     Vitamin B12 deficiency     Vitamin D deficiency      Present on Admission:   COPD with acute exacerbation (HCC)   LINCOLN (obstructive sleep apnea)   Primary hypertension   Coronary artery disease involving native coronary artery of native heart without angina pectoris   Mixed hyperlipidemia   Acute on chronic respiratory failure with hypoxia (HCC)      Admitting Diagnosis: Pneumonia [J18.9]  Respiratory distress [R06.03]  SOB (shortness of breath) [R06.02]  Bronchitis [J40]  COPD with acute exacerbation (HCC) [J44.1]  Aspiration into lower respiratory tract, initial encounter [T17.800A]  Age/Sex: 89 y.o. male    Network Utilization Review Department  ATTENTION: Please call with any questions or concerns to [phone] and carefully listen to the prompts so that you are directed to the right person. All voicemails are confidential.   For Discharge needs, contact Care Management DC Support Team at [phone] opt. [phone]  Send all requests for admission clinical reviews, approved or denied determinations and any other requests to dedicated fax number below belonging to the campus where the patient is receiving treatment. List of dedicated fax numbers for the Facilities:  FACILITY NAME UR FAX NUMBER   ADMISSION DENIALS (Administrative/Medical Necessity) [phone]   DISCHARGE SUPPORT TEAM (NETWORK) [phone]   PARENT  CHILD HEALTH (Maternity/NICU/Pediatrics) [phone]   [facility] [phone]   [facility] [phone]   [facility] [phone]   [facility] [phone]   Counts include 234 beds at the [facility] [phone]   [facility] [phone]   [facility] [phone]   [facility] [phone]   [facility] [phone]   [facility] [phone]   [facility] [phone]   [facility] [phone]

## 2025-05-05 NOTE — SPEECH THERAPY NOTE
Speech Language/Pathology  Speech-Language Pathology Bedside Swallow Evaluation      Patient Name: Kyler Hernandez    Today's Date: 5/5/2025     Problem List  Principal Problem:    COPD with acute exacerbation (HCC)  Active Problems:    Coronary artery disease involving native coronary artery of native heart without angina pectoris    Primary hypertension    LINCOLN (obstructive sleep apnea)    Mixed hyperlipidemia    Type 2 diabetes mellitus with hyperglycemia, with long-term current use of insulin (HCC)    Acute on chronic respiratory failure with hypoxia (HCC)    Acute kidney injury superimposed on stage 3a chronic kidney disease (HCC)    Benign prostatic hyperplasia without lower urinary tract symptoms    Acute bacterial conjunctivitis of both eyes    Atelectasis, bilateral      Past Medical History  Past Medical History:   Diagnosis Date    Atherosclerotic heart disease of native coronary artery without angina pectoris     Chronic obstructive pulmonary disease, unspecified COPD type (HCC)     Diabetes mellitus (HCC)     Dry eye syndrome     Hyperlipidemia     Hypertension     Nonrheumatic mitral (valve) insufficiency     Obstructive sleep apnea (adult) (pediatric)     Vitamin B12 deficiency     Vitamin D deficiency        Past Surgical History  Past Surgical History:   Procedure Laterality Date    HERNIA REPAIR         Summary   Pt presents w/ s/s suggestive of functional oropharyngeal swallow skills.     Complete labial seal for retrieval and containment of all materials, no anterior bolus loss present. Timely rotary mastication of regular textures w/ complete bolus breakdown and adequate bolus transfer. No oral residue noted. Suspected delayed swallow initiation though fair hyolaryngeal elevation to palpation. No overt s/s of aspiration at this time.     Education initiated w/ pt regarding strategies to optimize swallow safety including frequent/thorough oral care and to notify medical staff if s/s of aspiration  "arise. Pt verbalized understanding and agreement, denies questions or concerns at this time.     Pt w/ increased risk of aspiration 2/2 current admission w/ SOB, abnn imaging, and multiple medical co-morbidities. Pt is at an increased risk of reverse aspiration given CT revealing \"mid esophageal dilation with debris\", recommend GI consult given aforementioned. SLP to monitor GI consult/recommendations, will f/u for diet tolerance x1 as able and appropriate.       Recommended Diet: regular diet and thin liquids   Recommended Form of Meds: as tolerated    Aspiration precautions and swallowing strategies: upright posture, only feed when fully alert, slow rate of feeding, small bites/sips, and alternating bites and sips  Other Recommendations: Continue frequent oral care        Current Medical Status  Pt is a 89 y.o. male with past medical history of coronary artery disease, COPD (oxygen dependent 3 L), hypertension, hyperlipidemia, diabetes,and obstructive sleep apnea.  He presents to the emergency department from [facility] for shortness of breath and productive cough worsening over the last few days.  He was recently hospitalized between April 7 to 14 for COPD exacerbation and, per the [facility] staff, patient never fully returned to baseline.  The emergency department he was given nebulizers, steroids, and antibiotics and will be admitted for further treatment.     Current Precautions:  Fall  Aspiration      Allergies:  No known food allergies    Past medical history:  Please see H&P for details    Special Studies:  5/4/25 CTA chest pe study:  No pulmonary embolism.     Nodular opacities in the bilateral lungs and surrounding the inferior right kevin as described are most consistent with an infectious/inflammatory infiltrate considering clinical history.  Follow-up chest CT in 3 months recommended to assess for interval resolution.     Proximal left lower lobar bronchial occlusion " with near complete left lower lobe atelectasis.  Right lower lobe bronchial wall thickening with segmental occlusions.  Likely aspiration-related consideration mid esophageal dilation with debris.  No obvious obstructing mass, consider bronchoscopy for further evaluation.     5/4/25 XR chest portable:  Mild pulmonary venous congestion.     Left lower lobe atelectasis, better shown on subsequent chest CT.    History of VFSS:  N/A     Social/Education/Vocational Hx:  Pt lives at [address]     Swallow Information   Current Diet: regular diet and thin liquids   Baseline Diet: regular diet and thin liquids per pt report- diet not located on pt's facesheet       Baseline Assessment   Behavior/Cognition: alert  Speech/Language Status: able to participate in conversation and able to follow commands  Patient Positioning: upright in chair  Pain Status/Interventions/Response to Interventions: No report of or nonverbal indications of pain.       Oral Mechanism Exam  Facial: symmetrical  Labial: WFL  Lingual: WFL  Velum: symmetrical  Mandible: adequate ROM  Dentition: full dentures  Vocal quality:clear/adequate   Volitional Cough: strong/productive   Respiratory Status: on 5L O2        Consistencies Assessed and Performance   Consistencies Administered: thin liquids and hard solids    Oral Stage:   Complete labial seal for retrieval and containment of all materials, no anterior bolus loss present. Timely rotary mastication of regular textures w/ complete bolus breakdown and adequate bolus transfer. No oral residue noted.     Pharyngeal Stage:   Suspected delayed swallow initiation though fair hyolaryngeal elevation to palpation. No overt s/s of aspiration at this time.     Esophageal Concerns: none reported      Summary and Recommendations (see above)    Results Reviewed with: patient, RN, and MD     Treatment Recommended: as able and appropriate for diet tolerance x1- would recommending GI consult given CT revealing  "\"mid esophageal dilation with debris.\"     Dysphagia LTG  -Patient will demonstrate safe and effective oral intake (without overt s/s significant oral/pharyngeal dysphagia including s/s penetration or aspiration) for the highest appropriate diet level.     Short Term Goals:  -Pt will tolerate regular textures and thin liquid with no significant s/s oral or pharyngeal dysphagia across 1-3 diagnostic session/s    -Patient will comply with a Video/Modified Barium Swallow study for more complete assessment of swallowing anatomy/physiology/aspiration risk and to assess efficacy of treatment techniques so as to best guide treatment plan if medically indicated/if ongoing concern for aspiration     Speech Therapy Prognosis   Prognosis: good    Prognosis Considerations: age, medical status, and prior medical history    "

## 2025-05-05 NOTE — ASSESSMENT & PLAN NOTE
Baseline creatinine between 1.0 and 1.25, currently 1.65  Avoid nephrotoxins and hypotension  Check urinalysis  Provide intravenous Isolyte and trend renal function    Lab Results   Component Value Date    EGFR 39 05/05/2025    EGFR 36 05/04/2025    EGFR 53 04/14/2025    CREATININE 1.55 (H) 05/05/2025    CREATININE 1.65 (H) 05/04/2025    CREATININE 1.19 04/14/2025

## 2025-05-05 NOTE — ASSESSMENT & PLAN NOTE
Hold oral antidiabetics  Sliding scale insulin, add 5 units with meals  Continue twice daily Lantus  Carbohydrate restrictive diet  Hypoglycemia protocol    Lab Results   Component Value Date    HGBA1C 8.9 (H) 04/08/2025       Recent Labs     05/04/25 2119 05/05/25  0743   POCGLU 208* 271*       Blood Sugar Average: Last 72 hrs:  (P) 239.5

## 2025-05-05 NOTE — PLAN OF CARE
Problem: PAIN - ADULT  Goal: Verbalizes/displays adequate comfort level or baseline comfort level  Description: Interventions:- Encourage patient to monitor pain and request assistance- Assess pain using appropriate pain scale- Administer analgesics based on type and severity of pain and evaluate response- Implement non-pharmacological measures as appropriate and evaluate response- Consider cultural and social influences on pain and pain management- Notify physician/advanced practitioner if interventions unsuccessful or patient reports new pain  Outcome: Progressing     Problem: INFECTION - ADULT  Goal: Absence or prevention of progression during hospitalization  Description: INTERVENTIONS:- Assess and monitor for signs and symptoms of infection- Monitor lab/diagnostic results- Monitor all insertion sites, i.e. indwelling lines, tubes, and drains- Monitor endotracheal if appropriate and nasal secretions for changes in amount and color- [place] appropriate cooling/warming therapies per order- Administer medications as ordered- Instruct and encourage patient and family to use good hand hygiene technique- Identify and instruct in appropriate isolation precautions for identified infection/condition  Outcome: Progressing  Goal: Absence of fever/infection during neutropenic period  Description: INTERVENTIONS:- Monitor WBC  Outcome: Progressing     Problem: SAFETY ADULT  Goal: Patient will remain free of falls  Description: INTERVENTIONS:- Educate patient/family on patient safety including physical limitations- Instruct patient to call for assistance with activity - Consult OT/PT to assist with strengthening/mobility - Keep Call bell within reach- Keep bed low and locked with side rails adjusted as appropriate- Keep care items and personal belongings within reach- Initiate and maintain comfort rounds- Make Fall Risk Sign visible to staff- Offer Toileting every 2 Hours, in advance of need- Initiate/Maintain bed/chair  alarm- Obtain necessary fall risk management equipment: walker as needed for ambulation - Apply yellow socks and bracelet for high fall risk patients- Consider moving patient to room near nurses station  Outcome: Progressing  Goal: Maintain or return to baseline ADL function  Description: INTERVENTIONS:-  Assess patient's ability to carry out ADLs; assess patient's baseline for ADL function and identify physical deficits which impact ability to perform ADLs (bathing, care of mouth/teeth, toileting, grooming, dressing, etc.)- Assess/evaluate cause of self-care deficits - Assess range of motion- Assess patient's mobility; develop plan if impaired- Assess patient's need for assistive devices and provide as appropriate- Encourage maximum independence but intervene and supervise when necessary- Involve family in performance of ADLs- Assess for home care needs following discharge - Consider OT consult to assist with ADL evaluation and planning for discharge- Provide patient education as appropriate  Outcome: Not Progressing  Goal: Maintains/Returns to pre admission functional level  Description: INTERVENTIONS:- Perform AM-PAC 6 Click Basic Mobility/ Daily Activity assessment daily.- Set and communicate daily mobility goal to care team and patient/family/caregiver. - Collaborate with rehabilitation services on mobility goals if consulted- Perform Range of Motion 3 times a day.- Reposition patient every 2 hours.- Dangle patient 2 times a day- Stand patient 2 times a day- Ambulate patient 2 times a day- Out of bed to chair 2 times a day - Out of bed for meals 2 times a day- Out of bed for toileting- Record patient progress and toleration of activity level   Outcome: Progressing     Problem: DISCHARGE PLANNING  Goal: Discharge to home or other facility with appropriate resources  Description: INTERVENTIONS:- Identify barriers to discharge w/patient and caregiver- Arrange for needed discharge resources and transportation as  appropriate- Identify discharge learning needs (meds, wound care, etc.)- Arrange for interpretive services to assist at discharge as needed- Refer to Case Management Department for coordinating discharge planning if the patient needs post-hospital services based on physician/advanced practitioner order or complex needs related to functional status, cognitive ability, or social support system  Outcome: Progressing     Problem: Knowledge Deficit  Goal: Patient/family/caregiver demonstrates understanding of disease process, treatment plan, medications, and discharge instructions  Description: Complete learning assessment and assess knowledge base.Interventions:- Provide teaching at level of understanding- Provide teaching via preferred learning methods  Outcome: Progressing      none

## 2025-05-05 NOTE — ASSESSMENT & PLAN NOTE
Kyler is an 89-year-old male with past medical history of coronary artery disease, COPD (oxygen dependent 3 L), hypertension, hyperlipidemia, diabetes,and obstructive sleep apnea.  He presents to the emergency department from [facility] for shortness of breath and productive cough worsening over the last few days.  He was recently hospitalized between April 7 to 14 for COPD exacerbation and, per the [facility] staff, patient never fully returned to baseline.  The emergency department he was given nebulizers, steroids, and antibiotics and will be admitted for further treatment.    Pulmonary consulted, appreciate the assistance  Xopenex/Atrovent nebulizers, hold Trelegy for now  Solu-Medrol 40 mg every 8 hours  Currently on 4 L nasal cannula, wean to baseline as tolerated  Procalcitonin 0.15, ceftriaxone given in the emergency room.  Hold off on any further antibiotics unless patient develops a fever.  Procalcitonin is normal  Lactic acid negative, patient afebrile and without leukocytosis  CT chest shows:  Nodular opacities in the bilateral lungs and surrounding the inferior right kevin as described are most consistent with an infectious/inflammatory infiltrate considering clinical history.   Proximal left lower lobar bronchial occlusion with near complete left lower lobe atelectasis.  Right lower lobe bronchial wall thickening with segmental occlusions.  Likely aspiration-related consideration mid esophageal dilation with debris.  No obvious obstructing mass, consider bronchoscopy for further evaluation.

## 2025-05-05 NOTE — ASSESSMENT & PLAN NOTE
Patient is currently requiring 5 L/min.  Baseline oxygen needs 2-3 L/min.  Titrate to maintain saturations above 88%

## 2025-05-05 NOTE — ASSESSMENT & PLAN NOTE
Patient admitted with acute on chronic respiratory failure with hypoxia POA due to COPD exacerbation.  Patient was discharged on 3 L of supplemental oxygen on 4/14/2025  Currently requiring 4-5 L supplemental oxygen  Wean oxygen as tolerated  See above

## 2025-05-05 NOTE — CASE MANAGEMENT
Case Management Assessment & Discharge Planning Note    Patient name Kyler Hernandez  Location /-01 MRN [id number]  : 1935 Date 2025       Current Admission Date: 2025  Current Admission Diagnosis:COPD with acute exacerbation (HCC)   Patient Active Problem List    Diagnosis Date Noted Date Diagnosed    Atelectasis, bilateral 2025     Other dysphagia 2025     Acute kidney injury superimposed on stage 3a chronic kidney disease (HCC) 2025     Benign prostatic hyperplasia without lower urinary tract symptoms 2025     Acute bacterial conjunctivitis of both eyes 2025     Chronic hypoxic respiratory failure (HCC) 2025     Stage 3a chronic kidney disease (HCC) 04/15/2025     Acute on chronic respiratory failure with hypoxia (HCC) 2025     Type 2 diabetes mellitus with hyperglycemia, with long-term current use of insulin (HCC) 2025     Pulmonary hypertension (HCC) 2024     Coronary artery disease involving native coronary artery of native heart without angina pectoris 05/10/2024     Primary hypertension 05/10/2024     Benign prostatic hyperplasia with nocturia 05/10/2024     COPD with acute exacerbation (HCC) 05/10/2024     LINCOLN (obstructive sleep apnea) 05/10/2024     B12 deficiency 05/10/2024     Vitamin D deficiency 05/10/2024     Mixed hyperlipidemia 05/10/2024       LOS (days): 1  Geometric Mean LOS (GMLOS) (days): 3.5  Days to GMLOS:2.6     OBJECTIVE:  PATIENT READMITTED TO HOSPITAL  Risk of Unplanned Readmission Score: 24.15         Current admission status: Inpatient       Preferred Pharmacy:   [id number] - [place] - [address] [place] [id number]  Phone: [phone] Fax: [phone]    [facility]. - [place], [place] - [address] [address]  Phone: [phone] Fax: [phone]    [place], [place] - [address]  [address]  [address]  [facility] [address]  Phone: [phone] Fax: [phone]    Primary Care Provider: Gordon Álvarez MD    Primary Insurance: [employer] REP  Secondary Insurance:     ASSESSMENT:  Active Health Care Proxies       Romy bowman Health Care Representative - Lupe   Primary Phone: [phone] (Home)                 Advance Directives  Does patient have a Health Care POA?: Yes  Does patient have Advance Directives?: Yes  Advance Directives: Power of  for health care  Primary Contact: Lupe Stanton.         Readmission Root Cause  30 Day Readmission: Yes  During your hospital stay, did someone (provider, nurse, ) explain your care to you in a way you could understand?: Yes  Did you feel medically stable to leave the hospital?: Yes  Were you able to pay for your medication at the pharmacy?: Yes  Did you have reliable transportation to take you to your appointments?: Yes  During previous admission, was a post-acute recommendation made?: No  Patient was readmitted due to: COPD with acute excerbation  Action Plan: SLIM management, Pulm, and GI following    Patient Information  Admitted from:: Facility ([facility] [facility]  Mental Status: Alert  During Assessment patient was accompanied by: Not accompanied during assessment  Assessment information provided by:: Patient  Primary Caregiver: Self  Support Systems: Self, Family members, Home care staff  County of Residence: [place]  What [facility] do you live in?: [place]  Home entry access options. Select all that apply.: No steps to enter home, Ramp  Type of Current Residence: Facility  Upon entering residence, is there a bedroom on the main floor (no further steps)?: Yes  Upon entering residence, is there a bathroom on the main floor (no further steps)?: Yes  Living Arrangements: Lives in Facility    Activities of Daily Living Prior to Admission  Functional Status: Independent  Completes ADLs independently?: Yes  Ambulates independently?: Yes  Does  patient use assisted devices?: Yes  Assisted Devices (DME) used: Home Oxygen concentrator, Portable Oxygen tanks, CPAP, Nebulizer  DME Company Name (respiratory supplies): [employer]  O2 Rate(s): 3L  Does patient currently own DME?: Yes  What DME does the patient currently own?: CPAP, Home Oxygen concentrator, Nebulizer, Portable Oxygen tanks  Does patient have a history of Outpatient Therapy (PT/OT)?: No  Does the patient have a history of Short-Term Rehab?: No  Does patient have a history of HHC?: No  Does patient currently have HHC?: No         Patient Information Continued  Income Source: SSI/SSD  Does patient have prescription coverage?: Yes  Can the patient afford their medications and any related supplies (such as glucometers or test strips)?: Yes  Does patient receive dialysis treatments?: No  Does patient have a history of substance abuse?: No  Does patient have a history of Mental Health Diagnosis?: No     Pt is , reports not being service connected.     Means of Transportation  Means of Transport to [facility]:: Drives Self          DISCHARGE DETAILS:    Discharge planning discussed with:: Pt  Freedom of Choice: Yes     CM contacted family/caregiver?: No- see comments (In contact with family.)  Were Treatment Team discharge recommendations reviewed with patient/caregiver?: Yes  Did patient/caregiver verbalize understanding of patient care needs?: Yes  Were patient/caregiver advised of the risks associated with not following Treatment Team discharge recommendations?: Yes    Contacts  Patient Contacts: [address]  Relationship to Patient:: Treatment Provider  Contact Method: Phone  Phone Number: [phone]  Reason/Outcome: Discharge Planning, Emergency Contact, Continuity of Care    Requested Home Health Care         Is the patient interested in HHC at discharge?: No    DME Referral Provided  Referral made for DME?: No    Other Referral/Resources/Interventions Provided:  Referral Comments:  Spoke with [facility] nurse Tinajero that pt is independent at baseline, using no DME for mobility. Has DME for breathing--CPAP, Nebulizer, and home 02/Portable tanks.         Treatment Team Recommendation: Home  Discharge Destination Plan:: Home                                         Additional Comments: Spoke with pt at bedside to discern discharge needs. Pt lives at [facility] and has been since Jan 2023. Pt reports and Nurse Tinajero at  verifies that pt is independent with walking and ADLs PTA. Uses and owns Nebulizer, CPAP, and Home 02 setup through [employer]. Pt reports no STR, HHC, or OPPT hx. No inpatient psych or D&A treatment. Reports having attended pulm rehab in the past. Said Lupe Stanton is Medical POA. Drives a truck. Would need transport at discharge.

## 2025-05-05 NOTE — ASSESSMENT & PLAN NOTE
"89-year-old male with CAD, COPD oxygen dependent on 2 L nasal cannula, hypertension, hyperlipidemia, diabetes, obstructive sleep apnea, GERD admitted with COPD exacerbation, who presents to GI for evaluation of dysphagia.    Patient reports difficulty swallowing peas, corn, rice. Due to concern for aspiration, speech pathology performed bedside swallow evaluation which showed normal oropharyngeal swallow skills. Patient felt at increased risk of reverse aspiration given CT chest revealing \"mid esophageal dilation with debris.\" He is on PPI daily for history of GERD. No prior EGD in the system.    - Recommend EGD once optimized from respiratory standpoint and back to baseline oxygen requirements  - For now, continue regular diet as tolerated  - Aspiration precautions  - Continue Protonix 40 mg daily    "

## 2025-05-05 NOTE — ASSESSMENT & PLAN NOTE
Patient was recently hospitalized with COPD exacerbation due to parainfluenza, completed course of prednisone.  Per HPI, patient was wheezing on arrival.  Wheezing has improved.  We can decrease IV steroids to twice daily dosing and likely transition to prednisone in the next 24-48 hours.  Continue ipratropium and Xopenex. No PFTs on file.  Maintained on Trelegy Ellipta as an outpatient.

## 2025-05-05 NOTE — CONSULTS
"Consultation - Gastroenterology   Name: Kyler Hernandez 89 y.o. male I MRN: [id number]  Unit/Bed#: -01 I Date of Admission: 5/4/2025   Date of Service: 5/5/2025 I Hospital Day: 1   Inpatient consult to gastroenterology  Consult performed by: Karen Abebe PA-C  Consult ordered by: Lit Gottlieb MD        Physician Requesting Evaluation: Lit Gottlieb MD   Reason for Evaluation / Principal Problem: abnormal CT chest    Assessment & Plan  Other dysphagia  89-year-old male with CAD, COPD oxygen dependent on 2 L nasal cannula, hypertension, hyperlipidemia, diabetes, obstructive sleep apnea, GERD admitted with COPD exacerbation, who presents to GI for evaluation of dysphagia.    Patient reports difficulty swallowing peas, corn, rice. Due to concern for aspiration, speech pathology performed bedside swallow evaluation which showed normal oropharyngeal swallow skills. Patient felt at increased risk of reverse aspiration given CT chest revealing \"mid esophageal dilation with debris.\" He is on PPI daily for history of GERD. No prior EGD in the system.    - Recommend EGD once optimized from respiratory standpoint and back to baseline oxygen requirements  - For now, continue regular diet as tolerated  - Aspiration precautions  - Continue Protonix 40 mg daily    COPD with acute exacerbation (HCC)  Pulmonary consulted, appreciate management  Continue IV steroids and nebulizers  Acute on chronic respiratory failure with hypoxia (HCC)  See plan above  Coronary artery disease involving native coronary artery of native heart without angina pectoris  Continue aspirin and statin    I have discussed the above management plan in detail with the primary service.     History of Present Illness   HPI:  Kyler Hernandez is a 89 y.o. male with CAD, COPD oxygen dependent on 2 L nasal cannula, hypertension, hyperlipidemia, diabetes, obstructive sleep apnea, GERD who presents to GI for evaluation of dysphagia.    He is currently " admitted with COPD exacerbation.     He reports difficulty swallowing peas, corn, and rice. He was seen by speech pathology today and had normal oropharyngeal swallow skills. They felt he was at increased risk of aspiration secondary to abnormal CT imaging showing dilated mid esophagus containing debris. No complaints of nausea, vomiting, abdominal pain. He has history of GERD and takes Protonix 40 mg daily. No reported history of prior EGD.    Review of Systems   Constitutional:  Negative for chills and fever.   HENT:  Negative for ear pain and sore throat.    Eyes:  Negative for pain and visual disturbance.   Respiratory:  Positive for cough and shortness of breath.    Cardiovascular:  Negative for chest pain and palpitations.   Gastrointestinal:  Negative for abdominal pain and vomiting.   Genitourinary:  Negative for dysuria and hematuria.   Musculoskeletal:  Negative for arthralgias and back pain.   Skin:  Negative for color change and rash.   Neurological:  Negative for seizures and syncope.   All other systems reviewed and are negative.    Medical History Review: I have reviewed the patient's PMH, PSH, Social History, Family History, Meds, and Allergies     Objective :  Temp:  [97.4 °F (36.3 °C)-98.7 °F (37.1 °C)] 97.5 °F (36.4 °C)  HR:  [37-79] 54  BP: (121-138)/(58-71) 126/61  Resp:  [18-22] 20  SpO2:  [90 %-95 %] 95 %  O2 Device: Nasal cannula  Nasal Cannula O2 Flow Rate (L/min):  [2 L/min-6 L/min] 5 L/min    Physical Exam  Vitals and nursing note reviewed.   Constitutional:       General: He is not in acute distress.     Appearance: He is well-developed.   HENT:      Head: Normocephalic and atraumatic.      Nose:      Comments: 4 L O2, nasal cannula  Eyes:      Conjunctiva/sclera: Conjunctivae normal.   Cardiovascular:      Rate and Rhythm: Normal rate and regular rhythm.      Heart sounds: No murmur heard.  Pulmonary:      Effort: No respiratory distress.      Comments: Decreased breath sounds  bilaterally  Abdominal:      Palpations: Abdomen is soft.      Tenderness: There is no abdominal tenderness.   Musculoskeletal:         General: No swelling.      Cervical back: Neck supple.   Skin:     General: Skin is warm and dry.   Neurological:      Mental Status: He is alert.   Psychiatric:         Mood and Affect: Mood normal.           Lab Results: I have reviewed the following results:CBC/BMP:   .     05/05/25  0502   WBC 7.11   HGB 11.4*   HCT 34.9*      BANDSPCT 1   SODIUM 139   K 3.7   CL 99   CO2 27   BUN 31*   CREATININE 1.55*   GLUC 294*   MG 2.6   PHOS 5.0*    , Creatinine Clearance: Estimated Creatinine Clearance: 37 mL/min (A) (by C-G formula based on SCr of 1.55 mg/dL (H))., LFTs:   .     05/05/25  0502   AST 16   ALT 24   ALB 3.3*   TBILI 0.50   ALKPHOS 73    , PTT/INR:  .     05/04/25  1704   PTT 30   INR 1.15    , Troponin,BNP:  .     05/04/25  1609 05/04/25  1627 05/04/25  1817   HSTNI0 11  --   --    HSTNI2  --   --  10   BNP  --  201*  --         Imaging Results Review: I reviewed radiology reports from this admission including: CT chest.  Other Study Results Review: No additional pertinent studies reviewed.

## 2025-05-06 PROBLEM — I49.1 PAC (PREMATURE ATRIAL CONTRACTION): Status: ACTIVE | Noted: 2025-05-06

## 2025-05-06 LAB
ALBUMIN SERPL BCG-MCNC: 3.1 G/DL (ref 3.5–5)
ALP SERPL-CCNC: 68 U/L (ref 34–104)
ALT SERPL W P-5'-P-CCNC: 27 U/L (ref 7–52)
ANION GAP SERPL CALCULATED.3IONS-SCNC: 10 MMOL/L (ref 4–13)
ANION GAP SERPL CALCULATED.3IONS-SCNC: 10 MMOL/L (ref 4–13)
AST SERPL W P-5'-P-CCNC: 19 U/L (ref 13–39)
ATRIAL RATE: 102 BPM
ATRIAL RATE: 108 BPM
ATRIAL RATE: 70 BPM
BASOPHILS # BLD AUTO: 0.01 THOUSANDS/ÂΜL (ref 0–0.1)
BASOPHILS # BLD AUTO: 0.02 THOUSANDS/ÂΜL (ref 0–0.1)
BASOPHILS NFR BLD AUTO: 0 % (ref 0–1)
BASOPHILS NFR BLD AUTO: 0 % (ref 0–1)
BILIRUB SERPL-MCNC: 0.37 MG/DL (ref 0.2–1)
BUN SERPL-MCNC: 41 MG/DL (ref 5–25)
BUN SERPL-MCNC: 41 MG/DL (ref 5–25)
CALCIUM ALBUM COR SERPL-MCNC: 9.1 MG/DL (ref 8.3–10.1)
CALCIUM SERPL-MCNC: 8.4 MG/DL (ref 8.4–10.2)
CALCIUM SERPL-MCNC: 8.5 MG/DL (ref 8.4–10.2)
CHLORIDE SERPL-SCNC: 97 MMOL/L (ref 96–108)
CHLORIDE SERPL-SCNC: 99 MMOL/L (ref 96–108)
CO2 SERPL-SCNC: 27 MMOL/L (ref 21–32)
CO2 SERPL-SCNC: 29 MMOL/L (ref 21–32)
CREAT SERPL-MCNC: 1.33 MG/DL (ref 0.6–1.3)
CREAT SERPL-MCNC: 1.44 MG/DL (ref 0.6–1.3)
EOSINOPHIL # BLD AUTO: 0 THOUSAND/ÂΜL (ref 0–0.61)
EOSINOPHIL # BLD AUTO: 0 THOUSAND/ÂΜL (ref 0–0.61)
EOSINOPHIL NFR BLD AUTO: 0 % (ref 0–6)
EOSINOPHIL NFR BLD AUTO: 0 % (ref 0–6)
ERYTHROCYTE [DISTWIDTH] IN BLOOD BY AUTOMATED COUNT: 15.2 % (ref 11.6–15.1)
ERYTHROCYTE [DISTWIDTH] IN BLOOD BY AUTOMATED COUNT: 15.3 % (ref 11.6–15.1)
GFR SERPL CREATININE-BSD FRML MDRD: 42 ML/MIN/1.73SQ M
GFR SERPL CREATININE-BSD FRML MDRD: 47 ML/MIN/1.73SQ M
GLUCOSE SERPL-MCNC: 212 MG/DL (ref 65–140)
GLUCOSE SERPL-MCNC: 220 MG/DL (ref 65–140)
GLUCOSE SERPL-MCNC: 223 MG/DL (ref 65–140)
GLUCOSE SERPL-MCNC: 271 MG/DL (ref 65–140)
GLUCOSE SERPL-MCNC: 279 MG/DL (ref 65–140)
GLUCOSE SERPL-MCNC: 301 MG/DL (ref 65–140)
HCT VFR BLD AUTO: 34.3 % (ref 36.5–49.3)
HCT VFR BLD AUTO: 35.6 % (ref 36.5–49.3)
HGB BLD-MCNC: 11 G/DL (ref 12–17)
HGB BLD-MCNC: 11.5 G/DL (ref 12–17)
IMM GRANULOCYTES # BLD AUTO: 0.14 THOUSAND/UL (ref 0–0.2)
IMM GRANULOCYTES # BLD AUTO: 0.23 THOUSAND/UL (ref 0–0.2)
IMM GRANULOCYTES NFR BLD AUTO: 2 % (ref 0–2)
IMM GRANULOCYTES NFR BLD AUTO: 2 % (ref 0–2)
LYMPHOCYTES # BLD AUTO: 0.35 THOUSANDS/ÂΜL (ref 0.6–4.47)
LYMPHOCYTES # BLD AUTO: 0.45 THOUSANDS/ÂΜL (ref 0.6–4.47)
LYMPHOCYTES NFR BLD AUTO: 4 % (ref 14–44)
LYMPHOCYTES NFR BLD AUTO: 4 % (ref 14–44)
MAGNESIUM SERPL-MCNC: 2.7 MG/DL (ref 1.9–2.7)
MAGNESIUM SERPL-MCNC: 3.1 MG/DL (ref 1.9–2.7)
MCH RBC QN AUTO: 29.4 PG (ref 26.8–34.3)
MCH RBC QN AUTO: 29.5 PG (ref 26.8–34.3)
MCHC RBC AUTO-ENTMCNC: 32.1 G/DL (ref 31.4–37.4)
MCHC RBC AUTO-ENTMCNC: 32.3 G/DL (ref 31.4–37.4)
MCV RBC AUTO: 91 FL (ref 82–98)
MCV RBC AUTO: 92 FL (ref 82–98)
MONOCYTES # BLD AUTO: 0.7 THOUSAND/ÂΜL (ref 0.17–1.22)
MONOCYTES # BLD AUTO: 0.77 THOUSAND/ÂΜL (ref 0.17–1.22)
MONOCYTES NFR BLD AUTO: 7 % (ref 4–12)
MONOCYTES NFR BLD AUTO: 7 % (ref 4–12)
MRSA NOSE QL CULT: NORMAL
NEUTROPHILS # BLD AUTO: 8.34 THOUSANDS/ÂΜL (ref 1.85–7.62)
NEUTROPHILS # BLD AUTO: 9.24 THOUSANDS/ÂΜL (ref 1.85–7.62)
NEUTS SEG NFR BLD AUTO: 87 % (ref 43–75)
NEUTS SEG NFR BLD AUTO: 87 % (ref 43–75)
NRBC BLD AUTO-RTO: 0 /100 WBCS
NRBC BLD AUTO-RTO: 0 /100 WBCS
P AXIS: 51 DEGREES
P AXIS: 72 DEGREES
P AXIS: 80 DEGREES
PLATELET # BLD AUTO: 196 THOUSANDS/UL (ref 149–390)
PLATELET # BLD AUTO: 197 THOUSANDS/UL (ref 149–390)
PMV BLD AUTO: 10 FL (ref 8.9–12.7)
PMV BLD AUTO: 10.1 FL (ref 8.9–12.7)
POTASSIUM SERPL-SCNC: 3.7 MMOL/L (ref 3.5–5.3)
POTASSIUM SERPL-SCNC: 3.9 MMOL/L (ref 3.5–5.3)
PR INTERVAL: 304 MS
PR INTERVAL: 312 MS
PROCALCITONIN SERPL-MCNC: 0.14 NG/ML
PROT SERPL-MCNC: 6.5 G/DL (ref 6.4–8.4)
QRS AXIS: 49 DEGREES
QRS AXIS: 63 DEGREES
QRS AXIS: 64 DEGREES
QRSD INTERVAL: 84 MS
QRSD INTERVAL: 84 MS
QRSD INTERVAL: 94 MS
QT INTERVAL: 262 MS
QT INTERVAL: 404 MS
QT INTERVAL: 432 MS
QTC INTERVAL: 278 MS
QTC INTERVAL: 432 MS
QTC INTERVAL: 466 MS
RBC # BLD AUTO: 3.74 MILLION/UL (ref 3.88–5.62)
RBC # BLD AUTO: 3.9 MILLION/UL (ref 3.88–5.62)
SODIUM SERPL-SCNC: 136 MMOL/L (ref 135–147)
SODIUM SERPL-SCNC: 136 MMOL/L (ref 135–147)
T WAVE AXIS: 17 DEGREES
T WAVE AXIS: 215 DEGREES
T WAVE AXIS: 46 DEGREES
VENTRICULAR RATE: 68 BPM
VENTRICULAR RATE: 69 BPM
VENTRICULAR RATE: 70 BPM
WBC # BLD AUTO: 10.7 THOUSAND/UL (ref 4.31–10.16)
WBC # BLD AUTO: 9.55 THOUSAND/UL (ref 4.31–10.16)

## 2025-05-06 PROCEDURE — 99232 SBSQ HOSP IP/OBS MODERATE 35: CPT | Performed by: NURSE PRACTITIONER

## 2025-05-06 PROCEDURE — 94760 N-INVAS EAR/PLS OXIMETRY 1: CPT

## 2025-05-06 PROCEDURE — 99232 SBSQ HOSP IP/OBS MODERATE 35: CPT | Performed by: INTERNAL MEDICINE

## 2025-05-06 PROCEDURE — 99222 1ST HOSP IP/OBS MODERATE 55: CPT | Performed by: INTERNAL MEDICINE

## 2025-05-06 PROCEDURE — 94660 CPAP INITIATION&MGMT: CPT

## 2025-05-06 PROCEDURE — 80048 BASIC METABOLIC PNL TOTAL CA: CPT | Performed by: INTERNAL MEDICINE

## 2025-05-06 PROCEDURE — 94668 MNPJ CHEST WALL SBSQ: CPT

## 2025-05-06 PROCEDURE — 80053 COMPREHEN METABOLIC PANEL: CPT | Performed by: INTERNAL MEDICINE

## 2025-05-06 PROCEDURE — 82948 REAGENT STRIP/BLOOD GLUCOSE: CPT

## 2025-05-06 PROCEDURE — 83735 ASSAY OF MAGNESIUM: CPT | Performed by: INTERNAL MEDICINE

## 2025-05-06 PROCEDURE — 94640 AIRWAY INHALATION TREATMENT: CPT

## 2025-05-06 PROCEDURE — 85025 COMPLETE CBC W/AUTO DIFF WBC: CPT | Performed by: INTERNAL MEDICINE

## 2025-05-06 PROCEDURE — 93010 ELECTROCARDIOGRAM REPORT: CPT | Performed by: INTERNAL MEDICINE

## 2025-05-06 PROCEDURE — 84145 PROCALCITONIN (PCT): CPT | Performed by: INTERNAL MEDICINE

## 2025-05-06 RX ORDER — MAGNESIUM SULFATE 1 G/100ML
1 INJECTION INTRAVENOUS ONCE
Status: COMPLETED | OUTPATIENT
Start: 2025-05-06 | End: 2025-05-06

## 2025-05-06 RX ORDER — METOPROLOL SUCCINATE 50 MG/1
50 TABLET, EXTENDED RELEASE ORAL DAILY
Status: DISCONTINUED | OUTPATIENT
Start: 2025-05-07 | End: 2025-05-09 | Stop reason: HOSPADM

## 2025-05-06 RX ADMIN — INSULIN LISPRO 5 UNITS: 100 INJECTION, SOLUTION INTRAVENOUS; SUBCUTANEOUS at 16:24

## 2025-05-06 RX ADMIN — INSULIN LISPRO 5 UNITS: 100 INJECTION, SOLUTION INTRAVENOUS; SUBCUTANEOUS at 08:06

## 2025-05-06 RX ADMIN — TOBRAMYCIN 2 DROP: 3 SOLUTION/ DROPS OPHTHALMIC at 04:40

## 2025-05-06 RX ADMIN — TOBRAMYCIN 2 DROP: 3 SOLUTION/ DROPS OPHTHALMIC at 14:35

## 2025-05-06 RX ADMIN — ASPIRIN 81 MG: 81 TABLET, COATED ORAL at 10:09

## 2025-05-06 RX ADMIN — SODIUM CHLORIDE, SODIUM GLUCONATE, SODIUM ACETATE, POTASSIUM CHLORIDE, MAGNESIUM CHLORIDE, SODIUM PHOSPHATE, DIBASIC, AND POTASSIUM PHOSPHATE 50 ML/HR: .53; .5; .37; .037; .03; .012; .00082 INJECTION, SOLUTION INTRAVENOUS at 19:25

## 2025-05-06 RX ADMIN — HEPARIN SODIUM 5000 UNITS: 5000 INJECTION INTRAVENOUS; SUBCUTANEOUS at 14:27

## 2025-05-06 RX ADMIN — INSULIN GLARGINE 35 UNITS: 100 INJECTION, SOLUTION SUBCUTANEOUS at 21:59

## 2025-05-06 RX ADMIN — METHYLPREDNISOLONE SODIUM SUCCINATE 40 MG: 40 INJECTION, POWDER, FOR SOLUTION INTRAMUSCULAR; INTRAVENOUS at 10:09

## 2025-05-06 RX ADMIN — LEVALBUTEROL HYDROCHLORIDE 1.25 MG: 1.25 SOLUTION RESPIRATORY (INHALATION) at 13:57

## 2025-05-06 RX ADMIN — IPRATROPIUM BROMIDE 0.5 MG: 0.5 SOLUTION RESPIRATORY (INHALATION) at 08:35

## 2025-05-06 RX ADMIN — AMLODIPINE BESYLATE 10 MG: 2.5 TABLET ORAL at 10:08

## 2025-05-06 RX ADMIN — INSULIN LISPRO 5 UNITS: 100 INJECTION, SOLUTION INTRAVENOUS; SUBCUTANEOUS at 13:21

## 2025-05-06 RX ADMIN — SODIUM CHLORIDE, SODIUM GLUCONATE, SODIUM ACETATE, POTASSIUM CHLORIDE, MAGNESIUM CHLORIDE, SODIUM PHOSPHATE, DIBASIC, AND POTASSIUM PHOSPHATE 100 ML/HR: .53; .5; .37; .037; .03; .012; .00082 INJECTION, SOLUTION INTRAVENOUS at 03:29

## 2025-05-06 RX ADMIN — INSULIN LISPRO 4 UNITS: 100 INJECTION, SOLUTION INTRAVENOUS; SUBCUTANEOUS at 08:04

## 2025-05-06 RX ADMIN — SODIUM CHLORIDE, PRESERVATIVE FREE 3 ML: 5 INJECTION INTRAVENOUS at 14:34

## 2025-05-06 RX ADMIN — HEPARIN SODIUM 5000 UNITS: 5000 INJECTION INTRAVENOUS; SUBCUTANEOUS at 21:59

## 2025-05-06 RX ADMIN — SODIUM CHLORIDE, PRESERVATIVE FREE 3 ML: 5 INJECTION INTRAVENOUS at 22:07

## 2025-05-06 RX ADMIN — ISOSORBIDE MONONITRATE 60 MG: 30 TABLET, EXTENDED RELEASE ORAL at 10:09

## 2025-05-06 RX ADMIN — ATORVASTATIN CALCIUM 80 MG: 40 TABLET, FILM COATED ORAL at 21:59

## 2025-05-06 RX ADMIN — TOBRAMYCIN 2 DROP: 3 SOLUTION/ DROPS OPHTHALMIC at 10:26

## 2025-05-06 RX ADMIN — METHYLPREDNISOLONE SODIUM SUCCINATE 40 MG: 40 INJECTION, POWDER, FOR SOLUTION INTRAMUSCULAR; INTRAVENOUS at 21:59

## 2025-05-06 RX ADMIN — FLUTICASONE PROPIONATE 1 SPRAY: 50 SPRAY, METERED NASAL at 10:20

## 2025-05-06 RX ADMIN — DEXTRAN 70, GLYCERIN, HYPROMELLOSE 1 DROP: 1; 2; 3 SOLUTION/ DROPS OPHTHALMIC at 19:22

## 2025-05-06 RX ADMIN — LEVALBUTEROL HYDROCHLORIDE 1.25 MG: 1.25 SOLUTION RESPIRATORY (INHALATION) at 08:35

## 2025-05-06 RX ADMIN — IPRATROPIUM BROMIDE 0.5 MG: 0.5 SOLUTION RESPIRATORY (INHALATION) at 19:31

## 2025-05-06 RX ADMIN — MAGNESIUM SULFATE IN DEXTROSE 1 G: 10 INJECTION, SOLUTION INTRAVENOUS at 00:25

## 2025-05-06 RX ADMIN — INSULIN GLARGINE 35 UNITS: 100 INJECTION, SOLUTION SUBCUTANEOUS at 10:09

## 2025-05-06 RX ADMIN — FINASTERIDE 5 MG: 5 TABLET, FILM COATED ORAL at 10:09

## 2025-05-06 RX ADMIN — PANTOPRAZOLE SODIUM 40 MG: 40 TABLET, DELAYED RELEASE ORAL at 10:09

## 2025-05-06 RX ADMIN — SODIUM CHLORIDE, PRESERVATIVE FREE 3 ML: 5 INJECTION INTRAVENOUS at 04:41

## 2025-05-06 RX ADMIN — IPRATROPIUM BROMIDE 0.5 MG: 0.5 SOLUTION RESPIRATORY (INHALATION) at 13:57

## 2025-05-06 RX ADMIN — ATENOLOL 50 MG: 50 TABLET ORAL at 10:46

## 2025-05-06 RX ADMIN — INSULIN LISPRO 6 UNITS: 100 INJECTION, SOLUTION INTRAVENOUS; SUBCUTANEOUS at 16:21

## 2025-05-06 RX ADMIN — GUAIFENESIN SYRUP AND DEXTROMETHORPHAN 10 ML: 100; 10 SYRUP ORAL at 05:02

## 2025-05-06 RX ADMIN — HEPARIN SODIUM 5000 UNITS: 5000 INJECTION INTRAVENOUS; SUBCUTANEOUS at 04:41

## 2025-05-06 RX ADMIN — TAMSULOSIN HYDROCHLORIDE 0.4 MG: 0.4 CAPSULE ORAL at 16:20

## 2025-05-06 RX ADMIN — DEXTRAN 70, GLYCERIN, HYPROMELLOSE 1 DROP: 1; 2; 3 SOLUTION/ DROPS OPHTHALMIC at 10:21

## 2025-05-06 RX ADMIN — TOBRAMYCIN 2 DROP: 3 SOLUTION/ DROPS OPHTHALMIC at 19:22

## 2025-05-06 RX ADMIN — LEVALBUTEROL HYDROCHLORIDE 1.25 MG: 1.25 SOLUTION RESPIRATORY (INHALATION) at 19:31

## 2025-05-06 RX ADMIN — INSULIN LISPRO 8 UNITS: 100 INJECTION, SOLUTION INTRAVENOUS; SUBCUTANEOUS at 13:19

## 2025-05-06 RX ADMIN — INSULIN LISPRO 8 UNITS: 100 INJECTION, SOLUTION INTRAVENOUS; SUBCUTANEOUS at 22:00

## 2025-05-06 NOTE — ASSESSMENT & PLAN NOTE
Patient admitted with acute on chronic respiratory failure with hypoxia POA due to COPD exacerbation.  Patient was discharged on 3 L of supplemental oxygen on 4/14/2025  Currently requiring 5 L supplemental oxygen  Wean oxygen as tolerated  See above

## 2025-05-06 NOTE — ASSESSMENT & PLAN NOTE
Alli noted to have heart rate in the 30s EKG ordered by primary team and patient started telemetry.    EKG and telemetry reviewed.  Patient with frequent PACs with heart rates generally in the 70s -pulse on on vital signs charted reviewed, not accurate in the setting of frequent PACs  Labs reviewed with without evidence of electrolytes abnormalities.  - Will plan to switch b.blocker to metoprolol in setting of CKD.

## 2025-05-06 NOTE — PROGRESS NOTES
"Progress Note - Gastroenterology   Name: Kyler Hernandez 89 y.o. male I MRN: [id number]  Unit/Bed#: -01 I Date of Admission: 5/4/2025   Date of Service: 5/6/2025 I Hospital Day: 2    Assessment & Plan  Other dysphagia  89-year-old male with CAD, COPD oxygen dependent on 2 L nasal cannula, hypertension, hyperlipidemia, diabetes, obstructive sleep apnea, GERD admitted with COPD exacerbation, who presents to GI for evaluation of dysphagia.    Patient reports difficulty swallowing peas, corn, rice. Due to concern for aspiration, speech pathology performed bedside swallow evaluation which showed normal oropharyngeal swallow skills. Patient felt at increased risk of reverse aspiration given CT chest revealing \"mid esophageal dilation with debris.\" He is on PPI daily for history of GERD. No prior EGD in the system.    - Patient reports that his breathing has improved today  - We will enter n.p.o. order after midnight in anticipation of possible EGD tomorrow as long as respiratory status continues to improve  - For now, continue regular diet as tolerated  - Aspiration precautions  - Continue Protonix 40 mg daily    COPD with acute exacerbation (HCC)  Pulmonary consulted, appreciate management  Continue IV steroids and nebulizers  Acute on chronic respiratory failure with hypoxia (HCC)  See plan above  Coronary artery disease involving native coronary artery of native heart without angina pectoris  Continue aspirin and statin      I have discussed the above management plan in detail with the primary service.     Subjective   Patient seen and examined at bedside. He states his breathing is okay currently. He is currently on 4 and 1/2 liters.  He states he tolerated his diet fine yesterday.  No issues swallowing.    Objective :  Temp:  [97 °F (36.1 °C)-98.1 °F (36.7 °C)] 98.1 °F (36.7 °C)  HR:  [37-85] 37  BP: (112-128)/(59-71) 124/71  Resp:  [16-19] 16  SpO2:  [89 %-95 %] 94 %  O2 Device: Nasal cannula  Nasal Cannula O2 " Flow Rate (L/min):  [5 L/min] 5 L/min    Physical Exam  Vitals and nursing note reviewed.   Constitutional:       General: He is not in acute distress.     Appearance: He is well-developed.   HENT:      Head: Normocephalic and atraumatic.      Nose:      Comments: Nasal cannula  Eyes:      Conjunctiva/sclera: Conjunctivae normal.   Cardiovascular:      Rate and Rhythm: Normal rate and regular rhythm.      Heart sounds: No murmur heard.  Pulmonary:      Comments: Decreased breath sounds bilaterally  Abdominal:      Palpations: Abdomen is soft.      Tenderness: There is no abdominal tenderness.   Musculoskeletal:         General: No swelling.      Cervical back: Neck supple.   Skin:     General: Skin is warm and dry.   Neurological:      Mental Status: He is alert.   Psychiatric:         Mood and Affect: Mood normal.           Lab Results: I have reviewed the following results:CBC/BMP:   .     05/06/25  0450   WBC 10.70*   HGB 11.5*   HCT 35.6*      SODIUM 136   K 3.9   CL 99   CO2 27   BUN 41*   CREATININE 1.33*   GLUC 223*   MG 3.1*    , Creatinine Clearance: Estimated Creatinine Clearance: 43.1 mL/min (A) (by C-G formula based on SCr of 1.33 mg/dL (H))., LFTs:   .     05/06/25  0037   AST 19   ALT 27   ALB 3.1*   TBILI 0.37   ALKPHOS 68    , PTT/INR:No new results in last 24 hours.     Imaging Results Review: I reviewed radiology reports from this admission including: CT chest.  Other Study Results Review: No additional pertinent studies reviewed.

## 2025-05-06 NOTE — SPEECH THERAPY NOTE
Speech Language/Pathology  Chart reviewed. Pt currently undergoing breathing treatment, SLP to re-attempt dysphagia f/u as able and appropriate.

## 2025-05-06 NOTE — ASSESSMENT & PLAN NOTE
Chest CT shows bibasilar atelectasis, suspect mucous plugging.  He is having difficulty expectorating his secretions.  Would rule out aspiration.    Continue vest therapy and flutter device to help with sputum expectoration.  Obtain speech therapy evaluation.  No evidence of acute infectious process.  Observe off antibiotics.

## 2025-05-06 NOTE — PLAN OF CARE
Problem: PAIN - ADULT  Goal: Verbalizes/displays adequate comfort level or baseline comfort level  Description: Interventions:- Encourage patient to monitor pain and request assistance- Assess pain using appropriate pain scale- Administer analgesics based on type and severity of pain and evaluate response- Implement non-pharmacological measures as appropriate and evaluate response- Consider cultural and social influences on pain and pain management- Notify physician/advanced practitioner if interventions unsuccessful or patient reports new pain  Outcome: Progressing     Problem: DISCHARGE PLANNING  Goal: Discharge to home or other facility with appropriate resources  Description: INTERVENTIONS:- Identify barriers to discharge w/patient and caregiver- Arrange for needed discharge resources and transportation as appropriate- Identify discharge learning needs (meds, wound care, etc.)- Arrange for interpretive services to assist at discharge as needed- Refer to Case Management Department for coordinating discharge planning if the patient needs post-hospital services based on physician/advanced practitioner order or complex needs related to functional status, cognitive ability, or social support system  Outcome: Progressing     Problem: Knowledge Deficit  Goal: Patient/family/caregiver demonstrates understanding of disease process, treatment plan, medications, and discharge instructions  Description: Complete learning assessment and assess knowledge base.Interventions:- Provide teaching at level of understanding- Provide teaching via preferred learning methods  Outcome: Progressing     Problem: Nutrition/Hydration-ADULT  Goal: Nutrient/Hydration intake appropriate for improving, restoring or maintaining nutritional needs  Description: Monitor and assess patient's nutrition/hydration status for malnutrition. Collaborate with interdisciplinary team and initiate plan and interventions as ordered.  Monitor patient's weight  and dietary intake as ordered or per policy. Utilize nutrition screening tool and intervene as necessary. Determine patient's food preferences and provide high-protein, high-caloric foods as appropriate. INTERVENTIONS:- Monitor oral intake, urinary output, labs, and treatment plans- Assess nutrition and hydration status and recommend course of action- Evaluate amount of meals eaten- Assist patient with eating if necessary - Allow adequate time for meals- Recommend/ encourage appropriate diets, oral nutritional supplements, and vitamin/mineral supplements- Order, calculate, and assess calorie counts as needed- Recommend, monitor, and adjust tube feedings and TPN/PPN based on assessed needs- Assess need for intravenous fluids- Provide specific nutrition/hydration education as appropriate- Include patient/family/caregiver in decisions related to nutrition  Monitor and assess patient's nutrition/hydration status for malnutrition. Collaborate with interdisciplinary team and initiate plan and interventions as ordered.  Monitor patient's weight and dietary intake as ordered or per policy. Utilize nutrition screening tool and intervene as necessary. Determine patient's food preferences and provide high-protein, high-caloric foods as appropriate. INTERVENTIONS:- Monitor oral intake, urinary output, labs, and treatment plans- Assess nutrition and hydration status and recommend course of action- Evaluate amount of meals eaten- Assist patient with eating if necessary - Allow adequate time for meals- Recommend/ encourage appropriate diets, oral nutritional supplements, and vitamin/mineral supplements- Order, calculate, and assess calorie counts as needed- Recommend, monitor, and adjust tube feedings and TPN/PPN based on assessed needs- Assess need for intravenous fluids- Provide specific nutrition/hydration education as appropriate- Include patient/family/caregiver in decisions related to nutrition  Outcome: Progressing

## 2025-05-06 NOTE — PROGRESS NOTES
Progress Note - Hospitalist   Name: Kyler Hernandez 89 y.o. male I MRN: [id number]  Unit/Bed#: -01 I Date of Admission: 5/4/2025   Date of Service: 5/6/2025 I Hospital Day: 2    Assessment & Plan  COPD with acute exacerbation (HCC)  Kyler is an 89-year-old male with past medical history of coronary artery disease, COPD (oxygen dependent 3 L), hypertension, hyperlipidemia, diabetes,and obstructive sleep apnea.  He presents to the emergency department from [facility] for shortness of breath and productive cough worsening over the last few days.  He was recently hospitalized between April 7 to 14 for COPD exacerbation and, per the [facility] staff, patient never fully returned to baseline.  The emergency department he was given nebulizers, steroids, and antibiotics and will be admitted for further treatment.    Procalcitonin is normal  Lactic acid negative, patient afebrile and without leukocytosis  CT chest shows:  Nodular opacities in the bilateral lungs and surrounding the inferior right kevin as described are most consistent with an infectious/inflammatory infiltrate considering clinical history.   Proximal left lower lobar bronchial occlusion with near complete left lower lobe atelectasis.  Right lower lobe bronchial wall thickening with segmental occlusions.  Likely aspiration-related consideration mid esophageal dilation with debris.  No obvious obstructing mass, consider bronchoscopy for further evaluation.  Pulmonary consulted, appreciate the assistance  Xopenex/Atrovent nebulizers, hold Trelegy for now  Solu-Medrol 40 mg every 12 hours  Currently on 5 L nasal cannula, wean to baseline as tolerated  Procalcitonin 0.15, 0.12 ceftriaxone given in the emergency room.    Hold off on any further antibiotics  Acute kidney injury superimposed on stage 3a chronic kidney disease (HCC)  Baseline creatinine between 1.0 and 1.25, currently 1.65  Avoid nephrotoxins and hypotension  UA is  unremarkable  Creatinine this morning is 1.33  On intravenous Isolyte and trend renal function  Avoid hypotension/nephrotoxic medication    Lab Results   Component Value Date    EGFR 47 05/06/2025    EGFR 42 05/06/2025    EGFR 39 05/05/2025    CREATININE 1.33 (H) 05/06/2025    CREATININE 1.44 (H) 05/06/2025    CREATININE 1.55 (H) 05/05/2025     Coronary artery disease involving native coronary artery of native heart without angina pectoris  No active chest pain  Continue aspirin/statin/isosorbide  Primary hypertension  Blood pressure stable  Continue: Norvasc  Routine vital signs  LINCOLN (obstructive sleep apnea)  Continue CPAP  Mixed hyperlipidemia  Continue statin  Type 2 diabetes mellitus with hyperglycemia, with long-term current use of insulin ([facility])  Hold oral antidiabetics  Sliding scale insulin, add 5 units with meals  Continue twice daily Lantus  Carbohydrate restrictive diet  Hypoglycemia protocol    Lab Results   Component Value Date    HGBA1C 8.9 (H) 04/08/2025       Recent Labs     05/05/25  1123 05/05/25  1558 05/05/25  2115 05/06/25  0700   POCGLU 313* 325* 355* 212*       Blood Sugar Average: Last 72 hrs:  (P) [id number]    Benign prostatic hyperplasia without lower urinary tract symptoms  Continue dose equivalent for alfuzosin (tamsulosin 0.4 mg), and finasteride 5 mg daily  Acute bacterial conjunctivitis of both eyes  Patient complains of eye crust recently  Tobramycin drops started in the emergency room  Continue  Acute on chronic respiratory failure with hypoxia (HCC)  Patient admitted with acute on chronic respiratory failure with hypoxia POA due to COPD exacerbation.  Patient was discharged on 3 L of supplemental oxygen on 4/14/2025  Currently requiring 5 L supplemental oxygen  Wean oxygen as tolerated  See above  Atelectasis, bilateral    Other dysphagia  GI consult appreciated  GI is recommended endoscopy when patient pulmonary status improves  Continue on Protonix    Labs & Imaging:  "Results Review Statement: No pertinent imaging studies reviewed.    VTE Prophylaxis: in place.    Code Status:   Level 1 - Full Code    Patient Centered Rounds: I have performed bedside rounds with nursing staff today.    Mobility:   Basic Mobility Inpatient Raw Score: 19  JH-HLM Goal: 6: Walk 10 steps or more  JH-HLM Achieved: 6: Walk 10 steps or more  JH-HLM Goal achieved. Continue to encourage appropriate mobility.    Discussions with Specialists or Other Care Team Provider: GI    Education and Discussions with Family / Patient: Declined update    Total Time Spent on Date of Encounter in care of patient: 35 mins. This time was spent on one or more of the following: performing physical exam; counseling and coordination of care; obtaining or reviewing history; documenting in the medical record; reviewing/ordering tests, medications or procedures; communicating with other healthcare professionals and discussing with patient's family/caregivers.    Current Length of Stay: 2 day(s)    Current Patient Status: Inpatient   Certification Statement: The patient will continue to require additional inpatient hospital stay due to see my assessment and plan.     Subjective:   Patient is seen and examined at bedside.  No new complaints.  Afebrile  All other ROS are negative.    Objective:    Vitals: Blood pressure 124/71, pulse (!) 37, temperature 98.1 °F (36.7 °C), resp. rate 16, height 5' 8\" (1.727 m), weight 100 kg (220 lb 6.4 oz), SpO2 94%.,Body mass index is 33.51 kg/m².  SPO2 RA Rest      Flowsheet Row ED to Hosp-Admission (Current) from 5/4/2025 in [facility] Med Surg Unit   SpO2 94 %   SpO2 Activity At Rest   O2 Device Nasal cannula   O2 Flow Rate --          I&O:   Intake/Output Summary (Last 24 hours) at 5/6/2025 0811  Last data filed at 5/6/2025 0501  Gross per 24 hour   Intake 2683 ml   Output 2275 ml   Net 408 ml       Physical Exam:    General- Alert, sitting in chair. Not in any acute " distress.  Neck- Supple, No JVD  CVS- regular, S1 and S2 normal  Chest- Bilateral Air entry, decreased at bases  Abdomen- soft, nontender, not distended, no guarding or rigidity, BS+  Extremities-  No pedal edema, No calf tenderness                         Normal ROM in all extremities.  CNS-   Alert, awake and orientedx3. No focal deficits present.    Invasive Devices       Peripheral Intravenous Line  Duration             Peripheral IV 05/04/25 Right Antecubital 1 day                          Social History  reviewed  Family History   Problem Relation Age of Onset    Dementia Mother     Diabetes Mother     Peripheral vascular disease Father     Mental illness Neg Hx     Substance Abuse Neg Hx     reviewed    Meds:  Current Facility-Administered Medications   Medication Dose Route Frequency Provider Last Rate Last Admin    acetaminophen (TYLENOL) tablet 650 mg  650 mg Oral Q6H PRN CONCEPCION Vora        aluminum-magnesium hydroxide-simethicone (MAALOX) oral suspension 30 mL  30 mL Oral Q6H PRN CONCEPCION Vora        amLODIPine (NORVASC) tablet 10 mg  10 mg Oral QAM CONCEPCION Vora   10 mg at 05/05/25 0815    Artificial Tears Op Soln 1 drop  1 drop Both Eyes BID CONCEPCION Vora   1 drop at 05/05/25 1702    aspirin (ECOTRIN LOW STRENGTH) EC tablet 81 mg  81 mg Oral QAM CONCEPCION Vora   81 mg at 05/05/25 0815    [Held by provider] atenolol (TENORMIN) tablet 50 mg  50 mg Oral Daily CONCEPCION Vora   50 mg at 05/05/25 0815    atorvastatin (LIPITOR) tablet 80 mg  80 mg Oral HS CONCEPCION Vora   80 mg at 05/05/25 2137    dextromethorphan-guaiFENesin (ROBITUSSIN DM) oral syrup 10 mL  10 mL Oral Q4H PRN CONCEPCION Vora   10 mL at 05/06/25 0502    finasteride (PROSCAR) tablet 5 mg  5 mg Oral Daily CONCEPCION Vora   5 mg at 05/05/25 0815    fluticasone (FLONASE) 50 mcg/act nasal spray 1 spray  1 spray  Each Nare Daily CONCEPCION Vora   1 spray at 05/05/25 0819    heparin (porcine) subcutaneous injection 5,000 Units  5,000 Units Subcutaneous Q8H [facility] CONCEPCION Vora   5,000 Units at 05/06/25 0441    insulin glargine (LANTUS) subcutaneous injection 35 Units 0.35 mL  35 Units Subcutaneous Q12H [facility] CONCEPCION Vora   35 Units at 05/05/25 2138    insulin lispro (HumALOG/ADMELOG) 100 units/mL subcutaneous injection 2-12 Units  2-12 Units Subcutaneous TID AC Lit Gottlieb MD   4 Units at 05/06/25 0804    insulin lispro (HumALOG/ADMELOG) 100 units/mL subcutaneous injection 4-20 Units  4-20 Units Subcutaneous HS Lit Gottlieb MD   16 Units at 05/05/25 2138    insulin lispro (HumALOG/ADMELOG) 100 units/mL subcutaneous injection 5 Units  5 Units Subcutaneous TID With Meals CONCEPCION Vora   5 Units at 05/06/25 0806    ipratropium (ATROVENT) 0.02 % inhalation solution 0.5 mg  0.5 mg Nebulization TID CONCEPCION Vora   0.5 mg at 05/05/25 1900    isosorbide mononitrate (IMDUR) 24 hr tablet 60 mg  60 mg Oral QAM CONCEPCION Vora   60 mg at 05/05/25 0814    levalbuterol (XOPENEX) inhalation solution 1.25 mg  1.25 mg Nebulization TID CONCEPCION Vora   1.25 mg at 05/05/25 1900    methylPREDNISolone sodium succinate (Solu-MEDROL) injection 40 mg  40 mg Intravenous Q12H [facility] Sera Oates DO   40 mg at 05/05/25 2145    multi-electrolyte (Plasmalyte-A/Isolyte-S PH 7.4/Normosol-R) IV solution  50 mL/hr Intravenous Continuous Lit Gottlieb  mL/hr at 05/06/25 0329 100 mL/hr at 05/06/25 0329    pantoprazole (PROTONIX) EC tablet 40 mg  40 mg Oral QAM CONCEPCION Vora   40 mg at 05/05/25 0819    polyethylene glycol (MIRALAX) packet 17 g  17 g Oral Daily PRN CONCEPCION Vora        sodium chloride (PF) 0.9 % injection 3 mL  3 mL Intravenous Q8H MOLINA Yuen DO   3 mL at 05/06/25 0449     tamsulosin (FLOMAX) capsule 0.4 mg  0.4 mg Oral Daily With Dinner CONCEPCION Vora   0.4 mg at 05/05/25 1702    tobramycin (TOBREX) 0.3 % ophthalmic solution 2 drop  2 drop Both Eyes Q4H While Awake Torrey Yuen DO   2 drop at 05/06/25 0440        Medications Prior to Admission:     acetaminophen (TYLENOL) 325 mg tablet    albuterol (2.5 mg/3 mL) 0.083 % nebulizer solution    alfuzosin (UROXATRAL) 10 mg 24 hr tablet    amLODIPine (NORVASC) 10 mg tablet    aspirin (Aspirin Low Dose) 81 mg EC tablet    atenolol (TENORMIN) 50 mg tablet    atorvastatin (LIPITOR) 80 mg tablet    Cholecalciferol (Vitamin D3) 25 MCG (1000 UT) CAPS    Cyanocobalamin (Vitamin B-12) 2500 MCG SUBL    isosorbide mononitrate (IMDUR) 60 mg 24 hr tablet    losartan (COZAAR) 100 MG tablet    Multiple Vitamin (Daily-Jhonatan) TABS    Multiple Vitamin (MULTIVITAMIN ADULT PO)    pantoprazole (PROTONIX) 40 mg tablet    repaglinide (PRANDIN) 0.5 mg tablet    torsemide (DEMADEX) 20 mg tablet    Alcohol Swabs (Alcohol Prep Pad) 70 % PADS    Continuous Glucose  (Dexcom G7 ) ZAYNAB    Continuous Glucose Sensor (Dexcom G7 Sensor)    Empagliflozin (Jardiance) 10 MG TABS tablet    finasteride (PROSCAR) 5 mg tablet    fluticasone (FLONASE) 50 mcg/act nasal spray    Insulin Glargine Solostar (Lantus SoloStar) 100 UNIT/ML SOPN    insulin isophane (HumuLIN N KwikPen) 100 units/mL injection pen    insulin lispro (HumaLOG KwikPen) 100 units/mL injection pen    Insulin Pen Needle (BD AutoShield Duo) 30G X 5 MM MISC    ipratropium-albuterol (DUO-NEB) 0.5-2.5 mg/3 mL nebulizer solution    loperamide (IMODIUM) 2 mg capsule    NovoFine Autocover Pen Needle 30G X 8 MM MISC    Nutritional Supplements (Ensure)    nystatin (MYCOSTATIN) powder    Trelegy Ellipta 200-62.5-25 MCG/ACT AEPB inhaler    valACYclovir (VALTREX) 1,000 mg tablet    Labs:  Results from last 7 days   Lab Units 05/06/25  0450 05/06/25  0037 05/05/25  0502 05/04/25  1627   WBC  "Thousand/uL 10.70* 9.55 7.11 7.72   HEMOGLOBIN g/dL 11.5* 11.0* 11.4* 11.2*   HEMATOCRIT % 35.6* 34.3* 34.9* 35.2*   PLATELETS Thousands/uL 196 197 168 168   SEGS PCT % 87* 87*  --  74   LYMPHO PCT % 4* 4* 4* 7*   MONO PCT % 7 7 3* 17*   EOS PCT % 0 0 0 1     Results from last 7 days   Lab Units 05/06/25  0450 05/06/25  0037 05/05/25  0502 05/04/25  1609   POTASSIUM mmol/L 3.9 3.7 3.7 4.4   CHLORIDE mmol/L 99 97 99 100   CO2 mmol/L 27 29 27 31   BUN mg/dL 41* 41* 31* 25   CREATININE mg/dL 1.33* 1.44* 1.55* 1.65*   CALCIUM mg/dL 8.5 8.4 8.4 8.5   ALK PHOS U/L  --  68 73 74   ALT U/L  --  27 24 28   AST U/L  --  19 16 36     No results found for: \"TROPONINI\", \"CKMB\", \"CKTOTAL\"  Results from last 7 days   Lab Units 05/04/25  1704   INR  1.15     Lab Results   Component Value Date    BLOODCX No Growth at 24 hrs. 05/04/2025    BLOODCX No Growth at 24 hrs. 05/04/2025    BLOODCX No Growth After 5 Days. 04/07/2025    BLOODCX No Growth After 5 Days. 04/07/2025    URINECX No Growth <1000 cfu/mL 12/22/2024    SPUTUMCULTUR Test not performed. Suggest repeat specimen. 04/08/2025         Imaging:  Results for orders placed during the hospital encounter of 05/04/25    XR chest portable    Narrative  XR CHEST PORTABLE    INDICATION: sob cough.    COMPARISON: CXR 4/10/2025, chest CT 5/4/2025.    FINDINGS:    Left lower lobe atelectasis, better shown on subsequent chest CT. Mild pulmonary venous congestion. No pneumothorax or pleural effusion.    Mild cardiomegaly.    Bones are unremarkable for age.    Normal upper abdomen.    Impression  Mild pulmonary venous congestion.    Left lower lobe atelectasis, better shown on subsequent chest CT.        Workstation performed: [id number]    No results found for this or any previous visit.      Last 24 Hours Medication List:   Current Facility-Administered Medications   Medication Dose Route Frequency Provider Last Rate    acetaminophen  650 mg Oral Q6H PRN CONCEPCION Vora      " aluminum-magnesium hydroxide-simethicone  30 mL Oral Q6H PRN CONCEPCION Vora      amLODIPine  10 mg Oral QAM CONCEPCION Vora      Artificial Tears  1 drop Both Eyes BID CONCEPCION Vora      aspirin  81 mg Oral QAM CONCEPCION Vora      [Held by provider] atenolol  50 mg Oral Daily CONCEPCION Vora      atorvastatin  80 mg Oral HS CONCEPCION Vora      dextromethorphan-guaiFENesin  10 mL Oral Q4H PRN CONCEPCION Vora      finasteride  5 mg Oral Daily CONCEPCION Vora      fluticasone  1 spray Each Nare Daily CONCEPCION Vora      heparin (porcine)  5,000 Units Subcutaneous Q8H [facility] CONCEPCION Vora      insulin glargine  35 Units Subcutaneous Q12H [facility] CONCEPCION Vora      insulin lispro  2-12 Units Subcutaneous TID AC Lit Gottlieb MD      insulin lispro  4-20 Units Subcutaneous HS Lit Gottlieb MD      insulin lispro  5 Units Subcutaneous TID With Meals CONCEPCION Vora      ipratropium  0.5 mg Nebulization TID CONCEPCION Vora      isosorbide mononitrate  60 mg Oral QAM CONCEPCION Vora      levalbuterol  1.25 mg Nebulization TID CONCEPCION Vora      methylPREDNISolone sodium succinate  40 mg Intravenous Q12H [facility] Sera Oates DO      multi-electrolyte  50 mL/hr Intravenous Continuous Lit Gottlieb  mL/hr (05/06/25 0329)    pantoprazole  40 mg Oral QAM CONCEPCION Vora      polyethylene glycol  17 g Oral Daily PRN CONCEPCION Vora      sodium chloride (PF)  3 mL Intravenous Q8H [facility] Torrey Yuen DO      tamsulosin  0.4 mg Oral Daily With Dinner CONCEPCION Vora      tobramycin  2 drop Both Eyes Q4H While Awake Torrey Yuen DO          Today, Patient Was Seen By: Lit Gottlieb MD    ** Please Note: Dictation voice to text software may have been used in  the creation of this document. **

## 2025-05-06 NOTE — ASSESSMENT & PLAN NOTE
GI consult appreciated  GI is recommended endoscopy when patient pulmonary status improves  Continue on Protonix

## 2025-05-06 NOTE — ASSESSMENT & PLAN NOTE
Baseline creatinine between 1.0 and 1.25, currently 1.65  Avoid nephrotoxins and hypotension  UA is unremarkable  Creatinine this morning is 1.33  On intravenous Isolyte and trend renal function  Avoid hypotension/nephrotoxic medication    Lab Results   Component Value Date    EGFR 47 05/06/2025    EGFR 42 05/06/2025    EGFR 39 05/05/2025    CREATININE 1.33 (H) 05/06/2025    CREATININE 1.44 (H) 05/06/2025    CREATININE 1.55 (H) 05/05/2025

## 2025-05-06 NOTE — PROGRESS NOTES
Progress Note - Pulmonology   Name: Kyler Hernandez 89 y.o. male I MRN: [id number]  Unit/Bed#: -Francisco I Date of Admission: 5/4/2025   Date of Service: 5/6/2025 I Hospital Day: 2    Assessment & Plan  COPD with acute exacerbation (HCC)  Of unknown severity  Likely secondary to recent influenza infection-previously treated with prednisone  Home medication regime: Trelegy DuoNebs, albuterol nebulizer and albuterol rescue inhaler  WBC 7.11-10.70  Procalcitonin negative x 3  Currently on Solu-Medrol every 12 hours can decrease to daily starting today with anticipation of discharging on prednisone taper  Continue Atrovent and Xopenex nebulizers  Will need outpatient follow-up in formal PFTs  Acute on chronic respiratory failure with hypoxia (HCC)  93% on 5 L NC, baseline oxygen needs 2-3 L/min.    Titrate to maintain saturations above 88%  Atelectasis, bilateral  Chest CT shows bibasilar atelectasis, suspect mucous plugging.  He is having difficulty expectorating his secretions.  Would rule out aspiration.    Continue vest therapy and flutter device to help with sputum expectoration.  Obtain speech therapy evaluation.  No evidence of acute infectious process.  Observe off antibiotics.    Other dysphagia  Speech following recommended regular diet and thin liquids    24 Hour Events : Denies any overnight events  Subjective : Patient seen and examined at bedside.  Found resting comfortably in bed.  Saturations stable on 5 L    Objective :  Temp:  [97 °F (36.1 °C)-98.1 °F (36.7 °C)] 98.1 °F (36.7 °C)  HR:  [37-85] 59  BP: (112-143)/(59-71) 143/68  Resp:  [16-19] 16  SpO2:  [89 %-95 %] 92 %  O2 Device: Nasal cannula  Nasal Cannula O2 Flow Rate (L/min):  [5 L/min] 5 L/min    Physical Exam  Vitals reviewed.   Constitutional:       General: He is not in acute distress.     Appearance: He is not ill-appearing.   HENT:      Head: Normocephalic and atraumatic.      Right Ear: External ear normal.      Left Ear: External ear normal.       Nose: Nose normal.      Mouth/Throat:      Mouth: Mucous membranes are moist.   Eyes:      Extraocular Movements: Extraocular movements intact.      Pupils: Pupils are equal, round, and reactive to light.   Cardiovascular:      Rate and Rhythm: Normal rate and regular rhythm.   Abdominal:      General: Bowel sounds are normal.   Musculoskeletal:      Cervical back: Normal range of motion and neck supple.   Neurological:      Mental Status: He is alert and oriented to person, place, and time.   Psychiatric:         Mood and Affect: Mood normal.         Behavior: Behavior normal.         Thought Content: Thought content normal.         Judgment: Judgment normal.           Lab Results: I have reviewed the following results:   .     05/06/25  0037 05/06/25  0450   WBC 9.55 10.70*   HGB 11.0* 11.5*   HCT 34.3* 35.6*    196   SODIUM 136 136   K 3.7 3.9   CL 97 99   CO2 29 27   BUN 41* 41*   CREATININE 1.44* 1.33*   GLUC 271* 223*   MG 2.7 3.1*   AST 19  --    ALT 27  --    ALB 3.1*  --    TBILI 0.37  --    ALKPHOS 68  --      ABG: No new results in last 24 hours.    Imaging Results Review: I reviewed radiology reports from this admission including: CT chest.  CTA chest PE study 05/04/2025  No pulmonary embolism.     Nodular opacities in the bilateral lungs and surrounding the inferior right kevin as described are most consistent with an infectious/inflammatory infiltrate considering clinical history.  Follow-up chest CT in 3 months recommended to assess for interval resolution.     Proximal left lower lobar bronchial occlusion with near complete left lower lobe atelectasis.  Right lower lobe bronchial wall thickening with segmental occlusions.  Likely aspiration-related consideration mid esophageal dilation with debris.  No obvious obstructing mass, consider bronchoscopy for further evaluation.  Other Study Results Review: Other studies reviewed include: ECHO  Echo 0/38/2025 shows LVEF 65% systolic function  normal wall motion normal diastolic function normal  PFT Results Reviewed: No formal PFTs on file

## 2025-05-06 NOTE — ASSESSMENT & PLAN NOTE
Kyler is an 89-year-old male with past medical history of coronary artery disease, COPD (oxygen dependent 3 L), hypertension, hyperlipidemia, diabetes,and obstructive sleep apnea.  He presents to the emergency department from [facility] for shortness of breath and productive cough worsening over the last few days.  He was recently hospitalized between April 7 to 14 for COPD exacerbation and, per the [facility] staff, patient never fully returned to baseline.  The emergency department he was given nebulizers, steroids, and antibiotics and will be admitted for further treatment.    Procalcitonin is normal  Lactic acid negative, patient afebrile and without leukocytosis  CT chest shows:  Nodular opacities in the bilateral lungs and surrounding the inferior right kevin as described are most consistent with an infectious/inflammatory infiltrate considering clinical history.   Proximal left lower lobar bronchial occlusion with near complete left lower lobe atelectasis.  Right lower lobe bronchial wall thickening with segmental occlusions.  Likely aspiration-related consideration mid esophageal dilation with debris.  No obvious obstructing mass, consider bronchoscopy for further evaluation.  Pulmonary consulted, appreciate the assistance  Xopenex/Atrovent nebulizers, hold Trelegy for now  Solu-Medrol 40 mg every 12 hours  Currently on 5 L nasal cannula, wean to baseline as tolerated  Procalcitonin 0.15, 0.12 ceftriaxone given in the emergency room.    Hold off on any further antibiotics

## 2025-05-06 NOTE — QUICK NOTE
Contacted by nursing due to Stuart reading intermittently in the 30s.  Patient admitted on 5/4 with COPD exacerbation.  Patient sleeping soundly on his CPAP.  Woken up by nursing and myself.  Denies lightheadedness, dizziness, palpitations, chest pain or other symptoms.  EKGs obtained with sinus tachycardia with second-degree AV block Mobitz 2 then repeat EKG with mobitz 1. Patient placed on telemetry. Orders placed for cbc, cmp and magnesium. Hold atenolol.     Contacted cardiology on call who suspects atrial bigeminy and not 2nd degree AV block. Will continue telemetry overnight and have cardiology review in the morning.

## 2025-05-06 NOTE — PLAN OF CARE
Problem: PAIN - ADULT  Goal: Verbalizes/displays adequate comfort level or baseline comfort level  Description: Interventions:- Encourage patient to monitor pain and request assistance- Assess pain using appropriate pain scale- Administer analgesics based on type and severity of pain and evaluate response- Implement non-pharmacological measures as appropriate and evaluate response- Consider cultural and social influences on pain and pain management- Notify physician/advanced practitioner if interventions unsuccessful or patient reports new pain  Outcome: Progressing     Problem: INFECTION - ADULT  Goal: Absence or prevention of progression during hospitalization  Description: INTERVENTIONS:- Assess and monitor for signs and symptoms of infection- Monitor lab/diagnostic results- Monitor all insertion sites, i.e. indwelling lines, tubes, and drains- Monitor endotracheal if appropriate and nasal secretions for changes in amount and color- [place] appropriate cooling/warming therapies per order- Administer medications as ordered- Instruct and encourage patient and family to use good hand hygiene technique- Identify and instruct in appropriate isolation precautions for identified infection/condition  Outcome: Progressing  Goal: Absence of fever/infection during neutropenic period  Description: INTERVENTIONS:- Monitor WBC  Outcome: Progressing

## 2025-05-06 NOTE — ASSESSMENT & PLAN NOTE
Hold oral antidiabetics  Sliding scale insulin, add 5 units with meals  Continue twice daily Lantus  Carbohydrate restrictive diet  Hypoglycemia protocol    Lab Results   Component Value Date    HGBA1C 8.9 (H) 04/08/2025       Recent Labs     05/05/25  1123 05/05/25  1558 05/05/25  2115 05/06/25  0700   POCGLU 313* 325* 355* 212*       Blood Sugar Average: Last 72 hrs:  (P) [id number]

## 2025-05-06 NOTE — ASSESSMENT & PLAN NOTE
"89-year-old male with CAD, COPD oxygen dependent on 2 L nasal cannula, hypertension, hyperlipidemia, diabetes, obstructive sleep apnea, GERD admitted with COPD exacerbation, who presents to GI for evaluation of dysphagia.    Patient reports difficulty swallowing peas, corn, rice. Due to concern for aspiration, speech pathology performed bedside swallow evaluation which showed normal oropharyngeal swallow skills. Patient felt at increased risk of reverse aspiration given CT chest revealing \"mid esophageal dilation with debris.\" He is on PPI daily for history of GERD. No prior EGD in the system.    - Patient reports that his breathing has improved today  - We will enter n.p.o. order after midnight in anticipation of possible EGD tomorrow as long as respiratory status continues to improve  - For now, continue regular diet as tolerated  - Aspiration precautions  - Continue Protonix 40 mg daily    "

## 2025-05-06 NOTE — CONSULTS
Consultation - Cardiology   Name: Kyler Hernandez 89 y.o. male I MRN: [id number]  Unit/Bed#: -01 I Date of Admission: 5/4/2025   Date of Service: 5/6/2025 I Hospital Day: 2   Inpatient consult to Cardiology  Consult performed by: CONCEPCION Monroe  Consult ordered by: Camilla Berry PA-C      Physician Requesting Evaluation: Lit Gottlieb MD   Reason for Evaluation / Principal Problem: arrhthymia     Assessment & Plan  COPD with acute exacerbation (HCC)  Patient admitted from [facility] on 5/4/2025 with COPD exacerbation in the setting of atelectasis  PAC (premature atrial contraction)  Alli noted to have heart rate in the 30s EKG ordered by primary team and patient started telemetry.    EKG and telemetry reviewed.  Patient with frequent PACs with heart rates generally in the 70s -pulse on on vital signs charted reviewed, not accurate in the setting of frequent PACs  Labs reviewed with without evidence of electrolytes abnormalities.  - Will plan to switch b.blocker to metoprolol in setting of CKD.      Coronary artery disease involving native coronary artery of native heart without angina pectoris  Patient maintained on Imdur, aspirin, atorvastatin, atenolol  Primary hypertension  Treated outpatient with Norvasc,, losartan, Imdur, torsemide, Jardiance  LINCOLN (obstructive sleep apnea)  Uses CPAP at night  Mixed hyperlipidemia  Maintained on atorvastatin 80 mg daily  Plan  DC atenolol in the setting of stage 3 CKD  Will switch patient on Toprol XL 50 mg daily   Will need outpatient follow up - I will arrange     History of Present Illness   Kyler Hernandez is a 89 y.o. male with a past medical history of coronary artery disease, emphysema, chronic hypoxic respiratory failure on home O2, obstructive sleep apnea hypertension, hyperlipidemia who presents to [facility] emergency department from [facility] on 5/4/2025 with complaints of increased cough, shortness of breath and mucus production.  CT scan shows  bibasilar atelectasis.    Overnight on 5/6 patient noted to have maximal readings in the 30s while sleeping using his CPAP.  EKG revealed first-degree AV block with frequent PACs and few PVCs prompting a cardiac consultation.    Review of Systems   Constitutional:  Positive for fatigue.   Respiratory:  Positive for choking and shortness of breath.    Cardiovascular:  Negative for chest pain, palpitations and leg swelling.   Gastrointestinal:  Negative for blood in stool, constipation and nausea.   Genitourinary:  Negative for hematuria.   Neurological:  Negative for dizziness, syncope, weakness and light-headedness.     Medical History Review: I have reviewed the patient's PMH, PSH, Social History, Family History, Meds, and Allergies     Objective :  Temp:  [97 °F (36.1 °C)-98.1 °F (36.7 °C)] 98.1 °F (36.7 °C)  HR:  [37-85] 37  BP: (112-128)/(59-71) 124/71  Resp:  [16-19] 16  SpO2:  [89 %-95 %] 94 %  O2 Device: Nasal cannula  Nasal Cannula O2 Flow Rate (L/min):  [5 L/min] 5 L/min  Orthostatic Blood Pressures      Flowsheet Row Most Recent Value   Blood Pressure 124/71 filed at 05/06/2025 0703   Patient Position - Orthostatic VS Lying filed at 05/05/2025 0515          First Weight: Weight - Scale: 103 kg (226 lb) (05/04/25 1542)  Vitals:    05/04/25 1542 05/04/25 1948   Weight: 103 kg (226 lb) 100 kg (220 lb 6.4 oz)       Physical Exam  Constitutional:       Appearance: Normal appearance. He is obese.   HENT:      Head: Normocephalic.      Nose: Nose normal.      Mouth/Throat:      Mouth: Mucous membranes are moist.   Cardiovascular:      Rate and Rhythm: Normal rate and regular rhythm.      Pulses: Normal pulses.      Heart sounds: Normal heart sounds.   Pulmonary:      Effort: Pulmonary effort is normal.      Breath sounds: Wheezing and rhonchi present.   Abdominal:      General: Bowel sounds are normal.      Palpations: Abdomen is soft.   Genitourinary:     Penis: Normal.    Musculoskeletal:      Cervical back:  "Normal range of motion.      Right lower leg: Edema present.      Left lower leg: Edema present.   Skin:     General: Skin is warm.      Capillary Refill: Capillary refill takes less than 2 seconds.   Neurological:      General: No focal deficit present.      Mental Status: He is alert and oriented to person, place, and time. Mental status is at baseline.         Lab Results: I have reviewed the following results:Troponin,BNP:No new results in last 24 hours.   Results from last 7 days   Lab Units 05/06/25  0450 05/06/25  0037 05/05/25  0502   WBC Thousand/uL 10.70* 9.55 7.11   HEMOGLOBIN g/dL 11.5* 11.0* 11.4*   HEMATOCRIT % 35.6* 34.3* 34.9*   PLATELETS Thousands/uL 196 197 168     Results from last 7 days   Lab Units 05/06/25  0450 05/06/25  0037 05/05/25  0502   POTASSIUM mmol/L 3.9 3.7 3.7   CHLORIDE mmol/L 99 97 99   CO2 mmol/L 27 29 27   BUN mg/dL 41* 41* 31*   CREATININE mg/dL 1.33* 1.44* 1.55*   CALCIUM mg/dL 8.5 8.4 8.4     Results from last 7 days   Lab Units 05/04/25  1704   INR  1.15   PTT seconds 30     Lab Results   Component Value Date    HGBA1C 8.9 (H) 04/08/2025     No results found for: \"CKTOTAL\", \"CKMB\", \"CKMBINDEX\", \"TROPONINI\"    Imaging Results Review: I reviewed radiology reports from this admission including: chest xray and CT chest.  Other Study Results Review: EKG was reviewed.     VTE Prophylaxis: VTE covered by:  heparin (porcine), Subcutaneous, 5,000 Units at 05/06/25 0441         "

## 2025-05-07 ENCOUNTER — ANESTHESIA (INPATIENT)
Dept: GASTROENTEROLOGY | Facility: HOSPITAL | Age: OVER 89
DRG: 177 | End: 2025-05-07
Payer: COMMERCIAL

## 2025-05-07 ENCOUNTER — APPOINTMENT (INPATIENT)
Dept: GASTROENTEROLOGY | Facility: HOSPITAL | Age: OVER 89
DRG: 177 | End: 2025-05-07
Attending: PHYSICIAN ASSISTANT
Payer: COMMERCIAL

## 2025-05-07 LAB
ANION GAP SERPL CALCULATED.3IONS-SCNC: 7 MMOL/L (ref 4–13)
BUN SERPL-MCNC: 38 MG/DL (ref 5–25)
CALCIUM SERPL-MCNC: 8.6 MG/DL (ref 8.4–10.2)
CHLORIDE SERPL-SCNC: 101 MMOL/L (ref 96–108)
CO2 SERPL-SCNC: 28 MMOL/L (ref 21–32)
CREAT SERPL-MCNC: 1.08 MG/DL (ref 0.6–1.3)
ERYTHROCYTE [DISTWIDTH] IN BLOOD BY AUTOMATED COUNT: 15.2 % (ref 11.6–15.1)
GFR SERPL CREATININE-BSD FRML MDRD: 60 ML/MIN/1.73SQ M
GLUCOSE SERPL-MCNC: 110 MG/DL (ref 65–140)
GLUCOSE SERPL-MCNC: 160 MG/DL (ref 65–140)
GLUCOSE SERPL-MCNC: 164 MG/DL (ref 65–140)
GLUCOSE SERPL-MCNC: 169 MG/DL (ref 65–140)
GLUCOSE SERPL-MCNC: 172 MG/DL (ref 65–140)
HCT VFR BLD AUTO: 37.1 % (ref 36.5–49.3)
HGB BLD-MCNC: 12 G/DL (ref 12–17)
MCH RBC QN AUTO: 29.6 PG (ref 26.8–34.3)
MCHC RBC AUTO-ENTMCNC: 32.3 G/DL (ref 31.4–37.4)
MCV RBC AUTO: 91 FL (ref 82–98)
PLATELET # BLD AUTO: 221 THOUSANDS/UL (ref 149–390)
PMV BLD AUTO: 10.2 FL (ref 8.9–12.7)
POTASSIUM SERPL-SCNC: 4.1 MMOL/L (ref 3.5–5.3)
RBC # BLD AUTO: 4.06 MILLION/UL (ref 3.88–5.62)
SODIUM SERPL-SCNC: 136 MMOL/L (ref 135–147)
WBC # BLD AUTO: 11.88 THOUSAND/UL (ref 4.31–10.16)

## 2025-05-07 PROCEDURE — 94640 AIRWAY INHALATION TREATMENT: CPT

## 2025-05-07 PROCEDURE — 43239 EGD BIOPSY SINGLE/MULTIPLE: CPT | Performed by: STUDENT IN AN ORGANIZED HEALTH CARE EDUCATION/TRAINING PROGRAM

## 2025-05-07 PROCEDURE — 94760 N-INVAS EAR/PLS OXIMETRY 1: CPT

## 2025-05-07 PROCEDURE — 0DB68ZX EXCISION OF STOMACH, VIA NATURAL OR ARTIFICIAL OPENING ENDOSCOPIC, DIAGNOSTIC: ICD-10-PCS | Performed by: STUDENT IN AN ORGANIZED HEALTH CARE EDUCATION/TRAINING PROGRAM

## 2025-05-07 PROCEDURE — 82948 REAGENT STRIP/BLOOD GLUCOSE: CPT

## 2025-05-07 PROCEDURE — 0DB28ZX EXCISION OF MIDDLE ESOPHAGUS, VIA NATURAL OR ARTIFICIAL OPENING ENDOSCOPIC, DIAGNOSTIC: ICD-10-PCS | Performed by: STUDENT IN AN ORGANIZED HEALTH CARE EDUCATION/TRAINING PROGRAM

## 2025-05-07 PROCEDURE — 80048 BASIC METABOLIC PNL TOTAL CA: CPT | Performed by: INTERNAL MEDICINE

## 2025-05-07 PROCEDURE — C1726 CATH, BAL DIL, NON-VASCULAR: HCPCS

## 2025-05-07 PROCEDURE — 94668 MNPJ CHEST WALL SBSQ: CPT

## 2025-05-07 PROCEDURE — 85027 COMPLETE CBC AUTOMATED: CPT | Performed by: INTERNAL MEDICINE

## 2025-05-07 PROCEDURE — 99232 SBSQ HOSP IP/OBS MODERATE 35: CPT | Performed by: INTERNAL MEDICINE

## 2025-05-07 PROCEDURE — 88305 TISSUE EXAM BY PATHOLOGIST: CPT | Performed by: SPECIALIST

## 2025-05-07 PROCEDURE — 0D748ZZ DILATION OF ESOPHAGOGASTRIC JUNCTION, VIA NATURAL OR ARTIFICIAL OPENING ENDOSCOPIC: ICD-10-PCS | Performed by: STUDENT IN AN ORGANIZED HEALTH CARE EDUCATION/TRAINING PROGRAM

## 2025-05-07 PROCEDURE — 43249 ESOPH EGD DILATION <30 MM: CPT | Performed by: STUDENT IN AN ORGANIZED HEALTH CARE EDUCATION/TRAINING PROGRAM

## 2025-05-07 PROCEDURE — 92526 ORAL FUNCTION THERAPY: CPT

## 2025-05-07 RX ORDER — SODIUM CHLORIDE, SODIUM LACTATE, POTASSIUM CHLORIDE, CALCIUM CHLORIDE 600; 310; 30; 20 MG/100ML; MG/100ML; MG/100ML; MG/100ML
125 INJECTION, SOLUTION INTRAVENOUS CONTINUOUS
Status: DISCONTINUED | OUTPATIENT
Start: 2025-05-07 | End: 2025-05-07

## 2025-05-07 RX ORDER — METHYLPREDNISOLONE SODIUM SUCCINATE 40 MG/ML
40 INJECTION, POWDER, LYOPHILIZED, FOR SOLUTION INTRAMUSCULAR; INTRAVENOUS DAILY
Status: DISCONTINUED | OUTPATIENT
Start: 2025-05-08 | End: 2025-05-09 | Stop reason: HOSPADM

## 2025-05-07 RX ORDER — LIDOCAINE HYDROCHLORIDE 10 MG/ML
INJECTION, SOLUTION EPIDURAL; INFILTRATION; INTRACAUDAL; PERINEURAL AS NEEDED
Status: DISCONTINUED | OUTPATIENT
Start: 2025-05-07 | End: 2025-05-07

## 2025-05-07 RX ORDER — PROPOFOL 10 MG/ML
INJECTION, EMULSION INTRAVENOUS AS NEEDED
Status: DISCONTINUED | OUTPATIENT
Start: 2025-05-07 | End: 2025-05-07

## 2025-05-07 RX ADMIN — PROPOFOL 50 MG: 10 INJECTION, EMULSION INTRAVENOUS at 11:03

## 2025-05-07 RX ADMIN — SODIUM CHLORIDE, PRESERVATIVE FREE 3 ML: 5 INJECTION INTRAVENOUS at 21:29

## 2025-05-07 RX ADMIN — TAMSULOSIN HYDROCHLORIDE 0.4 MG: 0.4 CAPSULE ORAL at 18:24

## 2025-05-07 RX ADMIN — INSULIN LISPRO 5 UNITS: 100 INJECTION, SOLUTION INTRAVENOUS; SUBCUTANEOUS at 18:23

## 2025-05-07 RX ADMIN — TOBRAMYCIN 2 DROP: 3 SOLUTION/ DROPS OPHTHALMIC at 21:29

## 2025-05-07 RX ADMIN — TOBRAMYCIN 2 DROP: 3 SOLUTION/ DROPS OPHTHALMIC at 14:22

## 2025-05-07 RX ADMIN — HEPARIN SODIUM 5000 UNITS: 5000 INJECTION INTRAVENOUS; SUBCUTANEOUS at 14:22

## 2025-05-07 RX ADMIN — METOPROLOL SUCCINATE 50 MG: 50 TABLET, EXTENDED RELEASE ORAL at 08:50

## 2025-05-07 RX ADMIN — AMLODIPINE BESYLATE 10 MG: 2.5 TABLET ORAL at 08:50

## 2025-05-07 RX ADMIN — INSULIN GLARGINE 35 UNITS: 100 INJECTION, SOLUTION SUBCUTANEOUS at 08:49

## 2025-05-07 RX ADMIN — INSULIN LISPRO 5 UNITS: 100 INJECTION, SOLUTION INTRAVENOUS; SUBCUTANEOUS at 08:51

## 2025-05-07 RX ADMIN — PROPOFOL 20 MG: 10 INJECTION, EMULSION INTRAVENOUS at 11:05

## 2025-05-07 RX ADMIN — LEVALBUTEROL HYDROCHLORIDE 1.25 MG: 1.25 SOLUTION RESPIRATORY (INHALATION) at 19:50

## 2025-05-07 RX ADMIN — LEVALBUTEROL HYDROCHLORIDE 1.25 MG: 1.25 SOLUTION RESPIRATORY (INHALATION) at 13:34

## 2025-05-07 RX ADMIN — TOBRAMYCIN 2 DROP: 3 SOLUTION/ DROPS OPHTHALMIC at 12:18

## 2025-05-07 RX ADMIN — IPRATROPIUM BROMIDE 0.5 MG: 0.5 SOLUTION RESPIRATORY (INHALATION) at 08:00

## 2025-05-07 RX ADMIN — METHYLPREDNISOLONE SODIUM SUCCINATE 40 MG: 40 INJECTION, POWDER, FOR SOLUTION INTRAMUSCULAR; INTRAVENOUS at 08:55

## 2025-05-07 RX ADMIN — HEPARIN SODIUM 5000 UNITS: 5000 INJECTION INTRAVENOUS; SUBCUTANEOUS at 21:29

## 2025-05-07 RX ADMIN — DEXTRAN 70, GLYCERIN, HYPROMELLOSE 1 DROP: 1; 2; 3 SOLUTION/ DROPS OPHTHALMIC at 08:51

## 2025-05-07 RX ADMIN — INSULIN LISPRO 2 UNITS: 100 INJECTION, SOLUTION INTRAVENOUS; SUBCUTANEOUS at 08:51

## 2025-05-07 RX ADMIN — HEPARIN SODIUM 5000 UNITS: 5000 INJECTION INTRAVENOUS; SUBCUTANEOUS at 06:31

## 2025-05-07 RX ADMIN — DEXTRAN 70, GLYCERIN, HYPROMELLOSE 1 DROP: 1; 2; 3 SOLUTION/ DROPS OPHTHALMIC at 18:22

## 2025-05-07 RX ADMIN — INSULIN GLARGINE 35 UNITS: 100 INJECTION, SOLUTION SUBCUTANEOUS at 21:30

## 2025-05-07 RX ADMIN — SODIUM CHLORIDE, PRESERVATIVE FREE 3 ML: 5 INJECTION INTRAVENOUS at 14:23

## 2025-05-07 RX ADMIN — ASPIRIN 81 MG: 81 TABLET, COATED ORAL at 08:50

## 2025-05-07 RX ADMIN — IPRATROPIUM BROMIDE 0.5 MG: 0.5 SOLUTION RESPIRATORY (INHALATION) at 19:50

## 2025-05-07 RX ADMIN — FINASTERIDE 5 MG: 5 TABLET, FILM COATED ORAL at 08:50

## 2025-05-07 RX ADMIN — TOBRAMYCIN 2 DROP: 3 SOLUTION/ DROPS OPHTHALMIC at 06:32

## 2025-05-07 RX ADMIN — FLUTICASONE PROPIONATE 1 SPRAY: 50 SPRAY, METERED NASAL at 08:51

## 2025-05-07 RX ADMIN — SODIUM CHLORIDE, SODIUM GLUCONATE, SODIUM ACETATE, POTASSIUM CHLORIDE, MAGNESIUM CHLORIDE, SODIUM PHOSPHATE, DIBASIC, AND POTASSIUM PHOSPHATE 50 ML/HR: .53; .5; .37; .037; .03; .012; .00082 INJECTION, SOLUTION INTRAVENOUS at 20:44

## 2025-05-07 RX ADMIN — INSULIN LISPRO 2 UNITS: 100 INJECTION, SOLUTION INTRAVENOUS; SUBCUTANEOUS at 18:24

## 2025-05-07 RX ADMIN — INSULIN LISPRO 4 UNITS: 100 INJECTION, SOLUTION INTRAVENOUS; SUBCUTANEOUS at 21:29

## 2025-05-07 RX ADMIN — TOBRAMYCIN 2 DROP: 3 SOLUTION/ DROPS OPHTHALMIC at 18:24

## 2025-05-07 RX ADMIN — LEVALBUTEROL HYDROCHLORIDE 1.25 MG: 1.25 SOLUTION RESPIRATORY (INHALATION) at 08:00

## 2025-05-07 RX ADMIN — INSULIN LISPRO 5 UNITS: 100 INJECTION, SOLUTION INTRAVENOUS; SUBCUTANEOUS at 12:17

## 2025-05-07 RX ADMIN — PANTOPRAZOLE SODIUM 40 MG: 40 TABLET, DELAYED RELEASE ORAL at 08:50

## 2025-05-07 RX ADMIN — IPRATROPIUM BROMIDE 0.5 MG: 0.5 SOLUTION RESPIRATORY (INHALATION) at 13:34

## 2025-05-07 RX ADMIN — ATORVASTATIN CALCIUM 80 MG: 40 TABLET, FILM COATED ORAL at 21:29

## 2025-05-07 RX ADMIN — ISOSORBIDE MONONITRATE 60 MG: 30 TABLET, EXTENDED RELEASE ORAL at 08:50

## 2025-05-07 RX ADMIN — SODIUM CHLORIDE, PRESERVATIVE FREE 3 ML: 5 INJECTION INTRAVENOUS at 06:32

## 2025-05-07 RX ADMIN — LIDOCAINE HYDROCHLORIDE 100 MG: 10 INJECTION, SOLUTION EPIDURAL; INFILTRATION; INTRACAUDAL at 11:02

## 2025-05-07 NOTE — PLAN OF CARE
Problem: PAIN - ADULT  Goal: Verbalizes/displays adequate comfort level or baseline comfort level  Description: Interventions:- Encourage patient to monitor pain and request assistance- Assess pain using appropriate pain scale- Administer analgesics based on type and severity of pain and evaluate response- Implement non-pharmacological measures as appropriate and evaluate response- Consider cultural and social influences on pain and pain management- Notify physician/advanced practitioner if interventions unsuccessful or patient reports new pain  Outcome: Progressing     Problem: INFECTION - ADULT  Goal: Absence or prevention of progression during hospitalization  Description: INTERVENTIONS:- Assess and monitor for signs and symptoms of infection- Monitor lab/diagnostic results- Monitor all insertion sites, i.e. indwelling lines, tubes, and drains- Monitor endotracheal if appropriate and nasal secretions for changes in amount and color- [place] appropriate cooling/warming therapies per order- Administer medications as ordered- Instruct and encourage patient and family to use good hand hygiene technique- Identify and instruct in appropriate isolation precautions for identified infection/condition  Outcome: Progressing  Goal: Absence of fever/infection during neutropenic period  Description: INTERVENTIONS:- Monitor WBC  Outcome: Progressing

## 2025-05-07 NOTE — ASSESSMENT & PLAN NOTE
Baseline creatinine between 1.0 and 1.25, currently 1.65  Avoid nephrotoxins and hypotension  UA is unremarkable  Creatinine this morning is 1.08  On intravenous Isolyte and trend renal function  Avoid hypotension/nephrotoxic medication    Lab Results   Component Value Date    EGFR 60 05/07/2025    EGFR 47 05/06/2025    EGFR 42 05/06/2025    CREATININE 1.08 05/07/2025    CREATININE 1.33 (H) 05/06/2025    CREATININE 1.44 (H) 05/06/2025

## 2025-05-07 NOTE — ASSESSMENT & PLAN NOTE
Kyler is an 89-year-old male with past medical history of coronary artery disease, COPD (oxygen dependent 3 L), hypertension, hyperlipidemia, diabetes,and obstructive sleep apnea.  He presents to the emergency department from [facility] for shortness of breath and productive cough worsening over the last few days.  He was recently hospitalized between April 7 to 14 for COPD exacerbation and, per the [facility] staff, patient never fully returned to baseline.  The emergency department he was given nebulizers, steroids, and antibiotics and will be admitted for further treatment.    Procalcitonin is normal  Lactic acid negative, patient afebrile and without leukocytosis  CT chest shows:  Nodular opacities in the bilateral lungs and surrounding the inferior right kevin as described are most consistent with an infectious/inflammatory infiltrate considering clinical history.   Proximal left lower lobar bronchial occlusion with near complete left lower lobe atelectasis.  Right lower lobe bronchial wall thickening with segmental occlusions.  Likely aspiration-related consideration mid esophageal dilation with debris.  No obvious obstructing mass, consider bronchoscopy for further evaluation.  Pulmonary consulted, appreciate the assistance  Xopenex/Atrovent nebulizers, hold Trelegy for now  Solu-Medrol 40 mg every 12 hours.  Tapered to daily  Currently on 4 L nasal cannula, wean to baseline as tolerated  Procalcitonin 0.15, 0.12 ceftriaxone given in the emergency room.    Hold off on any further antibiotics

## 2025-05-07 NOTE — ASSESSMENT & PLAN NOTE
Of unknown severity  Likely secondary to recent influenza infection-previously treated with prednisone  Home medication regime: Trelegy DuoNebs, albuterol nebulizer and albuterol rescue inhaler  WBC 7.11-10.70-11.88  Procalcitonin negative x 3  Currently on Solu-Medrol every 12 hours can decrease to daily starting today with anticipation of discharging on prednisone taper  Continue Atrovent and Xopenex nebulizers  Will need outpatient follow-up in formal PFTs

## 2025-05-07 NOTE — PLAN OF CARE
Problem: PAIN - ADULT  Goal: Verbalizes/displays adequate comfort level or baseline comfort level  Description: Interventions:- Encourage patient to monitor pain and request assistance- Assess pain using appropriate pain scale- Administer analgesics based on type and severity of pain and evaluate response- Implement non-pharmacological measures as appropriate and evaluate response- Consider cultural and social influences on pain and pain management- Notify physician/advanced practitioner if interventions unsuccessful or patient reports new pain  Outcome: Progressing     Problem: INFECTION - ADULT  Goal: Absence or prevention of progression during hospitalization  Description: INTERVENTIONS:- Assess and monitor for signs and symptoms of infection- Monitor lab/diagnostic results- Monitor all insertion sites, i.e. indwelling lines, tubes, and drains- Monitor endotracheal if appropriate and nasal secretions for changes in amount and color- [place] appropriate cooling/warming therapies per order- Administer medications as ordered- Instruct and encourage patient and family to use good hand hygiene technique- Identify and instruct in appropriate isolation precautions for identified infection/condition  Outcome: Progressing

## 2025-05-07 NOTE — ASSESSMENT & PLAN NOTE
Chest CT shows bibasilar atelectasis, suspect mucous plugging.  He is having difficulty expectorating his secretions.  Would rule out aspiration.    Continue vest therapy and flutter device to help with sputum expectoration.  Obtain speech therapy evaluation.  No evidence of acute infectious process.  Continue to observe off antibiotics.

## 2025-05-07 NOTE — ASSESSMENT & PLAN NOTE
Hold oral antidiabetics  Sliding scale insulin, add 5 units with meals  Continue twice daily Lantus  Carbohydrate restrictive diet  Hypoglycemia protocol    Lab Results   Component Value Date    HGBA1C 8.9 (H) 04/08/2025       Recent Labs     05/06/25  1607 05/06/25  2040 05/07/25  0719 05/07/25  1159   POCGLU 279* 220* 169* 110       Blood Sugar Average: Last 72 hrs:  (P) [id number]

## 2025-05-07 NOTE — ASSESSMENT & PLAN NOTE
GI consult appreciated  GI is recommended endoscopy  Patient is scheduled for endoscopy today  Continue on Protonix

## 2025-05-07 NOTE — ANESTHESIA POSTPROCEDURE EVALUATION
Post-Op Assessment Note    CV Status:  Stable  Pain Score: 0    Pain management: adequate       Mental Status:  Sleepy   Hydration Status:  Euvolemic   PONV Controlled:  Controlled   Airway Patency:  Patent     Post Op Vitals Reviewed: Yes    No anethesia notable event occurred.    Staff: CRNA       Last Filed PACU Vitals:  Vitals Value Taken Time   Temp     Pulse 75 05/07/25 1118   /73 05/07/25 1118   Resp 16 05/07/25 1118   SpO2 96 % 05/07/25 1118       Modified Rafael:     Vitals Value Taken Time   Activity 1 05/07/25 1118   Respiration 1 05/07/25 1118   Circulation 1 05/07/25 1118   Consciousness 1 05/07/25 1118   Oxygen Saturation 1 05/07/25 1118     Modified Rafael Score: 5

## 2025-05-07 NOTE — PROGRESS NOTES
Progress Note - Hospitalist   Name: Kyler Hernandez 89 y.o. male I MRN: [id number]  Unit/Bed#: -01 I Date of Admission: 5/4/2025   Date of Service: 5/7/2025 I Hospital Day: 3    Assessment & Plan  COPD with acute exacerbation (HCC)  Kyler is an 89-year-old male with past medical history of coronary artery disease, COPD (oxygen dependent 3 L), hypertension, hyperlipidemia, diabetes,and obstructive sleep apnea.  He presents to the emergency department from [facility] for shortness of breath and productive cough worsening over the last few days.  He was recently hospitalized between April 7 to 14 for COPD exacerbation and, per the [facility] staff, patient never fully returned to baseline.  The emergency department he was given nebulizers, steroids, and antibiotics and will be admitted for further treatment.    Procalcitonin is normal  Lactic acid negative, patient afebrile and without leukocytosis  CT chest shows:  Nodular opacities in the bilateral lungs and surrounding the inferior right kevin as described are most consistent with an infectious/inflammatory infiltrate considering clinical history.   Proximal left lower lobar bronchial occlusion with near complete left lower lobe atelectasis.  Right lower lobe bronchial wall thickening with segmental occlusions.  Likely aspiration-related consideration mid esophageal dilation with debris.  No obvious obstructing mass, consider bronchoscopy for further evaluation.  Pulmonary consulted, appreciate the assistance  Xopenex/Atrovent nebulizers, hold Trelegy for now  Solu-Medrol 40 mg every 12 hours.  Tapered to daily  Currently on 4 L nasal cannula, wean to baseline as tolerated  Procalcitonin 0.15, 0.12 ceftriaxone given in the emergency room.    Hold off on any further antibiotics  Acute kidney injury superimposed on stage 3a chronic kidney disease (HCC)  Baseline creatinine between 1.0 and 1.25, currently 1.65  Avoid nephrotoxins and  hypotension  UA is unremarkable  Creatinine this morning is 1.08  On intravenous Isolyte and trend renal function  Avoid hypotension/nephrotoxic medication    Lab Results   Component Value Date    EGFR 60 05/07/2025    EGFR 47 05/06/2025    EGFR 42 05/06/2025    CREATININE 1.08 05/07/2025    CREATININE 1.33 (H) 05/06/2025    CREATININE 1.44 (H) 05/06/2025     Coronary artery disease involving native coronary artery of native heart without angina pectoris  No active chest pain  Continue aspirin/statin/isosorbide  Primary hypertension  Blood pressure stable  Continue: Norvasc  Routine vital signs  LINCOLN (obstructive sleep apnea)  Continue CPAP  Mixed hyperlipidemia  Continue statin  Type 2 diabetes mellitus with hyperglycemia, with long-term current use of insulin ([facility])  Hold oral antidiabetics  Sliding scale insulin, add 5 units with meals  Continue twice daily Lantus  Carbohydrate restrictive diet  Hypoglycemia protocol    Lab Results   Component Value Date    HGBA1C 8.9 (H) 04/08/2025       Recent Labs     05/06/25  1607 05/06/25  2040 05/07/25  0719 05/07/25  1159   POCGLU 279* 220* 169* 110       Blood Sugar Average: Last 72 hrs:  (P) [id number]    Benign prostatic hyperplasia without lower urinary tract symptoms  Continue dose equivalent for alfuzosin (tamsulosin 0.4 mg), and finasteride 5 mg daily  Acute bacterial conjunctivitis of both eyes  Patient complains of eye crust recently  Tobramycin drops started in the emergency room  Continue  Acute on chronic respiratory failure with hypoxia (HCC)  Patient admitted with acute on chronic respiratory failure with hypoxia POA due to COPD exacerbation.  Patient was discharged on 3 L of supplemental oxygen on 4/14/2025  Currently requiring 5 L supplemental oxygen  Wean oxygen as tolerated  See above  Atelectasis, bilateral    Other dysphagia  GI consult appreciated  GI is recommended endoscopy  Patient is scheduled for endoscopy today  Continue on Protonix  PAC  "(premature atrial contraction)  Cardiology consult appreciated  As per cardiology patient with some blocked PACs but otherwise is in sinus rhythm with normal rates with occasional PACs and PVCs  No further workup needed    Labs & Imaging: Results Review Statement: No pertinent imaging studies reviewed.    VTE Prophylaxis: in place.    Code Status:   Level 1 - Full Code    Patient Centered Rounds: I have performed bedside rounds with nursing staff today.    Mobility:   Basic Mobility Inpatient Raw Score: 19  JH-HLM Goal: 6: Walk 10 steps or more  JH-HLM Achieved: 6: Walk 10 steps or more (PT ambulated around room)  JH-HLM Goal NOT achieved. Continue with multidisciplinary rounding and encourage appropriate mobility to improve upon JH-HLM goals.    Discussions with Specialists or Other Care Team Provider: GI    Education and Discussions with Family / Patient: Declined    Total Time Spent on Date of Encounter in care of patient: 35 mins. This time was spent on one or more of the following: performing physical exam; counseling and coordination of care; obtaining or reviewing history; documenting in the medical record; reviewing/ordering tests, medications or procedures; communicating with other healthcare professionals and discussing with patient's family/caregivers.    Current Length of Stay: 3 day(s)    Current Patient Status: Inpatient   Certification Statement: The patient will continue to require additional inpatient hospital stay due to see my assessment and plan.     Subjective:   Patient is seen and examined at bedside.  No new complaints.  Afebrile  All other ROS are negative.    Objective:    Vitals: Blood pressure 133/76, pulse 77, temperature (!) 97 °F (36.1 °C), temperature source Temporal, resp. rate (!) 24, height 5' 8\" (1.727 m), weight 99.8 kg (220 lb), SpO2 91%.,Body mass index is 33.45 kg/m².  SPO2 RA Rest      Flowsheet Row ED to Hosp-Admission (Current) from 5/4/2025 in [facility] " Med Surg Unit   SpO2 91 %   SpO2 Activity At Rest   O2 Device Nasal cannula   O2 Flow Rate --          I&O:   Intake/Output Summary (Last 24 hours) at 5/7/2025 1209  Last data filed at 5/7/2025 0951  Gross per 24 hour   Intake 222 ml   Output 2380 ml   Net -2158 ml       Physical Exam:    General- Alert, sitting in chair.  Not in any acute distress.  Neck- Supple, No JVD  CVS- regular, S1 and S2 normal  Chest- Bilateral Air entry, decreased at bases  Abdomen- soft, nontender, not distended, no guarding or rigidity, BS+  Extremities-  No pedal edema, No calf tenderness                         Normal ROM in all extremities.  CNS-   Alert, awake and orientedx3. No focal deficits present.    Invasive Devices       Peripheral Intravenous Line  Duration             Peripheral IV 05/04/25 Right Antecubital 2 days                          Social History  reviewed  Family History   Problem Relation Age of Onset    Dementia Mother     Diabetes Mother     Peripheral vascular disease Father     Mental illness Neg Hx     Substance Abuse Neg Hx     reviewed    Meds:  Current Facility-Administered Medications   Medication Dose Route Frequency Provider Last Rate Last Admin    acetaminophen (TYLENOL) tablet 650 mg  650 mg Oral Q6H PRN CONCEPCION Vora        aluminum-magnesium hydroxide-simethicone (MAALOX) oral suspension 30 mL  30 mL Oral Q6H PRN CONCEPCION Vora        amLODIPine (NORVASC) tablet 10 mg  10 mg Oral QAM CONCEPCION Vora   10 mg at 05/07/25 0850    Artificial Tears Op Soln 1 drop  1 drop Both Eyes BID CONCEPCION Vora   1 drop at 05/07/25 0851    aspirin (ECOTRIN LOW STRENGTH) EC tablet 81 mg  81 mg Oral QAM CONCEPCION Vora   81 mg at 05/07/25 0850    atorvastatin (LIPITOR) tablet 80 mg  80 mg Oral HS CONCEPCION Vora   80 mg at 05/06/25 2159    dextromethorphan-guaiFENesin (ROBITUSSIN DM) oral syrup 10 mL  10 mL Oral Q4H PRN Howard  CONCEPCION Hassan   10 mL at 05/06/25 0502    finasteride (PROSCAR) tablet 5 mg  5 mg Oral Daily CONCEPCION Vora   5 mg at 05/07/25 0850    fluticasone (FLONASE) 50 mcg/act nasal spray 1 spray  1 spray Each Nare Daily CONCEPCION Vora   1 spray at 05/07/25 0851    heparin (porcine) subcutaneous injection 5,000 Units  5,000 Units Subcutaneous Q8H [facility] CONCEPCION Vora   5,000 Units at 05/07/25 0631    insulin glargine (LANTUS) subcutaneous injection 35 Units 0.35 mL  35 Units Subcutaneous Q12H [facility] CONCEPCION Vora   35 Units at 05/07/25 0849    insulin lispro (HumALOG/ADMELOG) 100 units/mL subcutaneous injection 2-12 Units  2-12 Units Subcutaneous TID AC Lit Gottlieb MD   2 Units at 05/07/25 0851    insulin lispro (HumALOG/ADMELOG) 100 units/mL subcutaneous injection 4-20 Units  4-20 Units Subcutaneous HS Lit Gottlieb MD   8 Units at 05/06/25 2200    insulin lispro (HumALOG/ADMELOG) 100 units/mL subcutaneous injection 5 Units  5 Units Subcutaneous TID With Meals CONCEPCION Vora   5 Units at 05/07/25 0851    ipratropium (ATROVENT) 0.02 % inhalation solution 0.5 mg  0.5 mg Nebulization TID CONCEPCION Vora   0.5 mg at 05/07/25 0800    isosorbide mononitrate (IMDUR) 24 hr tablet 60 mg  60 mg Oral QAM CONCEPCION Vora   60 mg at 05/07/25 0850    lactated ringers infusion  125 mL/hr Intravenous Continuous Karen Abebe PA-C        levalbuterol (XOPENEX) inhalation solution 1.25 mg  1.25 mg Nebulization TID CONCEPCION Vora   1.25 mg at 05/07/25 0800    [START ON 5/8/2025] methylPREDNISolone sodium succinate (Solu-MEDROL) injection 40 mg  40 mg Intravenous Daily CONCEPCION Almonte        metoprolol succinate (TOPROL-XL) 24 hr tablet 50 mg  50 mg Oral Daily CONCEPCION Monroe   50 mg at 05/07/25 0850    multi-electrolyte (Plasmalyte-A/Isolyte-S PH 7.4/Normosol-R) IV solution  50 mL/hr  Intravenous Continuous Lit Gottlieb MD 50 mL/hr at 05/06/25 1925 50 mL/hr at 05/06/25 1925    pantoprazole (PROTONIX) EC tablet 40 mg  40 mg Oral QAM CONCEPCION Vora   40 mg at 05/07/25 0850    polyethylene glycol (MIRALAX) packet 17 g  17 g Oral Daily PRN CONCEPCION Vora        sodium chloride (PF) 0.9 % injection 3 mL  3 mL Intravenous Q8H [facility] Torrey Yuen DO   3 mL at 05/07/25 0632    tamsulosin (FLOMAX) capsule 0.4 mg  0.4 mg Oral Daily With Dinner CONCEPCION Vora   0.4 mg at 05/06/25 1620    tobramycin (TOBREX) 0.3 % ophthalmic solution 2 drop  2 drop Both Eyes Q4H While Awake Torrey Yuen, DO   2 drop at 05/07/25 0632        Medications Prior to Admission:     acetaminophen (TYLENOL) 325 mg tablet    albuterol (2.5 mg/3 mL) 0.083 % nebulizer solution    alfuzosin (UROXATRAL) 10 mg 24 hr tablet    amLODIPine (NORVASC) 10 mg tablet    aspirin (Aspirin Low Dose) 81 mg EC tablet    atenolol (TENORMIN) 50 mg tablet    atorvastatin (LIPITOR) 80 mg tablet    Cholecalciferol (Vitamin D3) 25 MCG (1000 UT) CAPS    Cyanocobalamin (Vitamin B-12) 2500 MCG SUBL    isosorbide mononitrate (IMDUR) 60 mg 24 hr tablet    losartan (COZAAR) 100 MG tablet    Multiple Vitamin (Daily-Hjonatan) TABS    Multiple Vitamin (MULTIVITAMIN ADULT PO)    pantoprazole (PROTONIX) 40 mg tablet    repaglinide (PRANDIN) 0.5 mg tablet    torsemide (DEMADEX) 20 mg tablet    Alcohol Swabs (Alcohol Prep Pad) 70 % PADS    Continuous Glucose  (Dexcom G7 ) ZAYNAB    Continuous Glucose Sensor (Dexcom G7 Sensor)    Empagliflozin (Jardiance) 10 MG TABS tablet    finasteride (PROSCAR) 5 mg tablet    fluticasone (FLONASE) 50 mcg/act nasal spray    Insulin Glargine Solostar (Lantus SoloStar) 100 UNIT/ML SOPN    insulin isophane (HumuLIN N KwikPen) 100 units/mL injection pen    insulin lispro (HumaLOG KwikPen) 100 units/mL injection pen    Insulin Pen Needle (BD AutoShield Duo) 30G X 5 MM MISC     "ipratropium-albuterol (DUO-NEB) 0.5-2.5 mg/3 mL nebulizer solution    loperamide (IMODIUM) 2 mg capsule    NovoFine Autocover Pen Needle 30G X 8 MM MISC    Nutritional Supplements (Ensure)    nystatin (MYCOSTATIN) powder    Trelegy Ellipta 200-62.5-25 MCG/ACT AEPB inhaler    valACYclovir (VALTREX) 1,000 mg tablet    Labs:  Results from last 7 days   Lab Units 05/07/25  0454 05/06/25  0450 05/06/25  0037 05/05/25  0502 05/04/25  1627   WBC Thousand/uL 11.88* 10.70* 9.55 7.11 7.72   HEMOGLOBIN g/dL 12.0 11.5* 11.0* 11.4* 11.2*   HEMATOCRIT % 37.1 35.6* 34.3* 34.9* 35.2*   PLATELETS Thousands/uL 221 196 197 168 168   SEGS PCT %  --  87* 87*  --  74   LYMPHO PCT %  --  4* 4* 4* 7*   MONO PCT %  --  7 7 3* 17*   EOS PCT %  --  0 0 0 1     Results from last 7 days   Lab Units 05/07/25  0454 05/06/25  0450 05/06/25 0037 05/05/25 0502 05/04/25  1609   POTASSIUM mmol/L 4.1 3.9 3.7 3.7 4.4   CHLORIDE mmol/L 101 99 97 99 100   CO2 mmol/L 28 27 29 27 31   BUN mg/dL 38* 41* 41* 31* 25   CREATININE mg/dL 1.08 1.33* 1.44* 1.55* 1.65*   CALCIUM mg/dL 8.6 8.5 8.4 8.4 8.5   ALK PHOS U/L  --   --  68 73 74   ALT U/L  --   --  27 24 28   AST U/L  --   --  19 16 36     No results found for: \"TROPONINI\", \"CKMB\", \"CKTOTAL\"  Results from last 7 days   Lab Units 05/04/25  1704   INR  1.15     Lab Results   Component Value Date    BLOODCX No Growth at 48 hrs. 05/04/2025    BLOODCX No Growth at 48 hrs. 05/04/2025    BLOODCX No Growth After 5 Days. 04/07/2025    BLOODCX No Growth After 5 Days. 04/07/2025    URINECX No Growth <1000 cfu/mL 12/22/2024    SPUTUMCULTUR Test not performed. Suggest repeat specimen. 04/08/2025         Imaging:  Results for orders placed during the hospital encounter of 05/04/25    XR chest portable    Narrative  XR CHEST PORTABLE    INDICATION: sob cough.    COMPARISON: CXR 4/10/2025, chest CT 5/4/2025.    FINDINGS:    Left lower lobe atelectasis, better shown on subsequent chest CT. Mild pulmonary venous " congestion. No pneumothorax or pleural effusion.    Mild cardiomegaly.    Bones are unremarkable for age.    Normal upper abdomen.    Impression  Mild pulmonary venous congestion.    Left lower lobe atelectasis, better shown on subsequent chest CT.        Workstation performed: [id number]    No results found for this or any previous visit.      Last 24 Hours Medication List:   Current Facility-Administered Medications   Medication Dose Route Frequency Provider Last Rate    acetaminophen  650 mg Oral Q6H PRN CONCEPCION Vora      aluminum-magnesium hydroxide-simethicone  30 mL Oral Q6H PRN CONCEPCION Vora      amLODIPine  10 mg Oral QAM CONCEPCION Vora      Artificial Tears  1 drop Both Eyes BID CONCEPCION Vora      aspirin  81 mg Oral QAM CONCEPCION Vora      atorvastatin  80 mg Oral HS CONCEPCION Vora      dextromethorphan-guaiFENesin  10 mL Oral Q4H PRN CONCEPCION Vora      finasteride  5 mg Oral Daily CONCEPCION Vora      fluticasone  1 spray Each Nare Daily CONCEPCION Vora      heparin (porcine)  5,000 Units Subcutaneous Q8H MOLINA CONCEPCION Vora      insulin glargine  35 Units Subcutaneous Q12H [facility] CONCEPCION Vora      insulin lispro  2-12 Units Subcutaneous TID AC Lit Gottlieb MD      insulin lispro  4-20 Units Subcutaneous HS Lit Gottlieb MD      insulin lispro  5 Units Subcutaneous TID With Meals CONCEPCION Vora      ipratropium  0.5 mg Nebulization TID CONCEPCION Vora      isosorbide mononitrate  60 mg Oral QAM CONCEPCION Vora      lactated ringers  125 mL/hr Intravenous Continuous Karen Abebe PA-C      levalbuterol  1.25 mg Nebulization TID CONCEPCION Vora      [START ON 5/8/2025] methylPREDNISolone sodium succinate  40 mg Intravenous Daily CONCEPCION Almonte      metoprolol succinate   50 mg Oral Daily CONCEPCION Monroe      multi-electrolyte  50 mL/hr Intravenous Continuous Lit Gottlieb MD 50 mL/hr (05/06/25 1925)    pantoprazole  40 mg Oral QAM CONCEPCION Vora      polyethylene glycol  17 g Oral Daily PRN CONCEPCION Vora      sodium chloride (PF)  3 mL Intravenous Q8H MOLINA Torrey Yuen DO      tamsulosin  0.4 mg Oral Daily With Dinner CONCEPCION Vora      tobramycin  2 drop Both Eyes Q4H While Awake Torrey Yuen DO          Today, Patient Was Seen By: Lit Gottlieb MD    ** Please Note: Dictation voice to text software may have been used in the creation of this document. **

## 2025-05-07 NOTE — ANESTHESIA PREPROCEDURE EVALUATION
Procedure:  EGD    Relevant Problems   CARDIO   (+) Coronary artery disease involving native coronary artery of native heart without angina pectoris   (+) Mixed hyperlipidemia   (+) PAC (premature atrial contraction)   (+) Primary hypertension   (+) Pulmonary hypertension (HCC)      ENDO   (+) Type 2 diabetes mellitus with hyperglycemia, with long-term current use of insulin (HCC)      GI/HEPATIC   (+) Other dysphagia      /RENAL   (+) Acute kidney injury superimposed on stage 3a chronic kidney disease (HCC)   (+) Benign prostatic hyperplasia without lower urinary tract symptoms   (+) Stage 3a chronic kidney disease (HCC)      PULMONARY   (+) Acute on chronic respiratory failure with hypoxia (HCC)   (+) COPD with acute exacerbation (HCC)   (+) Chronic hypoxic respiratory failure (HCC)   (+) LINCOLN (obstructive sleep apnea)        Physical Exam    Airway    Mallampati score: II  TM Distance: >3 FB  Neck ROM: full     Dental       Cardiovascular  Cardiovascular exam normal    Pulmonary  Pulmonary exam normal     Other Findings        Anesthesia Plan  ASA Score- 4     Anesthesia Type- IV sedation with anesthesia with ASA Monitors.         Additional Monitors:     Airway Plan:            Plan Factors-Exercise tolerance (METS): >4 METS.    Chart reviewed. EKG reviewed. Imaging results reviewed. Existing labs reviewed. Patient summary reviewed.                  Induction- intravenous.    Postoperative Plan-     Perioperative Resuscitation Plan - Level 1 - Full Code.       Informed Consent- Anesthetic plan and risks discussed with patient.  I personally reviewed this patient with the CRNA. Discussed and agreed on the Anesthesia Plan with the CRNA..      NPO Status:  Vitals Value Taken Time   Date of last liquid 05/06/25 05/07/25 1012   Time of last liquid     Date of last solid 05/06/25 05/07/25 1012   Time of last solid

## 2025-05-07 NOTE — CASE MANAGEMENT
Case Management Discharge Planning Note    Patient name Kyler Hernandez  Location /-01 MRN [id number]  : 1935 Date 2025       Current Admission Date: 2025  Current Admission Diagnosis:COPD with acute exacerbation (HCC)   Patient Active Problem List    Diagnosis Date Noted Date Diagnosed    PAC (premature atrial contraction) 2025     Atelectasis, bilateral 2025     Other dysphagia 2025     Acute kidney injury superimposed on stage 3a chronic kidney disease (HCC) 2025     Benign prostatic hyperplasia without lower urinary tract symptoms 2025     Acute bacterial conjunctivitis of both eyes 2025     Chronic hypoxic respiratory failure (HCC) 2025     Stage 3a chronic kidney disease (HCC) 04/15/2025     Acute on chronic respiratory failure with hypoxia (HCC) 2025     Type 2 diabetes mellitus with hyperglycemia, with long-term current use of insulin (HCC) 2025     Pulmonary hypertension ([facility]) 2024     Coronary artery disease involving native coronary artery of native heart without angina pectoris 05/10/2024     Primary hypertension 05/10/2024     Benign prostatic hyperplasia with nocturia 05/10/2024     COPD with acute exacerbation (HCC) 05/10/2024     LINCOLN (obstructive sleep apnea) 05/10/2024     B12 deficiency 05/10/2024     Vitamin D deficiency 05/10/2024     Mixed hyperlipidemia 05/10/2024       LOS (days): 3  Geometric Mean LOS (GMLOS) (days): 3.5  Days to GMLOS:0.9     OBJECTIVE:  Risk of Unplanned Readmission Score: 19.95         Current admission status: Inpatient   Preferred Pharmacy:   [id number] - [place], [place] - [address] [address]  Phone: [phone] Fax: [phone]    [facility] - [place], [place] - [address] [address]  Phone: [phone] Fax: [phone]    [place], [place] - [address]  [address]  [address]  [facility] [address]  Phone: [phone] Fax: [phone]    Primary Care Provider: Gordon Álvarez MD    Primary Insurance: LING  REP  Secondary Insurance:     DISCHARGE DETAILS:          IMM Given (Date):: 05/07/25  IMM Given to:: Patient  IMM reviewed with patient, patient agrees with discharge determination.

## 2025-05-07 NOTE — RESPIRATORY THERAPY NOTE
PT performing flutter independently. Vest TX held. States flutter is more effective than vest TX. States vest hurts chest, increased SOB. States he has a productive cough

## 2025-05-07 NOTE — PROGRESS NOTES
Progress Note - Pulmonology   Name: Kyler Hernandez 89 y.o. male I MRN: [id number]  Unit/Bed#: -01 I Date of Admission: 5/4/2025   Date of Service: 5/7/2025 I Hospital Day: 3    Assessment & Plan  COPD with acute exacerbation (HCC)  Of unknown severity  Likely secondary to recent influenza infection-previously treated with prednisone  Home medication regime: Trelegy DuoNebs, albuterol nebulizer and albuterol rescue inhaler  WBC 7.11-10.70-11.88  Procalcitonin negative x 3  Currently on Solu-Medrol every 12 hours can decrease to daily starting today with anticipation of discharging on prednisone taper  Continue Atrovent and Xopenex nebulizers  Will need outpatient follow-up in formal PFTs  Acute on chronic respiratory failure with hypoxia (HCC)  93% on 4 L NC, baseline oxygen needs 2-3 L/min.    Titrate to maintain saturations above 88%  Atelectasis, bilateral  Chest CT shows bibasilar atelectasis, suspect mucous plugging.  He is having difficulty expectorating his secretions.  Would rule out aspiration.    Continue vest therapy and flutter device to help with sputum expectoration.  Obtain speech therapy evaluation.  No evidence of acute infectious process.  Continue to observe off antibiotics.    Other dysphagia  Speech following recommended regular diet and thin liquids    24 Hour Events : No overnight events  Subjective : Patient seen and examined at bedside.  Found resting comfortably in bed.  Currently n.p.o. for EGD later on today.  Denies any fever chills night sweats chest pain or hemoptysis.  Saturation stable on 4 L    Objective :  Temp:  [97.5 °F (36.4 °C)-97.7 °F (36.5 °C)] 97.7 °F (36.5 °C)  HR:  [56-74] 56  BP: (129-157)/(64-93) 157/93  Resp:  [17-20] 18  SpO2:  [87 %-97 %] 94 %  O2 Device: Nasal cannula  Nasal Cannula O2 Flow Rate (L/min):  [5 L/min] 5 L/min    Physical Exam  Constitutional:       General: He is not in acute distress.     Appearance: He is obese. He is not ill-appearing.   HENT:       Head: Normocephalic and atraumatic.      Left Ear: External ear normal.      Nose: Nose normal.      Mouth/Throat:      Mouth: Mucous membranes are moist.      Pharynx: Oropharynx is clear.   Eyes:      Extraocular Movements: Extraocular movements intact.      Pupils: Pupils are equal, round, and reactive to light.   Cardiovascular:      Rate and Rhythm: Normal rate and regular rhythm.      Pulses: Normal pulses.      Heart sounds: Normal heart sounds.   Pulmonary:      Effort: Pulmonary effort is normal.      Breath sounds: Wheezing present.      Comments: A few scattered wheezes otherwise CTA  Abdominal:      General: Bowel sounds are normal. There is distension.   Musculoskeletal:         General: Normal range of motion.      Cervical back: Normal range of motion and neck supple.      Right lower leg: Edema present.      Left lower leg: Edema present.      Comments: Trace edema in bilateral lower ext   Skin:     General: Skin is warm and dry.   Neurological:      Mental Status: He is alert and oriented to person, place, and time.   Psychiatric:         Mood and Affect: Mood normal.         Behavior: Behavior normal.         Thought Content: Thought content normal.         Judgment: Judgment normal.         Lab Results: I have reviewed the following results:   .     05/07/25  0454   WBC 11.88*   HGB 12.0   HCT 37.1      SODIUM 136   K 4.1      CO2 28   BUN 38*   CREATININE 1.08   GLUC 160*     ABG: No new results in last 24 hours.    Imaging Results Review: I reviewed radiology reports from this admission including: CT chest.  CTA chest PE study 05/04/2025  IMPRESSION:     No pulmonary embolism.     Nodular opacities in the bilateral lungs and surrounding the inferior right kevin as described are most consistent with an infectious/inflammatory infiltrate considering clinical history.  Follow-up chest CT in 3 months recommended to assess for interval resolution.     Proximal left lower lobar bronchial  occlusion with near complete left lower lobe atelectasis.  Right lower lobe bronchial wall thickening with segmental occlusions.  Likely aspiration-related consideration mid esophageal dilation with debris.  No obvious obstructing mass, consider bronchoscopy for further evaluation.  Other Study Results Review: Other studies reviewed include: ECHO  Echo 04/08/2025 shows LVEF 65%, systolic function normal, wall motion normal, diastolic function normal  PFT Results Reviewed: No formal PFTs on file

## 2025-05-07 NOTE — SPEECH THERAPY NOTE
Attending Statement:. I saw and evaluated the patient. I discussed the patient's case with the Resident.   Findings as noted in R 3rd and 4th toes.  Possibility of gout or trauma discussed with pt.   Would consider recheck of uric acid.  Pt declines xray today.    Alec Sosa MD     Speech Language/Pathology    Speech/Language Pathology Progress Note    Patient Name: Kyler Hernandez  Today's Date: 5/7/2025     Problem List  Principal Problem:    COPD with acute exacerbation (HCC)  Active Problems:    Coronary artery disease involving native coronary artery of native heart without angina pectoris    Primary hypertension    LINCOLN (obstructive sleep apnea)    Mixed hyperlipidemia    Type 2 diabetes mellitus with hyperglycemia, with long-term current use of insulin (HCC)    Acute on chronic respiratory failure with hypoxia (HCC)    Acute kidney injury superimposed on stage 3a chronic kidney disease (HCC)    Benign prostatic hyperplasia without lower urinary tract symptoms    Acute bacterial conjunctivitis of both eyes    Atelectasis, bilateral    Other dysphagia    PAC (premature atrial contraction)       Past Medical History  Past Medical History:   Diagnosis Date    Atherosclerotic heart disease of native coronary artery without angina pectoris     Chronic obstructive pulmonary disease, unspecified COPD type (HCC)     Diabetes mellitus (HCC)     Dry eye syndrome     Hyperlipidemia     Hypertension     Nonrheumatic mitral (valve) insufficiency     Obstructive sleep apnea (adult) (pediatric)     Vitamin B12 deficiency     Vitamin D deficiency         Past Surgical History  Past Surgical History:   Procedure Laterality Date    HERNIA REPAIR         Updated History:  Pt last seen by SLP 5/5 recommending regular textures and thin liquids, meds as tolerated.     Updated Imaging:  EGD 5/7/25:  IMPRESSION:  The upper third of the esophagus, middle third of the esophagus and lower third of the esophagus appeared normal. Performed random biopsy to rule out eosinophilic esophagitis.  5 cm type I hiatal hernia  Schatzki ring in the GE junction; dilated to 20 mm end size. Dilation caused improved passage of the scope and improved lumen appearance  Mild erythematous mucosa in the antrum, consistent with gastritis;  "performed cold forceps biopsy to rule out H. pylori  The duodenal bulb, 1st part of the duodenum and 2nd part of the duodenum appeared normal.    Subjective:  Pt awake and alert upon arrival, OOB in chair, agreeable to PO trials. Pt denies difficulty swallowing (ie: coughing/choking, globus sensation). SLP s/w RN, given clearance for trials of solid textures at this time.     \"Yeah I feel good.\"    Objective:  Materials administered: regular textures, thin liquids    Complete labial seal for retrieval and containment of all materials, no anterior bolus loss present. Min prolonged rotary mastication of regular textures w/ complete bolus breakdown and functional bolus transfer. Pt w/ independent addition of thin liquids to aid in adequate bolus formation. Suspected delayed swallow initiation though fair hyolaryngeal elevation to palpation. No overt s/s of aspiration at this time.     Assessment:  Pt presents w/ s/s suggestive of functional oropharyngeal swallow skills characterized by descriptions above.    Plan/Recommendations:  Regular textures and thin liquids   Meds as tolerated   Swallow strategies: upright posture, slow rate, small bites/sips, alternate solids/liquids   Frequent/thorough oral care   ST to f/u for diet tolerance x1 w/ larger PO sample  "

## 2025-05-07 NOTE — ASSESSMENT & PLAN NOTE
Cardiology consult appreciated  As per cardiology patient with some blocked PACs but otherwise is in sinus rhythm with normal rates with occasional PACs and PVCs  No further workup needed

## 2025-05-07 NOTE — SPEECH THERAPY NOTE
Speech Language/Pathology  Chart reviewed. Pt NPO for possible EGD this date. SLP to monitor chart/pt's status and f/u as able and appropriate.

## 2025-05-08 DIAGNOSIS — E11.65 TYPE 2 DIABETES MELLITUS WITH HYPERGLYCEMIA, WITH LONG-TERM CURRENT USE OF INSULIN (HCC): Primary | ICD-10-CM

## 2025-05-08 DIAGNOSIS — Z79.4 TYPE 2 DIABETES MELLITUS WITH HYPERGLYCEMIA, WITH LONG-TERM CURRENT USE OF INSULIN (HCC): Primary | ICD-10-CM

## 2025-05-08 LAB
ANION GAP SERPL CALCULATED.3IONS-SCNC: 6 MMOL/L (ref 4–13)
BASOPHILS # BLD MANUAL: 0 THOUSAND/UL (ref 0–0.1)
BASOPHILS NFR MAR MANUAL: 0 % (ref 0–1)
BUN SERPL-MCNC: 29 MG/DL (ref 5–25)
CALCIUM SERPL-MCNC: 8.5 MG/DL (ref 8.4–10.2)
CHLORIDE SERPL-SCNC: 100 MMOL/L (ref 96–108)
CO2 SERPL-SCNC: 32 MMOL/L (ref 21–32)
CREAT SERPL-MCNC: 0.95 MG/DL (ref 0.6–1.3)
EOSINOPHIL # BLD MANUAL: 0 THOUSAND/UL (ref 0–0.4)
EOSINOPHIL NFR BLD MANUAL: 0 % (ref 0–6)
ERYTHROCYTE [DISTWIDTH] IN BLOOD BY AUTOMATED COUNT: 15.8 % (ref 11.6–15.1)
GFR SERPL CREATININE-BSD FRML MDRD: 70 ML/MIN/1.73SQ M
GLUCOSE SERPL-MCNC: 104 MG/DL (ref 65–140)
GLUCOSE SERPL-MCNC: 184 MG/DL (ref 65–140)
GLUCOSE SERPL-MCNC: 234 MG/DL (ref 65–140)
GLUCOSE SERPL-MCNC: 268 MG/DL (ref 65–140)
GLUCOSE SERPL-MCNC: 96 MG/DL (ref 65–140)
HCT VFR BLD AUTO: 38 % (ref 36.5–49.3)
HGB BLD-MCNC: 12.4 G/DL (ref 12–17)
LYMPHOCYTES # BLD AUTO: 1.13 THOUSAND/UL (ref 0.6–4.47)
LYMPHOCYTES # BLD AUTO: 9 % (ref 14–44)
MCH RBC QN AUTO: 29.9 PG (ref 26.8–34.3)
MCHC RBC AUTO-ENTMCNC: 32.6 G/DL (ref 31.4–37.4)
MCV RBC AUTO: 92 FL (ref 82–98)
MONOCYTES # BLD AUTO: 1.76 THOUSAND/UL (ref 0–1.22)
MONOCYTES NFR BLD: 14 % (ref 4–12)
NEUTROPHILS # BLD MANUAL: 9.69 THOUSAND/UL (ref 1.85–7.62)
NEUTS SEG NFR BLD AUTO: 77 % (ref 43–75)
PLATELET # BLD AUTO: 257 THOUSANDS/UL (ref 149–390)
PLATELET BLD QL SMEAR: ADEQUATE
PMV BLD AUTO: 10.1 FL (ref 8.9–12.7)
POTASSIUM SERPL-SCNC: 4 MMOL/L (ref 3.5–5.3)
RBC # BLD AUTO: 4.15 MILLION/UL (ref 3.88–5.62)
RBC MORPH BLD: NORMAL
SODIUM SERPL-SCNC: 138 MMOL/L (ref 135–147)
WBC # BLD AUTO: 12.58 THOUSAND/UL (ref 4.31–10.16)

## 2025-05-08 PROCEDURE — 80048 BASIC METABOLIC PNL TOTAL CA: CPT | Performed by: INTERNAL MEDICINE

## 2025-05-08 PROCEDURE — 99232 SBSQ HOSP IP/OBS MODERATE 35: CPT | Performed by: NURSE PRACTITIONER

## 2025-05-08 PROCEDURE — 99232 SBSQ HOSP IP/OBS MODERATE 35: CPT | Performed by: INTERNAL MEDICINE

## 2025-05-08 PROCEDURE — 94640 AIRWAY INHALATION TREATMENT: CPT

## 2025-05-08 PROCEDURE — 85007 BL SMEAR W/DIFF WBC COUNT: CPT | Performed by: INTERNAL MEDICINE

## 2025-05-08 PROCEDURE — 94760 N-INVAS EAR/PLS OXIMETRY 1: CPT

## 2025-05-08 PROCEDURE — 85027 COMPLETE CBC AUTOMATED: CPT | Performed by: INTERNAL MEDICINE

## 2025-05-08 PROCEDURE — 82948 REAGENT STRIP/BLOOD GLUCOSE: CPT

## 2025-05-08 RX ORDER — LANCETS 26 GAUGE
EACH MISCELLANEOUS DAILY
Qty: 100 EACH | Refills: 5 | Status: SHIPPED | OUTPATIENT
Start: 2025-05-08

## 2025-05-08 RX ORDER — ISOPROPYL ALCOHOL 0.7 ML/1
SWAB TOPICAL DAILY
Qty: 100 EACH | Refills: 5 | Status: SHIPPED | OUTPATIENT
Start: 2025-05-08

## 2025-05-08 RX ADMIN — IPRATROPIUM BROMIDE 0.5 MG: 0.5 SOLUTION RESPIRATORY (INHALATION) at 14:48

## 2025-05-08 RX ADMIN — INSULIN LISPRO 8 UNITS: 100 INJECTION, SOLUTION INTRAVENOUS; SUBCUTANEOUS at 21:24

## 2025-05-08 RX ADMIN — SODIUM CHLORIDE, PRESERVATIVE FREE 3 ML: 5 INJECTION INTRAVENOUS at 05:50

## 2025-05-08 RX ADMIN — ASPIRIN 81 MG: 81 TABLET, COATED ORAL at 09:26

## 2025-05-08 RX ADMIN — TOBRAMYCIN 2 DROP: 3 SOLUTION/ DROPS OPHTHALMIC at 09:48

## 2025-05-08 RX ADMIN — HEPARIN SODIUM 5000 UNITS: 5000 INJECTION INTRAVENOUS; SUBCUTANEOUS at 21:23

## 2025-05-08 RX ADMIN — INSULIN GLARGINE 35 UNITS: 100 INJECTION, SOLUTION SUBCUTANEOUS at 09:23

## 2025-05-08 RX ADMIN — INSULIN LISPRO 5 UNITS: 100 INJECTION, SOLUTION INTRAVENOUS; SUBCUTANEOUS at 09:24

## 2025-05-08 RX ADMIN — INSULIN LISPRO 5 UNITS: 100 INJECTION, SOLUTION INTRAVENOUS; SUBCUTANEOUS at 16:38

## 2025-05-08 RX ADMIN — SODIUM CHLORIDE, PRESERVATIVE FREE 3 ML: 5 INJECTION INTRAVENOUS at 21:25

## 2025-05-08 RX ADMIN — LEVALBUTEROL HYDROCHLORIDE 1.25 MG: 1.25 SOLUTION RESPIRATORY (INHALATION) at 14:48

## 2025-05-08 RX ADMIN — IPRATROPIUM BROMIDE 0.5 MG: 0.5 SOLUTION RESPIRATORY (INHALATION) at 20:31

## 2025-05-08 RX ADMIN — TAMSULOSIN HYDROCHLORIDE 0.4 MG: 0.4 CAPSULE ORAL at 18:22

## 2025-05-08 RX ADMIN — INSULIN GLARGINE 35 UNITS: 100 INJECTION, SOLUTION SUBCUTANEOUS at 21:24

## 2025-05-08 RX ADMIN — INSULIN LISPRO 6 UNITS: 100 INJECTION, SOLUTION INTRAVENOUS; SUBCUTANEOUS at 16:38

## 2025-05-08 RX ADMIN — FINASTERIDE 5 MG: 5 TABLET, FILM COATED ORAL at 09:27

## 2025-05-08 RX ADMIN — FLUTICASONE PROPIONATE 1 SPRAY: 50 SPRAY, METERED NASAL at 09:28

## 2025-05-08 RX ADMIN — TOBRAMYCIN 2 DROP: 3 SOLUTION/ DROPS OPHTHALMIC at 14:25

## 2025-05-08 RX ADMIN — TOBRAMYCIN 2 DROP: 3 SOLUTION/ DROPS OPHTHALMIC at 18:22

## 2025-05-08 RX ADMIN — INSULIN LISPRO 5 UNITS: 100 INJECTION, SOLUTION INTRAVENOUS; SUBCUTANEOUS at 12:29

## 2025-05-08 RX ADMIN — DEXTRAN 70, GLYCERIN, HYPROMELLOSE 1 DROP: 1; 2; 3 SOLUTION/ DROPS OPHTHALMIC at 09:28

## 2025-05-08 RX ADMIN — LEVALBUTEROL HYDROCHLORIDE 1.25 MG: 1.25 SOLUTION RESPIRATORY (INHALATION) at 20:31

## 2025-05-08 RX ADMIN — METHYLPREDNISOLONE SODIUM SUCCINATE 40 MG: 40 INJECTION, POWDER, FOR SOLUTION INTRAMUSCULAR; INTRAVENOUS at 09:27

## 2025-05-08 RX ADMIN — TOBRAMYCIN 2 DROP: 3 SOLUTION/ DROPS OPHTHALMIC at 21:25

## 2025-05-08 RX ADMIN — PANTOPRAZOLE SODIUM 40 MG: 40 TABLET, DELAYED RELEASE ORAL at 09:27

## 2025-05-08 RX ADMIN — ATORVASTATIN CALCIUM 80 MG: 40 TABLET, FILM COATED ORAL at 21:23

## 2025-05-08 RX ADMIN — LEVALBUTEROL HYDROCHLORIDE 1.25 MG: 1.25 SOLUTION RESPIRATORY (INHALATION) at 07:34

## 2025-05-08 RX ADMIN — AMLODIPINE BESYLATE 10 MG: 2.5 TABLET ORAL at 09:26

## 2025-05-08 RX ADMIN — TOBRAMYCIN 2 DROP: 3 SOLUTION/ DROPS OPHTHALMIC at 05:50

## 2025-05-08 RX ADMIN — ISOSORBIDE MONONITRATE 60 MG: 30 TABLET, EXTENDED RELEASE ORAL at 09:26

## 2025-05-08 RX ADMIN — DEXTRAN 70, GLYCERIN, HYPROMELLOSE 1 DROP: 1; 2; 3 SOLUTION/ DROPS OPHTHALMIC at 18:22

## 2025-05-08 RX ADMIN — METOPROLOL SUCCINATE 50 MG: 50 TABLET, EXTENDED RELEASE ORAL at 09:27

## 2025-05-08 RX ADMIN — SODIUM CHLORIDE, SODIUM GLUCONATE, SODIUM ACETATE, POTASSIUM CHLORIDE, MAGNESIUM CHLORIDE, SODIUM PHOSPHATE, DIBASIC, AND POTASSIUM PHOSPHATE 50 ML/HR: .53; .5; .37; .037; .03; .012; .00082 INJECTION, SOLUTION INTRAVENOUS at 16:40

## 2025-05-08 RX ADMIN — INSULIN LISPRO 2 UNITS: 100 INJECTION, SOLUTION INTRAVENOUS; SUBCUTANEOUS at 12:29

## 2025-05-08 RX ADMIN — IPRATROPIUM BROMIDE 0.5 MG: 0.5 SOLUTION RESPIRATORY (INHALATION) at 07:34

## 2025-05-08 RX ADMIN — SODIUM CHLORIDE, PRESERVATIVE FREE 3 ML: 5 INJECTION INTRAVENOUS at 14:25

## 2025-05-08 NOTE — ASSESSMENT & PLAN NOTE
Baseline creatinine between 1.0 and 1.25, currently 1.65  Avoid nephrotoxins and hypotension  UA is unremarkable  Creatinine this morning is 0.95  On intravenous Isolyte and trend renal function  Avoid hypotension/nephrotoxic medication    Lab Results   Component Value Date    EGFR 70 05/08/2025    EGFR 60 05/07/2025    EGFR 47 05/06/2025    CREATININE 0.95 05/08/2025    CREATININE 1.08 05/07/2025    CREATININE 1.33 (H) 05/06/2025

## 2025-05-08 NOTE — PROGRESS NOTES
Progress Note - Gastroenterology   Name: Kyler Hernandez 89 y.o. male I MRN: [id number]  Unit/Bed#: -01 I Date of Admission: 5/4/2025   Date of Service: 5/8/2025 I Hospital Day: 4    Assessment & Plan  Other dysphagia  89-year-old male with CAD, COPD oxygen dependent, hypertension, hyperlipidemia, diabetes, obstructive sleep apnea, GERD admitted with COPD exacerbation, who presents to GI for evaluation of dysphagia.    Patient reported difficulty swallowing peas, corn, rice. Due to concern for aspiration, speech pathology performed bedside swallow evaluation which showed normal oropharyngeal swallow skills. Patient felt at increased risk of reverse aspiration given CT chest revealing mid esophageal dilation with debris. EGD 5/7 showed 5 cm hiatal hernia and Schatzki ring in the GE junction which was dilated to 20 mm. Patient is swallowing without difficulty now.    - Follow-up EGD biopsies  - Continue regular diet as tolerated  - Aspiration precautions  - Continue Protonix 40 mg daily while inpatient and upon discharge    GI will sign off. Please call with questions or concerns.  COPD with acute exacerbation (HCC)  Pulmonary consulted, appreciate management  Continue IV steroids and nebulizers  Acute on chronic respiratory failure with hypoxia (HCC)  See plan above  Coronary artery disease involving native coronary artery of native heart without angina pectoris  Continue aspirin and statin      I have discussed the above management plan in detail with the primary service.     Subjective   Patient seen and examined at bedside. Patient feeling better today. He denies any difficulty swallowing. He denies abdominal pain, nausea, and vomiting.    Objective :  Temp:  [97.4 °F (36.3 °C)-97.9 °F (36.6 °C)] 97.6 °F (36.4 °C)  HR:  [75-96] 96  BP: (101-159)/(73-98) 144/84  Resp:  [16-24] 20  SpO2:  [85 %-96 %] 90 %  O2 Device: Nasal cannula  Nasal Cannula O2 Flow Rate (L/min):  [4 L/min-5 L/min] 4 L/min    Physical  Exam  Vitals and nursing note reviewed.   Constitutional:       General: He is not in acute distress.     Appearance: He is well-developed.   HENT:      Head: Normocephalic and atraumatic.      Nose:      Comments: Nasal cannula  Eyes:      Conjunctiva/sclera: Conjunctivae normal.   Cardiovascular:      Rate and Rhythm: Normal rate and regular rhythm.      Heart sounds: No murmur heard.  Pulmonary:      Effort: Pulmonary effort is normal. No respiratory distress.      Breath sounds: Wheezing present.   Abdominal:      Palpations: Abdomen is soft.      Tenderness: There is no abdominal tenderness.   Musculoskeletal:         General: No swelling.      Cervical back: Neck supple.   Skin:     General: Skin is warm and dry.   Neurological:      Mental Status: He is alert.   Psychiatric:         Mood and Affect: Mood normal.           Lab Results: I have reviewed the following results:CBC/BMP:   .     05/08/25  0548   WBC 12.58*   HGB 12.4   HCT 38.0      SODIUM 138   K 4.0      CO2 32   BUN 29*   CREATININE 0.95   GLUC 104    , Creatinine Clearance: Estimated Creatinine Clearance: 60.4 mL/min (by C-G formula based on SCr of 0.95 mg/dL)., LFTs: No new results in last 24 hours.     Imaging Results Review: No pertinent imaging studies reviewed.  Other Study Results Review: Other studies reviewed include: EGD 5/7

## 2025-05-08 NOTE — CASE MANAGEMENT
Case Management Discharge Planning Note    Patient name Kyler Hernandez  Location /-01 MRN [id number]  : 1935 Date 2025       Current Admission Date: 2025  Current Admission Diagnosis:COPD with acute exacerbation (HCC)   Patient Active Problem List    Diagnosis Date Noted Date Diagnosed    PAC (premature atrial contraction) 2025     Atelectasis, bilateral 2025     Other dysphagia 2025     Acute kidney injury superimposed on stage 3a chronic kidney disease (HCC) 2025     Benign prostatic hyperplasia without lower urinary tract symptoms 2025     Acute bacterial conjunctivitis of both eyes 2025     Chronic hypoxic respiratory failure (HCC) 2025     Stage 3a chronic kidney disease (HCC) 04/15/2025     Acute on chronic respiratory failure with hypoxia (HCC) 2025     Type 2 diabetes mellitus with hyperglycemia, with long-term current use of insulin (HCC) 2025     Pulmonary hypertension ([facility]) 2024     Coronary artery disease involving native coronary artery of native heart without angina pectoris 05/10/2024     Primary hypertension 05/10/2024     Benign prostatic hyperplasia with nocturia 05/10/2024     COPD with acute exacerbation (HCC) 05/10/2024     LINCOLN (obstructive sleep apnea) 05/10/2024     B12 deficiency 05/10/2024     Vitamin D deficiency 05/10/2024     Mixed hyperlipidemia 05/10/2024       LOS (days): 4  Geometric Mean LOS (GMLOS) (days): 3.5  Days to GMLOS:-0.3     OBJECTIVE:  Risk of Unplanned Readmission Score: 18.58         Current admission status: Inpatient   Preferred Pharmacy:   [id number] - [place], [place] - [address] [place] [address]  Phone: [phone] Fax: [phone]    [facility]. - [place], [place] - [address] [place] [address]  Phone: [phone] Fax: [phone]    [place], [place] - [address]  [address]  [address]  [facility] [address]  Phone: [phone] Fax: [phone]    Primary Care Provider: Gordon Álvarez MD    Primary Insurance: [employer] REP  Secondary Insurance:     DISCHARGE DETAILS:      DME Referral Provided  Referral made for DME?: Yes  DME referral completed for the following items:: Hospital Bed  [facility]    Other Referral/Resources/Interventions Provided:  Interventions: DME  Referral Comments: Referral to [facility] for a hospital bed    Treatment Team Recommendation: Home  Discharge Destination Plan:: Home  Transport at Discharge : BLS Ambulance     Additional Comments: Patient is requesting a hospital bed. BREONNA spoke with Tracey [phone] at [facility] regarding same. Cheryl confirmed that pt can have a hospital bed at [facility]. She requested it be ordered by  to be delivered prior to patient returning. Order sent to [facility] via [place]. CM requested [facility] contact Tracey to coordination delivery; awaiting delivery time. Tracey requesting a new DME form be completed for the facility as pt will now have a hospital bed. DME form completed and faxed to Tracey at fax# [phone]. [facility]

## 2025-05-08 NOTE — ASSESSMENT & PLAN NOTE
89-year-old male with CAD, COPD oxygen dependent, hypertension, hyperlipidemia, diabetes, obstructive sleep apnea, GERD admitted with COPD exacerbation, who presents to GI for evaluation of dysphagia.    Patient reported difficulty swallowing peas, corn, rice. Due to concern for aspiration, speech pathology performed bedside swallow evaluation which showed normal oropharyngeal swallow skills. Patient felt at increased risk of reverse aspiration given CT chest revealing mid esophageal dilation with debris. EGD 5/7 showed 5 cm hiatal hernia and Schatzki ring in the GE junction which was dilated to 20 mm. Patient is swallowing without difficulty now.    - Follow-up EGD biopsies  - Continue regular diet as tolerated  - Aspiration precautions  - Continue Protonix 40 mg daily while inpatient and upon discharge    GI will sign off. Please call with questions or concerns.

## 2025-05-08 NOTE — ASSESSMENT & PLAN NOTE
93% on 4 L NC, baseline oxygen needs 2-3 L/min.    Titrate to maintain saturations above 88%  Pulmonary hygiene encouraged: Deep breathing with cough, OOB as tolerated, incentive spirometry and flutter valve  Assess home O2 requirements prior to discharge

## 2025-05-08 NOTE — ASSESSMENT & PLAN NOTE
Patient admitted with acute on chronic respiratory failure with hypoxia POA due to COPD exacerbation.  Patient was discharged on 3 L of supplemental oxygen on 4/14/2025  Currently requiring 4 L supplemental oxygen  Wean oxygen as tolerated  See above

## 2025-05-08 NOTE — PROGRESS NOTES
Progress Note - Pulmonology   Name: Kyler Hernandez 89 y.o. male I MRN: [id number]  Unit/Bed#: -Francisco I Date of Admission: 5/4/2025   Date of Service: 5/8/2025 I Hospital Day: 4    Assessment & Plan  COPD with acute exacerbation (HCC)  Of unknown severity  Likely secondary to recent influenza infection-previously treated with prednisone  Home medication regime: Trelegy DuoNebs, albuterol nebulizer and albuterol rescue inhaler  WBC 7.11-10.70-11.88  Procalcitonin negative x 3  Currently on Solu-Medrol every 12 hours can decrease to daily starting today with anticipation of discharging on prednisone taper  Continue Atrovent and Xopenex nebulizers  Will need outpatient follow-up in formal PFTs  Acute on chronic respiratory failure with hypoxia (HCC)  93% on 4 L NC, baseline oxygen needs 2-3 L/min.    Titrate to maintain saturations above 88%  Pulmonary hygiene encouraged: Deep breathing with cough, OOB as tolerated, incentive spirometry and flutter valve  Assess home O2 requirements prior to discharge  Atelectasis, bilateral  Chest CT shows bibasilar atelectasis, suspect mucous plugging.  He is having difficulty expectorating his secretions.     Continue vest therapy and flutter device to help with sputum expectoration.  No evidence of acute infectious process.    Continue to observe off antibiotics.    Other dysphagia  Speech following recommended regular diet and thin liquids    24 Hour Events : No overnight events  Subjective : Patient seen and examined at bedside.  Found resting comfortably in a recliner chair.  Denies any fever chills night sweats chest pain or hemoptysis.  Endorses overall improvement is in his breathing.  Endorsed being to able to ambulate to the bathroom with no significant dyspnea noted feels closer to his baseline    Objective :  Temp:  [97.4 °F (36.3 °C)-97.9 °F (36.6 °C)] 97.6 °F (36.4 °C)  HR:  [77-96] 96  BP: (133-159)/(76-98) 144/84  Resp:  [20-24] 20  SpO2:  [85 %-94 %] 90 %  O2  Device: Nasal cannula  Nasal Cannula O2 Flow Rate (L/min):  [4 L/min] 4 L/min    Physical Exam  Vitals reviewed.   Constitutional:       General: He is not in acute distress.     Appearance: He is obese. He is ill-appearing.   HENT:      Head: Normocephalic and atraumatic.      Right Ear: External ear normal.      Left Ear: External ear normal.      Nose: Nose normal.      Mouth/Throat:      Mouth: Mucous membranes are moist.      Pharynx: Oropharynx is clear.   Eyes:      Extraocular Movements: Extraocular movements intact.      Pupils: Pupils are equal, round, and reactive to light.   Cardiovascular:      Rate and Rhythm: Normal rate and regular rhythm.      Pulses: Normal pulses.      Heart sounds: Normal heart sounds.   Pulmonary:      Effort: Pulmonary effort is normal.      Comments: Poor aeration with a few scattered wheezes present on expiration  Abdominal:      General: Bowel sounds are normal.   Musculoskeletal:      Cervical back: Normal range of motion.   Skin:     General: Skin is warm and dry.   Neurological:      Mental Status: He is alert and oriented to person, place, and time.   Psychiatric:         Mood and Affect: Mood normal.         Behavior: Behavior normal.         Thought Content: Thought content normal.         Judgment: Judgment normal.         Lab Results: I have reviewed the following results:   .     05/08/25  0548   WBC 12.58*   HGB 12.4   HCT 38.0      SODIUM 138   K 4.0      CO2 32   BUN 29*   CREATININE 0.95   GLUC 104     ABG: No new results in last 24 hours.    Imaging Results Review: I reviewed radiology reports from this admission including: CT chest.  No pulmonary embolism.   Nodular opacities in the bilateral lungs and surrounding the inferior right kevin as described are most consistent with an infectious/inflammatory infiltrate considering clinical history.  Follow-up chest CT in 3 months recommended to assess for interval resolution.   Proximal left lower lobar  bronchial occlusion with near complete left lower lobe atelectasis.  Right lower lobe bronchial wall thickening with segmental occlusions.  Likely aspiration-related consideration mid esophageal dilation with debris.  No obvious obstructing mass, consider bronchoscopy for further evaluation.  Other Study Results Review: Other studies reviewed include: ECHO  Echo 04/08/2025 shows LVEF 65%, systolic function normal, wall motion normal, diastolic function normal   PFT Results Reviewed: No formal PFTs

## 2025-05-08 NOTE — PLAN OF CARE
Problem: PAIN - ADULT  Goal: Verbalizes/displays adequate comfort level or baseline comfort level  Description: Interventions:- Encourage patient to monitor pain and request assistance- Assess pain using appropriate pain scale- Administer analgesics based on type and severity of pain and evaluate response- Implement non-pharmacological measures as appropriate and evaluate response- Consider cultural and social influences on pain and pain management- Notify physician/advanced practitioner if interventions unsuccessful or patient reports new pain  Outcome: Progressing     Problem: INFECTION - ADULT  Goal: Absence or prevention of progression during hospitalization  Description: INTERVENTIONS:- Assess and monitor for signs and symptoms of infection- Monitor lab/diagnostic results- Monitor all insertion sites, i.e. indwelling lines, tubes, and drains- Monitor endotracheal if appropriate and nasal secretions for changes in amount and color- [place] appropriate cooling/warming therapies per order- Administer medications as ordered- Instruct and encourage patient and family to use good hand hygiene technique- Identify and instruct in appropriate isolation precautions for identified infection/condition  Outcome: Progressing  Goal: Absence of fever/infection during neutropenic period  Description: INTERVENTIONS:- Monitor WBC  Outcome: Progressing     Problem: SAFETY ADULT  Goal: Patient will remain free of falls  Description: INTERVENTIONS:- Educate patient/family on patient safety including physical limitations- Instruct patient to call for assistance with activity - Consult OT/PT to assist with strengthening/mobility - Keep Call bell within reach- Keep bed low and locked with side rails adjusted as appropriate- Keep care items and personal belongings within reach- Initiate and maintain comfort rounds- Make Fall Risk Sign visible to staff- Offer Toileting every 2 Hours, in advance of need- Initiate/Maintain 2alarm-  Obtain necessary fall risk management equipment: 2- Apply yellow socks and bracelet for high fall risk patients- Consider moving patient to room near nurses station  Outcome: Progressing  Goal: Maintain or return to baseline ADL function  Description: INTERVENTIONS:-  Assess patient's ability to carry out ADLs; assess patient's baseline for ADL function and identify physical deficits which impact ability to perform ADLs (bathing, care of mouth/teeth, toileting, grooming, dressing, etc.)- Assess/evaluate cause of self-care deficits - Assess range of motion- Assess patient's mobility; develop plan if impaired- Assess patient's need for assistive devices and provide as appropriate- Encourage maximum independence but intervene and supervise when necessary- Involve family in performance of ADLs- Assess for home care needs following discharge - Consider OT consult to assist with ADL evaluation and planning for discharge- Provide patient education as appropriate  Outcome: Progressing  Goal: Maintains/Returns to pre admission functional level  Description: INTERVENTIONS:- Perform AM-PAC 6 Click Basic Mobility/ Daily Activity assessment daily.- Set and communicate daily mobility goal to care team and patient/family/caregiver. - Collaborate with rehabilitation services on mobility goals if consulted- Perform Range of Motion 2 times a day.- Reposition patient every 2 hours.- Dangle patient 2 times a day- Stand patient 2 times a day- Ambulate patient 2 times a day- Out of bed to chair 2 times a day - Out of bed for meals 2 times a day- Out of bed for toileting- Record patient progress and toleration of activity level   Outcome: Progressing

## 2025-05-08 NOTE — ASSESSMENT & PLAN NOTE
Kyler is an 89-year-old male with past medical history of coronary artery disease, COPD (oxygen dependent 3 L), hypertension, hyperlipidemia, diabetes,and obstructive sleep apnea.  He presents to the emergency department from [facility] for shortness of breath and productive cough worsening over the last few days.  He was recently hospitalized between April 7 to 14 for COPD exacerbation and, per the [facility] staff, patient never fully returned to baseline.  The emergency department he was given nebulizers, steroids, and antibiotics and will be admitted for further treatment.    Procalcitonin is normal  Lactic acid negative, patient afebrile and without leukocytosis  CT chest shows:  Nodular opacities in the bilateral lungs and surrounding the inferior right kevin as described are most consistent with an infectious/inflammatory infiltrate considering clinical history.   Proximal left lower lobar bronchial occlusion with near complete left lower lobe atelectasis.  Right lower lobe bronchial wall thickening with segmental occlusions.  Likely aspiration-related consideration mid esophageal dilation with debris.  No obvious obstructing mass, consider bronchoscopy for further evaluation.  Pulmonary consulted, appreciate the assistance  Xopenex/Atrovent nebulizers, hold Trelegy for now  Solu-Medrol 40 mg every 12 hours.  Tapered to daily  Currently on 4 L nasal cannula, wean to baseline as tolerated  Procalcitonin 0.15, 0.12 ceftriaxone given in the emergency room.    Hold off on any further antibiotics

## 2025-05-08 NOTE — ASSESSMENT & PLAN NOTE
Hold oral antidiabetics  Sliding scale insulin, add 5 units with meals  Continue twice daily Lantus  Carbohydrate restrictive diet  Hypoglycemia protocol    Lab Results   Component Value Date    HGBA1C 8.9 (H) 04/08/2025       Recent Labs     05/07/25  1159 05/07/25  1602 05/07/25 2044 05/08/25  0712   POCGLU 110 164* 172* 96       Blood Sugar Average: Last 72 hrs:  (P) [id number]

## 2025-05-08 NOTE — PLAN OF CARE
Problem: PAIN - ADULT  Goal: Verbalizes/displays adequate comfort level or baseline comfort level  Description: Interventions:- Encourage patient to monitor pain and request assistance- Assess pain using appropriate pain scale- Administer analgesics based on type and severity of pain and evaluate response- Implement non-pharmacological measures as appropriate and evaluate response- Consider cultural and social influences on pain and pain management- Notify physician/advanced practitioner if interventions unsuccessful or patient reports new pain  Outcome: Progressing     Problem: INFECTION - ADULT  Goal: Absence or prevention of progression during hospitalization  Description: INTERVENTIONS:- Assess and monitor for signs and symptoms of infection- Monitor lab/diagnostic results- Monitor all insertion sites, i.e. indwelling lines, tubes, and drains- Monitor endotracheal if appropriate and nasal secretions for changes in amount and color- [place] appropriate cooling/warming therapies per order- Administer medications as ordered- Instruct and encourage patient and family to use good hand hygiene technique- Identify and instruct in appropriate isolation precautions for identified infection/condition  Outcome: Progressing  Goal: Absence of fever/infection during neutropenic period  Description: INTERVENTIONS:- Monitor WBC  Outcome: Progressing     Problem: SAFETY ADULT  Goal: Patient will remain free of falls  Description: INTERVENTIONS:- Educate patient/family on patient safety including physical limitations- Instruct patient to call for assistance with activity - Consult OT/PT to assist with strengthening/mobility - Keep Call bell within reach- Keep bed low and locked with side rails adjusted as appropriate- Keep care items and personal belongings within reach- Initiate and maintain comfort rounds- Make Fall Risk Sign visible to staff- Offer Toileting every 2 Hours, in advance of need- Initiate/Maintain 2alarm-  Obtain necessary fall risk management equipment: 2- Apply yellow socks and bracelet for high fall risk patients- Consider moving patient to room near nurses station  Outcome: Progressing  Goal: Maintain or return to baseline ADL function  Description: INTERVENTIONS:-  Assess patient's ability to carry out ADLs; assess patient's baseline for ADL function and identify physical deficits which impact ability to perform ADLs (bathing, care of mouth/teeth, toileting, grooming, dressing, etc.)- Assess/evaluate cause of self-care deficits - Assess range of motion- Assess patient's mobility; develop plan if impaired- Assess patient's need for assistive devices and provide as appropriate- Encourage maximum independence but intervene and supervise when necessary- Involve family in performance of ADLs- Assess for home care needs following discharge - Consider OT consult to assist with ADL evaluation and planning for discharge- Provide patient education as appropriate  Outcome: Progressing  Goal: Maintains/Returns to pre admission functional level  Description: INTERVENTIONS:- Perform AM-PAC 6 Click Basic Mobility/ Daily Activity assessment daily.- Set and communicate daily mobility goal to care team and patient/family/caregiver. - Collaborate with rehabilitation services on mobility goals if consulted- Perform Range of Motion 2 times a day.- Reposition patient every 2 hours.- Dangle patient 2 times a day- Stand patient 2 times a day- Ambulate patient 2 times a day- Out of bed to chair 2 times a day - Out of bed for meals 2 times a day- Out of bed for toileting- Record patient progress and toleration of activity level   Outcome: Progressing     Problem: DISCHARGE PLANNING  Goal: Discharge to home or other facility with appropriate resources  Description: INTERVENTIONS:- Identify barriers to discharge w/patient and caregiver- Arrange for needed discharge resources and transportation as appropriate- Identify discharge learning  needs (meds, wound care, etc.)- Arrange for interpretive services to assist at discharge as needed- Refer to Case Management Department for coordinating discharge planning if the patient needs post-hospital services based on physician/advanced practitioner order or complex needs related to functional status, cognitive ability, or social support system  Outcome: Progressing     Problem: Knowledge Deficit  Goal: Patient/family/caregiver demonstrates understanding of disease process, treatment plan, medications, and discharge instructions  Description: Complete learning assessment and assess knowledge base.Interventions:- Provide teaching at level of understanding- Provide teaching via preferred learning methods  Outcome: Progressing     Problem: Nutrition/Hydration-ADULT  Goal: Nutrient/Hydration intake appropriate for improving, restoring or maintaining nutritional needs  Description: Monitor and assess patient's nutrition/hydration status for malnutrition. Collaborate with interdisciplinary team and initiate plan and interventions as ordered.  Monitor patient's weight and dietary intake as ordered or per policy. Utilize nutrition screening tool and intervene as necessary. Determine patient's food preferences and provide high-protein, high-caloric foods as appropriate. INTERVENTIONS:- Monitor oral intake, urinary output, labs, and treatment plans- Assess nutrition and hydration status and recommend course of action- Evaluate amount of meals eaten- Assist patient with eating if necessary - Allow adequate time for meals- Recommend/ encourage appropriate diets, oral nutritional supplements, and vitamin/mineral supplements- Order, calculate, and assess calorie counts as needed- Recommend, monitor, and adjust tube feedings and TPN/PPN based on assessed needs- Assess need for intravenous fluids- Provide specific nutrition/hydration education as appropriate- Include patient/family/caregiver in decisions related to  nutrition  Monitor and assess patient's nutrition/hydration status for malnutrition. Collaborate with interdisciplinary team and initiate plan and interventions as ordered.  Monitor patient's weight and dietary intake as ordered or per policy. Utilize nutrition screening tool and intervene as necessary. Determine patient's food preferences and provide high-protein, high-caloric foods as appropriate. INTERVENTIONS:- Monitor oral intake, urinary output, labs, and treatment plans- Assess nutrition and hydration status and recommend course of action- Evaluate amount of meals eaten- Assist patient with eating if necessary - Allow adequate time for meals- Recommend/ encourage appropriate diets, oral nutritional supplements, and vitamin/mineral supplements- Order, calculate, and assess calorie counts as needed- Recommend, monitor, and adjust tube feedings and TPN/PPN based on assessed needs- Assess need for intravenous fluids- Provide specific nutrition/hydration education as appropriate- Include patient/family/caregiver in decisions related to nutrition  Monitor and assess patient's nutrition/hydration status for malnutrition. Collaborate with interdisciplinary team and initiate plan and interventions as ordered.  Monitor patient's weight and dietary intake as ordered or per policy. Utilize nutrition screening tool and intervene as necessary. Determine patient's food preferences and provide high-protein, high-caloric foods as appropriate. INTERVENTIONS:- Monitor oral intake, urinary output, labs, and treatment plans- Assess nutrition and hydration status and recommend course of action- Evaluate amount of meals eaten- Assist patient with eating if necessary - Allow adequate time for meals- Recommend/ encourage appropriate diets, oral nutritional supplements, and vitamin/mineral supplements- Order, calculate, and assess calorie counts as needed- Recommend, monitor, and adjust tube feedings and TPN/PPN based on assessed  needs- Assess need for intravenous fluids- Provide specific nutrition/hydration education as appropriate- Include patient/family/caregiver in decisions related to nutrition  Outcome: Progressing

## 2025-05-08 NOTE — ASSESSMENT & PLAN NOTE
GI consult appreciated  GI is recommended endoscopy  HOB 30 to 45 degrees  EGD 5/7 showed 5 cm hiatal hernia and Schatzki ring in the GE junction which was dilated to 20 mm. Patient is swallowing without difficulty now.   Continue on Protonix

## 2025-05-08 NOTE — ASSESSMENT & PLAN NOTE
Chest CT shows bibasilar atelectasis, suspect mucous plugging.  He is having difficulty expectorating his secretions.     Continue vest therapy and flutter device to help with sputum expectoration.  No evidence of acute infectious process.    Continue to observe off antibiotics.

## 2025-05-09 VITALS
DIASTOLIC BLOOD PRESSURE: 78 MMHG | OXYGEN SATURATION: 92 % | WEIGHT: 220 LBS | RESPIRATION RATE: 20 BRPM | HEART RATE: 69 BPM | SYSTOLIC BLOOD PRESSURE: 146 MMHG | TEMPERATURE: 97.6 F | BODY MASS INDEX: 33.34 KG/M2 | HEIGHT: 68 IN

## 2025-05-09 LAB
ANION GAP SERPL CALCULATED.3IONS-SCNC: 6 MMOL/L (ref 4–13)
BACTERIA BLD CULT: NORMAL
BACTERIA BLD CULT: NORMAL
BUN SERPL-MCNC: 26 MG/DL (ref 5–25)
CALCIUM SERPL-MCNC: 8.5 MG/DL (ref 8.4–10.2)
CHLORIDE SERPL-SCNC: 102 MMOL/L (ref 96–108)
CO2 SERPL-SCNC: 31 MMOL/L (ref 21–32)
CREAT SERPL-MCNC: 0.96 MG/DL (ref 0.6–1.3)
DME PARACHUTE DELIVERY DATE ACTUAL: NORMAL
DME PARACHUTE DELIVERY DATE ACTUAL: NORMAL
DME PARACHUTE DELIVERY DATE EXPECTED: NORMAL
DME PARACHUTE DELIVERY DATE EXPECTED: NORMAL
DME PARACHUTE DELIVERY DATE REQUESTED: NORMAL
DME PARACHUTE DELIVERY DATE REQUESTED: NORMAL
DME PARACHUTE ITEM DESCRIPTION: NORMAL
DME PARACHUTE ORDER STATUS: NORMAL
DME PARACHUTE ORDER STATUS: NORMAL
DME PARACHUTE SUPPLIER NAME: NORMAL
DME PARACHUTE SUPPLIER NAME: NORMAL
DME PARACHUTE SUPPLIER PHONE: NORMAL
DME PARACHUTE SUPPLIER PHONE: NORMAL
ERYTHROCYTE [DISTWIDTH] IN BLOOD BY AUTOMATED COUNT: 15.9 % (ref 11.6–15.1)
GFR SERPL CREATININE-BSD FRML MDRD: 69 ML/MIN/1.73SQ M
GLUCOSE SERPL-MCNC: 166 MG/DL (ref 65–140)
GLUCOSE SERPL-MCNC: 253 MG/DL (ref 65–140)
GLUCOSE SERPL-MCNC: 258 MG/DL (ref 65–140)
GLUCOSE SERPL-MCNC: 77 MG/DL (ref 65–140)
GLUCOSE SERPL-MCNC: 82 MG/DL (ref 65–140)
HCT VFR BLD AUTO: 38.8 % (ref 36.5–49.3)
HGB BLD-MCNC: 12.7 G/DL (ref 12–17)
MCH RBC QN AUTO: 30.2 PG (ref 26.8–34.3)
MCHC RBC AUTO-ENTMCNC: 32.7 G/DL (ref 31.4–37.4)
MCV RBC AUTO: 92 FL (ref 82–98)
NRBC BLD AUTO-RTO: 0 /100 WBCS
PLATELET # BLD AUTO: 236 THOUSANDS/UL (ref 149–390)
PMV BLD AUTO: 9.5 FL (ref 8.9–12.7)
POTASSIUM SERPL-SCNC: 4 MMOL/L (ref 3.5–5.3)
RBC # BLD AUTO: 4.21 MILLION/UL (ref 3.88–5.62)
SODIUM SERPL-SCNC: 139 MMOL/L (ref 135–147)
WBC # BLD AUTO: 13.1 THOUSAND/UL (ref 4.31–10.16)

## 2025-05-09 PROCEDURE — 94761 N-INVAS EAR/PLS OXIMETRY MLT: CPT

## 2025-05-09 PROCEDURE — 94640 AIRWAY INHALATION TREATMENT: CPT

## 2025-05-09 PROCEDURE — 85027 COMPLETE CBC AUTOMATED: CPT | Performed by: INTERNAL MEDICINE

## 2025-05-09 PROCEDURE — 99239 HOSP IP/OBS DSCHRG MGMT >30: CPT | Performed by: INTERNAL MEDICINE

## 2025-05-09 PROCEDURE — 80048 BASIC METABOLIC PNL TOTAL CA: CPT | Performed by: INTERNAL MEDICINE

## 2025-05-09 PROCEDURE — 82948 REAGENT STRIP/BLOOD GLUCOSE: CPT

## 2025-05-09 PROCEDURE — 94760 N-INVAS EAR/PLS OXIMETRY 1: CPT

## 2025-05-09 PROCEDURE — 99232 SBSQ HOSP IP/OBS MODERATE 35: CPT

## 2025-05-09 RX ORDER — TOBRAMYCIN 3 MG/ML
2 SOLUTION/ DROPS OPHTHALMIC
Qty: 1 ML | Refills: 0 | Status: SHIPPED | OUTPATIENT
Start: 2025-05-09 | End: 2025-05-11

## 2025-05-09 RX ORDER — PREDNISONE 10 MG/1
TABLET ORAL
Qty: 30 TABLET | Refills: 0 | Status: SHIPPED | OUTPATIENT
Start: 2025-05-09 | End: 2025-05-21

## 2025-05-09 RX ORDER — METOPROLOL SUCCINATE 50 MG/1
50 TABLET, EXTENDED RELEASE ORAL DAILY
Qty: 30 TABLET | Refills: 0 | Status: SHIPPED | OUTPATIENT
Start: 2025-05-10 | End: 2025-06-09

## 2025-05-09 RX ADMIN — HEPARIN SODIUM 5000 UNITS: 5000 INJECTION INTRAVENOUS; SUBCUTANEOUS at 06:30

## 2025-05-09 RX ADMIN — FINASTERIDE 5 MG: 5 TABLET, FILM COATED ORAL at 08:51

## 2025-05-09 RX ADMIN — INSULIN LISPRO 6 UNITS: 100 INJECTION, SOLUTION INTRAVENOUS; SUBCUTANEOUS at 11:36

## 2025-05-09 RX ADMIN — TAMSULOSIN HYDROCHLORIDE 0.4 MG: 0.4 CAPSULE ORAL at 16:18

## 2025-05-09 RX ADMIN — INSULIN GLARGINE 35 UNITS: 100 INJECTION, SOLUTION SUBCUTANEOUS at 08:49

## 2025-05-09 RX ADMIN — FLUTICASONE PROPIONATE 1 SPRAY: 50 SPRAY, METERED NASAL at 08:52

## 2025-05-09 RX ADMIN — LEVALBUTEROL HYDROCHLORIDE 1.25 MG: 1.25 SOLUTION RESPIRATORY (INHALATION) at 14:05

## 2025-05-09 RX ADMIN — METOPROLOL SUCCINATE 50 MG: 50 TABLET, EXTENDED RELEASE ORAL at 08:51

## 2025-05-09 RX ADMIN — LEVALBUTEROL HYDROCHLORIDE 1.25 MG: 1.25 SOLUTION RESPIRATORY (INHALATION) at 08:52

## 2025-05-09 RX ADMIN — IPRATROPIUM BROMIDE 0.5 MG: 0.5 SOLUTION RESPIRATORY (INHALATION) at 08:52

## 2025-05-09 RX ADMIN — INSULIN LISPRO 5 UNITS: 100 INJECTION, SOLUTION INTRAVENOUS; SUBCUTANEOUS at 16:18

## 2025-05-09 RX ADMIN — DEXTRAN 70, GLYCERIN, HYPROMELLOSE 1 DROP: 1; 2; 3 SOLUTION/ DROPS OPHTHALMIC at 08:53

## 2025-05-09 RX ADMIN — INSULIN LISPRO 5 UNITS: 100 INJECTION, SOLUTION INTRAVENOUS; SUBCUTANEOUS at 11:37

## 2025-05-09 RX ADMIN — TOBRAMYCIN 2 DROP: 3 SOLUTION/ DROPS OPHTHALMIC at 06:32

## 2025-05-09 RX ADMIN — PANTOPRAZOLE SODIUM 40 MG: 40 TABLET, DELAYED RELEASE ORAL at 08:51

## 2025-05-09 RX ADMIN — INSULIN LISPRO 6 UNITS: 100 INJECTION, SOLUTION INTRAVENOUS; SUBCUTANEOUS at 16:18

## 2025-05-09 RX ADMIN — ISOSORBIDE MONONITRATE 60 MG: 30 TABLET, EXTENDED RELEASE ORAL at 08:52

## 2025-05-09 RX ADMIN — AMLODIPINE BESYLATE 10 MG: 2.5 TABLET ORAL at 08:50

## 2025-05-09 RX ADMIN — IPRATROPIUM BROMIDE 0.5 MG: 0.5 SOLUTION RESPIRATORY (INHALATION) at 14:05

## 2025-05-09 RX ADMIN — SODIUM CHLORIDE, PRESERVATIVE FREE 3 ML: 5 INJECTION INTRAVENOUS at 06:34

## 2025-05-09 RX ADMIN — ASPIRIN 81 MG: 81 TABLET, COATED ORAL at 08:51

## 2025-05-09 RX ADMIN — TOBRAMYCIN 2 DROP: 3 SOLUTION/ DROPS OPHTHALMIC at 15:18

## 2025-05-09 RX ADMIN — HEPARIN SODIUM 5000 UNITS: 5000 INJECTION INTRAVENOUS; SUBCUTANEOUS at 15:17

## 2025-05-09 RX ADMIN — METHYLPREDNISOLONE SODIUM SUCCINATE 40 MG: 40 INJECTION, POWDER, FOR SOLUTION INTRAMUSCULAR; INTRAVENOUS at 08:51

## 2025-05-09 RX ADMIN — TOBRAMYCIN 2 DROP: 3 SOLUTION/ DROPS OPHTHALMIC at 11:11

## 2025-05-09 NOTE — ASSESSMENT & PLAN NOTE
Kyler is an 89-year-old male with past medical history of coronary artery disease, COPD (oxygen dependent 3 L), hypertension, hyperlipidemia, diabetes,and obstructive sleep apnea.  He presents to the emergency department from [facility] for shortness of breath and productive cough worsening over the last few days.  He was recently hospitalized between April 7 to 14 for COPD exacerbation and, per the [facility] staff, patient never fully returned to baseline.  The emergency department he was given nebulizers, steroids, and antibiotics and will be admitted for further treatment.  Suspected aspiration pneumonitis due to reverse aspiration/Schatzki ring a/e/b productive cough, CT with esophageal dilation and debris treated with swallow study, EGD with esophageal dilation, vest therapy and flutter device.     Procalcitonin is normal  Lactic acid negative, patient afebrile and without leukocytosis  CT chest shows:  Nodular opacities in the bilateral lungs and surrounding the inferior right kevin as described are most consistent with an infectious/inflammatory infiltrate considering clinical history.   Proximal left lower lobar bronchial occlusion with near complete left lower lobe atelectasis.  Right lower lobe bronchial wall thickening with segmental occlusions.  Likely aspiration-related consideration mid esophageal dilation with debris.  No obvious obstructing mass, consider bronchoscopy for further evaluation.  Pulmonary consulted, appreciate the assistance  Xopenex/Atrovent nebulizers, hold Trelegy for now  Solu-Medrol 40 mg every 12 hours.  Tapered to daily  Currently on 4 L nasal cannula, wean to baseline as tolerated  Procalcitonin 0.15, 0.12 ceftriaxone given in the emergency room.    Hold off on any further antibiotics  Patient is cleared by pulmonary to be discharged on tapering dose of prednisone 40 mg daily and taper by 10 mg every 3 days and follow-up with them as  outpatient.

## 2025-05-09 NOTE — ASSESSMENT & PLAN NOTE
Of unknown severity  Likely secondary to recent influenza infection-previously treated with prednisone  Home medication regime: Trelegy DuoNebs, albuterol nebulizer and albuterol rescue inhaler  WBC 7.11-10.70-11.88  Procalcitonin negative x 3  Start prednisone 40mg tomorrow with 10mg taper q3 days   Continue Atrovent and Xopenex nebulizers  Will arrange outpatient follow-up

## 2025-05-09 NOTE — ASSESSMENT & PLAN NOTE
GI consult appreciated  GI is recommended endoscopy  HOB 30 to 45 degrees  EGD 5/7 showed 5 cm hiatal hernia and Schatzki ring in the GE junction which was dilated to 20 mm. Patient is swallowing without difficulty now.   Continue on Protonix  Patient follow-up with GI

## 2025-05-09 NOTE — ASSESSMENT & PLAN NOTE
Baseline creatinine between 1.0 and 1.25, currently 1.65  Avoid nephrotoxins and hypotension  UA is unremarkable  Creatinine this morning is 0.95  On intravenous Isolyte and trend renal function  Avoid hypotension/nephrotoxic medication  Resolved    Lab Results   Component Value Date    EGFR 69 05/09/2025    EGFR 70 05/08/2025    EGFR 60 05/07/2025    CREATININE 0.96 05/09/2025    CREATININE 0.95 05/08/2025    CREATININE 1.08 05/07/2025

## 2025-05-09 NOTE — ASSESSMENT & PLAN NOTE
Hold oral antidiabetics  Sliding scale insulin, add 5 units with meals  Continue twice daily Lantus  Carbohydrate restrictive diet  Hypoglycemia protocol    Lab Results   Component Value Date    HGBA1C 8.9 (H) 04/08/2025       Recent Labs     05/08/25 2056 05/09/25 0710 05/09/25  0914 05/09/25  1130   POCGLU 234* 77 166* 253*       Blood Sugar Average: Last 72 hrs:  (P) [id number]

## 2025-05-09 NOTE — ANESTHESIA POSTPROCEDURE EVALUATION
Post-Op Assessment Note    CV Status:  Stable    Pain management: adequate    Multimodal analgesia used between 6 hours prior to anesthesia start to PACU discharge    Mental Status:  Alert   PONV Controlled:  Controlled   Airway Patency:  Patent  Two or more mitigation strategies used for obstructive sleep apnea   Post Op Vitals Reviewed: Yes    No anethesia notable event occurred.    Staff: Anesthesiologist           Last Filed PACU Vitals:  Vitals Value Taken Time   Temp     Pulse 86 05/07/25 1138   /98 05/07/25 1138   Resp 24 05/07/25 1138   SpO2 92 % 05/07/25 1138       Modified Rafael:     Vitals Value Taken Time   Activity 2 05/07/25 1138   Respiration 2 05/07/25 1138   Circulation 2 05/07/25 1138   Consciousness 2 05/07/25 1138   Oxygen Saturation 1 05/07/25 1138     Modified Rafael Score: 9

## 2025-05-09 NOTE — RESPIRATORY THERAPY NOTE
Home Oxygen Qualifying Test     Patient name: Kyler Hernandez        : 1935   Date of Test:  May 9, 2025  Diagnosis:    Home Oxygen Test:    **Medicare Guidelines require item(s) 1-5 on all ambulatory patients or 1 and 2 on non-ambulatory patients.    1. Baseline SPO2 on Room Air at rest 91 %   If <= 88% on Room Air add O2 via NC to obtain SpO2 >=88%. If LPM needed, document LPM  needed to reach =>88%    SPO2 during exertion on Room Air 84  %  During exertion monitor SPO2. If SPO2 increases >=89%, do not add supplemental oxygen    SPO2 on Oxygen at Rest 90 % at 4 LPM    SPO2 during exertion on Oxygen 89 % at 6 LPM    Test performed during exertion activity.      [x]  Supplemental Home Oxygen is indicated.    []  Client does not qualify for home oxygen.    Respiratory Additional Notes- pt ambulated approx.75 feet    Deyanira Luna, RT

## 2025-05-09 NOTE — PLAN OF CARE
Attempt made to call report to [email or web address] at [address]. No answer. Will attempt again later. Pt D/C via [employer]. All belongings reconciled and IV removed. F/U and D/C instructions provided.    Pt feels greatly improved after interventions, HCG negative, pt ready for dc w/PCP and neuro f/u. pt given extensive return precautions and f/u instructions which she demonstrated understanding of.

## 2025-05-09 NOTE — CASE MANAGEMENT
Case Management Discharge Planning Note    Patient name Kyler Hernandez  Location /-01 MRN [id number]  : 1935 Date 2025       Current Admission Date: 2025  Current Admission Diagnosis:COPD with acute exacerbation (HCC)   Patient Active Problem List    Diagnosis Date Noted Date Diagnosed    PAC (premature atrial contraction) 2025     Atelectasis, bilateral 2025     Other dysphagia 2025     Acute kidney injury superimposed on stage 3a chronic kidney disease (HCC) 2025     Benign prostatic hyperplasia without lower urinary tract symptoms 2025     Acute bacterial conjunctivitis of both eyes 2025     Chronic hypoxic respiratory failure (HCC) 2025     Stage 3a chronic kidney disease (HCC) 04/15/2025     Acute on chronic respiratory failure with hypoxia (HCC) 2025     Type 2 diabetes mellitus with hyperglycemia, with long-term current use of insulin (HCC) 2025     Pulmonary hypertension (HCC) 2024     Coronary artery disease involving native coronary artery of native heart without angina pectoris 05/10/2024     Primary hypertension 05/10/2024     Benign prostatic hyperplasia with nocturia 05/10/2024     COPD with acute exacerbation (HCC) 05/10/2024     LINCOLN (obstructive sleep apnea) 05/10/2024     B12 deficiency 05/10/2024     Vitamin D deficiency 05/10/2024     Mixed hyperlipidemia 05/10/2024       LOS (days): 5  Geometric Mean LOS (GMLOS) (days): 4.6  Days to GMLOS:-0.3     OBJECTIVE:  Risk of Unplanned Readmission Score: 19.04         Current admission status: Inpatient   Preferred Pharmacy:   [id number] - [place], [place] - [address] [place] [id number]  Phone: [phone] Fax: [phone]    [facility] - [place], [place] - [address] [place] [address]  Phone: [phone] Fax: [phone]    [place], [place] - [address]  [address]  [address]  [facility] [address]  Phone: [phone] Fax: [phone]    Primary Care Provider: Gordon Álvarez MD    Primary Insurance: [employer] REP  Secondary Insurance:     DISCHARGE DETAILS:      DME Referral Provided  Referral made for DME?: Yes  DME referral completed for the following items:: Home Oxygen concentrator  DME Supplier Name:: [employer]    Other Referral/Resources/Interventions Provided:  Interventions: DME  Referral Comments: Referral to [facility] for increase in 02    Number/Name of Dispatcher: SLETS  Transported by (Company and Unit #): [id number]  ETA of Transport (Date): 05/09/25  ETA of Transport (Time): 1945     Additional Comments: CM was informed by [facility] that hospital bed will be delivered between 11-2pm today. CM spoke with Savanna at [facility] who confirmed it was delivered. CM was informed that pt is requiring an increase in 02 needs; 4L at rest and 6L on ambulation. Updated order sent to [facility] via [place]. [facility] states new concentrator will be delivered to [facility] between 4-6pm tonight. CM spoke with Sidney at [facility] who confirmed pt can return with increased 02 need. Tereza asked for specific 02 instruction on dc paperwork. CM faxed dc instructions to Tanvi at [facility] fax# [phone]. Sidney requested a dc this evening. CM arranged with TISH WALL for a 7:45pm dc to [facility]. CM notified Tanvi, pt's bedside RN Arlene, and pt made aware of dc time. Facility transfer form and CMN completed. CMN placed on chart and medical record bin.    Accepting Facility Name, City & State : [facility]  Receiving Facility/Agency Phone Number: [phone]

## 2025-05-09 NOTE — PLAN OF CARE
Problem: PAIN - ADULT  Goal: Verbalizes/displays adequate comfort level or baseline comfort level  Description: Interventions:- Encourage patient to monitor pain and request assistance- Assess pain using appropriate pain scale- Administer analgesics based on type and severity of pain and evaluate response- Implement non-pharmacological measures as appropriate and evaluate response- Consider cultural and social influences on pain and pain management- Notify physician/advanced practitioner if interventions unsuccessful or patient reports new pain  Outcome: Progressing     Problem: INFECTION - ADULT  Goal: Absence or prevention of progression during hospitalization  Description: INTERVENTIONS:- Assess and monitor for signs and symptoms of infection- Monitor lab/diagnostic results- Monitor all insertion sites, i.e. indwelling lines, tubes, and drains- Monitor endotracheal if appropriate and nasal secretions for changes in amount and color- Deeth appropriate cooling/warming therapies per order- Administer medications as ordered- Instruct and encourage patient and family to use good hand hygiene technique- Identify and instruct in appropriate isolation precautions for identified infection/condition  Outcome: Progressing  Goal: Absence of fever/infection during neutropenic period  Description: INTERVENTIONS:- Monitor WBC  Outcome: Progressing     Problem: SAFETY ADULT  Goal: Patient will remain free of falls  Description: INTERVENTIONS:- Educate patient/family on patient safety including physical limitations- Instruct patient to call for assistance with activity - Consult OT/PT to assist with strengthening/mobility - Keep Call bell within reach- Keep bed low and locked with side rails adjusted as appropriate- Keep care items and personal belongings within reach- Initiate and maintain comfort rounds- Make Fall Risk Sign visible to staff- Offer Toileting every 2 Hours, in advance of need- Initiate/Maintain 2alarm-  Obtain necessary fall risk management equipment: 2- Apply yellow socks and bracelet for high fall risk patients- Consider moving patient to room near nurses station  Outcome: Progressing  Goal: Maintain or return to baseline ADL function  Description: INTERVENTIONS:-  Assess patient's ability to carry out ADLs; assess patient's baseline for ADL function and identify physical deficits which impact ability to perform ADLs (bathing, care of mouth/teeth, toileting, grooming, dressing, etc.)- Assess/evaluate cause of self-care deficits - Assess range of motion- Assess patient's mobility; develop plan if impaired- Assess patient's need for assistive devices and provide as appropriate- Encourage maximum independence but intervene and supervise when necessary- Involve family in performance of ADLs- Assess for home care needs following discharge - Consider OT consult to assist with ADL evaluation and planning for discharge- Provide patient education as appropriate  Outcome: Progressing  Goal: Maintains/Returns to pre admission functional level  Description: INTERVENTIONS:- Perform AM-PAC 6 Click Basic Mobility/ Daily Activity assessment daily.- Set and communicate daily mobility goal to care team and patient/family/caregiver. - Collaborate with rehabilitation services on mobility goals if consulted- Perform Range of Motion 2 times a day.- Reposition patient every 2 hours.- Dangle patient 2 times a day- Stand patient 2 times a day- Ambulate patient 2 times a day- Out of bed to chair 2 times a day - Out of bed for meals 2 times a day- Out of bed for toileting- Record patient progress and toleration of activity level   Outcome: Progressing     Problem: DISCHARGE PLANNING  Goal: Discharge to home or other facility with appropriate resources  Description: INTERVENTIONS:- Identify barriers to discharge w/patient and caregiver- Arrange for needed discharge resources and transportation as appropriate- Identify discharge learning  needs (meds, wound care, etc.)- Arrange for interpretive services to assist at discharge as needed- Refer to Case Management Department for coordinating discharge planning if the patient needs post-hospital services based on physician/advanced practitioner order or complex needs related to functional status, cognitive ability, or social support system  Outcome: Progressing     Problem: Knowledge Deficit  Goal: Patient/family/caregiver demonstrates understanding of disease process, treatment plan, medications, and discharge instructions  Description: Complete learning assessment and assess knowledge base.Interventions:- Provide teaching at level of understanding- Provide teaching via preferred learning methods  Outcome: Progressing     Problem: Nutrition/Hydration-ADULT  Goal: Nutrient/Hydration intake appropriate for improving, restoring or maintaining nutritional needs  Description: Monitor and assess patient's nutrition/hydration status for malnutrition. Collaborate with interdisciplinary team and initiate plan and interventions as ordered.  Monitor patient's weight and dietary intake as ordered or per policy. Utilize nutrition screening tool and intervene as necessary. Determine patient's food preferences and provide high-protein, high-caloric foods as appropriate. INTERVENTIONS:- Monitor oral intake, urinary output, labs, and treatment plans- Assess nutrition and hydration status and recommend course of action- Evaluate amount of meals eaten- Assist patient with eating if necessary - Allow adequate time for meals- Recommend/ encourage appropriate diets, oral nutritional supplements, and vitamin/mineral supplements- Order, calculate, and assess calorie counts as needed- Recommend, monitor, and adjust tube feedings and TPN/PPN based on assessed needs- Assess need for intravenous fluids- Provide specific nutrition/hydration education as appropriate- Include patient/family/caregiver in decisions related to  nutrition  Monitor and assess patient's nutrition/hydration status for malnutrition. Collaborate with interdisciplinary team and initiate plan and interventions as ordered.  Monitor patient's weight and dietary intake as ordered or per policy. Utilize nutrition screening tool and intervene as necessary. Determine patient's food preferences and provide high-protein, high-caloric foods as appropriate. INTERVENTIONS:- Monitor oral intake, urinary output, labs, and treatment plans- Assess nutrition and hydration status and recommend course of action- Evaluate amount of meals eaten- Assist patient with eating if necessary - Allow adequate time for meals- Recommend/ encourage appropriate diets, oral nutritional supplements, and vitamin/mineral supplements- Order, calculate, and assess calorie counts as needed- Recommend, monitor, and adjust tube feedings and TPN/PPN based on assessed needs- Assess need for intravenous fluids- Provide specific nutrition/hydration education as appropriate- Include patient/family/caregiver in decisions related to nutrition  Monitor and assess patient's nutrition/hydration status for malnutrition. Collaborate with interdisciplinary team and initiate plan and interventions as ordered.  Monitor patient's weight and dietary intake as ordered or per policy. Utilize nutrition screening tool and intervene as necessary. Determine patient's food preferences and provide high-protein, high-caloric foods as appropriate. INTERVENTIONS:- Monitor oral intake, urinary output, labs, and treatment plans- Assess nutrition and hydration status and recommend course of action- Evaluate amount of meals eaten- Assist patient with eating if necessary - Allow adequate time for meals- Recommend/ encourage appropriate diets, oral nutritional supplements, and vitamin/mineral supplements- Order, calculate, and assess calorie counts as needed- Recommend, monitor, and adjust tube feedings and TPN/PPN based on assessed  needs- Assess need for intravenous fluids- Provide specific nutrition/hydration education as appropriate- Include patient/family/caregiver in decisions related to nutrition  Outcome: Progressing     Problem: RESPIRATORY - ADULT  Goal: Achieves optimal ventilation and oxygenation  Description: INTERVENTIONS:- Assess for changes in respiratory status- Assess for changes in mentation and behavior- Position to facilitate oxygenation and minimize respiratory effort- Oxygen administered by appropriate delivery if ordered- Initiate smoking cessation education as indicated- Encourage broncho-pulmonary hygiene including cough, deep breathe, Incentive Spirometry- Assess the need for suctioning and aspirate as needed- Assess and instruct to report SOB or any respiratory difficulty- Respiratory Therapy support as indicated  Outcome: Progressing     Problem: MUSCULOSKELETAL - ADULT  Goal: Maintain or return mobility to safest level of function  Description: INTERVENTIONS:- Assess patient's ability to carry out ADLs; assess patient's baseline for ADL function and identify physical deficits which impact ability to perform ADLs (bathing, care of mouth/teeth, toileting, grooming, dressing, etc.)- Assess/evaluate cause of self-care deficits - Assess range of motion- Assess patient's mobility- Assess patient's need for assistive devices and provide as appropriate- Encourage maximum independence but intervene and supervise when necessary- Involve family in performance of ADLs- Assess for home care needs following discharge - Consider OT consult to assist with ADL evaluation and planning for discharge- Provide patient education as appropriate  Outcome: Progressing  Goal: Maintain proper alignment of affected body part  Description: INTERVENTIONS:- Support, maintain and protect limb and body alignment- Provide patient/ family with appropriate education  Outcome: Progressing

## 2025-05-09 NOTE — DISCHARGE SUMMARY
Discharge Summary - Hospitalist   Name: Kyler Hernandez 89 y.o. male I MRN: [id number]  Unit/Bed#: -01 I Date of Admission: 5/4/2025   Date of Service: 5/9/2025 I Hospital Day: 5     Assessment & Plan  COPD with acute exacerbation (HCC)  Kyler is an 89-year-old male with past medical history of coronary artery disease, COPD (oxygen dependent 3 L), hypertension, hyperlipidemia, diabetes,and obstructive sleep apnea.  He presents to the emergency department from [facility] for shortness of breath and productive cough worsening over the last few days.  He was recently hospitalized between April 7 to 14 for COPD exacerbation and, per the [facility] staff, patient never fully returned to baseline.  The emergency department he was given nebulizers, steroids, and antibiotics and will be admitted for further treatment.  Suspected aspiration pneumonitis due to reverse aspiration/Schatzki ring a/e/b productive cough, CT with esophageal dilation and debris treated with swallow study, EGD with esophageal dilation, vest therapy and flutter device.     Procalcitonin is normal  Lactic acid negative, patient afebrile and without leukocytosis  CT chest shows:  Nodular opacities in the bilateral lungs and surrounding the inferior right kevin as described are most consistent with an infectious/inflammatory infiltrate considering clinical history.   Proximal left lower lobar bronchial occlusion with near complete left lower lobe atelectasis.  Right lower lobe bronchial wall thickening with segmental occlusions.  Likely aspiration-related consideration mid esophageal dilation with debris.  No obvious obstructing mass, consider bronchoscopy for further evaluation.  Pulmonary consulted, appreciate the assistance  Xopenex/Atrovent nebulizers, hold Trelegy for now  Solu-Medrol 40 mg every 12 hours.  Tapered to daily  Currently on 4 L nasal cannula, wean to baseline as tolerated  Procalcitonin 0.15, 0.12  ceftriaxone given in the emergency room.    Hold off on any further antibiotics  Patient is cleared by pulmonary to be discharged on tapering dose of prednisone 40 mg daily and taper by 10 mg every 3 days and follow-up with them as outpatient.  Acute kidney injury superimposed on stage 3a chronic kidney disease (HCC)  Baseline creatinine between 1.0 and 1.25, currently 1.65  Avoid nephrotoxins and hypotension  UA is unremarkable  Creatinine this morning is 0.95  On intravenous Isolyte and trend renal function  Avoid hypotension/nephrotoxic medication  Resolved    Lab Results   Component Value Date    EGFR 69 05/09/2025    EGFR 70 05/08/2025    EGFR 60 05/07/2025    CREATININE 0.96 05/09/2025    CREATININE 0.95 05/08/2025    CREATININE 1.08 05/07/2025     Coronary artery disease involving native coronary artery of native heart without angina pectoris  No active chest pain  Continue aspirin/statin/isosorbide  Primary hypertension  Blood pressure stable  Continue: Norvasc  Routine vital signs  LINCOLN (obstructive sleep apnea)  Continue CPAP  Mixed hyperlipidemia  Continue statin  Type 2 diabetes mellitus with hyperglycemia, with long-term current use of insulin ([facility])  Hold oral antidiabetics  Sliding scale insulin, add 5 units with meals  Continue twice daily Lantus  Carbohydrate restrictive diet  Hypoglycemia protocol    Lab Results   Component Value Date    HGBA1C 8.9 (H) 04/08/2025       Recent Labs     05/08/25  2056 05/09/25  0710 05/09/25  0914 05/09/25  1130   POCGLU 234* 77 166* 253*       Blood Sugar Average: Last 72 hrs:  (P) [id number]    Benign prostatic hyperplasia without lower urinary tract symptoms  Continue dose equivalent for alfuzosin (tamsulosin 0.4 mg), and finasteride 5 mg daily  Acute bacterial conjunctivitis of both eyes  Patient complains of eye crust recently  Tobramycin drops started in the emergency room  Continue  Acute on chronic respiratory failure with hypoxia ([facility])  Patient  admitted with acute on chronic respiratory failure with hypoxia POA due to COPD exacerbation.  Patient was discharged on 3 L of supplemental oxygen on 4/14/2025  Currently requiring 4 L supplemental oxygen  Patient had home O2 evaluation done and will be discharged with home oxygen.  Wean oxygen as tolerated  See above  Atelectasis, bilateral    Other dysphagia  GI consult appreciated  GI is recommended endoscopy  HOB 30 to 45 degrees  EGD 5/7 showed 5 cm hiatal hernia and Schatzki ring in the GE junction which was dilated to 20 mm. Patient is swallowing without difficulty now.   Continue on Protonix  Patient follow-up with GI  PAC (premature atrial contraction)  Cardiology consult appreciated  As per cardiology patient with some blocked PACs but otherwise is in sinus rhythm with normal rates with occasional PACs and PVCs  Was switched to Toprol-XL and atenolol was discontinued  No further workup needed   Hospital Course:     Kyler Hernandez is a 89 y.o. male patient who originally presented to the hospital on   Admission Orders (From admission, onward)       Ordered        05/04/25 1855  INPATIENT ADMISSION  Once                         due to COPD exacerbation.  Patient was admitted and started on IV Solu-Medrol and was seen by pulmonary.  Patient was seen by GI and underwent endoscopy with dilatation for Schatzki ring  Patient was slowly tapered off Solu-Medrol and is cleared by pulmonary for discharge on tapering dose of prednisone as above.  Patient will be discharged home with home oxygen 4 L at rest and 6 L with exertion.  Is hemodynamically stable and will be discharged back to [address]  On Exam-  Chest-bilateral air entry, clear to auscultation  Abdomen-soft, nontender  Heart-S1 S2 regular  Extremities-no pedal edema or calf tenderness  Neuro-alert awake oriented x3.  No focal deficits    Please see above list of diagnoses and related plan for additional information.   Follow-up with PCP as  outpatient  Follow-up with pulmonary and GI as outpatient    CONSULTING PROVIDERS   IP CONSULT TO PULMONOLOGY  IP CONSULT TO GASTROENTEROLOGY  IP CONSULT TO CARDIOLOGY    PROCEDURES PERFORMED  * No surgery found *    RADIOLOGY RESULTS  EGD  Result Date: 5/7/2025  Narrative: Table formatting from the original result was not included. [facility] [address] [facility] [place] [address] [phone] DATE OF SERVICE: 5/07/25 PHYSICIAN(S): Attending: Yahir Yuen DO Fellow: No Staff Documented INDICATION: Dysphagia POST-OP DIAGNOSIS: See the impression below. PREPROCEDURE: Informed consent was obtained for the procedure, including sedation.  Risks of perforation, hemorrhage, adverse drug reaction and aspiration were discussed. The patient was placed in the left lateral decubitus position. Patient was explained about the risks and benefits of the procedure. Risks including but not limited to bleeding, infection, and perforation were explained in detail. Also explained about less than 100% sensitivity with the exam and other alternatives. PROCEDURE: EGD DETAILS OF PROCEDURE: Patient was taken to the procedure room where a time out was performed to confirm correct patient and correct procedure. The patient underwent monitored anesthesia care, which was administered by an anesthesia professional. The patient's blood pressure, heart rate, oxygen and respirations were monitored throughout the procedure. The scope was introduced through the mouth and advanced to the second part of the duodenum. Retroflexion was performed in the fundus. The patient's estimated blood loss was minimal (<5 mL). The procedure was not difficult. The patient tolerated the procedure well. There were no apparent adverse events. ANESTHESIA INFORMATION: ASA: IV Anesthesia Type: IV Sedation with Anesthesia MEDICATIONS: No administrations occurring from 1049 to 1116 on 05/07/25 FINDINGS: The upper third of the  esophagus, middle third of the esophagus and lower third of the esophagus appeared normal. Performed random biopsy using biopsy forceps to rule out eosinophilic esophagitis. 5 cm sliding hiatal hernia (type I hiatal hernia) without Alexander lesions present - GE junction 40 cm from the incisors, diaphragmatic impression 45 cm from the incisors, confirmed by retroflexion:  Hill classification: Grade III Non-obstructing Schatzki ring in the GE junction (40 cm from the incisors); dilated with balloon dilator from 18 mm starting size to 20 mm end size with step size of 1 mm. Dilation caused improved passage of the scope and improved lumen appearance Mild, localized erythematous mucosa in the antrum, consistent with gastritis; performed cold forceps biopsy to rule out H. pylori The duodenal bulb, 1st part of the duodenum and 2nd part of the duodenum appeared normal. SPECIMENS: ID Type Source Tests Collected by Time Destination 1 : r/o h.pylori Tissue Stomach TISSUE EXAM Yahir Yuen DO 5/7/2025 11:04 AM  2 : middle, r/o EOE Tissue Esophagus TISSUE EXAM Yahir Yuen DO 5/7/2025 11:11 AM      Impression: The upper third of the esophagus, middle third of the esophagus and lower third of the esophagus appeared normal. Performed random biopsy to rule out eosinophilic esophagitis. 5 cm type I hiatal hernia Schatzki ring in the GE junction; dilated to 20 mm end size. Dilation caused improved passage of the scope and improved lumen appearance Mild erythematous mucosa in the antrum, consistent with gastritis; performed cold forceps biopsy to rule out H. pylori The duodenal bulb, 1st part of the duodenum and 2nd part of the duodenum appeared normal. RECOMMENDATION: Await pathology results Follow up with GI Clinic Continue PPI Advance diet per speech recommendations GI will contact the patient with gastric and esophageal biopsies in 1-2 weeks    Yahir Yuen DO     XR chest portable  Result Date:  5/5/2025  Narrative: XR CHEST PORTABLE INDICATION: sob cough. COMPARISON: CXR 4/10/2025, chest CT 5/4/2025. FINDINGS: Left lower lobe atelectasis, better shown on subsequent chest CT. Mild pulmonary venous congestion. No pneumothorax or pleural effusion. Mild cardiomegaly. Bones are unremarkable for age. Normal upper abdomen.     Impression: Mild pulmonary venous congestion. Left lower lobe atelectasis, better shown on subsequent chest CT. Workstation performed: [id number]     CTA chest pe study  Result Date: 5/4/2025  Narrative: CTA - CHEST WITH IV CONTRAST - PULMONARY ANGIOGRAM INDICATION: sob cough. COMPARISON: Outside institution CT abdomen pelvis report from 2010 TECHNIQUE: CTA examination of the chest was performed using angiographic technique according to a protocol specifically tailored to evaluate for pulmonary embolism. Multiplanar 2D reformatted images were created from the source data. In addition, coronal  3D MIP postprocessing was performed on the acquisition scanner. Radiation dose length product (DLP) for this visit: 490.66 mGy-cm. . This examination, like all CT scans performed in the [facility], was performed utilizing techniques to minimize radiation dose exposure, including the use of iterative reconstruction and automated exposure control. IV Contrast: 85 mL of iohexol FINDINGS: PULMONARY ARTERIAL TREE:  No pulmonary embolus. LUNGS: 8 mm nodular opacity in the right upper lobe (2:99). Consolidation and/or atelectasis surrounding the right lower lobe kevin (2:127). Complete left lower lobe atelectasis, atelectasis favored over consolidation secondary to degree of enhancement. Posterior left upper lobe ill-defined nodular opacity (2:124) No tracheal lesion. Right lower lobe bronchial wall thickening with segmental occlusions. Near complete proximal left lower lobar bronchial occlusion. PLEURA: Trace left effusion HEART/GREAT VESSELS: Heart is unremarkable for patient's age. No  thoracic aortic aneurysm. MEDIASTINUM AND DANIEL: Unremarkable. CHEST WALL AND LOWER NECK: Hypodense right thyroid nodule measuring up to 1.3 cm, no specific follow-up recommended. Mildly distended mid esophagus containing debris. VISUALIZED STRUCTURES IN THE UPPER ABDOMEN: Indeterminate left adrenal nodule measuring to 3 cm (2:229), this was described similarly on a outside situation CT report from 2010, and likely represents a benign adenoma. OSSEOUS STRUCTURES: No acute fracture or destructive osseous lesion.     Impression: No pulmonary embolism. Nodular opacities in the bilateral lungs and surrounding the inferior right daniel as described are most consistent with an infectious/inflammatory infiltrate considering clinical history. Follow-up chest CT in 3 months recommended to assess for interval resolution. Proximal left lower lobar bronchial occlusion with near complete left lower lobe atelectasis. Right lower lobe bronchial wall thickening with segmental occlusions. Likely aspiration-related consideration mid esophageal dilation with debris. No obvious obstructing mass, consider bronchoscopy for further evaluation. The study was marked in EPIC for immediate notification. Workstation performed: [id number]     XR chest portable  Result Date: 4/10/2025  Narrative: XR CHEST PORTABLE INDICATION: pna. COMPARISON: Radiograph 4/7/2025 FINDINGS: Bibasilar atelectasis. It is difficult to assess for small pleural effusions, given the semiupright positioning. No visible pneumothorax. Normal cardiomediastinal silhouette. Bones are unremarkable for age. Normal upper abdomen.     Impression: No acute cardiopulmonary disease. Workstation performed: [id number]       LABS  Results from last 7 days   Lab Units 05/09/25  0446 05/08/25  0548 05/07/25  0454 05/06/25  0450 05/06/25  0037 05/05/25  0502 05/04/25  1704 05/04/25  1627   WBC Thousand/uL 13.10* 12.58* 11.88* 10.70* 9.55 7.11  --  7.72   HEMOGLOBIN g/dL 12.7 12.4 12.0 11.5*  11.0* 11.4*  --  11.2*   HEMATOCRIT % 38.8 38.0 37.1 35.6* 34.3* 34.9*  --  35.2*   MCV fL 92 92 91 91 92 93  --  93   TOTAL NEUT ABS Thousand/uL  --  9.69*  --   --   --  6.61  --   --    BANDS PCT %  --   --   --   --   --  1  --   --    PLATELETS Thousands/uL 236 257 221 196 197 168  --  168   INR   --   --   --   --   --   --  1.15  --      Results from last 7 days   Lab Units 05/09/25  0446 05/08/25  0548 05/07/25  0454 05/06/25  0450 05/06/25  0037 05/05/25  0502 05/04/25  1609   SODIUM mmol/L 139 138 136 136 136 139 139   POTASSIUM mmol/L 4.0 4.0 4.1 3.9 3.7 3.7 4.4   CHLORIDE mmol/L 102 100 101 99 97 99 100   CO2 mmol/L 31 32 28 27 29 27 31   BUN mg/dL 26* 29* 38* 41* 41* 31* 25   CREATININE mg/dL 0.96 0.95 1.08 1.33* 1.44* 1.55* 1.65*   CALCIUM mg/dL 8.5 8.5 8.6 8.5 8.4 8.4 8.5   ALBUMIN g/dL  --   --   --   --  3.1* 3.3* 3.4*   TOTAL BILIRUBIN mg/dL  --   --   --   --  0.37 0.50 0.69   ALK PHOS U/L  --   --   --   --  68 73 74   ALT U/L  --   --   --   --  27 24 28   AST U/L  --   --   --   --  19 16 36   EGFR ml/min/1.73sq m 69 70 60 47 42 39 36   GLUCOSE RANDOM mg/dL 82 104 160* 223* 271* 294* 134     Results from last 7 days   Lab Units 05/04/25  1817 05/04/25  1609   HS TNI 0HR ng/L  --  11   HS TNI 2HR ng/L 10  --           Results from last 7 days   Lab Units 05/04/25  1704   D-DIMER QUANTITATIVE ug/ml FEU 0.58*     Results from last 7 days   Lab Units 05/09/25  1130 05/09/25  0914 05/09/25  0710 05/08/25  2056 05/08/25  1611 05/08/25  1120 05/08/25  0712 05/07/25  2044 05/07/25  1602 05/07/25  1159   POC GLUCOSE mg/dl 253* 166* 77 234* 268* 184* 96 172* 164* 110             Results from last 7 days   Lab Units 05/06/25  0450 05/05/25  0502 05/04/25  1609   LACTIC ACID mmol/L  --   --  1.0   PROCALCITONIN ng/ml 0.14   < > 0.15    < > = values in this interval not displayed.           Cultures:   Results from last 7 days   Lab Units 05/04/25  2229   COLOR UA  Yellow   CLARITY UA  Clear   SPEC GRAV UA   <1.005*   PH UA  5.0   LEUKOCYTES UA  Negative   NITRITE UA  Negative   GLUCOSE UA mg/dl 1000 (1%)*   KETONES UA mg/dl Negative   BILIRUBIN UA  Negative   BLOOD UA  Negative      Results from last 7 days   Lab Units 05/04/25  2229   RBC UA /hpf None Seen   WBC UA /hpf 0-1*   EPITHELIAL CELLS WET PREP /hpf None Seen   BACTERIA UA /hpf None Seen      Results from last 7 days   Lab Units 05/04/25  1609   BLOOD CULTURE  No Growth After 4 Days.  No Growth After 4 Days.   INFLUENZA A PCR  Negative       Condition at Discharge:  good      Discharge instructions/Information to patient and family:   See after visit summary for information provided to patient and family.      Provisions for Follow-Up Care:  See after visit summary for information related to follow-up care and any pertinent home health orders.      Disposition:     Home       Discharge Statement:  I spent 40 minutes discharging the patient. This time was spent on the day of discharge. I had direct contact with the patient on the day of discharge. Greater than 50% of the total time was spent examining patient, answering all patient questions, arranging and discussing plan of care with patient as well as directly providing post-discharge instructions.  Additional time then spent on discharge activities.    Discharge Medications:  See after visit summary for reconciled discharge medications provided to patient and family.      ** Please Note: This note has been constructed using a voice recognition system **

## 2025-05-09 NOTE — PLAN OF CARE
Problem: PAIN - ADULT  Goal: Verbalizes/displays adequate comfort level or baseline comfort level  Description: Interventions:- Encourage patient to monitor pain and request assistance- Assess pain using appropriate pain scale- Administer analgesics based on type and severity of pain and evaluate response- Implement non-pharmacological measures as appropriate and evaluate response- Consider cultural and social influences on pain and pain management- Notify physician/advanced practitioner if interventions unsuccessful or patient reports new pain  Outcome: Progressing     Problem: INFECTION - ADULT  Goal: Absence or prevention of progression during hospitalization  Description: INTERVENTIONS:- Assess and monitor for signs and symptoms of infection- Monitor lab/diagnostic results- Monitor all insertion sites, i.e. indwelling lines, tubes, and drains- Monitor endotracheal if appropriate and nasal secretions for changes in amount and color- [place] appropriate cooling/warming therapies per order- Administer medications as ordered- Instruct and encourage patient and family to use good hand hygiene technique- Identify and instruct in appropriate isolation precautions for identified infection/condition  Outcome: Progressing  Goal: Absence of fever/infection during neutropenic period  Description: INTERVENTIONS:- Monitor WBC  Outcome: Progressing     Problem: SAFETY ADULT  Goal: Patient will remain free of falls  Description: INTERVENTIONS:- Educate patient/family on patient safety including physical limitations- Instruct patient to call for assistance with activity - Consult OT/PT to assist with strengthening/mobility - Keep Call bell within reach- Keep bed low and locked with side rails adjusted as appropriate- Keep care items and personal belongings within reach- Initiate and maintain comfort rounds- Make Fall Risk Sign visible to staff- Offer Toileting every 2 Hours, in advance of need- Initiate/Maintain 2alarm-  Obtain necessary fall risk management equipment: 2- Apply yellow socks and bracelet for high fall risk patients- Consider moving patient to room near nurses station  Outcome: Progressing  Goal: Maintain or return to baseline ADL function  Description: INTERVENTIONS:-  Assess patient's ability to carry out ADLs; assess patient's baseline for ADL function and identify physical deficits which impact ability to perform ADLs (bathing, care of mouth/teeth, toileting, grooming, dressing, etc.)- Assess/evaluate cause of self-care deficits - Assess range of motion- Assess patient's mobility; develop plan if impaired- Assess patient's need for assistive devices and provide as appropriate- Encourage maximum independence but intervene and supervise when necessary- Involve family in performance of ADLs- Assess for home care needs following discharge - Consider OT consult to assist with ADL evaluation and planning for discharge- Provide patient education as appropriate  Outcome: Progressing  Goal: Maintains/Returns to pre admission functional level  Description: INTERVENTIONS:- Perform AM-PAC 6 Click Basic Mobility/ Daily Activity assessment daily.- Set and communicate daily mobility goal to care team and patient/family/caregiver. - Collaborate with rehabilitation services on mobility goals if consulted- Perform Range of Motion 2 times a day.- Reposition patient every 2 hours.- Dangle patient 2 times a day- Stand patient 2 times a day- Ambulate patient 2 times a day- Out of bed to chair 2 times a day - Out of bed for meals 2 times a day- Out of bed for toileting- Record patient progress and toleration of activity level   Outcome: Progressing     Problem: DISCHARGE PLANNING  Goal: Discharge to home or other facility with appropriate resources  Description: INTERVENTIONS:- Identify barriers to discharge w/patient and caregiver- Arrange for needed discharge resources and transportation as appropriate- Identify discharge learning  needs (meds, wound care, etc.)- Arrange for interpretive services to assist at discharge as needed- Refer to Case Management Department for coordinating discharge planning if the patient needs post-hospital services based on physician/advanced practitioner order or complex needs related to functional status, cognitive ability, or social support system  Outcome: Progressing     Problem: Knowledge Deficit  Goal: Patient/family/caregiver demonstrates understanding of disease process, treatment plan, medications, and discharge instructions  Description: Complete learning assessment and assess knowledge base.Interventions:- Provide teaching at level of understanding- Provide teaching via preferred learning methods  Outcome: Progressing     Problem: Nutrition/Hydration-ADULT  Goal: Nutrient/Hydration intake appropriate for improving, restoring or maintaining nutritional needs  Description: Monitor and assess patient's nutrition/hydration status for malnutrition. Collaborate with interdisciplinary team and initiate plan and interventions as ordered.  Monitor patient's weight and dietary intake as ordered or per policy. Utilize nutrition screening tool and intervene as necessary. Determine patient's food preferences and provide high-protein, high-caloric foods as appropriate. INTERVENTIONS:- Monitor oral intake, urinary output, labs, and treatment plans- Assess nutrition and hydration status and recommend course of action- Evaluate amount of meals eaten- Assist patient with eating if necessary - Allow adequate time for meals- Recommend/ encourage appropriate diets, oral nutritional supplements, and vitamin/mineral supplements- Order, calculate, and assess calorie counts as needed- Recommend, monitor, and adjust tube feedings and TPN/PPN based on assessed needs- Assess need for intravenous fluids- Provide specific nutrition/hydration education as appropriate- Include patient/family/caregiver in decisions related to  nutrition  Monitor and assess patient's nutrition/hydration status for malnutrition. Collaborate with interdisciplinary team and initiate plan and interventions as ordered.  Monitor patient's weight and dietary intake as ordered or per policy. Utilize nutrition screening tool and intervene as necessary. Determine patient's food preferences and provide high-protein, high-caloric foods as appropriate. INTERVENTIONS:- Monitor oral intake, urinary output, labs, and treatment plans- Assess nutrition and hydration status and recommend course of action- Evaluate amount of meals eaten- Assist patient with eating if necessary - Allow adequate time for meals- Recommend/ encourage appropriate diets, oral nutritional supplements, and vitamin/mineral supplements- Order, calculate, and assess calorie counts as needed- Recommend, monitor, and adjust tube feedings and TPN/PPN based on assessed needs- Assess need for intravenous fluids- Provide specific nutrition/hydration education as appropriate- Include patient/family/caregiver in decisions related to nutrition  Monitor and assess patient's nutrition/hydration status for malnutrition. Collaborate with interdisciplinary team and initiate plan and interventions as ordered.  Monitor patient's weight and dietary intake as ordered or per policy. Utilize nutrition screening tool and intervene as necessary. Determine patient's food preferences and provide high-protein, high-caloric foods as appropriate. INTERVENTIONS:- Monitor oral intake, urinary output, labs, and treatment plans- Assess nutrition and hydration status and recommend course of action- Evaluate amount of meals eaten- Assist patient with eating if necessary - Allow adequate time for meals- Recommend/ encourage appropriate diets, oral nutritional supplements, and vitamin/mineral supplements- Order, calculate, and assess calorie counts as needed- Recommend, monitor, and adjust tube feedings and TPN/PPN based on assessed  needs- Assess need for intravenous fluids- Provide specific nutrition/hydration education as appropriate- Include patient/family/caregiver in decisions related to nutrition  Outcome: Progressing     Problem: RESPIRATORY - ADULT  Goal: Achieves optimal ventilation and oxygenation  Description: INTERVENTIONS:- Assess for changes in respiratory status- Assess for changes in mentation and behavior- Position to facilitate oxygenation and minimize respiratory effort- Oxygen administered by appropriate delivery if ordered- Initiate smoking cessation education as indicated- Encourage broncho-pulmonary hygiene including cough, deep breathe, Incentive Spirometry- Assess the need for suctioning and aspirate as needed- Assess and instruct to report SOB or any respiratory difficulty- Respiratory Therapy support as indicated  Outcome: Progressing      Pulses equal bilaterally, no edema present.

## 2025-05-09 NOTE — PROGRESS NOTES
Progress Note - Pulmonology   Name: Kyler Hernandez 89 y.o. male I MRN: [id number]  Unit/Bed#: -01 I Date of Admission: 5/4/2025   Date of Service: 5/9/2025 I Hospital Day: 5    Assessment & Plan  COPD with acute exacerbation (HCC)  Of unknown severity  Likely secondary to recent influenza infection-previously treated with prednisone  Home medication regime: Trelegy DuoNebs, albuterol nebulizer and albuterol rescue inhaler  WBC 7.11-10.70-11.88  Procalcitonin negative x 3  Start prednisone 40mg tomorrow with 10mg taper q3 days   Continue Atrovent and Xopenex nebulizers  Will arrange outpatient follow-up  Acute on chronic respiratory failure with hypoxia (HCC)  Currently on 5L NC, baseline oxygen needs 2-3 L/min.    Titrate to maintain saturations above 88%  Pulmonary hygiene encouraged: Deep breathing with cough, OOB as tolerated, incentive spirometry and flutter valve  Assess home O2 requirements prior to discharge  Atelectasis, bilateral  Chest CT shows bibasilar atelectasis, suspect mucous plugging.  He is having difficulty expectorating his secretions.     Continue vest therapy and flutter device to help with sputum expectoration.  No evidence of acute infectious process.    Continue to observe off antibiotics.    Other dysphagia  Speech following recommended regular diet and thin liquids  S/p EGD and dilation with GI    24 Hour Events : None  Subjective : Kyler is awake sitting up in his chair. He states breathing is feeling improved and he is ready to go home today. No complaints at this time     Objective :  Temp:  [97.6 °F (36.4 °C)-97.8 °F (36.6 °C)] 97.7 °F (36.5 °C)  HR:  [] 69  BP: (125-168)/(71-97) 168/96  Resp:  [17-20] 17  SpO2:  [90 %-94 %] 94 %  O2 Device: Nasal cannula  Nasal Cannula O2 Flow Rate (L/min):  [4 L/min] 4 L/min    Physical Exam  General appearance:   Alert and awake, in no acute distress  Head:   Normocephalic, without obvious abnormality, atraumatic  Eyes:   No scleral  icterus   Lungs:  Pulmonary effort non labored with rest   Breath sounds:  Bilateral expiratory wheezes with scattered bilateral rhonchi.  Cardiovascular:   Regular rate and rhythm. No murmurs. Capillary refill < 3 seconds.  Abdomen:    No appreciable distension or tenderness  Extremities:   No deformity. No clubbing present. No edema to extremities.   Skin:   Warm and dry. Intact. Color appropriate for ethnicity.  Neurologic:   No acute focal deficits are noted.  Psychiatric:   Normal mood. Answers questions appropriately.       Lab Results: I have reviewed the following results:   .     05/09/25  0446   WBC 13.10*   HGB 12.7   HCT 38.8      SODIUM 139   K 4.0      CO2 31   BUN 26*   CREATININE 0.96   GLUC 82     ABG: No new results in last 24 hours.    Imaging Results Review: I reviewed radiology reports from this admission including: chest xray and CT chest.  Other Study Results Review: No additional pertinent studies reviewed.  PFT Results Reviewed: N/A      Jesika Marin, MSN RN FNP-BC  Nurse Practitioner  [facility]

## 2025-05-09 NOTE — ASSESSMENT & PLAN NOTE
Patient admitted with acute on chronic respiratory failure with hypoxia POA due to COPD exacerbation.  Patient was discharged on 3 L of supplemental oxygen on 4/14/2025  Currently requiring 4 L supplemental oxygen  Patient had home O2 evaluation done and will be discharged with home oxygen.  Wean oxygen as tolerated  See above

## 2025-05-09 NOTE — ASSESSMENT & PLAN NOTE
Cardiology consult appreciated  As per cardiology patient with some blocked PACs but otherwise is in sinus rhythm with normal rates with occasional PACs and PVCs  Was switched to Toprol-XL and atenolol was discontinued  No further workup needed

## 2025-05-09 NOTE — DISCHARGE INSTR - AVS FIRST PAGE
Follow-up with PCP as outpatient  Follow-up with pulmonary and GI as outpatient  Patient to continue 4 L supplemental oxygen at rest and 6 L with exertion to maintain pulse ox equal to greater than 89%

## 2025-05-09 NOTE — ASSESSMENT & PLAN NOTE
Currently on 5L NC, baseline oxygen needs 2-3 L/min.    Titrate to maintain saturations above 88%  Pulmonary hygiene encouraged: Deep breathing with cough, OOB as tolerated, incentive spirometry and flutter valve  Assess home O2 requirements prior to discharge

## 2025-05-09 NOTE — PLAN OF CARE
Problem: Nutrition/Hydration-ADULT  Goal: Nutrient/Hydration intake appropriate for improving, restoring or maintaining nutritional needs  Description: Monitor and assess patient's nutrition/hydration status for malnutrition. Collaborate with interdisciplinary team and initiate plan and interventions as ordered.  Monitor patient's weight and dietary intake as ordered or per policy. Utilize nutrition screening tool and intervene as necessary. Determine patient's food preferences and provide high-protein, high-caloric foods as appropriate. INTERVENTIONS:- Monitor oral intake, urinary output, labs, and treatment plans- Assess nutrition and hydration status and recommend course of action- Evaluate amount of meals eaten- Assist patient with eating if necessary - Allow adequate time for meals- Recommend/ encourage appropriate diets, oral nutritional supplements, and vitamin/mineral supplements- Order, calculate, and assess calorie counts as needed- Recommend, monitor, and adjust tube feedings and TPN/PPN based on assessed needs- Assess need for intravenous fluids- Provide specific nutrition/hydration education as appropriate- Include patient/family/caregiver in decisions related to nutrition  Monitor and assess patient's nutrition/hydration status for malnutrition. Collaborate with interdisciplinary team and initiate plan and interventions as ordered.  Monitor patient's weight and dietary intake as ordered or per policy. Utilize nutrition screening tool and intervene as necessary. Determine patient's food preferences and provide high-protein, high-caloric foods as appropriate. INTERVENTIONS:- Monitor oral intake, urinary output, labs, and treatment plans- Assess nutrition and hydration status and recommend course of action- Evaluate amount of meals eaten- Assist patient with eating if necessary - Allow adequate time for meals- Recommend/ encourage appropriate diets, oral nutritional supplements, and vitamin/mineral  supplements- Order, calculate, and assess calorie counts as needed- Recommend, monitor, and adjust tube feedings and TPN/PPN based on assessed needs- Assess need for intravenous fluids- Provide specific nutrition/hydration education as appropriate- Include patient/family/caregiver in decisions related to nutrition  Monitor and assess patient's nutrition/hydration status for malnutrition. Collaborate with interdisciplinary team and initiate plan and interventions as ordered.  Monitor patient's weight and dietary intake as ordered or per policy. Utilize nutrition screening tool and intervene as necessary. Determine patient's food preferences and provide high-protein, high-caloric foods as appropriate. INTERVENTIONS:- Monitor oral intake, urinary output, labs, and treatment plans- Assess nutrition and hydration status and recommend course of action- Evaluate amount of meals eaten- Assist patient with eating if necessary - Allow adequate time for meals- Recommend/ encourage appropriate diets, oral nutritional supplements, and vitamin/mineral supplements- Order, calculate, and assess calorie counts as needed- Recommend, monitor, and adjust tube feedings and TPN/PPN based on assessed needs- Assess need for intravenous fluids- Provide specific nutrition/hydration education as appropriate- Include patient/family/caregiver in decisions related to nutrition  Outcome: Progressing

## 2025-05-12 ENCOUNTER — RESULTS FOLLOW-UP (OUTPATIENT)
Dept: GASTROENTEROLOGY | Facility: CLINIC | Age: OVER 89
End: 2025-05-12

## 2025-05-12 ENCOUNTER — IN HOME VISIT (OUTPATIENT)
Dept: FAMILY MEDICINE CLINIC | Facility: HOSPITAL | Age: OVER 89
End: 2025-05-12
Payer: COMMERCIAL

## 2025-05-12 ENCOUNTER — TRANSITIONAL CARE MANAGEMENT (OUTPATIENT)
Dept: FAMILY MEDICINE CLINIC | Facility: HOSPITAL | Age: OVER 89
End: 2025-05-12

## 2025-05-12 VITALS
HEART RATE: 86 BPM | RESPIRATION RATE: 16 BRPM | DIASTOLIC BLOOD PRESSURE: 55 MMHG | SYSTOLIC BLOOD PRESSURE: 121 MMHG | BODY MASS INDEX: 34.67 KG/M2 | WEIGHT: 228 LBS

## 2025-05-12 DIAGNOSIS — Z79.4 TYPE 2 DIABETES MELLITUS WITH HYPERGLYCEMIA, WITH LONG-TERM CURRENT USE OF INSULIN (HCC): ICD-10-CM

## 2025-05-12 DIAGNOSIS — E11.65 TYPE 2 DIABETES MELLITUS WITH HYPERGLYCEMIA, WITH LONG-TERM CURRENT USE OF INSULIN (HCC): ICD-10-CM

## 2025-05-12 DIAGNOSIS — N40.0 BENIGN PROSTATIC HYPERPLASIA WITHOUT LOWER URINARY TRACT SYMPTOMS: ICD-10-CM

## 2025-05-12 DIAGNOSIS — N18.31 ACUTE KIDNEY INJURY SUPERIMPOSED ON STAGE 3A CHRONIC KIDNEY DISEASE (HCC): ICD-10-CM

## 2025-05-12 DIAGNOSIS — J96.21 ACUTE ON CHRONIC RESPIRATORY FAILURE WITH HYPOXIA (HCC): ICD-10-CM

## 2025-05-12 DIAGNOSIS — I27.20 PULMONARY HYPERTENSION (HCC): ICD-10-CM

## 2025-05-12 DIAGNOSIS — N17.9 ACUTE KIDNEY INJURY SUPERIMPOSED ON STAGE 3A CHRONIC KIDNEY DISEASE (HCC): ICD-10-CM

## 2025-05-12 DIAGNOSIS — J44.1 COPD WITH ACUTE EXACERBATION (HCC): Primary | ICD-10-CM

## 2025-05-12 DIAGNOSIS — R35.0 URINARY FREQUENCY: ICD-10-CM

## 2025-05-12 PROBLEM — H10.33 ACUTE BACTERIAL CONJUNCTIVITIS OF BOTH EYES: Status: RESOLVED | Noted: 2025-05-04 | Resolved: 2025-05-12

## 2025-05-12 PROCEDURE — 99496 TRANSJ CARE MGMT HIGH F2F 7D: CPT | Performed by: INTERNAL MEDICINE

## 2025-05-12 PROCEDURE — 88305 TISSUE EXAM BY PATHOLOGIST: CPT | Performed by: SPECIALIST

## 2025-05-12 RX ORDER — REPAGLINIDE 0.5 MG/1
TABLET ORAL
Qty: 180 TABLET | Refills: 1 | Status: SHIPPED | OUTPATIENT
Start: 2025-05-12

## 2025-05-12 NOTE — ASSESSMENT & PLAN NOTE
Lab Results   Component Value Date    EGFR 69 05/09/2025    EGFR 70 05/08/2025    EGFR 60 05/07/2025    CREATININE 0.96 05/09/2025    CREATININE 0.95 05/08/2025    CREATININE 1.08 05/07/2025   He had acute kidney injury during the hospital stay in the setting of acute hypoxic respiratory failure.  His renal function returned to baseline on discharge    He complains of incomplete bladder emptying and urinary frequency.    I will check urinalysis to make sure he has no UTI and we will recheck his renal function this week    Orders:  •  Basic metabolic panel; Future

## 2025-05-12 NOTE — UTILIZATION REVIEW
NOTIFICATION OF ADMISSION DISCHARGE   This is a Notification of Discharge from [facility]. Please be advised that this patient has been discharge from our facility. Below you will find the admission and discharge date and time including the patient’s disposition.   UTILIZATION REVIEW CONTACT:  Utilization Review Assistants  Network Utilization Review Department  Phone: [phone] x carefully listen to the prompts. All voicemails are confidential.  Email: [email or web address]     ADMISSION INFORMATION  PRESENTATION DATE: 5/4/2025  3:39 PM  OBERVATION ADMISSION DATE: N/A  INPATIENT ADMISSION DATE: 5/4/25  6:56 PM   DISCHARGE DATE: 5/9/2025  7:36 PM   DISPOSITION:Discharged/Transferred to Long Term Care/Personal Care Home/Assisted Living    Network Utilization Review Department  ATTENTION: Please call with any questions or concerns to [phone] and carefully listen to the prompts so that you are directed to the right person. All voicemails are confidential.   For Discharge needs, contact Care Management DC Support Team at [phone] opt. 2  Send all requests for admission clinical reviews, approved or denied determinations and any other requests to dedicated fax number below belonging to the campus where the patient is receiving treatment. List of dedicated fax numbers for the Facilities:  FACILITY NAME UR FAX NUMBER   ADMISSION DENIALS (Administrative/Medical Necessity) [phone]   DISCHARGE SUPPORT TEAM ([facility]) [phone]   PARENT CHILD HEALTH (Maternity/NICU/Pediatrics) [phone]   [facility] [phone]   [facility] [phone]   [facility] [phone]   [facility] [phone]   [facility] [phone]   [facility] [phone]   [facility] [phone]   [facility]  [facility] [phone]   [facility] [phone]   [facility] [phone]   [facility] [phone]   [facility] [phone]

## 2025-05-12 NOTE — ASSESSMENT & PLAN NOTE
Patient's echo in April of this year showed moderate pulmonary artery systolic hypertension.  RV size And systolic function was normal.  His pulmonary hypertension is likely due to chronic hypoxic respiratory failure, COPD.    Patient's volume is compensated today he will continue torsemide 20 mg daily

## 2025-05-12 NOTE — ASSESSMENT & PLAN NOTE
He has BPH with urinary frequency and nocturia.  He feels that he has to urinate every 1-1/2-2 hours.  Denies hematuria or dysuria.  I will check a urinalysis to make sure he has no UTI after the hospital stay.  He is on maximum dose of alfuzosin and Proscar.    I am requesting a urology consult as patient is still very symptomatic despite maximal medical therapy  Orders:  •  Ambulatory Referral to Urology; Future  •  UA w Reflex to Microscopic w Reflex to Culture; Future   FAMILY HISTORY:  FH: hypertension     no

## 2025-05-12 NOTE — ASSESSMENT & PLAN NOTE
Lab Results   Component Value Date    HGBA1C 8.9 (H) 04/08/2025     Patient has type 2 diabetes with hyperglycemia.  He will continue Jardiance, Lantus, Humalog, Prandin

## 2025-05-12 NOTE — PROGRESS NOTES
Transition of Care Visit:  Name: Kyler Hernandez      : 1935      MRN: [id number]  Encounter Provider: Gordon Álvarez MD  Encounter Date: 2025   Encounter department: [facility] [address]    Assessment & Plan  COPD with acute exacerbation (HCC)  I saw patient at [facility] in facility for TCM management.  He was discharged on May 9 after hospital stay for acute hypoxic respiratory failure due to acute COPD exacerbation, atelectasis.  He was treated with oxygen, IV steroids, nebulizers and flutter valve.  He was transition to p.o. prednisone on discharge.    Patient feels that his breathing has improved, he denies wheezing, chest tightness, chest pain, pleurisy.  His cough has also been getting better.  Denies hemoptysis.    His lungs were clear to auscultation today without wheezing or crackles.    He will continue Trelegy, DuoNeb nebulizers and will finish a tapering course of prednisone.       Acute on chronic respiratory failure with hypoxia (HCC)  He had acute on chronic respiratory failure due to acute COPD exacerbation, atelectasis.  He was discharged on oxygen 6 L/min with exertion  He was in no respiratory distress today at rest with oxygen on.  Continue oxygen       Acute kidney injury superimposed on stage 3a chronic kidney disease (HCC)  Lab Results   Component Value Date    EGFR 69 2025    EGFR 70 2025    EGFR 60 2025    CREATININE 0.96 2025    CREATININE 0.95 2025    CREATININE 1.08 2025   He had acute kidney injury during the hospital stay in the setting of acute hypoxic respiratory failure.  His renal function returned to baseline on discharge    He complains of incomplete bladder emptying and urinary frequency.    I will check urinalysis to make sure he has no UTI and we will recheck his renal function this week    Orders:  •  Basic metabolic panel; Future    Benign prostatic hyperplasia without lower urinary  tract symptoms  He has BPH with urinary frequency and nocturia.  He feels that he has to urinate every 1-1/2-2 hours.  Denies hematuria or dysuria.  I will check a urinalysis to make sure he has no UTI after the hospital stay.  He is on maximum dose of alfuzosin and Proscar.    I am requesting a urology consult as patient is still very symptomatic despite maximal medical therapy  Orders:  •  Ambulatory Referral to Urology; Future  •  UA w Reflex to Microscopic w Reflex to Culture; Future    Pulmonary hypertension (HCC)  Patient's echo in April of this year showed moderate pulmonary artery systolic hypertension.  RV size And systolic function was normal.  His pulmonary hypertension is likely due to chronic hypoxic respiratory failure, COPD.    Patient's volume is compensated today he will continue torsemide 20 mg daily       Type 2 diabetes mellitus with hyperglycemia, with long-term current use of insulin ([facility])    Lab Results   Component Value Date    HGBA1C 8.9 (H) 04/08/2025     Patient has type 2 diabetes with hyperglycemia.  He will continue Jardiance, Lantus, Humalog, Prandin       Urinary frequency    Orders:  •  UA w Reflex to Microscopic w Reflex to Culture; Future         History of Present Illness     Transitional Care Management Review:   Kyler Hernandez is a 89 y.o. male here for TCM follow up.     During the TCM phone call patient stated:  TCM Call (since 4/28/2025)     Date and time call was made  5/12/2025  8:32 AM    Patient was hospitialized at  [facility]    Date of Admission  05/05/25    Date of discharge  05/09/25    Diagnosis  pneumonia    Disposition  Long term care facility    Were the patients medications reviewed and updated  Yes    Current Symptoms  None      TCM Call (since 4/28/2025)     Post hospital issues  None    Did you obtain your prescribed medications  Yes    Do you need help managing your prescriptions or medications  Yes    I have advised the patient to call PCP with  any new or worsening symptoms  Jeanette Licona CMA [facility] [address]    Living Arrangements  Alone        HPI  Review of Systems   Constitutional:  Negative for fever.   Respiratory:  Negative for wheezing.    Cardiovascular:  Negative for chest pain.   Gastrointestinal:  Negative for abdominal pain, blood in stool and constipation.   Genitourinary:  Positive for difficulty urinating. Negative for dysuria and hematuria.     Objective   /55   Pulse 86   Resp 16   Wt 103 kg (228 lb)   BMI 34.67 kg/m²     Physical Exam  Constitutional:       General: He is not in acute distress.     Appearance: He is obese. He is not toxic-appearing.   HENT:      Head: Normocephalic.   Cardiovascular:      Rate and Rhythm: Normal rate and regular rhythm.   Pulmonary:      Effort: No respiratory distress.      Breath sounds: No wheezing or rales.   Abdominal:      General: Bowel sounds are normal. There is no distension.      Palpations: Abdomen is soft. There is no mass.      Tenderness: There is no abdominal tenderness.   Skin:     General: Skin is warm and dry.   Neurological:      Mental Status: He is alert and oriented to person, place, and time.      Cranial Nerves: No cranial nerve deficit.      Motor: No weakness.   Psychiatric:         Mood and Affect: Mood normal.       Medications have been reviewed by provider in current encounter

## 2025-05-12 NOTE — ASSESSMENT & PLAN NOTE
He had acute on chronic respiratory failure due to acute COPD exacerbation, atelectasis.  He was discharged on oxygen 6 L/min with exertion  He was in no respiratory distress today at rest with oxygen on.  Continue oxygen

## 2025-05-12 NOTE — ASSESSMENT & PLAN NOTE
I saw patient at [facility] assisted in facility for TCM management.  He was discharged on May 9 after hospital stay for acute hypoxic respiratory failure due to acute COPD exacerbation, atelectasis.  He was treated with oxygen, IV steroids, nebulizers and flutter valve.  He was transition to p.o. prednisone on discharge.    Patient feels that his breathing has improved, he denies wheezing, chest tightness, chest pain, pleurisy.  His cough has also been getting better.  Denies hemoptysis.    His lungs were clear to auscultation today without wheezing or crackles.    He will continue Trelegy, DuoNeb nebulizers and will finish a tapering course of prednisone.      Left message with patient's mother to return call.

## 2025-05-14 DIAGNOSIS — R35.1 BENIGN PROSTATIC HYPERPLASIA WITH NOCTURIA: ICD-10-CM

## 2025-05-14 DIAGNOSIS — E53.8 B12 DEFICIENCY: ICD-10-CM

## 2025-05-14 DIAGNOSIS — N40.1 BENIGN PROSTATIC HYPERPLASIA WITH NOCTURIA: ICD-10-CM

## 2025-05-15 ENCOUNTER — TELEPHONE (OUTPATIENT)
Age: OVER 89
End: 2025-05-15

## 2025-05-15 DIAGNOSIS — J96.11 CHRONIC HYPOXIC RESPIRATORY FAILURE (HCC): Primary | ICD-10-CM

## 2025-05-15 RX ORDER — ALFUZOSIN HYDROCHLORIDE 10 MG/1
TABLET, EXTENDED RELEASE ORAL
Qty: 30 TABLET | Refills: 5 | Status: SHIPPED | OUTPATIENT
Start: 2025-05-15

## 2025-05-15 RX ORDER — CYANOCOBALAMIN (VITAMIN B-12) 2500 MCG
TABLET, SUBLINGUAL SUBLINGUAL
Qty: 20 TABLET | Refills: 4 | Status: SHIPPED | OUTPATIENT
Start: 2025-05-15

## 2025-05-15 NOTE — TELEPHONE ENCOUNTER
Spoke with Tracey at [place] [facility] and gave her the message from CONCEPCION Boyd. Reviewed date and time with her for the appointment next week with pulmonary. No questions at this time.

## 2025-05-15 NOTE — TELEPHONE ENCOUNTER
Tracey from [facility]  called to report that pt is stating he feels like his night time dose of Lantus  40 units should be decreased to 35 units because his morning BS is on the lower end. Some morning BS were reported     05/15 at 7 :13 am was 81  05/14 at 8:14 am was 75  05/13 at 7:50 am was 64  05/12 at 6:10 am was 115  05/11 at 7:06 am was 115  05/10 was 117.    Pt has dexcome, doesn't know if he shares data with office reports pt usually has to drop off the data. Please advise

## 2025-05-15 NOTE — TELEPHONE ENCOUNTER
Call from tracy Webb MA at [facility] where patient resides. She states patient is questioning the need for  the pulmonary rehab evaluation that was ordered when he was discharged fro [facility] after his admission there.      She states that he is using 4L of O2 during the night and 6L is ordered during the day; however, most times, the patient turns the oxygen down to 4.5L during the day. His O2 sats run 94%.    She is also requesting that an updated order be sent to [facility] to increase the amount of oxygen they deliver. She said he is running out on Friday and delivery is on Monday, so he stays in his room all weekend.    Please call Tracey at [phone].

## 2025-05-16 NOTE — TELEPHONE ENCOUNTER
I spoke with Tracey and made her aware via phone call and she also asked for a faxed Doctor's order for the decrease in the Lantus.  It was faxed over to [phone]

## 2025-05-20 ENCOUNTER — OFFICE VISIT (OUTPATIENT)
Age: OVER 89
End: 2025-05-20
Payer: COMMERCIAL

## 2025-05-20 VITALS
DIASTOLIC BLOOD PRESSURE: 68 MMHG | HEIGHT: 68 IN | HEART RATE: 105 BPM | BODY MASS INDEX: 34.37 KG/M2 | TEMPERATURE: 97.2 F | SYSTOLIC BLOOD PRESSURE: 140 MMHG | WEIGHT: 226.8 LBS | OXYGEN SATURATION: 93 %

## 2025-05-20 DIAGNOSIS — J96.11 CHRONIC HYPOXIC RESPIRATORY FAILURE (HCC): Primary | ICD-10-CM

## 2025-05-20 DIAGNOSIS — G47.33 OSA (OBSTRUCTIVE SLEEP APNEA): ICD-10-CM

## 2025-05-20 DIAGNOSIS — J43.2 CENTRILOBULAR EMPHYSEMA (HCC): ICD-10-CM

## 2025-05-20 PROCEDURE — 99213 OFFICE O/P EST LOW 20 MIN: CPT

## 2025-05-20 PROCEDURE — 94618 PULMONARY STRESS TESTING: CPT

## 2025-05-20 NOTE — ASSESSMENT & PLAN NOTE
He was discharged home from hospital on 4L with rest and 6L with exertion. Prior to hospitalization patient maintained on 2L with exertion. Has been wearing 3L continuously.  Will complete 6-minute walk in office today to reassess oxygen demands  Patient was initially saturating 93% on room air at rest, and then desaturated to 85% on exertion. Patient was then placed on 2L of oxygen by nasal cannula and then made to ambulate, oxygen saturations remained at 90% on exertion with the 2L. He ambulated 108 meters total.   Discussed with the patient to continue with 2L of oxygen by nasal cannula with exertion.  Placed order for home oxygen with portability and portable concentrator  Orders:    POCT Oxygen Titration    Home Oxygen with Portability    Portable Concentrators

## 2025-05-20 NOTE — ASSESSMENT & PLAN NOTE
Had diagnostic sleep study scheduled 7/23/25, patient cancelled due to transportation issues  Has 25 year old CPAP, still using this machine. We discussed in order to get new machine he must complete a sleep study. He will reconsider this.

## 2025-05-20 NOTE — PROGRESS NOTES
Follow-up  Visit - Pulmonary Medicine   Name: Kyler Hernandez      : 1935      MRN: [id number]  Encounter Provider: CONCEPCION Reed  Encounter Date: 2025   Encounter department: [facility]  :  Assessment & Plan  Chronic hypoxic respiratory failure (HCC)  He was discharged home from hospital on 4L with rest and 6L with exertion. Prior to hospitalization patient maintained on 2L with exertion. Has been wearing 3L continuously.  Will complete 6-minute walk in office today to reassess oxygen demands  Patient was initially saturating 93% on room air at rest, and then desaturated to 85% on exertion. Patient was then placed on 2L of oxygen by nasal cannula and then made to ambulate, oxygen saturations remained at 90% on exertion with the 2L. He ambulated 108 meters total.   Discussed with the patient to continue with 2L of oxygen by nasal cannula with exertion.  Placed order for home oxygen with portability and portable concentrator  Orders:    POCT Oxygen Titration    Home Oxygen with Portability    Portable Concentrators    Centrilobular emphysema (HCC)  Emphysema noted on prior CT chest imaging. Patient with no history of PFTs. Given his age, would not recommend due to likelihood of inaccurate results   Reviewed CT chest from hospitalization, with some bilateral nodular opacities noted. Will repeat CT chest in 3 months for interval resolution  Continue Trelegy 200 one puff once daily, albuterol nebulizer/inhaler as needed  Referral placed for pulmonary rehab upon hospital discharge, did with [facility] in  and felt no benefit, does not want to do again   Orders:    CT chest without contrast; Future    LINCOLN (obstructive sleep apnea)  Had diagnostic sleep study scheduled 25, patient cancelled due to transportation issues  Has 25 year old CPAP, still using this machine. We discussed in order to get new machine he must complete a sleep study. He will reconsider this.             Return in about 3 months (around 2025) for Next scheduled follow up.    History of Present Illness   Kyler Hernandez is a 89 y.o. male who presents for hospital follow up. He was admitted 25 - 25 for COPD exacerbation likely secondary to recent influenza infection. He was treated with steroids, nebulizer treatments and oxygen. He was discharged home on prednisone taper and oxygen 4L with rest 6L with exertion.    He resides at [facility].  He is compliant with his Trelegy, states he has been taking it since  and does not note any difference with taking it.  Does have albuterol inhaler and nebulizer which he uses nebulizer once daily in mornings. He feels he is back to his respiratory baseline. Denies any significant cough or wheezing. Is wearing 3-4L continuously and frequently spot checking his spo2.    He is a former smoker.  He smoked 2.5ppd for 23 years.  He quit 36 years ago in .     Review of Systems   Constitutional:  Negative for chills and fever.   HENT:  Negative for congestion, ear pain and sore throat.    Eyes:  Negative for pain and visual disturbance.   Respiratory:  Negative for cough, shortness of breath and wheezing.    Cardiovascular:  Negative for chest pain and palpitations.   Gastrointestinal:  Negative for abdominal pain and vomiting.   Musculoskeletal:  Negative for arthralgias and back pain.   Neurological:  Negative for dizziness and syncope.   All other systems reviewed and are negative.      Aside from what is mentioned in the HPI, ROS is otherwise negative    Medical History Reviewed by provider this encounter:  Tobacco  Allergies  Meds  Problems  Med Hx  Surg Hx  Fam Hx     .  Medications Ordered Prior to Encounter[1]   Social History     Tobacco Use    Smoking status: Former     Types: Cigarettes     Start date:      Quit date: 1945     Years since quittin.4    Smokeless tobacco: Never   Vaping Use    Vaping status: Never Used  "  Substance and Sexual Activity    Alcohol use: Yes     Comment: occasional whiskey sour    Drug use: Never    Sexual activity: Not Currently        Medical History Reviewed by provider this encounter:  Tobacco  Allergies  Meds  Problems  Med Hx  Surg Hx  Fam Hx     .    Objective   /68 (BP Location: Left arm, Patient Position: Sitting, Cuff Size: Standard)   Pulse 105   Temp (!) 97.2 °F (36.2 °C) (Tympanic)   Ht 5' 8\" (1.727 m)   Wt 103 kg (226 lb 12.8 oz)   SpO2 93%   BMI 34.48 kg/m²     Physical Exam  Vitals and nursing note reviewed.   Constitutional:       General: He is not in acute distress.     Appearance: He is well-developed.   HENT:      Head: Normocephalic and atraumatic.      Nose: Nose normal.     Eyes:      Conjunctiva/sclera: Conjunctivae normal.       Cardiovascular:      Rate and Rhythm: Normal rate.   Pulmonary:      Effort: Pulmonary effort is normal. No respiratory distress.      Breath sounds: Decreased breath sounds present. No wheezing or rhonchi.     Musculoskeletal:         General: No swelling. Normal range of motion.      Cervical back: Normal range of motion and neck supple.     Skin:     General: Skin is warm and dry.     Neurological:      General: No focal deficit present.      Mental Status: He is alert and oriented to person, place, and time.     Psychiatric:         Mood and Affect: Mood normal.           Diagnostic Data:  Labs: I personally reviewed the most recent laboratory data pertinent to today's visit.      Radiology results:  Radiology Results Review: I have reviewed radiology reports from 5/4/25 including: CT chest.  No pulmonary embolism.  Nodular opacities in the bilateral lungs and surrounding the inferior right kevin as described are most consistent with an infectious/inflammatory infiltrate considering clinical history.  Follow-up chest CT in 3 months recommended to assess for interval resolution.  Proximal left lower lobar bronchial occlusion with " "near complete left lower lobe atelectasis.  Right lower lobe bronchial wall thickening with segmental occlusions.  Likely aspiration-related consideration mid esophageal dilation with debris.  No obvious obstructing mass, consider bronchoscopy for further evaluation      PFT/spirometry results:  No results found for: \"FEV1\", \"FVC\", \"HRZ6UWC\", \"TLC\", \"DLCO\"       Oximetry testing:  See above      Jesika Marin, MSN RN FNP-BC  Nurse Practitioner  [facility]         [1]   Current Outpatient Medications on File Prior to Visit   Medication Sig Dispense Refill    albuterol (2.5 mg/3 mL) 0.083 % nebulizer solution Take 3 mL (2.5 mg total) by nebulization every 6 (six) hours as needed for wheezing or shortness of breath 1 vial q 4 hrs prn 180 mL 5    Alcohol Swabs (Alcohol Prep Pad) 70 % PADS Use in the morning As directed 100 each 5    alfuzosin (UROXATRAL) 10 mg 24 hr tablet 1 TAB ORALLY EVERY MORNING DX: BPH 30 tablet 5    amLODIPine (NORVASC) 10 mg tablet 1 TAB ORALLY EVERY MORNING DX: HYPERTENSION 30 tablet 5    aspirin (Aspirin Low Dose) 81 mg EC tablet 1 TAB ORALLY EVERY MORNING DX: STROKE PREVENTION 30 tablet 5    atorvastatin (LIPITOR) 80 mg tablet 1 TAB ORALLY AT BEDTIME DX:HYPERLIPIDEMIA 30 tablet 10    Cholecalciferol (Vitamin D3) 25 MCG (1000 UT) CAPS Take 1 capsule (1,000 Units total) by mouth in the morning 1 daily 30 capsule 11    Continuous Glucose  (Dexcom G7 ) ZAYNAB       Continuous Glucose Sensor (Dexcom G7 Sensor) USE PER  DIRECTIONS. CHANGE SENSOR EVERY 10 DAYS. 9 each 1    Cyanocobalamin (Vitamin B-12) 2500 MCG SUBL 1 TAB UNDER THE TONGUE 5 TIMES/WK (OMIT SA,WESTON) AT 8:30AM DX: SUPPLEMENT *COLD SEAL* 20 tablet 4    cycloSPORINE (Restasis) 0.05 % ophthalmic emulsion INSTILL 1 DROP INTO BOTH EYES TWICE DAILY DX: DRY EYES **NIOSH 2 HAZARDOUS DRUG** 5.5 mL 4    Empagliflozin (Jardiance) 10 MG TABS tablet 1 TAB ORALLY EVERY MORNING DX: DIABETES " (IDDM) 30 tablet 5    finasteride (PROSCAR) 5 mg tablet 1 TAB ORALLY EVERY MORNING DX: BPH **NIOSH 3 HAZARDOUS DRUG** 30 tablet 5    fluticasone (FLONASE) 50 mcg/act nasal spray every 24 hours      Insulin Glargine Solostar (Lantus SoloStar) 100 UNIT/ML SOPN INJECT 35U SUB-Q EVERY MORNING DX: DIABETES DX: **DISCARD UNUSED PEN 28 DAYS AFTER 1ST USE** INJECT 40 UNITS SUB-Q AT BEDTIME DX; DIABETES 15 mL 5    insulin lispro (HumaLOG KwikPen) 100 units/mL injection pen Inject 1-10 Units under the skin 3 (three) times a day with meals MDD 30u, per scale. 150-200=1u, 200-250 =2u, 250-300= 3u, 300-350=4u, >350 = 6u 15 mL 1    Insulin Pen Needle (BD AutoShield Duo) 30G X 5 MM MISC USE FOR INSULIN INJECTION 3 TIMES DAILY 100 each 1    isosorbide mononitrate (IMDUR) 60 mg 24 hr tablet 1 TAB ORALLY EVERY MORNING DX:ANGINA 30 tablet 5    loperamide (IMODIUM) 2 mg capsule 1 CAP ORALLY DAILY AS NEEDED FOR DIARRHEA *NOT TO EXCEED 8 CAPS/24 HR* 30 capsule 4    losartan (COZAAR) 100 MG tablet 1 TAB ORALLY EVERY MORNING DX: HYPERTENSION 30 tablet 10    metoprolol succinate (TOPROL-XL) 50 mg 24 hr tablet Take 1 tablet (50 mg total) by mouth daily 30 tablet 0    Multiple Vitamin (Daily-Jhonatan) TABS 1 TAB ORALLY EVERY MORNING DX: SUPPLEMENT 30 tablet 5    NovoFine Autocover Pen Needle 30G X 8 MM MISC 2 pen needles daily      Nutritional Supplements (Ensure) Take by mouth 1 can daily      nystatin (MYCOSTATIN) powder Apply topically 2 (two) times a day 15 g 0    pantoprazole (PROTONIX) 40 mg tablet 1 TAB ORALLY EVERY MORNING DX: GERD 30 tablet 5    predniSONE 10 mg tablet Take 4 tablets (40 mg total) by mouth daily for 3 days, THEN 3 tablets (30 mg total) daily for 3 days, THEN 2 tablets (20 mg total) daily for 3 days, THEN 1 tablet (10 mg total) daily for 3 days. 30 tablet 0    repaglinide (PRANDIN) 0.5 mg tablet Take 1 tablet with breakfast and 1 tablet with dinner, hold if not eating or blood sugar less than 100 180 tablet 1    Safety  Lancets MISC Use in the morning 100 each 5    torsemide (DEMADEX) 20 mg tablet Take 1 tablet (20 mg total) by mouth every morning 30 tablet 5    Trelegy Ellipta 200-62.5-25 MCG/ACT AEPB inhaler INHALE 1 PUFF ORALLY EVERY MORNING DX: COPD *RINSE MOUTH AFTER USE* 60 blister 0    ipratropium-albuterol (DUO-NEB) 0.5-2.5 mg/3 mL nebulizer solution 4 times daily prn wheezing       No current facility-administered medications on file prior to visit.

## 2025-05-21 ENCOUNTER — RESULTS FOLLOW-UP (OUTPATIENT)
Dept: FAMILY MEDICINE CLINIC | Facility: HOSPITAL | Age: OVER 89
End: 2025-05-21

## 2025-05-21 ENCOUNTER — APPOINTMENT (OUTPATIENT)
Dept: LAB | Facility: HOSPITAL | Age: OVER 89
End: 2025-05-21
Payer: COMMERCIAL

## 2025-05-21 DIAGNOSIS — R35.0 URINARY FREQUENCY: ICD-10-CM

## 2025-05-21 DIAGNOSIS — N40.0 BENIGN PROSTATIC HYPERPLASIA WITHOUT LOWER URINARY TRACT SYMPTOMS: ICD-10-CM

## 2025-05-21 DIAGNOSIS — N18.31 ACUTE KIDNEY INJURY SUPERIMPOSED ON STAGE 3A CHRONIC KIDNEY DISEASE (HCC): ICD-10-CM

## 2025-05-21 DIAGNOSIS — E11.69 TYPE 2 DIABETES MELLITUS WITH OTHER SPECIFIED COMPLICATION, WITH LONG-TERM CURRENT USE OF INSULIN (HCC): ICD-10-CM

## 2025-05-21 DIAGNOSIS — Z79.4 TYPE 2 DIABETES MELLITUS WITH OTHER SPECIFIED COMPLICATION, WITH LONG-TERM CURRENT USE OF INSULIN (HCC): ICD-10-CM

## 2025-05-21 DIAGNOSIS — N17.9 ACUTE KIDNEY INJURY SUPERIMPOSED ON STAGE 3A CHRONIC KIDNEY DISEASE (HCC): ICD-10-CM

## 2025-05-21 LAB
ALBUMIN SERPL BCG-MCNC: 3.3 G/DL (ref 3.5–5)
ALP SERPL-CCNC: 50 U/L (ref 34–104)
ALT SERPL W P-5'-P-CCNC: 30 U/L (ref 7–52)
ANION GAP SERPL CALCULATED.3IONS-SCNC: 7 MMOL/L (ref 4–13)
AST SERPL W P-5'-P-CCNC: 15 U/L (ref 13–39)
BILIRUB SERPL-MCNC: 0.8 MG/DL (ref 0.2–1)
BILIRUB UR QL STRIP: NEGATIVE
BUN SERPL-MCNC: 24 MG/DL (ref 5–25)
CALCIUM ALBUM COR SERPL-MCNC: 9.1 MG/DL (ref 8.3–10.1)
CALCIUM SERPL-MCNC: 8.5 MG/DL (ref 8.4–10.2)
CHLORIDE SERPL-SCNC: 99 MMOL/L (ref 96–108)
CLARITY UR: CLEAR
CO2 SERPL-SCNC: 35 MMOL/L (ref 21–32)
COLOR UR: COLORLESS
CREAT SERPL-MCNC: 1.03 MG/DL (ref 0.6–1.3)
CREAT UR-MCNC: 33.2 MG/DL
DME PARACHUTE DELIVERY DATE REQUESTED: NORMAL
DME PARACHUTE ITEM DESCRIPTION: NORMAL
DME PARACHUTE ORDER STATUS: NORMAL
DME PARACHUTE SUPPLIER NAME: NORMAL
DME PARACHUTE SUPPLIER PHONE: NORMAL
EST. AVERAGE GLUCOSE BLD GHB EST-MCNC: 240 MG/DL
GFR SERPL CREATININE-BSD FRML MDRD: 64 ML/MIN/1.73SQ M
GLUCOSE P FAST SERPL-MCNC: 209 MG/DL (ref 65–99)
GLUCOSE UR STRIP-MCNC: ABNORMAL MG/DL
HBA1C MFR BLD: 10 %
HGB UR QL STRIP.AUTO: NEGATIVE
KETONES UR STRIP-MCNC: NEGATIVE MG/DL
LEUKOCYTE ESTERASE UR QL STRIP: NEGATIVE
MICROALBUMIN UR-MCNC: <7 MG/L
NITRITE UR QL STRIP: NEGATIVE
PH UR STRIP.AUTO: 6 [PH]
POTASSIUM SERPL-SCNC: 4.2 MMOL/L (ref 3.5–5.3)
PROT SERPL-MCNC: 5.7 G/DL (ref 6.4–8.4)
PROT UR STRIP-MCNC: NEGATIVE MG/DL
SODIUM SERPL-SCNC: 141 MMOL/L (ref 135–147)
SP GR UR STRIP.AUTO: 1.01 (ref 1–1.03)
UROBILINOGEN UR STRIP-ACNC: <2 MG/DL

## 2025-05-21 PROCEDURE — 36415 COLL VENOUS BLD VENIPUNCTURE: CPT

## 2025-05-21 PROCEDURE — 80053 COMPREHEN METABOLIC PANEL: CPT

## 2025-05-21 PROCEDURE — 83036 HEMOGLOBIN GLYCOSYLATED A1C: CPT

## 2025-05-23 DIAGNOSIS — Z79.4 TYPE 2 DIABETES MELLITUS WITH HYPERGLYCEMIA, WITH LONG-TERM CURRENT USE OF INSULIN (HCC): Primary | ICD-10-CM

## 2025-05-23 DIAGNOSIS — E11.65 TYPE 2 DIABETES MELLITUS WITH HYPERGLYCEMIA, WITH LONG-TERM CURRENT USE OF INSULIN (HCC): Primary | ICD-10-CM

## 2025-05-23 RX ORDER — PEN NEEDLE, DIABETIC, SAFETY 30 GX3/16"
NEEDLE, DISPOSABLE MISCELLANEOUS
Qty: 400 EACH | Refills: 1 | Status: SHIPPED | OUTPATIENT
Start: 2025-05-23

## 2025-05-23 RX ORDER — LANCETS 26 GAUGE
EACH MISCELLANEOUS DAILY
Qty: 400 EACH | Refills: 2 | Status: SHIPPED | OUTPATIENT
Start: 2025-05-23

## 2025-05-23 NOTE — TELEPHONE ENCOUNTER
Per Tracey new orders needed to be placed for the patients supplies.  I called [facility] and she stated that needed new script with new orders for his test strips, lancets and pen needles.

## 2025-05-27 ENCOUNTER — TELEPHONE (OUTPATIENT)
Age: OVER 89
End: 2025-05-27

## 2025-05-27 DIAGNOSIS — I10 PRIMARY HYPERTENSION: ICD-10-CM

## 2025-05-27 RX ORDER — TORSEMIDE 20 MG/1
TABLET ORAL
Qty: 30 TABLET | Refills: 5 | Status: SHIPPED | OUTPATIENT
Start: 2025-05-27 | End: 2025-06-26

## 2025-05-27 NOTE — TELEPHONE ENCOUNTER
New Patient      Insurance   Current Insurance?[employer], Complete medicare    Insurance E-verified?     History   Reason for appointment/active symptoms?  BPH      Has the patient had any previous Urologist(s)? [facility] 2022     Was the patient seen in the ED? no      Labs/Imaging(Including Out Of Network)?  unsure      Records Requested?   Records Visible in EPIC? no      Personal history of cancer? no      Appointment   Office location preference:    ?   Appointment Details   Date:  07/08  Time:  10am   Location:  [facility]   Provider:  Joelle   Does the appointment need further review?

## 2025-05-28 LAB
DME PARACHUTE DELIVERY DATE EXPECTED: NORMAL
DME PARACHUTE DELIVERY DATE REQUESTED: NORMAL
DME PARACHUTE ITEM DESCRIPTION: NORMAL
DME PARACHUTE ORDER STATUS: NORMAL
DME PARACHUTE SUPPLIER NAME: NORMAL
DME PARACHUTE SUPPLIER PHONE: NORMAL

## 2025-06-02 DIAGNOSIS — J44.9 CHRONIC OBSTRUCTIVE PULMONARY DISEASE, UNSPECIFIED COPD TYPE (HCC): Primary | ICD-10-CM

## 2025-06-02 PROBLEM — J96.21 ACUTE ON CHRONIC RESPIRATORY FAILURE WITH HYPOXIA (HCC): Status: RESOLVED | Noted: 2025-04-07 | Resolved: 2025-06-02

## 2025-06-02 PROBLEM — J44.1 COPD WITH ACUTE EXACERBATION (HCC): Status: RESOLVED | Noted: 2024-05-10 | Resolved: 2025-06-02

## 2025-06-02 RX ORDER — IPRATROPIUM BROMIDE AND ALBUTEROL SULFATE 2.5; .5 MG/3ML; MG/3ML
3 SOLUTION RESPIRATORY (INHALATION) 4 TIMES DAILY PRN
Qty: 360 ML | Refills: 5 | Status: SHIPPED | OUTPATIENT
Start: 2025-06-02

## 2025-06-10 DIAGNOSIS — E11.65 TYPE 2 DIABETES MELLITUS WITH HYPERGLYCEMIA, WITH LONG-TERM CURRENT USE OF INSULIN (HCC): ICD-10-CM

## 2025-06-10 DIAGNOSIS — I10 PRIMARY HYPERTENSION: ICD-10-CM

## 2025-06-10 DIAGNOSIS — R19.7 DIARRHEA, UNSPECIFIED TYPE: ICD-10-CM

## 2025-06-10 DIAGNOSIS — Z79.4 TYPE 2 DIABETES MELLITUS WITH HYPERGLYCEMIA, WITH LONG-TERM CURRENT USE OF INSULIN (HCC): ICD-10-CM

## 2025-06-11 RX ORDER — AMLODIPINE BESYLATE 10 MG/1
10 TABLET ORAL EVERY MORNING
Qty: 30 TABLET | Refills: 5 | Status: SHIPPED | OUTPATIENT
Start: 2025-06-11

## 2025-06-11 RX ORDER — PANTOPRAZOLE SODIUM 40 MG/1
40 TABLET, DELAYED RELEASE ORAL EVERY MORNING
Qty: 30 TABLET | Refills: 5 | Status: SHIPPED | OUTPATIENT
Start: 2025-06-11

## 2025-06-11 RX ORDER — FINASTERIDE 5 MG/1
TABLET, FILM COATED ORAL
Qty: 30 TABLET | Refills: 5 | Status: SHIPPED | OUTPATIENT
Start: 2025-06-11

## 2025-06-11 RX ORDER — EMPAGLIFLOZIN 10 MG/1
TABLET, FILM COATED ORAL
Qty: 30 TABLET | Refills: 5 | Status: SHIPPED | OUTPATIENT
Start: 2025-06-11 | End: 2025-06-12

## 2025-06-11 RX ORDER — ASPIRIN 81 MG/1
81 TABLET, COATED ORAL EVERY MORNING
Qty: 30 TABLET | Refills: 5 | Status: SHIPPED | OUTPATIENT
Start: 2025-06-11

## 2025-06-11 RX ORDER — ISOSORBIDE MONONITRATE 60 MG/1
60 TABLET, EXTENDED RELEASE ORAL EVERY MORNING
Qty: 30 TABLET | Refills: 5 | Status: SHIPPED | OUTPATIENT
Start: 2025-06-11

## 2025-06-12 ENCOUNTER — IN HOME VISIT (OUTPATIENT)
Dept: FAMILY MEDICINE CLINIC | Facility: HOSPITAL | Age: OVER 89
End: 2025-06-12
Payer: COMMERCIAL

## 2025-06-12 VITALS
SYSTOLIC BLOOD PRESSURE: 136 MMHG | TEMPERATURE: 97.2 F | RESPIRATION RATE: 16 BRPM | HEART RATE: 88 BPM | BODY MASS INDEX: 35.28 KG/M2 | WEIGHT: 232 LBS | DIASTOLIC BLOOD PRESSURE: 59 MMHG

## 2025-06-12 DIAGNOSIS — Z79.4 TYPE 2 DIABETES MELLITUS WITH HYPERGLYCEMIA, WITH LONG-TERM CURRENT USE OF INSULIN (HCC): ICD-10-CM

## 2025-06-12 DIAGNOSIS — J44.9 CHRONIC OBSTRUCTIVE PULMONARY DISEASE, UNSPECIFIED COPD TYPE (HCC): Primary | ICD-10-CM

## 2025-06-12 DIAGNOSIS — I27.20 PULMONARY HYPERTENSION (HCC): ICD-10-CM

## 2025-06-12 DIAGNOSIS — E11.65 TYPE 2 DIABETES MELLITUS WITH HYPERGLYCEMIA, WITH LONG-TERM CURRENT USE OF INSULIN (HCC): ICD-10-CM

## 2025-06-12 PROBLEM — N17.9 ACUTE KIDNEY INJURY SUPERIMPOSED ON STAGE 3A CHRONIC KIDNEY DISEASE (HCC): Status: RESOLVED | Noted: 2025-05-04 | Resolved: 2025-06-12

## 2025-06-12 PROBLEM — N18.31 ACUTE KIDNEY INJURY SUPERIMPOSED ON STAGE 3A CHRONIC KIDNEY DISEASE (HCC): Status: RESOLVED | Noted: 2025-05-04 | Resolved: 2025-06-12

## 2025-06-12 PROCEDURE — 99349 HOME/RES VST EST MOD MDM 40: CPT | Performed by: INTERNAL MEDICINE

## 2025-06-12 RX ORDER — INSULIN GLARGINE 100 [IU]/ML
40 INJECTION, SOLUTION SUBCUTANEOUS 2 TIMES DAILY
Qty: 15 ML | Refills: 5 | Status: SHIPPED | OUTPATIENT
Start: 2025-06-12

## 2025-06-12 NOTE — ASSESSMENT & PLAN NOTE
I followed up with patient at [facility] assisted in facility for his COPD and his pulmonary hypertension.    His COPD is at baseline: He denies increased cough, wheezing, chest tightness or shortness of breath.    His lungs were clear to auscultation, oxygen saturation was 93% on oxygen via nasal cannula.    He will continue Trelegy and duoneb nebulizers

## 2025-06-12 NOTE — ASSESSMENT & PLAN NOTE
Patient has pulmonary hypertension likely due to severe COPD  Reviewed echo report from this April.    He gained 4 pounds compared to May and he still has lower extremity edema.  Labs showed normal electrolytes on May 21 and renal function was at baseline with a GFR of 64.    He will continue torsemide 20 mg twice daily  I asked him to decrease salty food intake

## 2025-06-12 NOTE — PROGRESS NOTES
Name: Kyler Hernandez      : 1935      MRN: [id number]  Encounter Provider: Gordon Álvarez MD  Encounter Date: 2025   Encounter department: [facility] [address]  :  Assessment & Plan  Chronic obstructive pulmonary disease, unspecified COPD type (HCC)  I followed up with patient at [facility] assisted in facility for his COPD and his pulmonary hypertension.    His COPD is at baseline: He denies increased cough, wheezing, chest tightness or shortness of breath.    His lungs were clear to auscultation, oxygen saturation was 93% on oxygen via nasal cannula.    He will continue Trelegy and duoneb nebulizers       Pulmonary hypertension (HCC)  Patient has pulmonary hypertension likely due to severe COPD  Reviewed echo report from this April.    He gained 4 pounds compared to May and he still has lower extremity edema.  Labs showed normal electrolytes on May 21 and renal function was at baseline with a GFR of 64.    He will continue torsemide 20 mg twice daily  I asked him to decrease salty food intake           Type 2 diabetes mellitus with hyperglycemia, with long-term current use of insulin ([facility])    Lab Results   Component Value Date    HGBA1C 10.0 (H) 2025     His diabetes is uncontrolled: A1c increased to 10.0.  He told me that he is not able to follow a low-carb diet at [facility].  He had no  microalbuminuria on May 21    I reviewed patient's blood pressure readings that show that his blood sugars have been well over 250 sometimes as high as 362 for the last week    He was getting Lantus 35 minutes in the morning and 3 units at bedtime besides taking Jardiance 10 mg and Prandin.    I asked him to try to decrease his sweet and carb intake.  I increased the dose of Lantus to 40 units twice a day and increased the dose of Jardiance to 25 mg daily    I will reassess him in 4 weeks.    Orders:  •  Insulin Glargine Solostar (Lantus SoloStar) 100 UNIT/ML SOPN; Inject  0.4 mL (40 Units total) under the skin in the morning and 0.4 mL (40 Units total) before bedtime.  •  Empagliflozin 25 MG TABS; Take 1 tablet (25 mg total) by mouth daily           History of Present Illness   HPI  Review of Systems    Objective   /59   Pulse 88   Temp (!) 97.2 °F (36.2 °C)   Resp 16   Wt 105 kg (232 lb)   BMI 35.28 kg/m²      Physical Exam  Constitutional:       General: He is not in acute distress.     Appearance: He is obese. He is not toxic-appearing.     Cardiovascular:      Rate and Rhythm: Normal rate and regular rhythm.   Pulmonary:      Effort: No respiratory distress.      Breath sounds: No wheezing or rales.     Neurological:      Mental Status: He is alert.

## 2025-06-12 NOTE — ASSESSMENT & PLAN NOTE
Lab Results   Component Value Date    HGBA1C 10.0 (H) 05/21/2025     His diabetes is uncontrolled: A1c increased to 10.0.  He told me that he is not able to follow a low-carb diet at [facility].  He had no  microalbuminuria on May 21    I reviewed patient's blood pressure readings that show that his blood sugars have been well over 250 sometimes as high as 362 for the last week    He was getting Lantus 35 minutes in the morning and 3 units at bedtime besides taking Jardiance 10 mg and Prandin.    I asked him to try to decrease his sweet and carb intake.  I increased the dose of Lantus to 40 units twice a day and increased the dose of Jardiance to 25 mg daily    I will reassess him in 4 weeks.    Orders:  •  Insulin Glargine Solostar (Lantus SoloStar) 100 UNIT/ML SOPN; Inject 0.4 mL (40 Units total) under the skin in the morning and 0.4 mL (40 Units total) before bedtime.  •  Empagliflozin 25 MG TABS; Take 1 tablet (25 mg total) by mouth daily

## 2025-06-15 ENCOUNTER — NURSE TRIAGE (OUTPATIENT)
Dept: OTHER | Facility: OTHER | Age: OVER 89
End: 2025-06-15

## 2025-06-15 DIAGNOSIS — Z79.4 TYPE 2 DIABETES MELLITUS WITH HYPERGLYCEMIA, WITH LONG-TERM CURRENT USE OF INSULIN (HCC): ICD-10-CM

## 2025-06-15 DIAGNOSIS — J44.1 COPD WITH ACUTE EXACERBATION (HCC): ICD-10-CM

## 2025-06-15 DIAGNOSIS — E11.65 TYPE 2 DIABETES MELLITUS WITH HYPERGLYCEMIA, WITH LONG-TERM CURRENT USE OF INSULIN (HCC): ICD-10-CM

## 2025-06-15 RX ORDER — METOPROLOL SUCCINATE 50 MG/1
50 TABLET, EXTENDED RELEASE ORAL DAILY
Qty: 90 TABLET | Refills: 3 | Status: SHIPPED | OUTPATIENT
Start: 2025-06-15 | End: 2025-07-15

## 2025-06-15 NOTE — TELEPHONE ENCOUNTER
"Regarding: Metorpolol med refill  ----- Message from Alexandro LEÓN sent at 6/15/2025 12:34 PM EDT -----  Josiah ARRIETA from [facility] stated, \"Pt is completely out of his Metoprolol succinate (TOPROL-XL) 50 mg 24 hr tablet, he does not have any for today.\"    Pharmacy: [facility] [id number] [place] [address]  Phone: [phone]    "

## 2025-06-15 NOTE — TELEPHONE ENCOUNTER
RN from [facility] calling to request refill metoprolol 50 mg 24 hr tab. Also looking for clarification on pts Jardiance dosage.       Not a triage- paged on call PCP.     Reason for Disposition   Health information question, no triage required and triager able to answer question    Protocols used: Information Only Call - No Triage-Adult-

## 2025-06-15 NOTE — PROGRESS NOTES
Telephone Call Documentation    Kyler Hernandez       1935              I spoke with nurse Josiah at [facility] sent in renewal for the metoprolol 50 mg xl daily to  [facility] and also clarified that Dr koroma had increased the empagliflozin to 25 mg daily

## 2025-06-18 ENCOUNTER — OFFICE VISIT (OUTPATIENT)
Dept: CARDIOLOGY CLINIC | Facility: CLINIC | Age: OVER 89
End: 2025-06-18
Payer: COMMERCIAL

## 2025-06-18 VITALS
DIASTOLIC BLOOD PRESSURE: 56 MMHG | WEIGHT: 235 LBS | HEIGHT: 68 IN | SYSTOLIC BLOOD PRESSURE: 108 MMHG | BODY MASS INDEX: 35.61 KG/M2 | HEART RATE: 85 BPM

## 2025-06-18 DIAGNOSIS — I25.10 CORONARY ARTERY DISEASE INVOLVING NATIVE CORONARY ARTERY OF NATIVE HEART WITHOUT ANGINA PECTORIS: Primary | ICD-10-CM

## 2025-06-18 DIAGNOSIS — I10 PRIMARY HYPERTENSION: ICD-10-CM

## 2025-06-18 PROCEDURE — 99214 OFFICE O/P EST MOD 30 MIN: CPT | Performed by: INTERNAL MEDICINE

## 2025-06-18 RX ORDER — ALBUTEROL SULFATE 90 UG/1
INHALANT RESPIRATORY (INHALATION)
COMMUNITY
Start: 2025-04-18

## 2025-06-18 NOTE — ASSESSMENT & PLAN NOTE
CAD: No angina. Continue with current medical therapy. EF normal on echocardiogram last year. LDL 87. No changes needed today.          Hyperlipidemia: Continue with statin therapy. LDL 74.

## 2025-07-07 NOTE — PROGRESS NOTES
Name: Kyler Hernandez      : 1935      MRN: [id number]  Encounter Provider: CONCEPCION Morton  Encounter Date: 2025   Encounter department: [facility]  :  Assessment & Plan  Benign prostatic hyperplasia with nocturia  Patient reports nocturia 5x/night for the past 5 years   Denies any other bothersome urinary issues   Unable to provide urine sample in the office today   PVR 14mls  Has a history of sleep apnea, wears cpap at night   Had been prescribed alfuzosin about 2 months ago but unsure if he's been taking it/noticed any improvement   Will try flomax nightly  Side effects reviewed  Recommend limiting fluids to ~2 hours prior to bedtime and cut out evening soda   Follow up in 6 months  Orders:    POCT Measure PVR    tamsulosin (FLOMAX) 0.4 mg; Take 1 capsule (0.4 mg total) by mouth daily with dinner        History of Present Illness   Kyler Hernandez is a 89 y.o. male who presents as a new patient for evaluation of BPH. He reports frequent night times wake up d/t urinary urgency about 5x/night. This has been going on for several years but has become very bothersome. He does have a history of sleep apnea for which he wears a CPAP overnight. He denies any other bothersome urinary symptoms, no daytime symptoms. He does report drinking soda every night before bed.      Review of Systems   Constitutional:  Negative for chills, diaphoresis, fatigue and fever.   Gastrointestinal:  Negative for abdominal pain and nausea.   Genitourinary:  Negative for difficulty urinating, dysuria, flank pain, frequency, hematuria and urgency.        Nocturia 5x/night   Musculoskeletal:  Negative for back pain.   Skin:  Negative for pallor.   Neurological:  Negative for dizziness, light-headedness and numbness.       Pertinent Medical History       Medical History Reviewed by provider this encounter:     .  Past Medical History   Past Medical History[1]  Past Surgical History[2]  Family History[3]    reports that he quit smoking about 80 years ago. His smoking use included cigarettes. He started smoking about 37 years ago. He has never used smokeless tobacco. He reports current alcohol use. He reports that he does not use drugs.  Current Outpatient Medications   Medication Instructions    albuterol (PROVENTIL HFA,VENTOLIN HFA) 90 mcg/act inhaler     albuterol 2.5 mg, Nebulization, Every 6 hours PRN, 1 vial q 4 hrs prn    Alcohol Swabs (Alcohol Prep Pad) 70 % PADS Other, Daily, As directed    amLODIPine (NORVASC) 10 mg, Oral, Every morning, Hypertension    Aspirin Low Dose 81 mg, Oral, Every morning, Dx: Stroke Prevention    atorvastatin (LIPITOR) 80 mg, Oral, Daily at bedtime, Hyperlipidemia    Continuous Glucose  (Dexcom G7 ) ZAYNAB     Continuous Glucose Sensor (Dexcom G7 Sensor) USE PER  DIRECTIONS. CHANGE SENSOR EVERY 10 DAYS.    Cyanocobalamin (Vitamin B-12) 2500 MCG SUBL 1 TAB UNDER THE TONGUE 5 TIMES/WK (OMIT SA,WESTON) AT 8:30AM DX: SUPPLEMENT *COLD SEAL*    cycloSPORINE (Restasis) 0.05 % ophthalmic emulsion INSTILL 1 DROP INTO BOTH EYES TWICE DAILY DX: DRY EYES **NIOSH 2 HAZARDOUS DRUG**    Empagliflozin 25 mg, Oral, Daily    finasteride (PROSCAR) 5 mg tablet 1 TAB ORALLY EVERY MORNING DX: BPH **NIOSH 3 HAZARDOUS DRUG**    fluticasone (FLONASE) 50 mcg/act nasal spray Every 24 hours    glucose blood test strip Test blood sugars 4 times a day    Insulin Glargine Solostar (LANTUS SOLOSTAR) 40 Units, Subcutaneous, 2 times daily    insulin lispro (HUMALOG KWIKPEN) 1-10 Units, Subcutaneous, 3 times daily with meals, MDD 30u, per scale. 150-200=1u, 200-250 =2u, 250-300= 3u, 300-350=4u, >350 = 6u    Insulin Pen Needle (BD AutoShield Duo) 30G X 5 MM MISC Use to inject insulin 4 times a day    ipratropium-albuterol (DUO-NEB) 0.5-2.5 mg/3 mL nebulizer solution 3 mL, Nebulization, 4 times daily PRN, 4 times daily prn wheezing, may self-administer it    isosorbide mononitrate (IMDUR) 60 mg,  "Oral, Every morning, Dx: Angina    loperamide (IMODIUM) 2 mg capsule 1 CAP ORALLY DAILY AS NEEDED FOR DIARRHEA *NOT TO EXCEED 8 CAPS/24 HR*    losartan (COZAAR) 100 mg, Oral, Every morning, Hypertension    metoprolol succinate (TOPROL-XL) 50 mg, Oral, Daily    Multiple Vitamin (Daily-Jhonatan) TABS 1 tablet, Oral, Every morning, Dx: Supplement    NovoFine Autocover Pen Needle 30G X 8 MM MISC 2 pen needles daily    Nutritional Supplements (Ensure) Take by mouth 1 can daily    nystatin (MYCOSTATIN) powder Topical, 2 times daily    pantoprazole (PROTONIX) 40 mg, Oral, Every morning, GERD    repaglinide (PRANDIN) 0.5 mg tablet Take 1 tablet with breakfast and 1 tablet with dinner, hold if not eating or blood sugar less than 100    Safety Lancets MISC Does not apply, Daily, Use to test sugars 4 times a day    tamsulosin (FLOMAX) 0.4 mg, Oral, Daily with dinner    torsemide (DEMADEX) 20 mg tablet Take 1 tablet (20 mg total) by mouth every morning AND 1 tablet (20 mg total) daily after lunch.    Trelegy Ellipta 200-62.5-25 MCG/ACT AEPB inhaler INHALE 1 PUFF ORALLY EVERY MORNING DX: COPD *RINSE MOUTH AFTER USE*    Vitamin D3 1,000 Units, Oral, Daily, 1 daily   Allergies[4]   Medications Ordered Prior to Encounter[5]   Social History[6]     Objective   /62 (BP Location: Left arm, Patient Position: Sitting, Cuff Size: Adult)   Pulse 71   Ht 5' 8\" (1.727 m)   Wt 107 kg (235 lb)   SpO2 92%   BMI 35.73 kg/m²     Physical Exam  Constitutional:       Appearance: He is obese.      Interventions: Nasal cannula in place.   HENT:      Head: Normocephalic and atraumatic.     Eyes:      Conjunctiva/sclera: Conjunctivae normal.     Pulmonary:      Effort: No respiratory distress.      Comments: Supplemental O2    Musculoskeletal:         General: Normal range of motion.      Cervical back: Normal range of motion.     Skin:     General: Skin is warm and dry.     Neurological:      General: No focal deficit present.      Mental " Status: He is alert and oriented to person, place, and time.     Psychiatric:         Mood and Affect: Mood normal.         Behavior: Behavior normal.           Results   Lab Results   Component Value Date    CALCIUM 8.5 05/21/2025    K 4.2 05/21/2025    CO2 35 (H) 05/21/2025    CL 99 05/21/2025    BUN 24 05/21/2025    CREATININE 1.03 05/21/2025     Lab Results   Component Value Date    WBC 13.10 (H) 05/09/2025    HGB 12.7 05/09/2025    HCT 38.8 05/09/2025    MCV 92 05/09/2025     05/09/2025       Office Urine Dip  Recent Results (from the past hour)   POCT Measure PVR    Collection Time: 07/08/25  9:52 AM   Result Value Ref Range    POST-VOID RESIDUAL VOLUME, ML POC 14 mL            [1]   Past Medical History:  Diagnosis Date    Atherosclerotic heart disease of native coronary artery without angina pectoris     Chronic obstructive pulmonary disease, unspecified COPD type (HCC)     Diabetes mellitus (HCC)     Dry eye syndrome     Hyperlipidemia     Hypertension     Nonrheumatic mitral (valve) insufficiency     Obstructive sleep apnea (adult) (pediatric)     Vitamin B12 deficiency     Vitamin D deficiency    [2]   Past Surgical History:  Procedure Laterality Date    HERNIA REPAIR     [3]   Family History  Problem Relation Name Age of Onset    Dementia Mother      Diabetes Mother      Peripheral vascular disease Father      Mental illness Neg Hx      Substance Abuse Neg Hx     [4]   Allergies  Allergen Reactions    Mounjaro [Tirzepatide] Diarrhea   [5]   Current Outpatient Medications on File Prior to Visit   Medication Sig Dispense Refill    albuterol (2.5 mg/3 mL) 0.083 % nebulizer solution Take 3 mL (2.5 mg total) by nebulization every 6 (six) hours as needed for wheezing or shortness of breath 1 vial q 4 hrs prn 180 mL 5    albuterol (PROVENTIL HFA,VENTOLIN HFA) 90 mcg/act inhaler       Alcohol Swabs (Alcohol Prep Pad) 70 % PADS Use in the morning As directed 100 each 5    amLODIPine (NORVASC) 10 mg tablet  1 TAB ORALLY EVERY MORNING DX: HYPERTENSION 30 tablet 5    aspirin (Aspirin Low Dose) 81 mg EC tablet 1 TAB ORALLY EVERY MORNING DX: STROKE PREVENTION 30 tablet 5    atorvastatin (LIPITOR) 80 mg tablet 1 TAB ORALLY AT BEDTIME DX:HYPERLIPIDEMIA 30 tablet 10    Cholecalciferol (Vitamin D3) 25 MCG (1000 UT) CAPS Take 1 capsule (1,000 Units total) by mouth in the morning 1 daily 30 capsule 11    Continuous Glucose  (Dexcom G7 ) ZAYNAB       Continuous Glucose Sensor (Dexcom G7 Sensor) USE PER  DIRECTIONS. CHANGE SENSOR EVERY 10 DAYS. 9 each 1    Cyanocobalamin (Vitamin B-12) 2500 MCG SUBL 1 TAB UNDER THE TONGUE 5 TIMES/WK (OMIT SA,[place]) AT 8:30AM DX: SUPPLEMENT *COLD SEAL* 20 tablet 4    cycloSPORINE (Restasis) 0.05 % ophthalmic emulsion INSTILL 1 DROP INTO BOTH EYES TWICE DAILY DX: DRY EYES **NIOSH 2 HAZARDOUS DRUG** 5.5 mL 4    Empagliflozin 25 MG TABS Take 1 tablet (25 mg total) by mouth daily 30 tablet 5    finasteride (PROSCAR) 5 mg tablet 1 TAB ORALLY EVERY MORNING DX: BPH **NIOSH 3 HAZARDOUS DRUG** 30 tablet 5    fluticasone (FLONASE) 50 mcg/act nasal spray every 24 hours      glucose blood test strip Test blood sugars 4 times a day 400 strip 2    Insulin Glargine Solostar (Lantus SoloStar) 100 UNIT/ML SOPN Inject 0.4 mL (40 Units total) under the skin in the morning and 0.4 mL (40 Units total) before bedtime. 15 mL 5    insulin lispro (HumaLOG KwikPen) 100 units/mL injection pen Inject 1-10 Units under the skin 3 (three) times a day with meals MDD 30u, per scale. 150-200=1u, 200-250 =2u, 250-300= 3u, 300-350=4u, >350 = 6u 15 mL 1    Insulin Pen Needle (BD AutoShield Duo) 30G X 5 MM MISC Use to inject insulin 4 times a day 400 each 1    ipratropium-albuterol (DUO-NEB) 0.5-2.5 mg/3 mL nebulizer solution Take 3 mL by nebulization 4 (four) times a day as needed for wheezing or shortness of breath 4 times daily prn wheezing, may self-administer it 360 mL 5    isosorbide mononitrate (IMDUR) 60  mg 24 hr tablet 1 TAB ORALLY EVERY MORNING DX:ANGINA 30 tablet 5    loperamide (IMODIUM) 2 mg capsule 1 CAP ORALLY DAILY AS NEEDED FOR DIARRHEA *NOT TO EXCEED 8 CAPS/24 HR* 30 capsule 4    losartan (COZAAR) 100 MG tablet 1 TAB ORALLY EVERY MORNING DX: HYPERTENSION 30 tablet 10    metoprolol succinate (TOPROL-XL) 50 mg 24 hr tablet Take 1 tablet (50 mg total) by mouth daily 90 tablet 3    Multiple Vitamin (Daily-Jhonatan) TABS 1 TAB ORALLY EVERY MORNING DX: SUPPLEMENT 30 tablet 5    NovoFine Autocover Pen Needle 30G X 8 MM MISC 2 pen needles daily      Nutritional Supplements (Ensure) Take by mouth 1 can daily      pantoprazole (PROTONIX) 40 mg tablet 1 TAB ORALLY EVERY MORNING DX: GERD 30 tablet 5    repaglinide (PRANDIN) 0.5 mg tablet Take 1 tablet with breakfast and 1 tablet with dinner, hold if not eating or blood sugar less than 100 180 tablet 1    Safety Lancets MISC Use in the morning Use to test sugars 4 times a day 400 each 2    Trelegy Ellipta 200-62.5-25 MCG/ACT AEPB inhaler INHALE 1 PUFF ORALLY EVERY MORNING DX: COPD *RINSE MOUTH AFTER USE* 60 blister 0    [DISCONTINUED] alfuzosin (UROXATRAL) 10 mg 24 hr tablet 1 TAB ORALLY EVERY MORNING DX: BPH 30 tablet 5    nystatin (MYCOSTATIN) powder Apply topically 2 (two) times a day (Patient not taking: Reported on 2025) 15 g 0    torsemide (DEMADEX) 20 mg tablet Take 1 tablet (20 mg total) by mouth every morning AND 1 tablet (20 mg total) daily after lunch. 30 tablet 5     No current facility-administered medications on file prior to visit.   [6]   Social History  Tobacco Use    Smoking status: Former     Types: Cigarettes     Start date:      Quit date: 1945     Years since quittin.5    Smokeless tobacco: Never   Vaping Use    Vaping status: Never Used   Substance and Sexual Activity    Alcohol use: Yes     Comment: occasional whiskey sour    Drug use: Never    Sexual activity: Not Currently

## 2025-07-08 ENCOUNTER — TELEPHONE (OUTPATIENT)
Age: OVER 89
End: 2025-07-08

## 2025-07-08 ENCOUNTER — OFFICE VISIT (OUTPATIENT)
Dept: UROLOGY | Facility: HOSPITAL | Age: OVER 89
End: 2025-07-08
Payer: COMMERCIAL

## 2025-07-08 VITALS
SYSTOLIC BLOOD PRESSURE: 122 MMHG | HEIGHT: 68 IN | BODY MASS INDEX: 35.61 KG/M2 | DIASTOLIC BLOOD PRESSURE: 62 MMHG | WEIGHT: 235 LBS | HEART RATE: 71 BPM | OXYGEN SATURATION: 92 %

## 2025-07-08 DIAGNOSIS — R35.1 BENIGN PROSTATIC HYPERPLASIA WITH NOCTURIA: Primary | ICD-10-CM

## 2025-07-08 DIAGNOSIS — N40.1 BENIGN PROSTATIC HYPERPLASIA WITH NOCTURIA: Primary | ICD-10-CM

## 2025-07-08 LAB — POST-VOID RESIDUAL VOLUME, ML POC: 14 ML

## 2025-07-08 PROCEDURE — 99204 OFFICE O/P NEW MOD 45 MIN: CPT

## 2025-07-08 PROCEDURE — 51798 US URINE CAPACITY MEASURE: CPT

## 2025-07-08 RX ORDER — TAMSULOSIN HYDROCHLORIDE 0.4 MG/1
0.4 CAPSULE ORAL
Qty: 90 CAPSULE | Refills: 0 | Status: SHIPPED | OUTPATIENT
Start: 2025-07-08 | End: 2025-07-08

## 2025-07-08 RX ORDER — TAMSULOSIN HYDROCHLORIDE 0.4 MG/1
0.4 CAPSULE ORAL
Qty: 90 CAPSULE | Refills: 2 | Status: SHIPPED | OUTPATIENT
Start: 2025-07-08

## 2025-07-08 NOTE — ASSESSMENT & PLAN NOTE
Patient reports nocturia 5x/night for the past 5 years   Denies any other bothersome urinary issues   Unable to provide urine sample in the office today   PVR 14mls  Has a history of sleep apnea, wears cpap at night   Had been prescribed alfuzosin about 2 months ago but unsure if he's been taking it/noticed any improvement   Will try flomax nightly  Side effects reviewed  Recommend limiting fluids to ~2 hours prior to bedtime and cut out evening soda   Follow up in 6 months  Orders:    POCT Measure PVR    tamsulosin (FLOMAX) 0.4 mg; Take 1 capsule (0.4 mg total) by mouth daily with dinner

## 2025-07-08 NOTE — TELEPHONE ENCOUNTER
Patient calling to inform Joelle that he never received Flomax from [facility] per the discuss ion at today's visit . He will start it tomorrow

## 2025-07-28 ENCOUNTER — OFFICE VISIT (OUTPATIENT)
Dept: ENDOCRINOLOGY | Facility: HOSPITAL | Age: OVER 89
End: 2025-07-28
Payer: COMMERCIAL

## 2025-07-28 VITALS
BODY MASS INDEX: 35.01 KG/M2 | HEART RATE: 88 BPM | HEIGHT: 68 IN | SYSTOLIC BLOOD PRESSURE: 120 MMHG | OXYGEN SATURATION: 92 % | WEIGHT: 231 LBS | DIASTOLIC BLOOD PRESSURE: 68 MMHG

## 2025-07-28 DIAGNOSIS — E66.01 OBESITY, MORBID (HCC): ICD-10-CM

## 2025-07-28 DIAGNOSIS — Z79.4 TYPE 2 DIABETES MELLITUS WITH HYPERGLYCEMIA, WITH LONG-TERM CURRENT USE OF INSULIN (HCC): Primary | ICD-10-CM

## 2025-07-28 DIAGNOSIS — I25.10 CORONARY ARTERY DISEASE INVOLVING NATIVE CORONARY ARTERY OF NATIVE HEART WITHOUT ANGINA PECTORIS: ICD-10-CM

## 2025-07-28 DIAGNOSIS — I10 PRIMARY HYPERTENSION: ICD-10-CM

## 2025-07-28 DIAGNOSIS — E78.2 MIXED HYPERLIPIDEMIA: ICD-10-CM

## 2025-07-28 DIAGNOSIS — Z79.4 TYPE 2 DIABETES MELLITUS WITH OTHER SPECIFIED COMPLICATION, WITH LONG-TERM CURRENT USE OF INSULIN (HCC): ICD-10-CM

## 2025-07-28 DIAGNOSIS — E11.69 TYPE 2 DIABETES MELLITUS WITH OTHER SPECIFIED COMPLICATION, WITH LONG-TERM CURRENT USE OF INSULIN (HCC): ICD-10-CM

## 2025-07-28 DIAGNOSIS — E11.65 TYPE 2 DIABETES MELLITUS WITH HYPERGLYCEMIA, WITH LONG-TERM CURRENT USE OF INSULIN (HCC): Primary | ICD-10-CM

## 2025-07-28 PROCEDURE — 95251 CONT GLUC MNTR ANALYSIS I&R: CPT | Performed by: STUDENT IN AN ORGANIZED HEALTH CARE EDUCATION/TRAINING PROGRAM

## 2025-07-28 PROCEDURE — 99214 OFFICE O/P EST MOD 30 MIN: CPT | Performed by: STUDENT IN AN ORGANIZED HEALTH CARE EDUCATION/TRAINING PROGRAM

## 2025-07-28 RX ORDER — INSULIN GLARGINE 100 [IU]/ML
45 INJECTION, SOLUTION SUBCUTANEOUS 2 TIMES DAILY
Qty: 45 ML | Refills: 5 | Status: SHIPPED | OUTPATIENT
Start: 2025-07-28

## 2025-07-28 RX ORDER — INSULIN LISPRO 100 [IU]/ML
15 INJECTION, SOLUTION INTRAVENOUS; SUBCUTANEOUS
Qty: 45 ML | Refills: 1 | Status: SHIPPED | OUTPATIENT
Start: 2025-07-28

## 2025-07-30 DIAGNOSIS — I10 PRIMARY HYPERTENSION: ICD-10-CM

## 2025-07-31 RX ORDER — TORSEMIDE 20 MG/1
TABLET ORAL
Qty: 60 TABLET | Refills: 0 | Status: SHIPPED | OUTPATIENT
Start: 2025-07-31

## 2025-08-08 ENCOUNTER — TELEPHONE (OUTPATIENT)
Age: OVER 89
End: 2025-08-08

## 2025-08-21 DIAGNOSIS — N40.1 BENIGN PROSTATIC HYPERPLASIA WITH NOCTURIA: ICD-10-CM

## 2025-08-21 DIAGNOSIS — J44.1 COPD WITH ACUTE EXACERBATION (HCC): ICD-10-CM

## 2025-08-21 DIAGNOSIS — I10 PRIMARY HYPERTENSION: ICD-10-CM

## 2025-08-21 DIAGNOSIS — Z79.4 TYPE 2 DIABETES MELLITUS WITH HYPERGLYCEMIA, WITH LONG-TERM CURRENT USE OF INSULIN (HCC): ICD-10-CM

## 2025-08-21 DIAGNOSIS — E78.2 MIXED HYPERLIPIDEMIA: ICD-10-CM

## 2025-08-21 DIAGNOSIS — R19.7 DIARRHEA, UNSPECIFIED TYPE: ICD-10-CM

## 2025-08-21 DIAGNOSIS — R35.1 BENIGN PROSTATIC HYPERPLASIA WITH NOCTURIA: ICD-10-CM

## 2025-08-21 DIAGNOSIS — E11.65 TYPE 2 DIABETES MELLITUS WITH HYPERGLYCEMIA, WITH LONG-TERM CURRENT USE OF INSULIN (HCC): ICD-10-CM

## 2025-08-22 RX ORDER — TORSEMIDE 20 MG/1
TABLET ORAL
Qty: 60 TABLET | Refills: 10 | Status: SHIPPED | OUTPATIENT
Start: 2025-08-22

## 2025-08-22 RX ORDER — ATORVASTATIN CALCIUM 80 MG/1
TABLET, FILM COATED ORAL
Qty: 30 TABLET | Refills: 10 | Status: SHIPPED | OUTPATIENT
Start: 2025-08-22

## 2025-08-22 RX ORDER — EMPAGLIFLOZIN 25 MG/1
TABLET, FILM COATED ORAL
Qty: 30 TABLET | Refills: 10 | Status: SHIPPED | OUTPATIENT
Start: 2025-08-22

## 2025-08-22 RX ORDER — LOSARTAN POTASSIUM 100 MG/1
TABLET ORAL
Qty: 30 TABLET | Refills: 10 | Status: SHIPPED | OUTPATIENT
Start: 2025-08-22

## 2025-08-22 RX ORDER — PANTOPRAZOLE SODIUM 40 MG/1
TABLET, DELAYED RELEASE ORAL
Qty: 30 TABLET | Refills: 10 | Status: SHIPPED | OUTPATIENT
Start: 2025-08-22

## 2025-08-22 RX ORDER — FINASTERIDE 5 MG/1
TABLET, FILM COATED ORAL
Qty: 30 TABLET | Refills: 10 | Status: SHIPPED | OUTPATIENT
Start: 2025-08-22

## 2025-08-22 RX ORDER — TAMSULOSIN HYDROCHLORIDE 0.4 MG/1
CAPSULE ORAL
Qty: 30 CAPSULE | Refills: 10 | Status: SHIPPED | OUTPATIENT
Start: 2025-08-22

## 2025-08-22 RX ORDER — REPAGLINIDE 0.5 MG/1
TABLET ORAL
Qty: 60 TABLET | Refills: 10 | Status: SHIPPED | OUTPATIENT
Start: 2025-08-22

## 2025-08-22 RX ORDER — AMLODIPINE BESYLATE 10 MG/1
TABLET ORAL
Qty: 30 TABLET | Refills: 10 | Status: SHIPPED | OUTPATIENT
Start: 2025-08-22

## 2025-08-22 RX ORDER — ISOSORBIDE MONONITRATE 60 MG/1
TABLET, EXTENDED RELEASE ORAL
Qty: 30 TABLET | Refills: 10 | Status: SHIPPED | OUTPATIENT
Start: 2025-08-22

## 2025-08-22 RX ORDER — METOPROLOL SUCCINATE 50 MG/1
TABLET, EXTENDED RELEASE ORAL
Qty: 30 TABLET | Refills: 10 | Status: SHIPPED | OUTPATIENT
Start: 2025-08-22